# Patient Record
Sex: FEMALE | Race: WHITE | NOT HISPANIC OR LATINO | Employment: UNEMPLOYED | URBAN - NONMETROPOLITAN AREA
[De-identification: names, ages, dates, MRNs, and addresses within clinical notes are randomized per-mention and may not be internally consistent; named-entity substitution may affect disease eponyms.]

---

## 2017-03-23 ENCOUNTER — HOSPITAL ENCOUNTER (INPATIENT)
Facility: HOSPITAL | Age: 57
LOS: 1 days | Discharge: LEFT BEFORE DISCHARGE | End: 2017-03-24
Attending: EMERGENCY MEDICINE | Admitting: HOSPITALIST

## 2017-03-23 ENCOUNTER — APPOINTMENT (OUTPATIENT)
Dept: GENERAL RADIOLOGY | Facility: HOSPITAL | Age: 57
End: 2017-03-23

## 2017-03-23 DIAGNOSIS — N17.9 ACUTE RENAL FAILURE, UNSPECIFIED ACUTE RENAL FAILURE TYPE (HCC): ICD-10-CM

## 2017-03-23 DIAGNOSIS — N39.0 URINARY TRACT INFECTION, SITE UNSPECIFIED: ICD-10-CM

## 2017-03-23 DIAGNOSIS — F19.10 POLYSUBSTANCE ABUSE (HCC): ICD-10-CM

## 2017-03-23 DIAGNOSIS — E87.6 HYPOKALEMIA: ICD-10-CM

## 2017-03-23 DIAGNOSIS — I95.9 HYPOTENSION, UNSPECIFIED HYPOTENSION TYPE: Primary | ICD-10-CM

## 2017-03-23 PROBLEM — D75.839 THROMBOCYTOSIS: Status: ACTIVE | Noted: 2017-03-23

## 2017-03-23 PROBLEM — D72.829 LEUKOCYTOSIS: Status: ACTIVE | Noted: 2017-03-23

## 2017-03-23 PROBLEM — I10 ESSENTIAL HYPERTENSION: Status: ACTIVE | Noted: 2017-03-23

## 2017-03-23 PROBLEM — G40.909 SEIZURE DISORDER (HCC): Status: ACTIVE | Noted: 2017-03-23

## 2017-03-23 PROBLEM — F17.200 TOBACCO USE DISORDER: Status: ACTIVE | Noted: 2017-03-23

## 2017-03-23 PROBLEM — F32.A DEPRESSION: Status: ACTIVE | Noted: 2017-03-23

## 2017-03-23 PROBLEM — E11.9 TYPE 2 DIABETES MELLITUS (HCC): Status: ACTIVE | Noted: 2017-03-23

## 2017-03-23 PROBLEM — J44.9 COPD (CHRONIC OBSTRUCTIVE PULMONARY DISEASE) (HCC): Status: ACTIVE | Noted: 2017-03-23

## 2017-03-23 PROBLEM — E86.0 DEHYDRATION: Status: ACTIVE | Noted: 2017-03-23

## 2017-03-23 LAB
ALBUMIN SERPL-MCNC: 4.3 G/DL (ref 3.5–5)
ALBUMIN/GLOB SERPL: 1.1 G/DL (ref 1–2)
ALP SERPL-CCNC: 87 U/L (ref 38–126)
ALT SERPL W P-5'-P-CCNC: 15 U/L (ref 13–69)
AMPHET+METHAMPHET UR QL: NEGATIVE
AMPHETAMINES UR QL: NEGATIVE
ANION GAP SERPL CALCULATED.3IONS-SCNC: 17.1 MMOL/L
AST SERPL-CCNC: 14 U/L (ref 15–46)
B-HCG UR QL: NEGATIVE
BACTERIA UR QL AUTO: ABNORMAL /HPF
BARBITURATES UR QL SCN: NEGATIVE
BASOPHILS # BLD AUTO: 0.07 10*3/MM3 (ref 0–0.2)
BASOPHILS # BLD AUTO: 0.08 10*3/MM3 (ref 0–0.2)
BASOPHILS NFR BLD AUTO: 0.4 % (ref 0–2.5)
BASOPHILS NFR BLD AUTO: 0.4 % (ref 0–2.5)
BENZODIAZ UR QL SCN: POSITIVE
BILIRUB SERPL-MCNC: 0.5 MG/DL (ref 0.2–1.3)
BILIRUB UR QL STRIP: ABNORMAL
BUN BLD-MCNC: 33 MG/DL (ref 7–20)
BUN/CREAT SERPL: 12.2 (ref 7.1–23.5)
BUPRENORPHINE SERPL-MCNC: NEGATIVE NG/ML
CALCIUM SPEC-SCNC: 10.3 MG/DL (ref 8.4–10.2)
CANNABINOIDS SERPL QL: POSITIVE
CHLORIDE SERPL-SCNC: 103 MMOL/L (ref 98–107)
CLARITY UR: ABNORMAL
CO2 SERPL-SCNC: 19 MMOL/L (ref 26–30)
COCAINE UR QL: NEGATIVE
COLOR UR: ABNORMAL
CREAT BLD-MCNC: 2.7 MG/DL (ref 0.6–1.3)
D-LACTATE SERPL-SCNC: 0.7 MMOL/L (ref 0.5–2)
D-LACTATE SERPL-SCNC: 2.4 MMOL/L (ref 0.5–2)
DEPRECATED RDW RBC AUTO: 40.8 FL (ref 37–54)
DEPRECATED RDW RBC AUTO: 40.9 FL (ref 37–54)
EOSINOPHIL # BLD AUTO: 0.05 10*3/MM3 (ref 0–0.7)
EOSINOPHIL # BLD AUTO: 0.07 10*3/MM3 (ref 0–0.7)
EOSINOPHIL NFR BLD AUTO: 0.3 % (ref 0–7)
EOSINOPHIL NFR BLD AUTO: 0.4 % (ref 0–7)
ERYTHROCYTE [DISTWIDTH] IN BLOOD BY AUTOMATED COUNT: 14.3 % (ref 11.5–14.5)
ERYTHROCYTE [DISTWIDTH] IN BLOOD BY AUTOMATED COUNT: 14.3 % (ref 11.5–14.5)
ETHANOL BLD-MCNC: <10 MG/DL
ETHANOL UR QL: <0.01 %
GFR SERPL CREATININE-BSD FRML MDRD: 18 ML/MIN/1.73
GLOBULIN UR ELPH-MCNC: 3.8 GM/DL
GLUCOSE BLD-MCNC: 111 MG/DL (ref 74–98)
GLUCOSE UR STRIP-MCNC: NEGATIVE MG/DL
HCT VFR BLD AUTO: 38.3 % (ref 37–47)
HCT VFR BLD AUTO: 39 % (ref 37–47)
HGB BLD-MCNC: 13.9 G/DL (ref 12–16)
HGB BLD-MCNC: 14 G/DL (ref 12–16)
HGB UR QL STRIP.AUTO: ABNORMAL
HOLD SPECIMEN: NORMAL
HYALINE CASTS UR QL AUTO: ABNORMAL /LPF
IMM GRANULOCYTES # BLD: 0.2 10*3/MM3 (ref 0–0.06)
IMM GRANULOCYTES # BLD: 0.2 10*3/MM3 (ref 0–0.06)
IMM GRANULOCYTES NFR BLD: 1.1 % (ref 0–0.6)
IMM GRANULOCYTES NFR BLD: 1.1 % (ref 0–0.6)
KETONES UR QL STRIP: ABNORMAL
LEUKOCYTE ESTERASE UR QL STRIP.AUTO: ABNORMAL
LIPASE SERPL-CCNC: 547 U/L (ref 23–300)
LYMPHOCYTES # BLD AUTO: 3.65 10*3/MM3 (ref 0.6–3.4)
LYMPHOCYTES # BLD AUTO: 3.65 10*3/MM3 (ref 0.6–3.4)
LYMPHOCYTES NFR BLD AUTO: 19.4 % (ref 10–50)
LYMPHOCYTES NFR BLD AUTO: 19.5 % (ref 10–50)
MAGNESIUM SERPL-MCNC: 2.6 MG/DL (ref 1.6–2.3)
MCH RBC QN AUTO: 28.4 PG (ref 27–31)
MCH RBC QN AUTO: 28.7 PG (ref 27–31)
MCHC RBC AUTO-ENTMCNC: 35.9 G/DL (ref 30–37)
MCHC RBC AUTO-ENTMCNC: 36.3 G/DL (ref 30–37)
MCV RBC AUTO: 79 FL (ref 81–99)
MCV RBC AUTO: 79.1 FL (ref 81–99)
METHADONE UR QL SCN: NEGATIVE
MONOCYTES # BLD AUTO: 1.13 10*3/MM3 (ref 0–0.9)
MONOCYTES # BLD AUTO: 1.15 10*3/MM3 (ref 0–0.9)
MONOCYTES NFR BLD AUTO: 6 % (ref 0–12)
MONOCYTES NFR BLD AUTO: 6.1 % (ref 0–12)
NEUTROPHILS # BLD AUTO: 13.61 10*3/MM3 (ref 2–6.9)
NEUTROPHILS # BLD AUTO: 13.72 10*3/MM3 (ref 2–6.9)
NEUTROPHILS NFR BLD AUTO: 72.6 % (ref 37–80)
NEUTROPHILS NFR BLD AUTO: 72.7 % (ref 37–80)
NITRITE UR QL STRIP: NEGATIVE
NRBC BLD MANUAL-RTO: 0 /100 WBC (ref 0–0)
NRBC BLD MANUAL-RTO: 0 /100 WBC (ref 0–0)
OPIATES UR QL: NEGATIVE
OXYCODONE UR QL SCN: POSITIVE
PCP UR QL SCN: NEGATIVE
PH UR STRIP.AUTO: <=5 [PH] (ref 5–8)
PLATELET # BLD AUTO: 509 10*3/MM3 (ref 130–400)
PLATELET # BLD AUTO: 518 10*3/MM3 (ref 130–400)
PMV BLD AUTO: 9.1 FL (ref 6–12)
PMV BLD AUTO: 9.3 FL (ref 6–12)
POTASSIUM BLD-SCNC: 2.1 MMOL/L (ref 3.5–5.1)
PROPOXYPH UR QL: NEGATIVE
PROT SERPL-MCNC: 8.1 G/DL (ref 6.3–8.2)
PROT UR QL STRIP: ABNORMAL
RBC # BLD AUTO: 4.85 10*6/MM3 (ref 4.2–5.4)
RBC # BLD AUTO: 4.93 10*6/MM3 (ref 4.2–5.4)
RBC # UR: ABNORMAL /HPF
REF LAB TEST METHOD: ABNORMAL
SODIUM BLD-SCNC: 137 MMOL/L (ref 137–145)
SP GR UR STRIP: 1.02 (ref 1–1.03)
SQUAMOUS #/AREA URNS HPF: ABNORMAL /HPF
TRICYCLICS UR QL SCN: NEGATIVE
UROBILINOGEN UR QL STRIP: ABNORMAL
WBC CLUMPS # UR AUTO: ABNORMAL /HPF
WBC NRBC COR # BLD: 18.73 10*3/MM3 (ref 4.8–10.8)
WBC NRBC COR # BLD: 18.85 10*3/MM3 (ref 4.8–10.8)
WBC UR QL AUTO: ABNORMAL /HPF
WHOLE BLOOD HOLD SPECIMEN: NORMAL

## 2017-03-23 PROCEDURE — 25010000002 HEPARIN (PORCINE) 5000 UNIT/0.5ML SOLUTION: Performed by: HOSPITALIST

## 2017-03-23 PROCEDURE — 87086 URINE CULTURE/COLONY COUNT: CPT | Performed by: HOSPITALIST

## 2017-03-23 PROCEDURE — 87040 BLOOD CULTURE FOR BACTERIA: CPT | Performed by: HOSPITALIST

## 2017-03-23 PROCEDURE — 81025 URINE PREGNANCY TEST: CPT | Performed by: HOSPITALIST

## 2017-03-23 PROCEDURE — 83605 ASSAY OF LACTIC ACID: CPT | Performed by: EMERGENCY MEDICINE

## 2017-03-23 PROCEDURE — 81001 URINALYSIS AUTO W/SCOPE: CPT | Performed by: EMERGENCY MEDICINE

## 2017-03-23 PROCEDURE — 83735 ASSAY OF MAGNESIUM: CPT | Performed by: HOSPITALIST

## 2017-03-23 PROCEDURE — 80053 COMPREHEN METABOLIC PANEL: CPT | Performed by: EMERGENCY MEDICINE

## 2017-03-23 PROCEDURE — 80306 DRUG TEST PRSMV INSTRMNT: CPT | Performed by: EMERGENCY MEDICINE

## 2017-03-23 PROCEDURE — 71010 HC CHEST PA OR AP: CPT

## 2017-03-23 PROCEDURE — 87077 CULTURE AEROBIC IDENTIFY: CPT | Performed by: EMERGENCY MEDICINE

## 2017-03-23 PROCEDURE — 99222 1ST HOSP IP/OBS MODERATE 55: CPT | Performed by: HOSPITALIST

## 2017-03-23 PROCEDURE — 83690 ASSAY OF LIPASE: CPT | Performed by: EMERGENCY MEDICINE

## 2017-03-23 PROCEDURE — 80307 DRUG TEST PRSMV CHEM ANLYZR: CPT | Performed by: EMERGENCY MEDICINE

## 2017-03-23 PROCEDURE — 87186 SC STD MICRODIL/AGAR DIL: CPT | Performed by: EMERGENCY MEDICINE

## 2017-03-23 PROCEDURE — 87147 CULTURE TYPE IMMUNOLOGIC: CPT | Performed by: EMERGENCY MEDICINE

## 2017-03-23 PROCEDURE — 87186 SC STD MICRODIL/AGAR DIL: CPT | Performed by: HOSPITALIST

## 2017-03-23 PROCEDURE — 87086 URINE CULTURE/COLONY COUNT: CPT | Performed by: EMERGENCY MEDICINE

## 2017-03-23 PROCEDURE — 25010000003 POTASSIUM CHLORIDE 10 MEQ/100ML SOLUTION: Performed by: EMERGENCY MEDICINE

## 2017-03-23 PROCEDURE — 87040 BLOOD CULTURE FOR BACTERIA: CPT | Performed by: EMERGENCY MEDICINE

## 2017-03-23 PROCEDURE — 93005 ELECTROCARDIOGRAM TRACING: CPT | Performed by: EMERGENCY MEDICINE

## 2017-03-23 PROCEDURE — 99285 EMERGENCY DEPT VISIT HI MDM: CPT

## 2017-03-23 PROCEDURE — 87077 CULTURE AEROBIC IDENTIFY: CPT | Performed by: HOSPITALIST

## 2017-03-23 PROCEDURE — 85025 COMPLETE CBC W/AUTO DIFF WBC: CPT | Performed by: EMERGENCY MEDICINE

## 2017-03-23 PROCEDURE — 25010000002 ONDANSETRON PER 1 MG: Performed by: EMERGENCY MEDICINE

## 2017-03-23 PROCEDURE — 25010000002 CEFTRIAXONE: Performed by: EMERGENCY MEDICINE

## 2017-03-23 PROCEDURE — 87147 CULTURE TYPE IMMUNOLOGIC: CPT | Performed by: HOSPITALIST

## 2017-03-23 PROCEDURE — 87081 CULTURE SCREEN ONLY: CPT | Performed by: HOSPITALIST

## 2017-03-23 RX ORDER — HYDROXYZINE PAMOATE 25 MG/1
25 CAPSULE ORAL NIGHTLY
Status: DISCONTINUED | OUTPATIENT
Start: 2017-03-23 | End: 2017-03-24 | Stop reason: HOSPADM

## 2017-03-23 RX ORDER — OXYBUTYNIN CHLORIDE 5 MG/1
5 TABLET, EXTENDED RELEASE ORAL DAILY
Status: DISCONTINUED | OUTPATIENT
Start: 2017-03-23 | End: 2017-03-24 | Stop reason: HOSPADM

## 2017-03-23 RX ORDER — SODIUM CHLORIDE 0.9 % (FLUSH) 0.9 %
10 SYRINGE (ML) INJECTION AS NEEDED
Status: DISCONTINUED | OUTPATIENT
Start: 2017-03-23 | End: 2017-03-24 | Stop reason: HOSPADM

## 2017-03-23 RX ORDER — TIZANIDINE 4 MG/1
2 TABLET ORAL EVERY 12 HOURS SCHEDULED
Status: DISCONTINUED | OUTPATIENT
Start: 2017-03-23 | End: 2017-03-23

## 2017-03-23 RX ORDER — PANTOPRAZOLE SODIUM 40 MG/1
40 TABLET, DELAYED RELEASE ORAL
Status: DISCONTINUED | OUTPATIENT
Start: 2017-03-24 | End: 2017-03-24 | Stop reason: HOSPADM

## 2017-03-23 RX ORDER — ASPIRIN 81 MG/1
81 TABLET ORAL DAILY
Status: DISCONTINUED | OUTPATIENT
Start: 2017-03-23 | End: 2017-03-24 | Stop reason: HOSPADM

## 2017-03-23 RX ORDER — WHITE PETROLATUM 450 MG/G
1 STICK TOPICAL AS NEEDED
Status: DISCONTINUED | OUTPATIENT
Start: 2017-03-23 | End: 2017-03-24 | Stop reason: HOSPADM

## 2017-03-23 RX ORDER — SODIUM CHLORIDE 0.9 % (FLUSH) 0.9 %
1-10 SYRINGE (ML) INJECTION AS NEEDED
Status: DISCONTINUED | OUTPATIENT
Start: 2017-03-23 | End: 2017-03-24 | Stop reason: HOSPADM

## 2017-03-23 RX ORDER — POTASSIUM CHLORIDE 750 MG/1
40 CAPSULE, EXTENDED RELEASE ORAL ONCE
Status: COMPLETED | OUTPATIENT
Start: 2017-03-23 | End: 2017-03-23

## 2017-03-23 RX ORDER — LEVETIRACETAM 500 MG/1
500 TABLET ORAL 2 TIMES DAILY
COMMUNITY

## 2017-03-23 RX ORDER — LISINOPRIL 5 MG/1
5 TABLET ORAL DAILY
COMMUNITY
End: 2018-01-02

## 2017-03-23 RX ORDER — SODIUM CHLORIDE 9 MG/ML
150 INJECTION, SOLUTION INTRAVENOUS CONTINUOUS
Status: DISCONTINUED | OUTPATIENT
Start: 2017-03-23 | End: 2017-03-24 | Stop reason: HOSPADM

## 2017-03-23 RX ORDER — BUPRENORPHINE AND NALOXONE 2; .5 MG/1; MG/1
1 FILM, SOLUBLE BUCCAL; SUBLINGUAL DAILY
Status: DISCONTINUED | OUTPATIENT
Start: 2017-03-23 | End: 2017-03-23

## 2017-03-23 RX ORDER — GABAPENTIN 100 MG/1
200 CAPSULE ORAL EVERY 12 HOURS SCHEDULED
Status: DISCONTINUED | OUTPATIENT
Start: 2017-03-23 | End: 2017-03-23

## 2017-03-23 RX ORDER — OXYCODONE HYDROCHLORIDE AND ACETAMINOPHEN 5; 325 MG/1; MG/1
2 TABLET ORAL EVERY 8 HOURS PRN
Status: DISCONTINUED | OUTPATIENT
Start: 2017-03-23 | End: 2017-03-24 | Stop reason: HOSPADM

## 2017-03-23 RX ORDER — POTASSIUM CHLORIDE 7.45 MG/ML
10 INJECTION INTRAVENOUS ONCE
Status: COMPLETED | OUTPATIENT
Start: 2017-03-23 | End: 2017-03-23

## 2017-03-23 RX ORDER — ONDANSETRON 2 MG/ML
4 INJECTION INTRAMUSCULAR; INTRAVENOUS ONCE
Status: COMPLETED | OUTPATIENT
Start: 2017-03-23 | End: 2017-03-23

## 2017-03-23 RX ORDER — DEXTROSE MONOHYDRATE 25 G/50ML
25 INJECTION, SOLUTION INTRAVENOUS
Status: DISCONTINUED | OUTPATIENT
Start: 2017-03-23 | End: 2017-03-24 | Stop reason: HOSPADM

## 2017-03-23 RX ORDER — CITALOPRAM 20 MG/1
40 TABLET ORAL DAILY
Status: DISCONTINUED | OUTPATIENT
Start: 2017-03-23 | End: 2017-03-24 | Stop reason: HOSPADM

## 2017-03-23 RX ORDER — HEPARIN SODIUM 5000 [USP'U]/.5ML
5000 INJECTION, SOLUTION INTRAVENOUS; SUBCUTANEOUS EVERY 12 HOURS
Status: DISCONTINUED | OUTPATIENT
Start: 2017-03-23 | End: 2017-03-24 | Stop reason: HOSPADM

## 2017-03-23 RX ORDER — CETIRIZINE HYDROCHLORIDE 10 MG/1
5 TABLET ORAL DAILY
Status: DISCONTINUED | OUTPATIENT
Start: 2017-03-23 | End: 2017-03-24 | Stop reason: HOSPADM

## 2017-03-23 RX ORDER — FAMOTIDINE 20 MG/1
20 TABLET, FILM COATED ORAL 2 TIMES DAILY
Status: DISCONTINUED | OUTPATIENT
Start: 2017-03-23 | End: 2017-03-24 | Stop reason: HOSPADM

## 2017-03-23 RX ORDER — PREDNISONE 20 MG/1
20 TABLET ORAL DAILY
Refills: 0 | Status: ON HOLD | COMMUNITY
Start: 2017-01-02 | End: 2017-03-23

## 2017-03-23 RX ORDER — TRAZODONE HYDROCHLORIDE 50 MG/1
50 TABLET ORAL NIGHTLY
Status: DISCONTINUED | OUTPATIENT
Start: 2017-03-23 | End: 2017-03-24 | Stop reason: HOSPADM

## 2017-03-23 RX ORDER — BISACODYL 5 MG/1
5 TABLET, DELAYED RELEASE ORAL DAILY PRN
Status: DISCONTINUED | OUTPATIENT
Start: 2017-03-23 | End: 2017-03-24 | Stop reason: HOSPADM

## 2017-03-23 RX ADMIN — GABAPENTIN 200 MG: 100 CAPSULE ORAL at 13:02

## 2017-03-23 RX ADMIN — CITALOPRAM HYDROBROMIDE 40 MG: 20 TABLET, FILM COATED ORAL at 13:03

## 2017-03-23 RX ADMIN — HYDROXYZINE PAMOATE 25 MG: 25 CAPSULE ORAL at 21:28

## 2017-03-23 RX ADMIN — HEPARIN SODIUM 5000 UNITS: 5000 INJECTION, SOLUTION INTRAVENOUS; SUBCUTANEOUS at 13:01

## 2017-03-23 RX ADMIN — TIZANIDINE 2 MG: 4 TABLET ORAL at 13:02

## 2017-03-23 RX ADMIN — OXYCODONE HYDROCHLORIDE AND ACETAMINOPHEN 2 TABLET: 5; 325 TABLET ORAL at 13:02

## 2017-03-23 RX ADMIN — POTASSIUM CHLORIDE 10 MEQ: 10 INJECTION, SOLUTION INTRAVENOUS at 09:38

## 2017-03-23 RX ADMIN — SODIUM CHLORIDE 1000 ML: 9 INJECTION, SOLUTION INTRAVENOUS at 09:38

## 2017-03-23 RX ADMIN — POTASSIUM CHLORIDE 40 MEQ: 750 CAPSULE, EXTENDED RELEASE ORAL at 09:38

## 2017-03-23 RX ADMIN — CEFTRIAXONE 1 G: 1 INJECTION, POWDER, FOR SOLUTION INTRAMUSCULAR; INTRAVENOUS at 09:38

## 2017-03-23 RX ADMIN — CETIRIZINE HYDROCHLORIDE 10 MG: 10 TABLET, FILM COATED ORAL at 13:03

## 2017-03-23 RX ADMIN — FAMOTIDINE 20 MG: 20 TABLET, FILM COATED ORAL at 13:02

## 2017-03-23 RX ADMIN — OXYCODONE HYDROCHLORIDE AND ACETAMINOPHEN 2 TABLET: 5; 325 TABLET ORAL at 21:28

## 2017-03-23 RX ADMIN — FAMOTIDINE 20 MG: 20 TABLET, FILM COATED ORAL at 18:00

## 2017-03-23 RX ADMIN — SODIUM CHLORIDE 100 ML/HR: 9 INJECTION, SOLUTION INTRAVENOUS at 13:03

## 2017-03-23 RX ADMIN — ASPIRIN 81 MG: 81 TABLET, COATED ORAL at 13:03

## 2017-03-23 RX ADMIN — ONDANSETRON 4 MG: 2 INJECTION INTRAMUSCULAR; INTRAVENOUS at 08:13

## 2017-03-23 RX ADMIN — SODIUM CHLORIDE 1000 ML: 9 INJECTION, SOLUTION INTRAVENOUS at 07:30

## 2017-03-23 RX ADMIN — GABAPENTIN 200 MG: 100 CAPSULE ORAL at 21:28

## 2017-03-23 RX ADMIN — OXYBUTYNIN CHLORIDE 5 MG: 5 TABLET, EXTENDED RELEASE ORAL at 13:03

## 2017-03-24 VITALS
WEIGHT: 128.4 LBS | SYSTOLIC BLOOD PRESSURE: 98 MMHG | OXYGEN SATURATION: 100 % | TEMPERATURE: 99.1 F | DIASTOLIC BLOOD PRESSURE: 50 MMHG | HEIGHT: 60 IN | RESPIRATION RATE: 22 BRPM | BODY MASS INDEX: 25.21 KG/M2 | HEART RATE: 68 BPM

## 2017-03-24 LAB
ALBUMIN SERPL-MCNC: 3 G/DL (ref 3.5–5)
ALBUMIN/GLOB SERPL: 0.9 G/DL (ref 1–2)
ALP SERPL-CCNC: 72 U/L (ref 38–126)
ALT SERPL W P-5'-P-CCNC: 24 U/L (ref 13–69)
ANION GAP SERPL CALCULATED.3IONS-SCNC: 11.3 MMOL/L
AST SERPL-CCNC: 12 U/L (ref 15–46)
BASOPHILS # BLD AUTO: 0.08 10*3/MM3 (ref 0–0.2)
BASOPHILS NFR BLD AUTO: 0.6 % (ref 0–2.5)
BILIRUB SERPL-MCNC: 0.2 MG/DL (ref 0.2–1.3)
BUN BLD-MCNC: 20 MG/DL (ref 7–20)
BUN/CREAT SERPL: 22.2 (ref 7.1–23.5)
CALCIUM SPEC-SCNC: 9.4 MG/DL (ref 8.4–10.2)
CHLORIDE SERPL-SCNC: 113 MMOL/L (ref 98–107)
CO2 SERPL-SCNC: 19 MMOL/L (ref 26–30)
CREAT BLD-MCNC: 0.9 MG/DL (ref 0.6–1.3)
DEPRECATED RDW RBC AUTO: 43.3 FL (ref 37–54)
EOSINOPHIL # BLD AUTO: 0.21 10*3/MM3 (ref 0–0.7)
EOSINOPHIL NFR BLD AUTO: 1.5 % (ref 0–7)
ERYTHROCYTE [DISTWIDTH] IN BLOOD BY AUTOMATED COUNT: 14.6 % (ref 11.5–14.5)
GFR SERPL CREATININE-BSD FRML MDRD: 65 ML/MIN/1.73
GLOBULIN UR ELPH-MCNC: 3.3 GM/DL
GLUCOSE BLD-MCNC: 94 MG/DL (ref 74–98)
GLUCOSE BLDC GLUCOMTR-MCNC: 106 MG/DL (ref 70–130)
HBA1C MFR BLD: 5.7 % (ref 3–6)
HCT VFR BLD AUTO: 26.9 % (ref 37–47)
HGB BLD-MCNC: 9.6 G/DL (ref 12–16)
IMM GRANULOCYTES # BLD: 0.09 10*3/MM3 (ref 0–0.06)
IMM GRANULOCYTES NFR BLD: 0.7 % (ref 0–0.6)
IRON 24H UR-MRATE: 81 MCG/DL (ref 37–181)
IRON SATN MFR SERPL: 31 % (ref 11–46)
LYMPHOCYTES # BLD AUTO: 6.31 10*3/MM3 (ref 0.6–3.4)
LYMPHOCYTES NFR BLD AUTO: 46 % (ref 10–50)
MCH RBC QN AUTO: 28.8 PG (ref 27–31)
MCHC RBC AUTO-ENTMCNC: 35.7 G/DL (ref 30–37)
MCV RBC AUTO: 80.8 FL (ref 81–99)
MONOCYTES # BLD AUTO: 0.87 10*3/MM3 (ref 0–0.9)
MONOCYTES NFR BLD AUTO: 6.3 % (ref 0–12)
NEUTROPHILS # BLD AUTO: 6.15 10*3/MM3 (ref 2–6.9)
NEUTROPHILS NFR BLD AUTO: 44.9 % (ref 37–80)
NRBC BLD MANUAL-RTO: 0 /100 WBC (ref 0–0)
PLATELET # BLD AUTO: 344 10*3/MM3 (ref 130–400)
PMV BLD AUTO: 9.3 FL (ref 6–12)
POTASSIUM BLD-SCNC: 2.3 MMOL/L (ref 3.5–5.1)
PROT SERPL-MCNC: 6.3 G/DL (ref 6.3–8.2)
RBC # BLD AUTO: 3.33 10*6/MM3 (ref 4.2–5.4)
SODIUM BLD-SCNC: 141 MMOL/L (ref 137–145)
TIBC SERPL-MCNC: 260 MCG/DL (ref 261–497)
WBC NRBC COR # BLD: 13.71 10*3/MM3 (ref 4.8–10.8)

## 2017-03-24 PROCEDURE — 99221 1ST HOSP IP/OBS SF/LOW 40: CPT | Performed by: HOSPITALIST

## 2017-03-24 PROCEDURE — 83550 IRON BINDING TEST: CPT | Performed by: HOSPITALIST

## 2017-03-24 PROCEDURE — 83036 HEMOGLOBIN GLYCOSYLATED A1C: CPT | Performed by: HOSPITALIST

## 2017-03-24 PROCEDURE — 83540 ASSAY OF IRON: CPT | Performed by: HOSPITALIST

## 2017-03-24 PROCEDURE — 80053 COMPREHEN METABOLIC PANEL: CPT | Performed by: HOSPITALIST

## 2017-03-24 PROCEDURE — 25010000002 CEFTRIAXONE: Performed by: HOSPITALIST

## 2017-03-24 PROCEDURE — 82962 GLUCOSE BLOOD TEST: CPT

## 2017-03-24 PROCEDURE — 85025 COMPLETE CBC W/AUTO DIFF WBC: CPT | Performed by: HOSPITALIST

## 2017-03-24 PROCEDURE — 25010000002 HEPARIN (PORCINE) 5000 UNIT/0.5ML SOLUTION: Performed by: HOSPITALIST

## 2017-03-24 PROCEDURE — 25010000003 POTASSIUM CHLORIDE 10 MEQ/100ML SOLUTION: Performed by: INTERNAL MEDICINE

## 2017-03-24 RX ORDER — POTASSIUM CHLORIDE 750 MG/1
20 CAPSULE, EXTENDED RELEASE ORAL
Status: DISCONTINUED | OUTPATIENT
Start: 2017-03-24 | End: 2017-03-24 | Stop reason: HOSPADM

## 2017-03-24 RX ORDER — POTASSIUM CHLORIDE 7.45 MG/ML
10 INJECTION INTRAVENOUS EVERY 4 HOURS
Status: DISCONTINUED | OUTPATIENT
Start: 2017-03-24 | End: 2017-03-24

## 2017-03-24 RX ORDER — POTASSIUM CHLORIDE 7.45 MG/ML
10 INJECTION INTRAVENOUS
Status: DISCONTINUED | OUTPATIENT
Start: 2017-03-24 | End: 2017-03-24 | Stop reason: HOSPADM

## 2017-03-24 RX ADMIN — OXYBUTYNIN CHLORIDE 5 MG: 5 TABLET, EXTENDED RELEASE ORAL at 09:03

## 2017-03-24 RX ADMIN — HEPARIN SODIUM 5000 UNITS: 5000 INJECTION, SOLUTION INTRAVENOUS; SUBCUTANEOUS at 01:41

## 2017-03-24 RX ADMIN — CITALOPRAM HYDROBROMIDE 40 MG: 20 TABLET, FILM COATED ORAL at 09:03

## 2017-03-24 RX ADMIN — PANTOPRAZOLE SODIUM 40 MG: 40 TABLET, DELAYED RELEASE ORAL at 05:27

## 2017-03-24 RX ADMIN — CETIRIZINE HYDROCHLORIDE 5 MG: 10 TABLET, FILM COATED ORAL at 09:03

## 2017-03-24 RX ADMIN — FAMOTIDINE 20 MG: 20 TABLET, FILM COATED ORAL at 09:03

## 2017-03-24 RX ADMIN — Medication 1 EACH: at 09:03

## 2017-03-24 RX ADMIN — OXYCODONE HYDROCHLORIDE AND ACETAMINOPHEN 2 TABLET: 5; 325 TABLET ORAL at 05:27

## 2017-03-24 RX ADMIN — POTASSIUM CHLORIDE 10 MEQ: 10 INJECTION, SOLUTION INTRAVENOUS at 09:29

## 2017-03-24 RX ADMIN — CEFTRIAXONE 1 G: 1 INJECTION, POWDER, FOR SOLUTION INTRAMUSCULAR; INTRAVENOUS at 09:03

## 2017-03-24 RX ADMIN — ASPIRIN 81 MG: 81 TABLET, COATED ORAL at 09:03

## 2017-03-25 LAB
BACTERIA SPEC AEROBE CULT: ABNORMAL
BACTERIA SPEC AEROBE CULT: ABNORMAL

## 2017-03-26 LAB — MRSA SPEC QL CULT: NORMAL

## 2017-03-28 LAB
BACTERIA SPEC AEROBE CULT: NORMAL
BACTERIA SPEC AEROBE CULT: NORMAL

## 2017-04-25 ENCOUNTER — HOSPITAL ENCOUNTER (EMERGENCY)
Facility: HOSPITAL | Age: 57
End: 2017-04-25

## 2017-04-25 VITALS
DIASTOLIC BLOOD PRESSURE: 75 MMHG | TEMPERATURE: 98.3 F | RESPIRATION RATE: 18 BRPM | HEIGHT: 60 IN | HEART RATE: 77 BPM | WEIGHT: 125 LBS | OXYGEN SATURATION: 97 % | SYSTOLIC BLOOD PRESSURE: 130 MMHG | BODY MASS INDEX: 24.54 KG/M2

## 2017-04-28 ENCOUNTER — OFFICE VISIT (OUTPATIENT)
Dept: ORTHOPEDIC SURGERY | Facility: CLINIC | Age: 57
End: 2017-04-28

## 2017-04-28 VITALS — BODY MASS INDEX: 24.54 KG/M2 | HEIGHT: 60 IN | RESPIRATION RATE: 18 BRPM | WEIGHT: 125 LBS

## 2017-04-28 DIAGNOSIS — E11.42 TYPE 2 DIABETES MELLITUS WITH DIABETIC POLYNEUROPATHY, WITH LONG-TERM CURRENT USE OF INSULIN (HCC): ICD-10-CM

## 2017-04-28 DIAGNOSIS — Z79.4 TYPE 2 DIABETES MELLITUS WITH DIABETIC POLYNEUROPATHY, WITH LONG-TERM CURRENT USE OF INSULIN (HCC): ICD-10-CM

## 2017-04-28 DIAGNOSIS — M84.374A STRESS FRACTURE OF RIGHT FOOT, INITIAL ENCOUNTER: Primary | ICD-10-CM

## 2017-04-28 PROCEDURE — 99204 OFFICE O/P NEW MOD 45 MIN: CPT | Performed by: PODIATRIST

## 2017-04-28 RX ORDER — GABAPENTIN 600 MG/1
TABLET ORAL
Refills: 0 | COMMUNITY
Start: 2017-04-27 | End: 2017-07-17

## 2017-04-28 RX ORDER — LISINOPRIL 10 MG/1
10 TABLET ORAL DAILY
Refills: 0 | COMMUNITY
Start: 2017-04-24

## 2017-04-28 RX ORDER — MELOXICAM 7.5 MG/1
7.5 TABLET ORAL DAILY
Qty: 30 TABLET | Refills: 2 | Status: SHIPPED | OUTPATIENT
Start: 2017-04-28 | End: 2017-08-02

## 2017-04-28 NOTE — PROGRESS NOTES
Subjective   Patient ID: Charisma Cortez is a 56 y.o. female   Pain of the Right Foot  He was referred from the urgent care treatment center yesterday.  Patient states she fell down some steps back in March and the had not sought treatment until yesterday.  She says she fell down about 3 steps and was barefooted but had to keep going taking care people.  Complains of a constant pain since.  She complains of the crutches and also complains of the boot making her foot hurt worse.  She said she tried using an Ace wrap for but it made her worse as well.    History of Present Illness    Review of Systems   Constitutional: Positive for unexpected weight change. Negative for fever.   HENT: Positive for sore throat. Negative for voice change.    Eyes: Negative for visual disturbance.   Respiratory: Positive for shortness of breath.    Cardiovascular: Negative for chest pain.   Gastrointestinal: Positive for abdominal pain. Negative for abdominal distention.   Genitourinary: Negative for dysuria.   Musculoskeletal: Positive for arthralgias. Negative for gait problem and joint swelling.   Skin: Negative for rash.   Neurological: Positive for headaches. Negative for speech difficulty.   Hematological: Does not bruise/bleed easily.   Psychiatric/Behavioral: Negative for confusion.       Past Medical History:   Diagnosis Date   • Arthritis    • Cancer     skin   • Chest pain    • COPD (chronic obstructive pulmonary disease) 3/23/2017   • Depression 3/23/2017   • Diabetes mellitus    • Emphysema lung    • Epilepsy    • Headache    • Hypertension    • Liver disease    • Low back pain    • Lumbosacral disc disease    • Neurological disorder    • Osteoporosis    • Seizure disorder 3/23/2017        Past Surgical History:   Procedure Laterality Date   • APPENDECTOMY     • BREAST BIOPSY     •  SECTION  ,   • CHOLECYSTECTOMY     • COLON SURGERY     • CYST REMOVAL      brain, R front lobe - [facility]   •  HYSTERECTOMY     • RECONSTRUCTION URETHROPLASTY         Allergies   Allergen Reactions   • Sulfa Antibiotics Rash       @FH/SH reviewed@    Objective   Physical Exam   Constitutional: She is oriented to person, place, and time. She appears well-developed and well-nourished.   HENT:   Head: Normocephalic and atraumatic.   Eyes: EOM are normal. Pupils are equal, round, and reactive to light.   Neck: Normal range of motion.   Cardiovascular: Normal rate.    Pulmonary/Chest: Effort normal.   Abdominal: Soft. Bowel sounds are normal.   Musculoskeletal: Normal range of motion.   Neurological: She is alert and oriented to person, place, and time. She has normal reflexes.   Skin: Skin is warm.   Psychiatric: She has a normal mood and affect. Her behavior is normal. Judgment and thought content normal.     Ortho Exam  Ortho Exam  Right Lower extremity exam:  Vascular: Pedal pulses are palpable, no pedal hair is noted, trophic ecchymotic skin changes are noted to the foot and lower extremity.  Capillary refill time less than 5 seconds  Neuro: Sensations intact  Derm: Her skin is very thin and dry and hyperpigmented.  MSK: Planes of pain anytime I move her entire foot or ankle.  She complains of tenderness all across the top of the foot, dorsal foot, ankle.  She complains of pain everywhere and the entire right foot and ankle.  There is no way to definitively diagnose any exact pinpoint area of tenderness    Assessment/Plan right foot probable stress fractures  Independent Review of Radiographic Studies:    I reviewed x-rays taken in urgent care yesterday and do not see any definitive fracture line.  There is some sclerosis in the proximal second third and fourth metatarsal shafts in basis which could represent stress fractures.     Laboratory and Other Studies:     Medical Decision Making:        Procedures  [] No procedures were performed in office today.    Charisma was seen today for pain.    Diagnoses and all orders for  this visit:    Stress fracture of right foot, initial encounter    Type 2 diabetes mellitus with diabetic polyneuropathy, with long-term current use of insulin    Other orders  -     meloxicam (MOBIC) 7.5 MG tablet; Take 1 tablet by mouth Daily.        Recommendations/Plan:  I explained to her that I don't see any clearcut stress fracture but it sounds like she may have suffered some stress fractures and being that she's walking on for 6 weeks and not rested them there is continuing to bother her.  I recommend she wear the boot and she doesn't want to wear the boot.  She says it hurts worse.  I explained her that she doesn't need crutches but she asked about her knee scooter or walker or wheelchair.  I explained that she does not need those and she'll be fine walking in the boot.  I explained other alternative would be a cast but she does not need that.  I explained if she has a cast she would be a walk on it and she would need those offloading devices which again she does not need.  Ultimately it seems that she's probably pain seeking and requesting pain medication.  She is on Suboxone and multiple other medications.  She also has a history of a liver disease.  She takes multiple nerve altering medication secondary to neuropathy and pain as well.  I told her I would not give her narcotics and she became very upset and I plan to send meloxicam to the pharmacy but she says she's not to take it.  I recommend she wear the boot and she can follow-up in 2 or 3 weeks for follow-up x-rays if she wishes.  I instructed her on rice to help with the edema and we placed a Tubigrip on her inside her boot.    Return in about 3 weeks (around 5/19/2017).  Patient agreeable to call or return sooner for any concerns.

## 2017-07-10 DIAGNOSIS — Z09 FOLLOW-UP EXAM: Primary | ICD-10-CM

## 2017-07-14 ENCOUNTER — HOSPITAL ENCOUNTER (EMERGENCY)
Facility: HOSPITAL | Age: 57
Discharge: HOME | End: 2017-07-14
Attending: STUDENT IN AN ORGANIZED HEALTH CARE EDUCATION/TRAINING PROGRAM | Admitting: STUDENT IN AN ORGANIZED HEALTH CARE EDUCATION/TRAINING PROGRAM

## 2017-07-14 VITALS
OXYGEN SATURATION: 98 % | HEIGHT: 60 IN | TEMPERATURE: 98.9 F | WEIGHT: 130 LBS | DIASTOLIC BLOOD PRESSURE: 70 MMHG | SYSTOLIC BLOOD PRESSURE: 114 MMHG | RESPIRATION RATE: 18 BRPM | HEART RATE: 84 BPM | BODY MASS INDEX: 25.52 KG/M2

## 2017-07-14 DIAGNOSIS — T78.40XA ALLERGIC REACTION, INITIAL ENCOUNTER: Primary | ICD-10-CM

## 2017-07-14 DIAGNOSIS — L50.9 HIVES: ICD-10-CM

## 2017-07-14 PROCEDURE — 63710000001 DIPHENHYDRAMINE PER 50 MG: Performed by: PHYSICIAN ASSISTANT

## 2017-07-14 PROCEDURE — 99283 EMERGENCY DEPT VISIT LOW MDM: CPT

## 2017-07-14 PROCEDURE — 63710000001 PREDNISONE PER 5 MG: Performed by: PHYSICIAN ASSISTANT

## 2017-07-14 RX ORDER — DIPHENHYDRAMINE HCL 25 MG
25 CAPSULE ORAL ONCE
Status: COMPLETED | OUTPATIENT
Start: 2017-07-14 | End: 2017-07-14

## 2017-07-14 RX ORDER — DIPHENHYDRAMINE HCL 25 MG
25 TABLET ORAL EVERY 6 HOURS PRN
Qty: 30 TABLET | Refills: 0 | Status: SHIPPED | OUTPATIENT
Start: 2017-07-14 | End: 2017-08-02

## 2017-07-14 RX ORDER — METHYLPREDNISOLONE 4 MG/1
TABLET ORAL
Qty: 21 TABLET | Refills: 0 | Status: SHIPPED | OUTPATIENT
Start: 2017-07-14 | End: 2017-08-02

## 2017-07-14 RX ADMIN — DIPHENHYDRAMINE HYDROCHLORIDE 25 MG: 25 CAPSULE ORAL at 20:22

## 2017-07-14 RX ADMIN — PREDNISONE 60 MG: 50 TABLET ORAL at 20:22

## 2017-07-15 NOTE — ED PROVIDER NOTES
Subjective   HPI Comments: 56-year-old female presents with hives itching and a rash that started last night.  She stayed at her mother's house and states that she may be allergic to some the.  No difficulty breathing no swelling of her throat or tongue.  She is unsure what caused allergic reaction.  She states she is diabetic she is tolerating steroids in the past      History provided by:  Patient   used: No        Review of Systems   Skin:        Hives, rash   All other systems reviewed and are negative.      Past Medical History:   Diagnosis Date   • Arthritis    • Cancer     skin   • Chest pain    • COPD (chronic obstructive pulmonary disease) 3/23/2017   • Depression 3/23/2017   • Diabetes mellitus    • Emphysema lung    • Epilepsy    • Headache    • Hypertension    • Liver disease    • Low back pain    • Lumbosacral disc disease    • Neurological disorder    • Osteoporosis    • Seizure disorder 3/23/2017       Allergies   Allergen Reactions   • Keflex [Cephalexin] Hives   • Sulfa Antibiotics Rash       Past Surgical History:   Procedure Laterality Date   • APPENDECTOMY     • BRAIN SURGERY     • BREAST BIOPSY     •  SECTION     • CHOLECYSTECTOMY     • COLON SURGERY     • CYST REMOVAL      brain, R front lobe - [facility]   • DENTAL PROCEDURE     • HYSTERECTOMY     • RECONSTRUCTION URETHROPLASTY     • TUBAL ABDOMINAL LIGATION         Family History   Problem Relation Age of Onset   • Osteoarthritis Other    • Osteoporosis Other    • Heart disease Other    • Asthma Other    • COPD Other    • Ulcers Other    • Diabetes Other    • Cancer Other    • Hyperlipidemia Other    • Liver disease Other        Social History     Social History   • Marital status:      Spouse name: N/A   • Number of children: N/A   • Years of education: N/A     Social History Main Topics   • Smoking status: Current Every Day Smoker     Packs/day: 2.00     Types: Cigarettes   • Smokeless  tobacco: None   • Alcohol use No   • Drug use: No   • Sexual activity: Defer     Other Topics Concern   • None     Social History Narrative           Objective   Physical Exam   Constitutional: She is oriented to person, place, and time. She appears well-developed and well-nourished.   HENT:   Head: Normocephalic.   Eyes: EOM are normal. Pupils are equal, round, and reactive to light.   Neck: Normal range of motion. Neck supple.   Cardiovascular: Normal rate and regular rhythm.    Pulmonary/Chest: Effort normal and breath sounds normal.   Abdominal: Soft. Bowel sounds are normal.   Musculoskeletal: Normal range of motion.   Neurological: She is alert and oriented to person, place, and time. She has normal reflexes.   Skin: Skin is warm and dry.   Psychiatric: She has a normal mood and affect.   Nursing note and vitals reviewed.      Procedures         ED Course  ED Course                  MDM    Final diagnoses:   Allergic reaction, initial encounter   Hives            Loc Mason Jr., PA-C  07/14/17 2025

## 2017-07-17 ENCOUNTER — OFFICE VISIT (OUTPATIENT)
Dept: SURGERY | Facility: CLINIC | Age: 57
End: 2017-07-17

## 2017-07-17 VITALS
HEIGHT: 60 IN | TEMPERATURE: 98.2 F | BODY MASS INDEX: 24.94 KG/M2 | SYSTOLIC BLOOD PRESSURE: 120 MMHG | WEIGHT: 127 LBS | OXYGEN SATURATION: 97 % | DIASTOLIC BLOOD PRESSURE: 70 MMHG

## 2017-07-17 DIAGNOSIS — D17.1 LIPOMA OF TORSO: Primary | ICD-10-CM

## 2017-07-17 PROCEDURE — 99203 OFFICE O/P NEW LOW 30 MIN: CPT | Performed by: SURGERY

## 2017-07-17 RX ORDER — IBUPROFEN 800 MG/1
TABLET ORAL
COMMUNITY
End: 2017-08-02 | Stop reason: SDUPTHER

## 2017-07-17 RX ORDER — TIZANIDINE 4 MG/1
TABLET ORAL
COMMUNITY
End: 2017-07-17

## 2017-07-17 RX ORDER — HYDROXYZINE 50 MG/1
TABLET, FILM COATED ORAL
COMMUNITY
End: 2017-07-17

## 2017-07-17 NOTE — PROGRESS NOTES
Patient: Charisma Cortez    YOB: 1960    Date: 07/17/2017    Primary Care Provider: ANDREA Zhong    Reason for Consultation: Lesion    Chief complaint:   Chief Complaint   Patient presents with   • Cyst     cyst @ left buttock       Subjective .     History of present illness:  I saw the patient in the office  today as a consultation for evaluation and treatment cyst @ left buttock present for several years.  Patient denies redness or drainage.  She had the cyst removed in the past and has reoccurred. She complains of growth and tenderness.   Review of Systems   Constitutional: Negative for chills, fever and unexpected weight change.   HENT: Negative for hearing loss, trouble swallowing and voice change.    Eyes: Negative for visual disturbance.   Respiratory: Negative for apnea, cough, chest tightness, shortness of breath and wheezing.    Cardiovascular: Negative for chest pain, palpitations and leg swelling.   Gastrointestinal: Negative for abdominal distention, abdominal pain, anal bleeding, blood in stool, constipation, diarrhea, nausea, rectal pain and vomiting.   Endocrine: Negative for cold intolerance and heat intolerance.   Genitourinary: Negative for difficulty urinating, dysuria and flank pain.   Musculoskeletal: Negative for back pain and gait problem.   Skin: Negative for color change, rash and wound.   Neurological: Negative for dizziness, syncope, speech difficulty, weakness, light-headedness, numbness and headaches.   Hematological: Negative for adenopathy. Does not bruise/bleed easily.   Psychiatric/Behavioral: Negative for confusion. The patient is not nervous/anxious.        History:  Past Medical History:   Diagnosis Date   • Arthritis    • Cancer     skin   • Chest pain    • COPD (chronic obstructive pulmonary disease) 3/23/2017   • Depression 3/23/2017   • Diabetes mellitus    • Emphysema lung    • Epilepsy    • Headache    • Hypertension    • Liver disease    •  Low back pain    • Lumbosacral disc disease    • Neurological disorder    • Osteoporosis    • Seizure disorder 3/23/2017       Past Surgical History:   Procedure Laterality Date   • APPENDECTOMY     • BRAIN SURGERY     • BREAST BIOPSY     •  SECTION  ,   • CHOLECYSTECTOMY     • COLON SURGERY     • CYST REMOVAL      brain, R front lobe - [facility]   • DENTAL PROCEDURE     • HYSTERECTOMY     • RECONSTRUCTION URETHROPLASTY     • TUBAL ABDOMINAL LIGATION         Family History   Problem Relation Age of Onset   • Osteoarthritis Other    • Osteoporosis Other    • Heart disease Other    • Asthma Other    • COPD Other    • Ulcers Other    • Diabetes Other    • Cancer Other    • Hyperlipidemia Other    • Liver disease Other    • No Known Problems Mother    • No Known Problems Father    • No Known Problems Sister    • No Known Problems Brother        Social History   Substance Use Topics   • Smoking status: Current Every Day Smoker     Packs/day: 2.00     Types: Cigarettes   • Smokeless tobacco: None   • Alcohol use No        Allergies:  Allergies   Allergen Reactions   • Keflex [Cephalexin] Hives   • Sulfa Antibiotics Rash       Medications:    Current Outpatient Prescriptions:   •  acetaminophen-codeine (TYLENOL #3) 300-30 MG per tablet, Take 1 tablet by mouth Every 6 (Six) Hours As Needed for Moderate Pain (4-6)., Disp: 20 tablet, Rfl: 0  •  aspirin 81 MG EC tablet, TAKE 1 TABLET BY MOUTH EVERY DAY, Disp: , Rfl: 2  •  buprenorphine-naloxone (SUBOXONE) 8-2 MG per SL tablet, take 3 tablets under the tongue once daily, Disp: , Rfl: 0  •  cetirizine (ZyrTEC) 10 MG tablet, Take 10 mg by mouth daily., Disp: , Rfl:   •  citalopram (CeleXA) 40 MG tablet, TAKE 1 TABLET BY MOUTH EVERY DAY, Disp: , Rfl: 2  •  diphenhydrAMINE (BENADRYL) 25 MG tablet, Take 1 tablet by mouth Every 6 (Six) Hours As Needed for Itching., Disp: 30 tablet, Rfl: 0  •  gabapentin (NEURONTIN) 800 MG tablet, take 1 tablet by mouth four times  a day, Disp: , Rfl: 0  •  hydrochlorothiazide (HYDRODIURIL) 25 MG tablet, TAKE 1 TABLET BY MOUTH EVERY DAY, Disp: , Rfl: 0  •  hydrOXYzine (VISTARIL) 50 MG capsule, TAKE ONE CAPSULE BY MOUTH AT BEDTIME, Disp: , Rfl: 0  •  ibuprofen (ADVIL,MOTRIN) 800 MG tablet, 800 mg As Needed., Disp: , Rfl: 0  •  ibuprofen (ADVIL,MOTRIN) 800 MG tablet, Take  by mouth., Disp: , Rfl:   •  levETIRAcetam (KEPPRA) 500 MG tablet, Take 500 mg by mouth 2 (Two) Times a Day., Disp: , Rfl:   •  lisinopril (PRINIVIL,ZESTRIL) 10 MG tablet, , Disp: , Rfl: 0  •  lisinopril (PRINIVIL,ZESTRIL) 5 MG tablet, Take 5 mg by mouth Daily., Disp: , Rfl:   •  meloxicam (MOBIC) 7.5 MG tablet, Take 1 tablet by mouth Daily., Disp: 30 tablet, Rfl: 2  •  metFORMIN (GLUCOPHAGE) 500 MG tablet, TAKE 1 TABLET BY MOUTH EVERY DAY, Disp: , Rfl: 0  •  MethylPREDNISolone (MEDROL, MICHELLE,) 4 MG tablet, Take as directed on package instructions., Disp: 21 tablet, Rfl: 0  •  sertraline (ZOLOFT) 50 MG tablet, , Disp: , Rfl: 0  •  simvastatin (ZOCOR) 40 MG tablet, take 1 tablet by mouth once daily, Disp: , Rfl: 0  •  SPIRIVA HANDIHALER 18 MCG per inhalation capsule, INHALE CONTENTS OF 1 CAPSULE DAILY, Disp: , Rfl: 0  •  tiZANidine (ZANAFLEX) 4 MG tablet, TAKE 1 TABLET BY MOUTH EVERY 8 HOURS AS NEEDED, Disp: , Rfl: 0  •  traZODone (DESYREL) 100 MG tablet, TAKE 1 TABLET BY MOUTH AT BEDTIME, Disp: , Rfl: 1    Objective     Vital Signs:   Temp:  [98.2 °F (36.8 °C)] 98.2 °F (36.8 °C)  BP: (120)/(70) 120/70    Physical Exam:   General Appearance:    Alert, cooperative, in no acute distress   Head:    Normocephalic, without obvious abnormality, atraumatic   Eyes:            Lids and lashes normal, conjunctivae and sclerae normal, no   icterus, no pallor, corneas clear.   Ears:    Ears appear intact with no abnormalities noted   Throat:   No oral lesions, no thrush, oral mucosa moist   Neck:   No adenopathy, supple, trachea midline, no thyromegaly, no   carotid bruit.   Extremities:    Moves all extremities well, no edema, no cyanosis, no             Redness.    Pulses:   Pulses palpable and equal bilaterally   Skin:   No bleeding, bruising or rash. Lesion On left buttock, measures 6 cm.  Multiple and well-circumscribed    Lymph nodes:   No palpable adenopathy   Neurologic:   Cranial nerves 2 - 12 grossly intact, sensation intact.     Results Review:   I reviewed the patient's new clinical results.    Assessment/Plan     1. Lipoma of torso        I did have a detailed and extensive discussion with the patient in the hospital and they understand that they need to undergo excision of Left buttock lipoma. Full risks and benefits of operative versus nonoperative intervention were discussed with the patient and these include bleeding, infection and reccurrence. The patient understands, agrees, and wishes to proceed with the surgical treatment plan as mentioned above. The patient had no questions for me at the end of the discussion.       I discussed the patients findings and my recommendations with patient and consulting provider.    Electronically signed by Jennifer Galindo MD  07/17/17  1:04 PM

## 2017-08-02 RX ORDER — GABAPENTIN 600 MG/1
600 TABLET ORAL 3 TIMES DAILY
Status: ON HOLD | COMMUNITY
End: 2018-01-09

## 2017-08-07 ENCOUNTER — ANESTHESIA EVENT (OUTPATIENT)
Dept: PERIOP | Facility: HOSPITAL | Age: 57
End: 2017-08-07

## 2017-08-07 ENCOUNTER — HOSPITAL ENCOUNTER (OUTPATIENT)
Facility: HOSPITAL | Age: 57
Setting detail: HOSPITAL OUTPATIENT SURGERY
Discharge: HOME | End: 2017-08-07
Attending: SURGERY | Admitting: SURGERY

## 2017-08-07 ENCOUNTER — ANESTHESIA (OUTPATIENT)
Dept: PERIOP | Facility: HOSPITAL | Age: 57
End: 2017-08-07

## 2017-08-07 VITALS
DIASTOLIC BLOOD PRESSURE: 65 MMHG | SYSTOLIC BLOOD PRESSURE: 106 MMHG | RESPIRATION RATE: 18 BRPM | HEART RATE: 69 BPM | WEIGHT: 127 LBS | BODY MASS INDEX: 24.94 KG/M2 | OXYGEN SATURATION: 100 % | TEMPERATURE: 97.8 F | HEIGHT: 60 IN

## 2017-08-07 DIAGNOSIS — D17.1 LIPOMA OF TORSO: ICD-10-CM

## 2017-08-07 LAB — GLUCOSE BLDC GLUCOMTR-MCNC: 116 MG/DL (ref 70–130)

## 2017-08-07 PROCEDURE — 82962 GLUCOSE BLOOD TEST: CPT

## 2017-08-07 PROCEDURE — 27043 EXC HIP PELVIS LES SC 3 CM/>: CPT | Performed by: SURGERY

## 2017-08-07 PROCEDURE — 25010000002 PROPOFOL 10 MG/ML EMULSION: Performed by: NURSE ANESTHETIST, CERTIFIED REGISTERED

## 2017-08-07 PROCEDURE — 25010000002 FENTANYL CITRATE (PF) 100 MCG/2ML SOLUTION: Performed by: NURSE ANESTHETIST, CERTIFIED REGISTERED

## 2017-08-07 PROCEDURE — 25010000002 PROPOFOL 1000 MG/ML EMULSION: Performed by: NURSE ANESTHETIST, CERTIFIED REGISTERED

## 2017-08-07 RX ORDER — HYDROCODONE BITARTRATE AND ACETAMINOPHEN 5; 325 MG/1; MG/1
1-2 TABLET ORAL EVERY 4 HOURS PRN
Qty: 16 TABLET | Refills: 0 | Status: ON HOLD | OUTPATIENT
Start: 2017-08-07 | End: 2018-01-02

## 2017-08-07 RX ORDER — LIDOCAINE HYDROCHLORIDE 10 MG/ML
INJECTION, SOLUTION INFILTRATION; PERINEURAL
Status: DISCONTINUED
Start: 2017-08-07 | End: 2017-08-07 | Stop reason: HOSPADM

## 2017-08-07 RX ORDER — HYDROCODONE BITARTRATE AND ACETAMINOPHEN 5; 325 MG/1; MG/1
1 TABLET ORAL ONCE AS NEEDED
Status: CANCELLED | OUTPATIENT
Start: 2017-08-07 | End: 2017-08-17

## 2017-08-07 RX ORDER — BUPIVACAINE HYDROCHLORIDE 5 MG/ML
INJECTION, SOLUTION EPIDURAL; INTRACAUDAL AS NEEDED
Status: DISCONTINUED | OUTPATIENT
Start: 2017-08-07 | End: 2017-08-07 | Stop reason: HOSPADM

## 2017-08-07 RX ORDER — BUPIVACAINE HYDROCHLORIDE 5 MG/ML
INJECTION, SOLUTION EPIDURAL; INTRACAUDAL
Status: DISCONTINUED
Start: 2017-08-07 | End: 2017-08-07 | Stop reason: HOSPADM

## 2017-08-07 RX ORDER — SODIUM CHLORIDE 0.9 % (FLUSH) 0.9 %
3 SYRINGE (ML) INJECTION AS NEEDED
Status: DISCONTINUED | OUTPATIENT
Start: 2017-08-07 | End: 2017-08-07 | Stop reason: HOSPADM

## 2017-08-07 RX ORDER — HYDROCODONE BITARTRATE AND ACETAMINOPHEN 5; 325 MG/1; MG/1
TABLET ORAL
Status: COMPLETED
Start: 2017-08-07 | End: 2017-08-07

## 2017-08-07 RX ORDER — OMEPRAZOLE 20 MG/1
20 CAPSULE, DELAYED RELEASE ORAL DAILY
COMMUNITY

## 2017-08-07 RX ORDER — LIDOCAINE HYDROCHLORIDE 10 MG/ML
INJECTION, SOLUTION INFILTRATION; PERINEURAL AS NEEDED
Status: DISCONTINUED | OUTPATIENT
Start: 2017-08-07 | End: 2017-08-07 | Stop reason: HOSPADM

## 2017-08-07 RX ORDER — KETAMINE HYDROCHLORIDE 50 MG/ML
INJECTION, SOLUTION, CONCENTRATE INTRAMUSCULAR; INTRAVENOUS AS NEEDED
Status: DISCONTINUED | OUTPATIENT
Start: 2017-08-07 | End: 2017-08-07 | Stop reason: SURG

## 2017-08-07 RX ORDER — FENTANYL CITRATE 50 UG/ML
INJECTION, SOLUTION INTRAMUSCULAR; INTRAVENOUS AS NEEDED
Status: DISCONTINUED | OUTPATIENT
Start: 2017-08-07 | End: 2017-08-07 | Stop reason: SURG

## 2017-08-07 RX ORDER — SODIUM CHLORIDE, SODIUM LACTATE, POTASSIUM CHLORIDE, CALCIUM CHLORIDE 600; 310; 30; 20 MG/100ML; MG/100ML; MG/100ML; MG/100ML
1000 INJECTION, SOLUTION INTRAVENOUS CONTINUOUS PRN
Status: DISCONTINUED | OUTPATIENT
Start: 2017-08-07 | End: 2017-08-07 | Stop reason: HOSPADM

## 2017-08-07 RX ORDER — CLINDAMYCIN PHOSPHATE 900 MG/50ML
900 INJECTION, SOLUTION INTRAVENOUS ONCE
Status: COMPLETED | OUTPATIENT
Start: 2017-08-07 | End: 2017-08-07

## 2017-08-07 RX ORDER — MAGNESIUM HYDROXIDE 1200 MG/15ML
LIQUID ORAL AS NEEDED
Status: DISCONTINUED | OUTPATIENT
Start: 2017-08-07 | End: 2017-08-07 | Stop reason: HOSPADM

## 2017-08-07 RX ORDER — PROPOFOL 10 MG/ML
VIAL (ML) INTRAVENOUS AS NEEDED
Status: DISCONTINUED | OUTPATIENT
Start: 2017-08-07 | End: 2017-08-07 | Stop reason: SURG

## 2017-08-07 RX ADMIN — HYDROCODONE BITARTRATE AND ACETAMINOPHEN 1 TABLET: 5; 325 TABLET ORAL at 08:38

## 2017-08-07 RX ADMIN — SODIUM CHLORIDE, POTASSIUM CHLORIDE, SODIUM LACTATE AND CALCIUM CHLORIDE: 600; 310; 30; 20 INJECTION, SOLUTION INTRAVENOUS at 07:27

## 2017-08-07 RX ADMIN — PROPOFOL 120 MCG/KG/MIN: 10 INJECTION, EMULSION INTRAVENOUS at 07:31

## 2017-08-07 RX ADMIN — Medication 100 MCG: at 07:37

## 2017-08-07 RX ADMIN — CLINDAMYCIN PHOSPHATE 900 MG: 900 INJECTION, SOLUTION INTRAVENOUS at 07:27

## 2017-08-07 RX ADMIN — FENTANYL CITRATE 25 MCG: 50 INJECTION, SOLUTION INTRAMUSCULAR; INTRAVENOUS at 07:30

## 2017-08-07 RX ADMIN — PROPOFOL 30 MG: 10 INJECTION, EMULSION INTRAVENOUS at 07:32

## 2017-08-07 RX ADMIN — SODIUM CHLORIDE, POTASSIUM CHLORIDE, SODIUM LACTATE AND CALCIUM CHLORIDE 1000 ML: 600; 310; 30; 20 INJECTION, SOLUTION INTRAVENOUS at 06:35

## 2017-08-07 RX ADMIN — HYDROCODONE BITARTRATE AND ACETAMINOPHEN 1 TABLET: 5; 325 TABLET ORAL at 08:09

## 2017-08-07 RX ADMIN — PROPOFOL 50 MG: 10 INJECTION, EMULSION INTRAVENOUS at 07:29

## 2017-08-07 RX ADMIN — KETAMINE HYDROCHLORIDE 10 MG: 50 INJECTION, SOLUTION INTRAMUSCULAR; INTRAVENOUS at 07:31

## 2017-08-07 NOTE — NURSING NOTE
0750  Pt recvd from OR with left buttocks covaderm cdi.    0850  Pt d/c home with covaderm to buttocks cdi.

## 2017-08-07 NOTE — PLAN OF CARE
Problem: Perioperative Period (Adult)  Goal: Signs and Symptoms of Listed Potential Problems Will be Absent or Manageable (Perioperative Period)  Outcome: Ongoing (interventions implemented as appropriate)    08/07/17 0619   Perioperative Period   Problems Assessed (Perioperative Period) all   Problems Present (Perioperative Period) situational response

## 2017-08-07 NOTE — INTERVAL H&P NOTE
H&P updated. The patient was examined and the following changes are noted:  Lungs-clear heart-regular rate and rhythm without murmur

## 2017-08-07 NOTE — ANESTHESIA PREPROCEDURE EVALUATION
Anesthesia Evaluation     Patient summary reviewed and Nursing notes reviewed   NPO Solid Status: > 8 hours  NPO Liquid Status: > 8 hours     Airway   Mallampati: I  TM distance: >3 FB  Neck ROM: full  no difficulty expected  Dental    (+) edentulous    Pulmonary - normal exam    breath sounds clear to auscultation  (+) a smoker Current, COPD,   Cardiovascular - normal exam    Rhythm: regular  Rate: normal    (+) hypertension,       Neuro/Psych  (+) seizures well controlled, headaches, psychiatric history Anxiety and Depression,    GI/Hepatic/Renal/Endo    (+)  hepatitis, liver disease, diabetes mellitus type 2,     Musculoskeletal     Abdominal    Substance History      OB/GYN          Other   (+) arthritis                                     Anesthesia Plan    ASA 3     MAC     intravenous induction   Anesthetic plan and risks discussed with patient.

## 2017-08-07 NOTE — OP NOTE
PATIENT:    Charisma Cortez    DATE OF SURGERY:  8/7/2017    PHYSICIAN:    Jennifer Galindo MD    REFERRING PHYSICIAN:  Jennifer Galindo MD    YOB: 1960    PREOPERATIVE DIAGNOSIS: 8 cm left buttock mass    POSTOPERATIVE DIAGNOSIS:  Same    PROCEDURE:  Excision 8 cm left buttock intramuscular lipoma with layered closure    INDICATIONS:  The patient was sent to me as a consultation by Jennifer Galindo MD for evaluation and treatment of a history significant for left buttock mass. They are here now today for excision    ANESTHESIA:  Sedation with local Anesthesia     OPERATIVE PROCEDURE:  The patient was taken to the operating room, placed in the supine position, and given sedation with local anesthesia.  They were prepped and draped in the normal sterile fashion.  They did receive preoperative IV antibiotics.  The nursing staff did perform intraoperative timeout prior to the incision.    Lidocaine injected left buttock region.  Incision made and large 8 cm intramuscular mass was removed completely.  The wound was closed in 2 layers and dressing applied.  EBL was 5 cc.  Complications none    The patient was stable at this point in time and subsequently transferred back to the recovery room in stable condition.       Jennifer Galindo MD  8/7/2017  7:47 AM

## 2017-08-07 NOTE — ANESTHESIA POSTPROCEDURE EVALUATION
Patient: Charisma Cortez    Procedure Summary     Date Anesthesia Start Anesthesia Stop Room / Location    08/07/17 0727 0746 [facility] OR 4 /  [facility]       Procedure Diagnosis Surgeon Provider    EXCISION LEFT BUTTOCKS MASS (Left Buttocks) Lipoma of torso  (Lipoma of torso [D17.1]) MD Rodo Steiner CRNA          Anesthesia Type: MAC  Last vitals  BP        Temp        Pulse       Resp        SpO2          Post Anesthesia Care and Evaluation    Patient location during evaluation: PACU  Patient participation: complete - patient participated  Level of consciousness: awake and alert  Pain score: 0  Pain management: satisfactory to patient  Airway patency: patent  Anesthetic complications: No anesthetic complications  PONV Status: none  Cardiovascular status: acceptable and hemodynamically stable  Respiratory status: acceptable  Hydration status: acceptable

## 2017-08-08 ENCOUNTER — TELEPHONE (OUTPATIENT)
Dept: SURGERY | Facility: CLINIC | Age: 57
End: 2017-08-08

## 2017-08-08 NOTE — TELEPHONE ENCOUNTER
Patient had an excision of a left buttock cyst on Monday she was prescribed 16 [place] 5's Patient is requesting more today.  Per Dr. CASTREJON patient should not need more pain medication at this time.  She can implement Ibuprofen in bewteen doses, avoid sitting on buttock for a couple of days.  Patient denies fever, drainage or nausea.  She wants to see Dr. CASTREJON tomorrow

## 2017-08-09 ENCOUNTER — OFFICE VISIT (OUTPATIENT)
Dept: SURGERY | Facility: CLINIC | Age: 57
End: 2017-08-09

## 2017-08-09 VITALS
BODY MASS INDEX: 24.94 KG/M2 | HEIGHT: 60 IN | HEART RATE: 70 BPM | WEIGHT: 127 LBS | SYSTOLIC BLOOD PRESSURE: 110 MMHG | TEMPERATURE: 98.6 F | DIASTOLIC BLOOD PRESSURE: 68 MMHG

## 2017-08-09 DIAGNOSIS — Z48.89 POSTOPERATIVE VISIT: Primary | ICD-10-CM

## 2017-08-09 PROCEDURE — 99024 POSTOP FOLLOW-UP VISIT: CPT | Performed by: SURGERY

## 2017-08-09 RX ORDER — TROSPIUM CHLORIDE ER 60 MG/1
CAPSULE ORAL
Refills: 0 | Status: ON HOLD | COMMUNITY
Start: 2017-08-08 | End: 2018-01-02

## 2017-08-09 NOTE — PROGRESS NOTES
Patient: Charisma Cortez    YOB: 1960    Date: 08/09/2017    Primary Care Provider: ANDREA Zhong    Reason for Consultation: Follow-up lesion excision    Chief Complaint:   Chief Complaint   Patient presents with   • Post-op     Post op excision buttock mass       History of present illness:  I saw the patient in the office today as a followup from their recent lesion excision on the buttock.  They state that they have done well but is still having a lot of pain.Patient appears to be on chronic pain medications, appears to have pain seeking personality.  Patient became very hostile and I discussed that she had a very small lipoma removed, did not need further pain medication.  She takes 16 pills in less than 24 hours.  She has an appointment with pain clinic in 2 weeks.  I instructed patient that she did not require more pain medication I could not give her more, she is very upset about that and felt like she needed pain medication to function.  I encouraged her to talk to her family physician and possibly move up appointment with pain management.    Vital Signs   Temp:  [98.6 °F (37 °C)] 98.6 °F (37 °C)  Heart Rate:  [70] 70  BP: (110)/(68) 110/68    Physical Exam:   General Appearance:    Alert, cooperative, in no acute distress   Abdomen:     no masses, no organomegaly, soft non-tender, non-distended, no guarding, wounds are well healed   Chest:      Clear toausculation       Assessment / Plan:    1. Postoperative visit        I did discuss the situation with the patient today in the office and they have done well from their recent lesion excision, I don't think that the patient needs any More pain medication from my standpoint.  Patient told to take ibuprofen as needed.  Follow-up in 7 days to remove sutures.    Electronically signed by Jennifer Galindo MD  08/09/17        Scribed for Jennifer Galindo MD by Michelle Milligan. 8/9/2017  10:48 AM

## 2017-08-11 LAB
LAB AP CASE REPORT: NORMAL
Lab: NORMAL
PATH REPORT.FINAL DX SPEC: NORMAL

## 2018-01-02 ENCOUNTER — APPOINTMENT (OUTPATIENT)
Dept: CT IMAGING | Facility: HOSPITAL | Age: 58
End: 2018-01-02

## 2018-01-02 ENCOUNTER — APPOINTMENT (OUTPATIENT)
Dept: ULTRASOUND IMAGING | Facility: HOSPITAL | Age: 58
End: 2018-01-02

## 2018-01-02 ENCOUNTER — APPOINTMENT (OUTPATIENT)
Dept: GENERAL RADIOLOGY | Facility: HOSPITAL | Age: 58
End: 2018-01-02

## 2018-01-02 ENCOUNTER — HOSPITAL ENCOUNTER (INPATIENT)
Facility: HOSPITAL | Age: 58
LOS: 7 days | Discharge: HOME | End: 2018-01-09
Attending: EMERGENCY MEDICINE | Admitting: INTERNAL MEDICINE

## 2018-01-02 DIAGNOSIS — N18.9 ACUTE RENAL FAILURE SUPERIMPOSED ON CHRONIC KIDNEY DISEASE, UNSPECIFIED CKD STAGE, UNSPECIFIED ACUTE RENAL FAILURE TYPE (HCC): ICD-10-CM

## 2018-01-02 DIAGNOSIS — N17.9 ACUTE RENAL FAILURE SUPERIMPOSED ON CHRONIC KIDNEY DISEASE, UNSPECIFIED CKD STAGE, UNSPECIFIED ACUTE RENAL FAILURE TYPE (HCC): ICD-10-CM

## 2018-01-02 DIAGNOSIS — N39.0 URINARY TRACT INFECTION WITH HEMATURIA, SITE UNSPECIFIED: ICD-10-CM

## 2018-01-02 DIAGNOSIS — J18.9 COMMUNITY ACQUIRED PNEUMONIA OF LEFT LOWER LOBE OF LUNG: ICD-10-CM

## 2018-01-02 DIAGNOSIS — R31.9 URINARY TRACT INFECTION WITH HEMATURIA, SITE UNSPECIFIED: ICD-10-CM

## 2018-01-02 DIAGNOSIS — A41.9 SEPSIS, DUE TO UNSPECIFIED ORGANISM: Primary | ICD-10-CM

## 2018-01-02 DIAGNOSIS — A41.9 SEPTIC SHOCK (HCC): ICD-10-CM

## 2018-01-02 DIAGNOSIS — J18.9 COMMUNITY ACQUIRED PNEUMONIA OF RIGHT LOWER LOBE OF LUNG: ICD-10-CM

## 2018-01-02 DIAGNOSIS — R40.2413 GLASGOW COMA SCALE TOTAL SCORE 13-15, AT HOSPITAL ADMISSION: ICD-10-CM

## 2018-01-02 DIAGNOSIS — N17.9 ACUTE RENAL FAILURE, UNSPECIFIED ACUTE RENAL FAILURE TYPE (HCC): ICD-10-CM

## 2018-01-02 DIAGNOSIS — R65.21 SEPTIC SHOCK (HCC): ICD-10-CM

## 2018-01-02 DIAGNOSIS — M62.82 NON-TRAUMATIC RHABDOMYOLYSIS: ICD-10-CM

## 2018-01-02 PROBLEM — E87.20 METABOLIC ACIDOSIS: Status: ACTIVE | Noted: 2018-01-02

## 2018-01-02 PROBLEM — T50.901A DRUG OVERDOSE: Status: ACTIVE | Noted: 2018-01-02

## 2018-01-02 PROBLEM — G93.40 ENCEPHALOPATHY: Status: ACTIVE | Noted: 2018-01-02

## 2018-01-02 LAB
ALBUMIN SERPL-MCNC: 3.3 G/DL (ref 3.2–4.8)
ALBUMIN SERPL-MCNC: 3.5 G/DL (ref 3.2–4.8)
ALBUMIN SERPL-MCNC: 3.5 G/DL (ref 3.2–4.8)
ALBUMIN/GLOB SERPL: 1.2 G/DL (ref 1.5–2.5)
ALBUMIN/GLOB SERPL: 1.2 G/DL (ref 1.5–2.5)
ALP SERPL-CCNC: 120 U/L (ref 25–100)
ALP SERPL-CCNC: 120 U/L (ref 25–100)
ALT SERPL W P-5'-P-CCNC: 27 U/L (ref 7–40)
ALT SERPL W P-5'-P-CCNC: 32 U/L (ref 7–40)
AMORPH URATE CRY URNS QL MICRO: ABNORMAL /HPF
AMPHET+METHAMPHET UR QL: NEGATIVE
AMPHETAMINES UR QL: NEGATIVE
ANION GAP SERPL CALCULATED.3IONS-SCNC: 13 MMOL/L (ref 3–11)
ANION GAP SERPL CALCULATED.3IONS-SCNC: ABNORMAL MMOL/L (ref 3–11)
ANION GAP SERPL CALCULATED.3IONS-SCNC: ABNORMAL MMOL/L (ref 3–11)
APAP SERPL-MCNC: <10 MCG/ML (ref 0–30)
APTT PPP: 31 SECONDS (ref 24–31)
ARTERIAL PATENCY WRIST A: NORMAL
AST SERPL-CCNC: 53 U/L (ref 0–33)
AST SERPL-CCNC: 67 U/L (ref 0–33)
ATMOSPHERIC PRESS: 760 MMHG
ATMOSPHERIC PRESS: ABNORMAL MMHG
B-HCG UR QL: NEGATIVE
BACTERIA UR QL AUTO: ABNORMAL /HPF
BACTERIA UR QL AUTO: ABNORMAL /HPF
BARBITURATES UR QL SCN: NEGATIVE
BASE EXCESS BLDA CALC-SCNC: -19.9 MMOL/L
BASE EXCESS BLDV CALC-SCNC: -16.8 MMOL/L (ref -2–2)
BASOPHILS # BLD AUTO: 0.03 10*3/MM3 (ref 0–0.2)
BASOPHILS NFR BLD AUTO: 0.1 % (ref 0–1)
BDY SITE: ABNORMAL
BDY SITE: NORMAL
BENZODIAZ UR QL SCN: NEGATIVE
BILIRUB SERPL-MCNC: 0 MG/DL (ref 0.3–1.2)
BILIRUB SERPL-MCNC: 0 MG/DL (ref 0.3–1.2)
BILIRUB UR QL STRIP: ABNORMAL
BILIRUB UR QL STRIP: ABNORMAL
BILIRUB UR QL STRIP: NEGATIVE
BUN BLD-MCNC: 73 MG/DL (ref 9–23)
BUN BLD-MCNC: 75 MG/DL (ref 9–23)
BUN BLD-MCNC: 80 MG/DL (ref 9–23)
BUN/CREAT SERPL: 10.1 (ref 7–25)
BUN/CREAT SERPL: 8.5 (ref 7–25)
BUN/CREAT SERPL: 9.5 (ref 7–25)
BUPRENORPHINE SERPL-MCNC: NEGATIVE NG/ML
CA-I SERPL ISE-MCNC: 1.2 MMOL/L (ref 1.12–1.32)
CALCIUM SPEC-SCNC: 7.9 MG/DL (ref 8.7–10.4)
CALCIUM SPEC-SCNC: 8.2 MG/DL (ref 8.7–10.4)
CALCIUM SPEC-SCNC: 8.4 MG/DL (ref 8.7–10.4)
CANNABINOIDS SERPL QL: NEGATIVE
CHLORIDE SERPL-SCNC: 116 MMOL/L (ref 99–109)
CHLORIDE SERPL-SCNC: 117 MMOL/L (ref 99–109)
CHLORIDE SERPL-SCNC: 118 MMOL/L (ref 99–109)
CK MB SERPL-CCNC: 60.44 NG/ML (ref 0–5)
CK MB SERPL-CCNC: 86.16 NG/ML (ref 0–5)
CK MB SERPL-RTO: 2.3 % (ref 0–3)
CK MB SERPL-RTO: 2.4 % (ref 0–3)
CK SERPL-CCNC: 2498 U/L (ref 26–174)
CK SERPL-CCNC: 2593 U/L (ref 26–174)
CK SERPL-CCNC: 3581 U/L (ref 26–174)
CLARITY UR: ABNORMAL
CO2 BLDA-SCNC: 13.6 MMOL/L (ref 22–33)
CO2 SERPL-SCNC: 11 MMOL/L (ref 20–31)
CO2 SERPL-SCNC: <10 MMOL/L (ref 20–31)
CO2 SERPL-SCNC: <10 MMOL/L (ref 20–31)
COCAINE UR QL: NEGATIVE
COHGB MFR BLD: 1.3 %
COHGB MFR BLD: 1.5 % (ref 0–2)
COLOR UR: YELLOW
CREAT BLD-MCNC: 7.4 MG/DL (ref 0.6–1.3)
CREAT BLD-MCNC: 8.4 MG/DL (ref 0.6–1.3)
CREAT BLD-MCNC: 8.6 MG/DL (ref 0.6–1.3)
CREAT UR-MCNC: 75.7 MG/DL
CRP SERPL-MCNC: 19.92 MG/DL (ref 0–1)
D-LACTATE SERPL-SCNC: 0.9 MMOL/L (ref 0.5–2)
DEPRECATED RDW RBC AUTO: 53.4 FL (ref 37–54)
EOSINOPHIL # BLD AUTO: 0.04 10*3/MM3 (ref 0–0.3)
EOSINOPHIL NFR BLD AUTO: 0.2 % (ref 0–3)
ERYTHROCYTE [DISTWIDTH] IN BLOOD BY AUTOMATED COUNT: 15.7 % (ref 11.3–14.5)
ETHANOL BLD-MCNC: <10 MG/DL (ref 0–10)
FLUAV SUBTYP SPEC NAA+PROBE: NOT DETECTED
FLUBV RNA ISLT QL NAA+PROBE: NOT DETECTED
GFR SERPL CREATININE-BSD FRML MDRD: 5 ML/MIN/1.73
GFR SERPL CREATININE-BSD FRML MDRD: 5 ML/MIN/1.73
GFR SERPL CREATININE-BSD FRML MDRD: 6 ML/MIN/1.73
GLOBULIN UR ELPH-MCNC: 2.9 GM/DL
GLOBULIN UR ELPH-MCNC: 2.9 GM/DL
GLUCOSE BLD-MCNC: 102 MG/DL (ref 70–100)
GLUCOSE BLD-MCNC: 130 MG/DL (ref 70–100)
GLUCOSE BLD-MCNC: 90 MG/DL (ref 70–100)
GLUCOSE BLDC GLUCOMTR-MCNC: 121 MG/DL (ref 70–130)
GLUCOSE BLDC GLUCOMTR-MCNC: 185 MG/DL (ref 70–130)
GLUCOSE UR STRIP-MCNC: NEGATIVE MG/DL
HCO3 BLDA-SCNC: 8.9 MMOL/L
HCO3 BLDV-SCNC: 12.3 MMOL/L (ref 22–28)
HCT VFR BLD AUTO: 32 % (ref 34.5–44)
HCT VFR BLD CALC: 28.9 %
HGB BLD-MCNC: 10 G/DL (ref 11.5–15.5)
HGB BLDA-MCNC: 10.1 G/DL (ref 14–18)
HGB BLDA-MCNC: 9.4 G/DL
HGB UR QL STRIP.AUTO: ABNORMAL
HOLD SPECIMEN: NORMAL
HOLD SPECIMEN: NORMAL
HOROWITZ INDEX BLD+IHG-RTO: 37 %
HYALINE CASTS UR QL AUTO: ABNORMAL /LPF
HYALINE CASTS UR QL AUTO: ABNORMAL /LPF
IMM GRANULOCYTES # BLD: 0.11 10*3/MM3 (ref 0–0.03)
IMM GRANULOCYTES NFR BLD: 0.5 % (ref 0–0.6)
INR PPP: 1.12
KETONES UR QL STRIP: ABNORMAL
KETONES UR QL STRIP: ABNORMAL
KETONES UR QL STRIP: NEGATIVE
LDH SERPL-CCNC: 304 U/L (ref 120–246)
LEUKOCYTE ESTERASE UR QL STRIP.AUTO: ABNORMAL
LYMPHOCYTES # BLD AUTO: 2.12 10*3/MM3 (ref 0.6–4.8)
LYMPHOCYTES NFR BLD AUTO: 8.8 % (ref 24–44)
MAGNESIUM SERPL-MCNC: 1.9 MG/DL (ref 1.3–2.7)
MCH RBC QN AUTO: 29.3 PG (ref 27–31)
MCHC RBC AUTO-ENTMCNC: 31.3 G/DL (ref 32–36)
MCV RBC AUTO: 93.8 FL (ref 80–99)
METHADONE UR QL SCN: NEGATIVE
METHGB BLD QL: 1 % (ref 0–1.5)
MODALITY: ABNORMAL
MODALITY: NORMAL
MONOCYTES # BLD AUTO: 2.26 10*3/MM3 (ref 0–1)
MONOCYTES NFR BLD AUTO: 9.3 % (ref 0–12)
NEUTROPHILS # BLD AUTO: 19.64 10*3/MM3 (ref 1.5–8.3)
NEUTROPHILS NFR BLD AUTO: 81.1 % (ref 41–71)
NITRITE UR QL STRIP: NEGATIVE
OPIATES UR QL: POSITIVE
OXYCODONE UR QL SCN: NEGATIVE
OXYHGB MFR BLDV: 67.7 % (ref 94–99)
OXYHGB MFR BLDV: 94.9 % (ref 94–99)
PCO2 BLDA: 31 MM HG
PCO2 BLDV: 41.6 MM HG (ref 41–51)
PCP UR QL SCN: NEGATIVE
PH BLDA: 7.07 PH UNITS
PH BLDV: 7.08 PH UNITS (ref 7.25–7.5)
PH UR STRIP.AUTO: 5.5 [PH] (ref 5–8)
PH UR STRIP.AUTO: 5.5 [PH] (ref 5–8)
PH UR STRIP.AUTO: 6 [PH] (ref 5–8)
PHOSPHATE SERPL-MCNC: 6.9 MG/DL (ref 2.4–5.1)
PHOSPHATE SERPL-MCNC: 7.5 MG/DL (ref 2.4–5.1)
PLATELET # BLD AUTO: 387 10*3/MM3 (ref 150–450)
PMV BLD AUTO: 10.3 FL (ref 6–12)
PO2 BLDA: 88.4 MM HG
PO2 BLDV: 37.8 MM HG (ref 27–53)
POTASSIUM BLD-SCNC: 4.6 MMOL/L (ref 3.5–5.5)
POTASSIUM BLD-SCNC: 5.1 MMOL/L (ref 3.5–5.5)
POTASSIUM BLD-SCNC: 5.5 MMOL/L (ref 3.5–5.5)
PROCALCITONIN SERPL-MCNC: 3.71 NG/ML
PROPOXYPH UR QL: NEGATIVE
PROT SERPL-MCNC: 6.4 G/DL (ref 5.7–8.2)
PROT SERPL-MCNC: 6.4 G/DL (ref 5.7–8.2)
PROT UR QL STRIP: ABNORMAL
PROT UR-MCNC: 66 MG/DL (ref 1–14)
PROTHROMBIN TIME: 12.3 SECONDS (ref 9.6–11.5)
RBC # BLD AUTO: 3.41 10*6/MM3 (ref 3.89–5.14)
RBC # UR: ABNORMAL /HPF
RBC # UR: ABNORMAL /HPF
REF LAB TEST METHOD: ABNORMAL
REF LAB TEST METHOD: ABNORMAL
SALICYLATES SERPL-MCNC: <1 MG/DL (ref 0–29)
SODIUM BLD-SCNC: 139 MMOL/L (ref 132–146)
SODIUM BLD-SCNC: 139 MMOL/L (ref 132–146)
SODIUM BLD-SCNC: 140 MMOL/L (ref 132–146)
SP GR UR STRIP: 1.01 (ref 1–1.03)
SP GR UR STRIP: 1.02 (ref 1–1.03)
SP GR UR STRIP: 1.02 (ref 1–1.03)
SQUAMOUS #/AREA URNS HPF: ABNORMAL /HPF
SQUAMOUS #/AREA URNS HPF: ABNORMAL /HPF
TRICYCLICS UR QL SCN: NEGATIVE
TROPONIN I SERPL-MCNC: 0.04 NG/ML
URINE MYOGLOBIN, QUALITATIVE: POSITIVE
UROBILINOGEN UR QL STRIP: ABNORMAL
WBC NRBC COR # BLD: 24.2 10*3/MM3 (ref 3.5–10.8)
WBC UR QL AUTO: ABNORMAL /HPF
WBC UR QL AUTO: ABNORMAL /HPF
WHOLE BLOOD HOLD SPECIMEN: NORMAL
WHOLE BLOOD HOLD SPECIMEN: NORMAL

## 2018-01-02 PROCEDURE — 82805 BLOOD GASES W/O2 SATURATION: CPT | Performed by: EMERGENCY MEDICINE

## 2018-01-02 PROCEDURE — 80306 DRUG TEST PRSMV INSTRMNT: CPT | Performed by: EMERGENCY MEDICINE

## 2018-01-02 PROCEDURE — 83874 ASSAY OF MYOGLOBIN: CPT | Performed by: INTERNAL MEDICINE

## 2018-01-02 PROCEDURE — 87186 SC STD MICRODIL/AGAR DIL: CPT | Performed by: EMERGENCY MEDICINE

## 2018-01-02 PROCEDURE — 36415 COLL VENOUS BLD VENIPUNCTURE: CPT

## 2018-01-02 PROCEDURE — 94799 UNLISTED PULMONARY SVC/PX: CPT

## 2018-01-02 PROCEDURE — 80307 DRUG TEST PRSMV CHEM ANLYZR: CPT | Performed by: EMERGENCY MEDICINE

## 2018-01-02 PROCEDURE — 86160 COMPLEMENT ANTIGEN: CPT | Performed by: INTERNAL MEDICINE

## 2018-01-02 PROCEDURE — 87186 SC STD MICRODIL/AGAR DIL: CPT | Performed by: NURSE PRACTITIONER

## 2018-01-02 PROCEDURE — 82570 ASSAY OF URINE CREATININE: CPT | Performed by: INTERNAL MEDICINE

## 2018-01-02 PROCEDURE — 71250 CT THORAX DX C-: CPT

## 2018-01-02 PROCEDURE — 87077 CULTURE AEROBIC IDENTIFY: CPT | Performed by: EMERGENCY MEDICINE

## 2018-01-02 PROCEDURE — 70450 CT HEAD/BRAIN W/O DYE: CPT

## 2018-01-02 PROCEDURE — 87077 CULTURE AEROBIC IDENTIFY: CPT | Performed by: INTERNAL MEDICINE

## 2018-01-02 PROCEDURE — 82553 CREATINE MB FRACTION: CPT | Performed by: INTERNAL MEDICINE

## 2018-01-02 PROCEDURE — 81001 URINALYSIS AUTO W/SCOPE: CPT | Performed by: NURSE PRACTITIONER

## 2018-01-02 PROCEDURE — 84145 PROCALCITONIN (PCT): CPT | Performed by: INTERNAL MEDICINE

## 2018-01-02 PROCEDURE — 93005 ELECTROCARDIOGRAM TRACING: CPT | Performed by: EMERGENCY MEDICINE

## 2018-01-02 PROCEDURE — 25010000002 HEPARIN (PORCINE) PER 1000 UNITS: Performed by: INTERNAL MEDICINE

## 2018-01-02 PROCEDURE — 81025 URINE PREGNANCY TEST: CPT | Performed by: EMERGENCY MEDICINE

## 2018-01-02 PROCEDURE — 82962 GLUCOSE BLOOD TEST: CPT

## 2018-01-02 PROCEDURE — 05HM33Z INSERTION OF INFUSION DEVICE INTO RIGHT INTERNAL JUGULAR VEIN, PERCUTANEOUS APPROACH: ICD-10-PCS | Performed by: INTERNAL MEDICINE

## 2018-01-02 PROCEDURE — 83615 LACTATE (LD) (LDH) ENZYME: CPT | Performed by: INTERNAL MEDICINE

## 2018-01-02 PROCEDURE — 71045 X-RAY EXAM CHEST 1 VIEW: CPT

## 2018-01-02 PROCEDURE — 94760 N-INVAS EAR/PLS OXIMETRY 1: CPT

## 2018-01-02 PROCEDURE — 63710000001 INSULIN REGULAR HUMAN PER 5 UNITS: Performed by: INTERNAL MEDICINE

## 2018-01-02 PROCEDURE — 87040 BLOOD CULTURE FOR BACTERIA: CPT | Performed by: EMERGENCY MEDICINE

## 2018-01-02 PROCEDURE — 25010000002 LEVOFLOXACIN PER 250 MG: Performed by: EMERGENCY MEDICINE

## 2018-01-02 PROCEDURE — 94640 AIRWAY INHALATION TREATMENT: CPT

## 2018-01-02 PROCEDURE — 83874 ASSAY OF MYOGLOBIN: CPT | Performed by: NURSE PRACTITIONER

## 2018-01-02 PROCEDURE — 80053 COMPREHEN METABOLIC PANEL: CPT | Performed by: EMERGENCY MEDICINE

## 2018-01-02 PROCEDURE — 82550 ASSAY OF CK (CPK): CPT | Performed by: INTERNAL MEDICINE

## 2018-01-02 PROCEDURE — 99291 CRITICAL CARE FIRST HOUR: CPT | Performed by: INTERNAL MEDICINE

## 2018-01-02 PROCEDURE — 36556 INSERT NON-TUNNEL CV CATH: CPT | Performed by: NURSE PRACTITIONER

## 2018-01-02 PROCEDURE — 84156 ASSAY OF PROTEIN URINE: CPT | Performed by: INTERNAL MEDICINE

## 2018-01-02 PROCEDURE — 25010000002 CEFTRIAXONE PER 250 MG: Performed by: EMERGENCY MEDICINE

## 2018-01-02 PROCEDURE — 36600 WITHDRAWAL OF ARTERIAL BLOOD: CPT | Performed by: EMERGENCY MEDICINE

## 2018-01-02 PROCEDURE — 87077 CULTURE AEROBIC IDENTIFY: CPT | Performed by: NURSE PRACTITIONER

## 2018-01-02 PROCEDURE — 84100 ASSAY OF PHOSPHORUS: CPT | Performed by: EMERGENCY MEDICINE

## 2018-01-02 PROCEDURE — 36600 WITHDRAWAL OF ARTERIAL BLOOD: CPT | Performed by: INTERNAL MEDICINE

## 2018-01-02 PROCEDURE — 86140 C-REACTIVE PROTEIN: CPT | Performed by: EMERGENCY MEDICINE

## 2018-01-02 PROCEDURE — 25010000002 PIPERACILLIN SOD-TAZOBACTAM PER 1 G: Performed by: INTERNAL MEDICINE

## 2018-01-02 PROCEDURE — 82805 BLOOD GASES W/O2 SATURATION: CPT | Performed by: NURSE PRACTITIONER

## 2018-01-02 PROCEDURE — 83605 ASSAY OF LACTIC ACID: CPT | Performed by: EMERGENCY MEDICINE

## 2018-01-02 PROCEDURE — 87086 URINE CULTURE/COLONY COUNT: CPT | Performed by: EMERGENCY MEDICINE

## 2018-01-02 PROCEDURE — 81001 URINALYSIS AUTO W/SCOPE: CPT | Performed by: EMERGENCY MEDICINE

## 2018-01-02 PROCEDURE — 87502 INFLUENZA DNA AMP PROBE: CPT | Performed by: NURSE PRACTITIONER

## 2018-01-02 PROCEDURE — 87186 SC STD MICRODIL/AGAR DIL: CPT | Performed by: INTERNAL MEDICINE

## 2018-01-02 PROCEDURE — 84484 ASSAY OF TROPONIN QUANT: CPT | Performed by: INTERNAL MEDICINE

## 2018-01-02 PROCEDURE — 25010000002 VANCOMYCIN 10 G RECONSTITUTED SOLUTION: Performed by: INTERNAL MEDICINE

## 2018-01-02 PROCEDURE — 74176 CT ABD & PELVIS W/O CONTRAST: CPT

## 2018-01-02 PROCEDURE — 80053 COMPREHEN METABOLIC PANEL: CPT | Performed by: INTERNAL MEDICINE

## 2018-01-02 PROCEDURE — 99285 EMERGENCY DEPT VISIT HI MDM: CPT

## 2018-01-02 PROCEDURE — 25010000002 LEVETIRACETAM IN NACL 0.82% 500 MG/100ML SOLUTION: Performed by: INTERNAL MEDICINE

## 2018-01-02 PROCEDURE — 76775 US EXAM ABDO BACK WALL LIM: CPT

## 2018-01-02 PROCEDURE — 83735 ASSAY OF MAGNESIUM: CPT | Performed by: EMERGENCY MEDICINE

## 2018-01-02 PROCEDURE — 85730 THROMBOPLASTIN TIME PARTIAL: CPT | Performed by: EMERGENCY MEDICINE

## 2018-01-02 PROCEDURE — 80069 RENAL FUNCTION PANEL: CPT | Performed by: INTERNAL MEDICINE

## 2018-01-02 PROCEDURE — 87086 URINE CULTURE/COLONY COUNT: CPT | Performed by: INTERNAL MEDICINE

## 2018-01-02 PROCEDURE — 81003 URINALYSIS AUTO W/O SCOPE: CPT | Performed by: INTERNAL MEDICINE

## 2018-01-02 PROCEDURE — 85610 PROTHROMBIN TIME: CPT | Performed by: EMERGENCY MEDICINE

## 2018-01-02 PROCEDURE — 85025 COMPLETE CBC W/AUTO DIFF WBC: CPT | Performed by: EMERGENCY MEDICINE

## 2018-01-02 PROCEDURE — 82330 ASSAY OF CALCIUM: CPT | Performed by: EMERGENCY MEDICINE

## 2018-01-02 PROCEDURE — 82805 BLOOD GASES W/O2 SATURATION: CPT | Performed by: INTERNAL MEDICINE

## 2018-01-02 PROCEDURE — 94660 CPAP INITIATION&MGMT: CPT

## 2018-01-02 PROCEDURE — 82550 ASSAY OF CK (CPK): CPT | Performed by: EMERGENCY MEDICINE

## 2018-01-02 PROCEDURE — 87086 URINE CULTURE/COLONY COUNT: CPT | Performed by: NURSE PRACTITIONER

## 2018-01-02 RX ORDER — FAMOTIDINE 20 MG/1
20 TABLET, FILM COATED ORAL DAILY
Status: DISCONTINUED | OUTPATIENT
Start: 2018-01-02 | End: 2018-01-03

## 2018-01-02 RX ORDER — SODIUM CHLORIDE 9 MG/ML
150 INJECTION, SOLUTION INTRAVENOUS CONTINUOUS
Status: DISCONTINUED | OUTPATIENT
Start: 2018-01-02 | End: 2018-01-02

## 2018-01-02 RX ORDER — LEVOFLOXACIN 5 MG/ML
750 INJECTION, SOLUTION INTRAVENOUS ONCE
Status: COMPLETED | OUTPATIENT
Start: 2018-01-02 | End: 2018-01-02

## 2018-01-02 RX ORDER — CEFTRIAXONE SODIUM 1 G/50ML
1 INJECTION, SOLUTION INTRAVENOUS ONCE
Status: COMPLETED | OUTPATIENT
Start: 2018-01-02 | End: 2018-01-02

## 2018-01-02 RX ORDER — LEVETIRACETAM 5 MG/ML
500 INJECTION INTRAVASCULAR EVERY 12 HOURS SCHEDULED
Status: DISCONTINUED | OUTPATIENT
Start: 2018-01-02 | End: 2018-01-04

## 2018-01-02 RX ORDER — SODIUM CHLORIDE 0.9 % (FLUSH) 0.9 %
10 SYRINGE (ML) INJECTION AS NEEDED
Status: DISCONTINUED | OUTPATIENT
Start: 2018-01-02 | End: 2018-01-02

## 2018-01-02 RX ORDER — IPRATROPIUM BROMIDE AND ALBUTEROL SULFATE 2.5; .5 MG/3ML; MG/3ML
3 SOLUTION RESPIRATORY (INHALATION)
Status: DISCONTINUED | OUTPATIENT
Start: 2018-01-02 | End: 2018-01-06

## 2018-01-02 RX ORDER — ONDANSETRON 2 MG/ML
4 INJECTION INTRAMUSCULAR; INTRAVENOUS EVERY 6 HOURS PRN
Status: DISCONTINUED | OUTPATIENT
Start: 2018-01-02 | End: 2018-01-09 | Stop reason: HOSPADM

## 2018-01-02 RX ORDER — TROSPIUM CHLORIDE ER 60 MG/1
60 CAPSULE ORAL EVERY MORNING
COMMUNITY
End: 2018-01-09 | Stop reason: HOSPADM

## 2018-01-02 RX ORDER — ASPIRIN 81 MG/1
81 TABLET ORAL DAILY
COMMUNITY

## 2018-01-02 RX ORDER — LEVETIRACETAM 500 MG/1
500 TABLET ORAL EVERY 12 HOURS SCHEDULED
Status: DISCONTINUED | OUTPATIENT
Start: 2018-01-02 | End: 2018-01-02

## 2018-01-02 RX ORDER — SODIUM CHLORIDE 0.9 % (FLUSH) 0.9 %
1-10 SYRINGE (ML) INJECTION AS NEEDED
Status: DISCONTINUED | OUTPATIENT
Start: 2018-01-02 | End: 2018-01-02

## 2018-01-02 RX ORDER — SODIUM CHLORIDE, SODIUM LACTATE, POTASSIUM CHLORIDE, CALCIUM CHLORIDE 600; 310; 30; 20 MG/100ML; MG/100ML; MG/100ML; MG/100ML
150 INJECTION, SOLUTION INTRAVENOUS CONTINUOUS
Status: DISCONTINUED | OUTPATIENT
Start: 2018-01-02 | End: 2018-01-02

## 2018-01-02 RX ORDER — ACETAMINOPHEN 325 MG/1
650 TABLET ORAL EVERY 4 HOURS PRN
Status: DISCONTINUED | OUTPATIENT
Start: 2018-01-02 | End: 2018-01-09 | Stop reason: HOSPADM

## 2018-01-02 RX ORDER — HYDROCODONE BITARTRATE AND ACETAMINOPHEN 7.5; 325 MG/1; MG/1
1 TABLET ORAL 2 TIMES DAILY PRN
COMMUNITY
End: 2018-08-06

## 2018-01-02 RX ORDER — HEPARIN SODIUM 5000 [USP'U]/ML
5000 INJECTION, SOLUTION INTRAVENOUS; SUBCUTANEOUS EVERY 12 HOURS SCHEDULED
Status: DISCONTINUED | OUTPATIENT
Start: 2018-01-02 | End: 2018-01-09 | Stop reason: HOSPADM

## 2018-01-02 RX ORDER — TRAZODONE HYDROCHLORIDE 100 MG/1
100 TABLET ORAL NIGHTLY
COMMUNITY

## 2018-01-02 RX ADMIN — IPRATROPIUM BROMIDE AND ALBUTEROL SULFATE 3 ML: .5; 3 SOLUTION RESPIRATORY (INHALATION) at 20:30

## 2018-01-02 RX ADMIN — SODIUM BICARBONATE 100 ML/HR: 84 INJECTION, SOLUTION INTRAVENOUS at 17:37

## 2018-01-02 RX ADMIN — CEFTRIAXONE SODIUM 1 G: 1 INJECTION, SOLUTION INTRAVENOUS at 14:01

## 2018-01-02 RX ADMIN — TAZOBACTAM SODIUM AND PIPERACILLIN SODIUM 4.5 G: 500; 4 INJECTION, SOLUTION INTRAVENOUS at 17:32

## 2018-01-02 RX ADMIN — SODIUM CHLORIDE 1836 ML: 9 INJECTION, SOLUTION INTRAVENOUS at 11:27

## 2018-01-02 RX ADMIN — INSULIN HUMAN 2 UNITS: 100 INJECTION, SOLUTION PARENTERAL at 23:41

## 2018-01-02 RX ADMIN — NOREPINEPHRINE BITARTRATE 0.02 MCG/KG/MIN: 1 INJECTION, SOLUTION, CONCENTRATE INTRAVENOUS at 14:05

## 2018-01-02 RX ADMIN — IPRATROPIUM BROMIDE AND ALBUTEROL SULFATE 3 ML: .5; 3 SOLUTION RESPIRATORY (INHALATION) at 16:29

## 2018-01-02 RX ADMIN — SODIUM CHLORIDE 2000 ML: 9 INJECTION, SOLUTION INTRAVENOUS at 14:27

## 2018-01-02 RX ADMIN — LEVOFLOXACIN 750 MG: 5 INJECTION, SOLUTION INTRAVENOUS at 18:15

## 2018-01-02 RX ADMIN — HEPARIN SODIUM 5000 UNITS: 5000 INJECTION, SOLUTION INTRAVENOUS; SUBCUTANEOUS at 20:22

## 2018-01-02 RX ADMIN — VANCOMYCIN HYDROCHLORIDE 1250 MG: 10 INJECTION, POWDER, LYOPHILIZED, FOR SOLUTION INTRAVENOUS at 17:30

## 2018-01-02 RX ADMIN — LEVETIRACETAM 500 MG: 5 INJECTION INTRAVENOUS at 20:22

## 2018-01-02 NOTE — ED PROVIDER NOTES
Subjective   HPI Comments:   57-year-old female presents via EMS for evaluation of altered mental status.  The patient is a limited historian at this time.  It is unknown at what time the patient was last known normal.  Per EMS, the patient was found with decreased responsiveness at home by her  this morning.  EMS was called.  The patient has a history of prior narcotic overdoses.  On EMS arrival, the patient was noted be hypoxemic with pinpoint pupils and was given Narcan 2 mg IV with significant improvement in her mental status.  Her GCS increased from 10 to 13.  However, she was noted to be hypotensive and tachycardic and told EMS that she no longer wanted to live.  She was subsequently brought to the ED for evaluation.    Patient is a 57 y.o. female presenting with altered mental status.   History provided by:  EMS personnel and patient  History limited by:  Mental status change  Altered Mental Status   Presenting symptoms: confusion, disorientation and partial responsiveness    Severity:  Severe  Most recent episode:  Today  Timing:  Constant  Progression:  Unchanged  Chronicity:  New  Context: drug use    Context comment:  Injected herion  Associated symptoms: abdominal pain, agitation and suicidal behavior        Review of Systems   Unable to perform ROS: Mental status change   Gastrointestinal: Positive for abdominal pain.   Psychiatric/Behavioral: Positive for agitation, confusion, self-injury and suicidal ideas.       Past Medical History:   Diagnosis Date   • Anxiety    • Arthritis    • Cancer     skin   • Cataracts, bilateral    • Chest pain    • COPD (chronic obstructive pulmonary disease) 3/23/2017   • Depression 3/23/2017   • Diabetes mellitus    • Emphysema lung    • Epilepsy     LAST SEIZURE 2/2017   • Headache    • High cholesterol    • History of brain shunt 1983   • Hypertension    • Liver disease    • Low back pain    • Lumbosacral disc disease    • BAEZ (nonalcoholic steatohepatitis)   "  • Neurological disorder    • Osteoporosis    • Pneumonia    • Seizure disorder 3/23/2017   • Wears glasses        Allergies   Allergen Reactions   • Keflex [Cephalexin] Hives   • Sulfa Antibiotics Rash       Past Surgical History:   Procedure Laterality Date   • APPENDECTOMY     • BRAIN SURGERY     • BREAST BIOPSY Right    •  SECTION  ,   • CHOLECYSTECTOMY     • COLON SURGERY      2\" REMOVED   • COLONOSCOPY     • CYST REMOVAL      brain, R front lobe - [facility]   • DENTAL PROCEDURE     • ENDOSCOPY     • EXCISION MASS TRUNK Left 2017    Procedure: EXCISION LEFT BUTTOCKS MASS;  Surgeon: Jennifer Galindo MD;  Location: [facility] OR;  Service:    • HYSTERECTOMY     • RECONSTRUCTION URETHROPLASTY     • TUBAL ABDOMINAL LIGATION         Family History   Problem Relation Age of Onset   • Osteoarthritis Other    • Osteoporosis Other    • Heart disease Other    • Asthma Other    • COPD Other    • Ulcers Other    • Diabetes Other    • Cancer Other    • Hyperlipidemia Other    • Liver disease Other    • No Known Problems Mother    • No Known Problems Father    • No Known Problems Sister    • No Known Problems Brother        Social History     Social History   • Marital status:      Spouse name: N/A   • Number of children: N/A   • Years of education: N/A     Social History Main Topics   • Smoking status: Current Every Day Smoker     Packs/day: 1.00     Years: 45.00     Types: Cigarettes   • Smokeless tobacco: Never Used   • Alcohol use No   • Drug use: No   • Sexual activity: Defer     Other Topics Concern   • None     Social History Narrative         Objective   Physical Exam   Constitutional: She appears well-developed and well-nourished. No distress.   Nontoxic appearing female with decreased LOC   HENT:   Head: Normocephalic and atraumatic.   Mouth/Throat: Oropharynx is clear and moist.   Eyes: EOM are normal. Pupils are equal, round, and reactive to light.   Neck: Normal range of motion. Neck " supple.   Cardiovascular: Normal rate, regular rhythm and normal heart sounds.  Exam reveals no gallop and no friction rub.    No murmur heard.  Pulmonary/Chest: Effort normal and breath sounds normal. No respiratory distress. She has no wheezes. She has no rales.   Abdominal: Soft. Bowel sounds are normal. She exhibits no distension and no mass. There is tenderness. There is guarding. There is no rebound.   Diffuse tenderness to palpation with voluntary guarding noted   Musculoskeletal: Normal range of motion. She exhibits no edema.   Neurological: She is alert.   GCS of 13 (E-4, M-6, V-3); moving all fours; alert to person only   Skin: Skin is warm and dry. No rash noted. She is not diaphoretic. No erythema.   Psychiatric:   Difficult to assess secondary to altered mental status; voicing active SI   Nursing note and vitals reviewed.      Critical Care  Performed by: JENNIFER SYED  Authorized by: JENNIFER SYED     Critical care provider statement:     Critical care time (minutes):  100    Critical care time was exclusive of:  Separately billable procedures and treating other patients and teaching time    Critical care was necessary to treat or prevent imminent or life-threatening deterioration of the following conditions:  Sepsis, shock and metabolic crisis    Critical care was time spent personally by me on the following activities:  Development of treatment plan with patient or surrogate, discussions with consultants, evaluation of patient's response to treatment, examination of patient, obtaining history from patient or surrogate, ordering and performing treatments and interventions, ordering and review of laboratory studies, ordering and review of radiographic studies, pulse oximetry, re-evaluation of patient's condition and review of old charts    I assumed direction of critical care for this patient from another provider in my specialty: no                 ED Course  ED Course   Comment By Time    57-year-old female presents for evaluation of altered mental status.  It is unknown at what time the patient was last known normal.  She has a history of prior narcotic overdoses.  This morning, the patient's  found her lying in the floor with decreased LOC and EMS was called.  On their arrival, the patient was hypopneic with pinpoint pupils and GCS of 10.  She was given Narcan 2 mg IV which significantly improved her mental status.  She was also noted to be tachycardic and hypotensive and was subsequently brought to the ED after voicing SI. Trent Curiel MD 01/02 1048   On arrival to the ED, patient with GCS of 13 and blood pressure noted to be 84/50.  IV fluids administered.  Patient protecting airway.  We will obtain labs and imaging and perform serial re-evaluations of patient's mental status.  Blood and urine cultures pending at this time. Trent Curiel MD 01/02 1107   Labs remarkable for urosepsis, rhabdomyolysis, and acute renal failure.  Rocephin given.  After 30 cc/kg fluid bolus, patient's systolic blood pressure still in the 80s.  We will start Levophed for refractory hypotension.  I discussed the case with Dr. Bunch and will admit for further evaluation and treatment.  Advanced imaging pending at this time. Trent Curiel MD 01/02 1339   Levaquin added based on patient's CT read. Trent Curiel MD 01/02 1455                 Recent Results (from the past 24 hour(s))   Protime-INR    Collection Time: 01/02/18 10:56 AM   Result Value Ref Range    Protime 12.3 (H) 9.6 - 11.5 Seconds    INR 1.12    aPTT    Collection Time: 01/02/18 10:56 AM   Result Value Ref Range    PTT 31.0 24.0 - 31.0 seconds   Calcium, Ionized    Collection Time: 01/02/18 10:56 AM   Result Value Ref Range    Ionized Calcium 1.20 1.12 - 1.32 mmol/L   C-reactive Protein    Collection Time: 01/02/18 10:56 AM   Result Value Ref Range    C-Reactive Protein 19.92 (H) 0.00 - 1.00 mg/dL   Light Blue Top     Collection Time: 01/02/18 10:56 AM   Result Value Ref Range    Extra Tube hold for add-on    Lavender Top    Collection Time: 01/02/18 10:56 AM   Result Value Ref Range    Extra Tube hold for add-on    CBC Auto Differential    Collection Time: 01/02/18 10:56 AM   Result Value Ref Range    WBC 24.20 (H) 3.50 - 10.80 10*3/mm3    RBC 3.41 (L) 3.89 - 5.14 10*6/mm3    Hemoglobin 10.0 (L) 11.5 - 15.5 g/dL    Hematocrit 32.0 (L) 34.5 - 44.0 %    MCV 93.8 80.0 - 99.0 fL    MCH 29.3 27.0 - 31.0 pg    MCHC 31.3 (L) 32.0 - 36.0 g/dL    RDW 15.7 (H) 11.3 - 14.5 %    RDW-SD 53.4 37.0 - 54.0 fl    MPV 10.3 6.0 - 12.0 fL    Platelets 387 150 - 450 10*3/mm3    Neutrophil % 81.1 (H) 41.0 - 71.0 %    Lymphocyte % 8.8 (L) 24.0 - 44.0 %    Monocyte % 9.3 0.0 - 12.0 %    Eosinophil % 0.2 0.0 - 3.0 %    Basophil % 0.1 0.0 - 1.0 %    Immature Grans % 0.5 0.0 - 0.6 %    Neutrophils, Absolute 19.64 (H) 1.50 - 8.30 10*3/mm3    Lymphocytes, Absolute 2.12 0.60 - 4.80 10*3/mm3    Monocytes, Absolute 2.26 (H) 0.00 - 1.00 10*3/mm3    Eosinophils, Absolute 0.04 0.00 - 0.30 10*3/mm3    Basophils, Absolute 0.03 0.00 - 0.20 10*3/mm3    Immature Grans, Absolute 0.11 (H) 0.00 - 0.03 10*3/mm3   Urinalysis With / Culture If Indicated - Urine, Catheter    Collection Time: 01/02/18 11:04 AM   Result Value Ref Range    Color, UA Yellow Yellow, Straw    Appearance, UA Cloudy (A) Clear    pH, UA 6.0 5.0 - 8.0    Specific Gravity, UA 1.015 1.001 - 1.030    Glucose, UA Negative Negative    Ketones, UA Negative Negative    Bilirubin, UA Negative Negative    Blood, UA Small (1+) (A) Negative    Protein,  mg/dL (2+) (A) Negative    Leuk Esterase, UA Moderate (2+) (A) Negative    Nitrite, UA Negative Negative    Urobilinogen, UA 0.2 E.U./dL 0.2 - 1.0 E.U./dL   Urine Drug Screen - Urine, Clean Catch    Collection Time: 01/02/18 11:04 AM   Result Value Ref Range    THC, Screen, Urine Negative Negative    Phencyclidine (PCP), Urine Negative Negative    Cocaine  Screen, Urine Negative Negative    Methamphetamine, Urine Negative Negative    Opiate Screen Positive (A) Negative    Amphetamine Screen, Urine Negative Negative    Benzodiazepine Screen, Urine Negative Negative    Tricyclic Antidepressants Screen Negative Negative    Methadone Screen, Urine Negative Negative    Barbiturates Screen, Urine Negative Negative    Oxycodone Screen, Urine Negative Negative    Propoxyphene Screen Negative Negative    Buprenorphine, Screen, Urine Negative Negative   Pregnancy, Urine - Urine, Clean Catch    Collection Time: 01/02/18 11:04 AM   Result Value Ref Range    HCG, Urine QL Negative Negative   Urinalysis, Microscopic Only - Urine, Clean Catch    Collection Time: 01/02/18 11:04 AM   Result Value Ref Range    RBC, UA 0-2 None Seen, 0-2 /HPF    WBC, UA 31-50 (A) None Seen /HPF    Bacteria, UA Trace None Seen, Trace /HPF    Squamous Epithelial Cells, UA 0-2 None Seen, 0-2 /HPF    Hyaline Casts, UA 0-6 0 - 6 /LPF    Amorphous Crystals, UA Moderate/2+ None Seen /HPF    Methodology Manual Light Microscopy    Blood Gas, Arterial    Collection Time: 01/02/18 11:20 AM   Result Value Ref Range    Site Arterial: left brachial     Willard's Test N/A     pH, Arterial 7.068 pH units    pCO2, Arterial 31.0 mm Hg    pO2, Arterial 88.4 mm Hg    HCO3, Arterial 8.9 mmol/L    Base Excess, Arterial -19.9 mmol/L    Hemoglobin, Blood Gas 9.4 g/dL    Hematocrit, Blood Gas 28.9 %    Oxyhemoglobin 94.9 94 - 99 %    Carboxyhemoglobin 1.3 %    Barometric Pressure for Blood Gas 760 mmHg    Modality Nasal Cannula    Lactic Acid, Plasma    Collection Time: 01/02/18 11:25 AM   Result Value Ref Range    Lactate 0.9 0.5 - 2.0 mmol/L   Comprehensive Metabolic Panel    Collection Time: 01/02/18 11:32 AM   Result Value Ref Range    Glucose 90 70 - 100 mg/dL    BUN 80 (H) 9 - 23 mg/dL    Creatinine 8.40 (H) 0.60 - 1.30 mg/dL    Sodium 140 132 - 146 mmol/L    Potassium 4.6 3.5 - 5.5 mmol/L    Chloride 116 (H) 99 - 109  mmol/L    CO2 11.0 (L) 20.0 - 31.0 mmol/L    Calcium 7.9 (L) 8.7 - 10.4 mg/dL    Total Protein 6.4 5.7 - 8.2 g/dL    Albumin 3.50 3.20 - 4.80 g/dL    ALT (SGPT) 27 7 - 40 U/L    AST (SGOT) 53 (H) 0 - 33 U/L    Alkaline Phosphatase 120 (H) 25 - 100 U/L    Total Bilirubin 0.0 (L) 0.3 - 1.2 mg/dL    eGFR Non African Amer 5 (L) >60 mL/min/1.73    Globulin 2.9 gm/dL    A/G Ratio 1.2 (L) 1.5 - 2.5 g/dL    BUN/Creatinine Ratio 9.5 7.0 - 25.0    Anion Gap 13.0 (H) 3.0 - 11.0 mmol/L   Magnesium    Collection Time: 01/02/18 11:32 AM   Result Value Ref Range    Magnesium 1.9 1.3 - 2.7 mg/dL   Phosphorus    Collection Time: 01/02/18 11:32 AM   Result Value Ref Range    Phosphorus 6.9 (H) 2.4 - 5.1 mg/dL   Green Top (Gel)    Collection Time: 01/02/18 11:32 AM   Result Value Ref Range    Extra Tube Hold for add-ons.    Gold Top - SST    Collection Time: 01/02/18 11:32 AM   Result Value Ref Range    Extra Tube Hold for add-ons.    CK    Collection Time: 01/02/18 11:32 AM   Result Value Ref Range    Creatine Kinase 2593 (H) 26 - 174 U/L   Ethanol    Collection Time: 01/02/18 11:32 AM   Result Value Ref Range    Ethanol <10 0 - 10 mg/dL   Acetaminophen Level    Collection Time: 01/02/18 11:32 AM   Result Value Ref Range    Acetaminophen <10.0 0.0 - 30.0 mcg/mL   Salicylate Level    Collection Time: 01/02/18 11:32 AM   Result Value Ref Range    Salicylate <1.0 0.0 - 29.0 mg/dL   Procalcitonin    Collection Time: 01/02/18 11:32 AM   Result Value Ref Range    Procalcitonin 3.71 ng/mL   Blood Gas, Arterial    Collection Time: 01/02/18  4:31 PM   Result Value Ref Range    Site Arterial: right brachial     Willard's Test N/A     pH, Arterial 7.020 pH units    pCO2, Arterial 34.0 mm Hg    pO2, Arterial 70.0 mm Hg    HCO3, Arterial 9.0 mmol/L    Base Excess, Arterial -21.0 mmol/L    Oxyhemoglobin 89 (L) 94 - 99 %    Methemoglobin 1.10 %    Carboxyhemoglobin 1.3 %    Barometric Pressure for Blood Gas  mmHg    Modality Cannula - Nasal    POC  Glucose Once    Collection Time: 01/02/18  5:39 PM   Result Value Ref Range    Glucose 121 70 - 130 mg/dL     Note: In addition to lab results from this visit, the labs listed above may include labs taken at another facility or during a different encounter within the last 24 hours. Please correlate lab times with ED admission and discharge times for further clarification of the services performed during this visit.    CT Head Without Contrast   Final Result   There is a congenital anomaly of arachnoid cyst in the right   middle cranial fossa. There are no acute findings and there has been no   change since 12/24/2013.       D:  01/02/2018   E:  01/02/2018           This report was finalized on 1/2/2018 3:36 PM by Dr. Nahid Menjivar MD.          CT Chest Without Contrast   Final Result   Patchy bibasilar airspace disease, left greater than right,   but without consolidation or effusion.       D:  01/02/2018   E:  01/02/2018           This report was finalized on 1/2/2018 3:36 PM by Dr. Nahid Menjivar MD.          XR Chest 1 View   Final Result   No active disease.       D:  01/02/2018   E:  01/02/2018       This report was finalized on 1/2/2018 2:35 PM by Dr. Nahid Menjivar MD.          CT Abdomen Pelvis Without Contrast    (Results Pending)   US Renal Bilateral    (Results Pending)   XR Chest 1 View    (Results Pending)     Vitals:    01/02/18 1450 01/02/18 1455 01/02/18 1500 01/02/18 1629   BP: 94/54 (!) 85/50 92/44    Pulse: 96 95 96 100   Resp:    15   Temp:       TempSrc:       SpO2: 96% 95% 96% 95%   Weight:       Height:         Medications   sodium chloride 0.9 % flush 10 mL (not administered)   sodium chloride 0.9 % bolus 1,836 mL (0 mL Intravenous Stopped 1/2/18 1401)   norepinephrine (LEVOPHED) 8 mg/250 mL (32 mcg/mL) in sodium chloride 0.9% infusion (premix) (0.12 mcg/kg/min × 61.2 kg Intravenous Rate/Dose Change 1/2/18 1601)   levoFLOXacin (LEVAQUIN) 750 mg/150 mL D5W (premix) (LEVAQUIN) 750 mg (not  administered)   sodium chloride 0.9 % flush 1-10 mL (not administered)   heparin (porcine) 5000 UNIT/ML injection 5,000 Units (not administered)   acetaminophen (TYLENOL) tablet 650 mg (not administered)   ondansetron (ZOFRAN) injection 4 mg (not administered)   famotidine (PEPCID) tablet 20 mg (20 mg Oral Not Given 1/2/18 1621)   sodium bicarbonate 8.4 % 150 mEq in dextrose (D5W) 5 % 1,000 mL infusion (greater than 75 mEq) (100 mL/hr Intravenous New Bag 1/2/18 1737)   Pharmacy to dose vancomycin (not administered)   Pharmacy to dose - Zosyn (not administered)   levETIRAcetam (KEPPRA) tablet 500 mg (not administered)   ipratropium-albuterol (DUO-NEB) nebulizer solution 3 mL (3 mL Nebulization Given 1/2/18 1629)   insulin regular (humuLIN R,novoLIN R) injection 0-7 Units (0 Units Subcutaneous Not Given 1/2/18 1747)   vancomycin 1250 mg/250 mL 0.9% NS IVPB ([facility]) (1,250 mg Intravenous New Bag 1/2/18 1730)   piperacillin-tazobactam (ZOSYN) 4.5 g in iso-osmotic dextrose 100 mL IVPB (premix) (not administered)   sodium chloride 0.9 % infusion (150 mL/hr Intravenous Currently Infusing 1/2/18 1759)   cefTRIAXone (ROCEPHIN) IVPB 1 g (0 g Intravenous Stopped 1/2/18 1430)   sodium chloride 0.9 % bolus 2,000 mL (2,000 mL Intravenous New Bag 1/2/18 1427)   piperacillin-tazobactam (ZOSYN) 4.5 g in iso-osmotic dextrose 100 mL IVPB (premix) (4.5 g Intravenous New Bag 1/2/18 1732)     ECG/EMG Results (last 24 hours)     Procedure Component Value Units Date/Time    ECG 12 Lead [[id number]] Collected:  01/02/18 1017     Updated:  01/02/18 1020            MDM    Final diagnoses:   Sepsis, due to unspecified organism   Urinary tract infection with hematuria, site unspecified   Non-traumatic rhabdomyolysis   [place] coma scale total score 13-15, at hospital admission   Acute renal failure, unspecified acute renal failure type   Septic shock       Documentation assistance provided by gonzales Boyd.  Information recorded by the  scribe was done at my direction and has been verified and validated by me.     Shawn Boyd  01/02/18 1026       Shawn Boyd  01/02/18 1039       Shawn Boyd  01/02/18 1106       Shawn Boyd  01/02/18 1420       Trent Curiel MD  01/02/18 2969

## 2018-01-02 NOTE — CONSULTS
"NAL Consult Note    Charisma Cortez  1960  [id number]    Date of Admit:  2018    Date of Consult: 2018        Requesting Provider: No ref. provider found  Evaluating Physician: Nahid Flores MD        Reason for Consultation:    Oliguric raghu    History of present illness:    Patient is a 57 y.o.  Yr old female with h/o heroin abuse ( says snorts, no iv).  Pt seen by dr Barahona in the past at [facility].  Had RAGHU with cr 2.9 improved to 0.9 3/17.  No cr determinations since then.  Pt also has a h/o hypertension on RAAS inhibition and hypokalemia on k replacement.  Snorted heroin a few days ago.  Not much intake since then.   brought her in for ams.  Found to have severe acidosis, raghu, hyperkalemia and rhabdomyolysis.      Past Medical History:   Diagnosis Date   • Anxiety    • Arthritis    • Cancer     skin   • Cataracts, bilateral    • Chest pain    • COPD (chronic obstructive pulmonary disease) 3/23/2017   • Depression 3/23/2017   • Diabetes mellitus    • Emphysema lung    • Epilepsy     LAST SEIZURE 2017   • Headache    • High cholesterol    • History of brain shunt    • Hypertension    • Liver disease    • Low back pain    • Lumbosacral disc disease    • BAEZ (nonalcoholic steatohepatitis)    • Neurological disorder    • Osteoporosis    • Pneumonia    • Seizure disorder 3/23/2017   • Wears glasses        Past Surgical History:   Procedure Laterality Date   • APPENDECTOMY     • BRAIN SURGERY     • BREAST BIOPSY Right    •  SECTION  ,   • CHOLECYSTECTOMY     • COLON SURGERY      2\" REMOVED   • COLONOSCOPY     • CYST REMOVAL      brain, R front lobe - [facility]   • DENTAL PROCEDURE     • ENDOSCOPY     • EXCISION MASS TRUNK Left 2017    Procedure: EXCISION LEFT BUTTOCKS MASS;  Surgeon: Jennifer Galindo MD;  Location: [facility];  Service:    • HYSTERECTOMY     • RECONSTRUCTION URETHROPLASTY     • TUBAL ABDOMINAL LIGATION   "       Social history is pos for smoking and drug abuse neg etoh    family history includes Asthma in her other; COPD in her other; Cancer in her other; Diabetes in her other; Heart disease in her other; Hyperlipidemia in her other; Liver disease in her other; No Known Problems in her brother, father, mother, and sister; Osteoarthritis in her other; Osteoporosis in her other; Ulcers in her other.    Allergies   Allergen Reactions   • Keflex [Cephalexin] Hives   • Sulfa Antibiotics Rash       Medication:    Current Facility-Administered Medications:   •  acetaminophen (TYLENOL) tablet 650 mg, 650 mg, Oral, Q4H PRN, Nandini V. Case, DO  •  famotidine (PEPCID) tablet 20 mg, 20 mg, Oral, Daily, Nandini V. Case, DO  •  heparin (porcine) 5000 UNIT/ML injection 5,000 Units, 5,000 Units, Subcutaneous, Q12H, Nandini V. Case, DO  •  insulin regular (humuLIN R,novoLIN R) injection 0-7 Units, 0-7 Units, Subcutaneous, Q6H, Nandini V. Case, DO  •  ipratropium-albuterol (DUO-NEB) nebulizer solution 3 mL, 3 mL, Nebulization, 4x Daily - RT, Nandini V. Case, DO, 3 mL at 01/02/18 1629  •  levETIRAcetam (KEPPRA) tablet 500 mg, 500 mg, Oral, Q12H, Nandini V. Case, DO  •  norepinephrine (LEVOPHED) 8 mg/250 mL (32 mcg/mL) in sodium chloride 0.9% infusion (premix), 0.02-0.3 mcg/kg/min, Intravenous, Titrated, Trent Curiel MD, Last Rate: 18.36 mL/hr at 01/02/18 1815, 0.16 mcg/kg/min at 01/02/18 1815  •  ondansetron (ZOFRAN) injection 4 mg, 4 mg, Intravenous, Q6H PRN, Nandini V. Case, DO  •  Pharmacy to dose - Zosyn, , Does not apply, Continuous PRN, Nandini V. Case, DO  •  Pharmacy to dose vancomycin, , Does not apply, Continuous PRN, Nandini V. Case, DO  •  [START ON 1/3/2018] piperacillin-tazobactam (ZOSYN) 4.5 g in iso-osmotic dextrose 100 mL IVPB (premix), 4.5 g, Intravenous, Q12H, Kedar Sharma MD  •  sodium bicarbonate 8.4 % 150 mEq in dextrose (D5W) 5 % 1,000 mL infusion (greater than 75 mEq), 100 mL/hr, Intravenous,  Continuous, Nandini V. Case, DO, Last Rate: 100 mL/hr at 01/02/18 1737, 100 mL/hr at 01/02/18 1737  •  sodium chloride 0.9 % flush 1-10 mL, 1-10 mL, Intravenous, PRN, Nandini V. Case, DO  •  sodium chloride 0.9 % flush 10 mL, 10 mL, Intravenous, PRN, Trent Curiel MD  •  sodium chloride 0.9 % infusion, 150 mL/hr, Intravenous, Continuous, Kedar Sharma MD, Last Rate: 150 mL/hr at 01/02/18 1759, 150 mL/hr at 01/02/18 1759  •  vancomycin 1250 mg/250 mL 0.9% NS IVPB ([facility]), 20 mg/kg, Intravenous, Once, Kedar Sharma MD, 1,250 mg at 01/02/18 1730    Prescriptions Prior to Admission   Medication Sig Dispense Refill Last Dose   • aspirin 81 MG EC tablet Take 81 mg by mouth Daily.      • HYDROcodone-acetaminophen (NORCO) 7.5-325 MG per tablet Take 1 tablet by mouth 2 (Two) Times a Day As Needed for Moderate Pain .      • metFORMIN (GLUCOPHAGE) 500 MG tablet Take 500 mg by mouth Daily With Breakfast.      • tiotropium (SPIRIVA) 18 MCG per inhalation capsule Place 1 capsule into inhaler and inhale Daily.      • traZODone (DESYREL) 100 MG tablet Take 100 mg by mouth Every Night.      • trospium 60 MG capsule sustained-release 24 hr capsule Take 60 mg by mouth Every Morning.      • gabapentin (NEURONTIN) 600 MG tablet Take 600 mg by mouth 3 (Three) Times a Day.   8/4/2017   • levETIRAcetam (KEPPRA) 500 MG tablet Take 500 mg by mouth 2 (Two) Times a Day.   8/7/2017 at 0430   • lisinopril (PRINIVIL,ZESTRIL) 10 MG tablet Take 10 mg by mouth Daily.  0 Taking   • omeprazole (priLOSEC) 20 MG capsule Take 20 mg by mouth Daily.   8/7/2017 at 0430       Review of Systems:  Obtained from the , pt cannot contribute    Constitutional-- pos wgt loss  Eye-- no diplopia, no conjunctivitis  ENT-- No new hearing or throat complaints.  No epistaxis or oral sores. No odynophagia or dysphagia. No headache, photophobia or neck stiffness.  CV-- pos chest pain, palpitations,  Pos soa, or edema  Resp-- pos soa and cough/neg  "Hemoptysis  GI- pos nausea, vomiting, no diarrhea.  No hematochezia, melena, or hematemesis.  -- No dysuria, hematuria, or flank pain. No lower tract obstructive symptoms, uop low  Skin-- No rash, warm and dry  Lymph- no painful or swollen lymph nodes in neck/axilla or groin.   Heme- No active bruising or bleeding; no Hx of DVT or PE.  MS-- no swelling or pain in the joints  Neuro-- altered mental status for 3 days, pos seizures.  Psych--pos anxiety and depression  Endo--pos cold or heat intolerance.  No polyuria, polydipsia, or polyphagia    Full review of systems reviewed and negative otherwise for acute complaints    Physical Exam:   Vital Signs   Blood pressure (!) 90/39, pulse 97, temperature 98 °F (36.7 °C), temperature source Axillary, resp. rate 20, height 152.4 cm (60\"), weight 61.2 kg (135 lb), SpO2 94 %.     GENERAL: obtunded, not responsive  HEENT: Normocephalic, atraumatic.  PER.  No conjunctivitis. No icterus. Oropharynx clear without evidence of thrush or exudate. No evidence of peridontal disease.    NECK: Supple without nuchal rigidity. No mass.  LYMPH: No painful cervical, axillary or inguinal lymphadenopathy.  HEART: RRR; No murmur, rubs, gallops. No bruits in neck, abdomen, or groins, no edema  LUNGS: Normal respiratory effort. Nonlabored. No dullness.  No crepitus.  Not Clear to auscultation bilateral wheezing, rales, and rhonchi.  ABDOMEN: Soft, nontender, nondistended. Positive bowel sounds. No rebound or guarding. NO mass or HSM.  JOINTS:  Full range of motion, no redness or tenderness.  EXT:  No cyanosis, clubbing or edema.  :  External genitalia without swelling or lesion  SKIN: Warm and dry without rash  NEURO: Oriented to PPT. No focal neurological deficits. Strength equal bilateral  PSYCHIATRIC: Normal insight and judgement. Cooperative with PE    Laboratory Data    Results from last 7 days  Lab Units 01/02/18  1056   HEMOGLOBIN g/dL 10.0*   HEMATOCRIT % 32.0*       Results from last " 7 days  Lab Units 01/02/18  1614 01/02/18  1132   SODIUM mmol/L 139 140   POTASSIUM mmol/L 5.5 4.6   CHLORIDE mmol/L 118* 116*   CO2 mmol/L <10.0* 11.0*   BUN mg/dL 73* 80*   CREATININE mg/dL 8.60* 8.40*   CALCIUM mg/dL 8.4* 7.9*   PHOSPHORUS mg/dL  --  6.9*   MAGNESIUM mg/dL  --  1.9   ALBUMIN g/dL 3.50 3.50   Results for LEXI PERSON (MRN [id number]) as of 1/2/2018 18:40   Ref. Range 1/2/2018 11:32   Creatine Kinase Latest Ref Range: 26 - 174 U/L 2593 (H)   Results for LEXI PERSON (MRN [id number]) as of 1/2/2018 18:40   Ref. Range 1/2/2018 11:25   Lactate, Venous Latest Ref Range: 0.5 - 2.0 mmol/L 0.9       Results from last 7 days  Lab Units 01/02/18  1614   GLUCOSE mg/dL 102*       Results from last 7 days  Lab Units 01/02/18  1104   COLOR UA  Yellow   CLARITY UA  Cloudy*   PH, URINE  6.0   SPECIFIC GRAVITY, URINE  1.015   GLUCOSE UA  Negative   KETONES UA  Negative   BILIRUBIN UA  Negative   PROTEIN UA  100 mg/dL (2+)*   BLOOD UA  Small (1+)*   LEUKOCYTES UA  Moderate (2+)*   NITRITE UA  Negative       Results from last 7 days  Lab Units 01/02/18  1614   ALK PHOS U/L 120*   BILIRUBIN mg/dL 0.0*   ALT (SGPT) U/L 32   AST (SGOT) U/L 67*     Estimated Creatinine Clearance: 6 mL/min (by C-G formula based on Cr of 8.6).    Radiology:  FINDINGS: The cardiac silhouette is normal. There is no pulmonary  inflammatory process. There is no mass or effusion.   I viewed the cxr         IMPRESSION:  No active disease.  Ct abd and chest pending  Renal Imaging:        Impression:   Oliguric xena, likely 2/2 vol depletion, acei, rhabdo, may require dialysis if she does not respond to ivf and hco3.I guess she could have glomerulonephritis from shunt infection but I doubt it.will check c3 and c4.  non anion gap acidosis, i'm surprised anion gap and lactate normal, she must not have been taking her metformin which causes a lactic acidosis, continue hco3  Hyperkalemia 2/2 #1 and 2 and meds, should reverse with  hco3  Hypotensive possibly septic she has a h/o PV shunt could be infected would prefer to avoid vancomycin for now, consider zyvox or cubicin      PLAN: Thank you for asking us to see Charisma Cortez, I recommend the following:   nahco3, vol replacement, broad atbs avoid vancomycin and aminoglycosides for now, eval in am after volume resuscitation and hco3 for recovery of renal function she may need dialysis    Nahid Flores MD  1/2/2018  6:54 PM

## 2018-01-02 NOTE — PROCEDURES
"Insert Central Line At Bedside  Date/Time: 1/2/2018 5:18 PM  Performed by: CAMERON TOMAS  Authorized by: CAMERON TOMAS   Consent: Verbal consent not obtained. Written consent obtained.  Risks and benefits: risks, benefits and alternatives were discussed  Consent given by: spouse  Procedure consent: procedure consent matches procedure scheduled  Relevant documents: relevant documents present and verified  Test results: test results available and properly labeled  Site marked: the operative site was marked  Imaging studies: imaging studies available  Required items: required blood products, implants, devices, and special equipment available  Patient identity confirmed: arm band and hospital-assigned identification number  Time out: Immediately prior to procedure a \"time out\" was called to verify the correct patient, procedure, equipment, support staff and site/side marked as required.  Indications: vascular access and central pressure monitoring  Anesthesia: local infiltration    Anesthesia:  Local Anesthetic: lidocaine 1% without epinephrine    Sedation:  Patient sedated: no  Preparation: skin prepped with 2% chlorhexidine  Skin prep agent dried: skin prep agent completely dried prior to procedure  Sterile barriers: all five maximum sterile barriers used - cap, mask, sterile gown, sterile gloves, and large sterile sheet  Hand hygiene: hand hygiene performed prior to central venous catheter insertion  Location details: right internal jugular  Patient position: flat  Catheter type: triple lumen  Catheter size: 7 Fr  Pre-procedure: landmarks identified  Ultrasound guidance: yes  Sterile ultrasound techniques: sterile gel and sterile probe covers were used  Number of attempts: 1  Successful placement: yes  Post-procedure: line sutured and dressing applied  Assessment: blood return through all ports,  free fluid flow,  placement verified by x-ray and no pneumothorax on x-ray  Patient tolerance: Patient " tolerated the procedure well with no immediate complications      Procedure note/ultrasound interpretation:  Using ultrasound guidance the potential vascular site for insertion of the catheter was visualized to determine the patency of the vessel.  After selecting the appropriate site for insertion, the needle was visualized under ultrasound being inserted into the internal jugular vein, followed by ultrasound confirmation of wire and catheter placement.  Subsequently, a chest x-ray was ordered for further evaluation of line placement.    Chest x-ray interpretation:  A chest x-ray was ordered and independently read by myself to determine placement of central venous catheter.  The central venous catheter tip is well positioned and appears to be in the Superior Vena cava just above the right atrium.    ANDREA Lewis, [facility]wood [facility] CenterP-BC  Pulmonary and Critical Care Service  1/2/2018 5:20 PM

## 2018-01-02 NOTE — H&P
"INTENSIVIST   INITIAL HOSPITAL VISIT NOTE     Hospital:  LOS: 0 days     Ms. Charisma Cortez, 57 y.o. female is seen for a Chief Complaint of:  Chief Complaint   Patient presents with   • Altered Mental Status     Active Problems:    Hypotension    Tobacco use disorder    Type 2 diabetes mellitus    Seizure disorder    UTI (urinary tract infection)    Dehydration    COPD (chronic obstructive pulmonary disease)    Drug overdose    Acute renal failure superimposed on chronic kidney disease    Sepsis    Metabolic acidosis    Encephalopathy      Subjective   S     Ms. Cortez is a 58yo f with a history of polysubstance abuse who presents to the ED after being found altered at home by her . The patient is unable to provide any real history and the history is obtained from her  who is at bedside.     Per report, she last used Heroin on 12/29/17. For the past couple of days, she has been laying on the couch and has not been eating or drinking much. Her  was able to get her to eat some cornflakes earlier today. He also notes that she sometimes uses drugs without his knowledge. After breakfast, she began to have what he calls a \"gran mal\" seizure. However, he notes that she was able to talk during this whole event. She was then poorly responsive and he called EMS. Upon EMS arrival, she was noted to be significantly hypotensive and tachycardic and she told them that she \"wanted to die.\" Her GCS initially per EMS report was 10 but improved to 13 after 2mg IV Narcan.     In the ED, she was found to have an initial blood pressure of 84/50. Per report, this did not improve after 3L of IVF and she was started on levophed. She was given Rocephin and Levaquin.     She does have a history of seizures and was admitted to both  and [facility] with seizures in the past requiring mechanical ventilation per her .        PMH: She  has a past medical history of Anxiety; Arthritis; Cancer; Cataracts, " bilateral; Chest pain; COPD (chronic obstructive pulmonary disease) (3/23/2017); Depression (3/23/2017); Diabetes mellitus; Emphysema lung; Epilepsy; Headache; High cholesterol; History of brain shunt (); Hypertension; Liver disease; Low back pain; Lumbosacral disc disease; BAEZ (nonalcoholic steatohepatitis); Neurological disorder; Osteoporosis; Pneumonia; Seizure disorder (3/23/2017); and Wears glasses.   PSxH: She  has a past surgical history that includes Appendectomy (); Reconstruction urethroplasty;  section (,); Hysterectomy; Cyst Removal (); Cholecystectomy; Brain surgery; Tubal ligation; Dental surgery; Breast biopsy (Right); Colon surgery (); Colonoscopy; Esophagogastroduodenoscopy; and Excision Mass Trunk (Left, 2017).      Medications:  No current facility-administered medications on file prior to encounter.      Current Outpatient Prescriptions on File Prior to Encounter   Medication Sig   • aspirin 81 MG EC tablet TAKE 1 TABLET BY MOUTH EVERY DAY   • gabapentin (NEURONTIN) 600 MG tablet Take 600 mg by mouth 3 (Three) Times a Day.   • HYDROcodone-acetaminophen (NORCO) 5-325 MG per tablet Take 1-2 tablets by mouth Every 4 (Four) Hours As Needed (Pain).   • levETIRAcetam (KEPPRA) 500 MG tablet Take 500 mg by mouth 2 (Two) Times a Day.   • lisinopril (PRINIVIL,ZESTRIL) 10 MG tablet Take 10 mg by mouth Daily.   • metFORMIN (GLUCOPHAGE) 500 MG tablet TAKE 1 TABLET BY MOUTH EVERY DAY   • omeprazole (priLOSEC) 20 MG capsule Take 20 mg by mouth Daily.   • SPIRIVA HANDIHALER 18 MCG per inhalation capsule INHALE CONTENTS OF 1 CAPSULE DAILY   • traZODone (DESYREL) 100 MG tablet TAKE 1 TABLET BY MOUTH AT BEDTIME   • trospium 60 MG capsule sustained-release 24 hr capsule take 1 capsule by mouth once daily   • [DISCONTINUED] cetirizine (ZyrTEC) 10 MG tablet Take 10 mg by mouth daily.   • [DISCONTINUED] citalopram (CeleXA) 40 MG tablet TAKE 1 TABLET BY MOUTH EVERY DAY   •  [DISCONTINUED] hydrochlorothiazide (HYDRODIURIL) 25 MG tablet TAKE 1 TABLET BY MOUTH EVERY DAY   • [DISCONTINUED] hydrOXYzine (VISTARIL) 50 MG capsule TAKE ONE CAPSULE BY MOUTH AT BEDTIME   • [DISCONTINUED] ibuprofen (ADVIL,MOTRIN) 800 MG tablet 800 mg Every 6 (Six) Hours As Needed.   • [DISCONTINUED] lisinopril (PRINIVIL,ZESTRIL) 5 MG tablet Take 5 mg by mouth Daily.   • [DISCONTINUED] sertraline (ZOLOFT) 50 MG tablet Take 100 mg by mouth Daily.   • [DISCONTINUED] simvastatin (ZOCOR) 40 MG tablet take 1 tablet by mouth once daily   • [DISCONTINUED] tiZANidine (ZANAFLEX) 4 MG tablet TAKE 1 TABLET BY MOUTH EVERY 8 HOURS AS NEEDED       Allergies: She is allergic to keflex [cephalexin] and sulfa antibiotics.   FH: Her family history includes Asthma in her other; COPD in her other; Cancer in her other; Diabetes in her other; Heart disease in her other; Hyperlipidemia in her other; Liver disease in her other; No Known Problems in her brother, father, mother, and sister; Osteoarthritis in her other; Osteoporosis in her other; Ulcers in her other.   SH: She  reports that she has been smoking Cigarettes.  She has a 45.00 pack-year smoking history. She has never used smokeless tobacco. She reports that she does not drink alcohol or use illicit drugs.     The patient's relevant past medical, surgical and social history were reviewed and updated in Epic as appropriate.     ROS (14):   Review of Systems   Unable to perform ROS: Mental status change       Objective   O     Vitals    Temp  Min: 97.7 °F (36.5 °C)  Max: 97.7 °F (36.5 °C)  BP  Min: 51/42  Max: 94/54  Pulse  Min: 95  Max: 100  Resp  Min: 16  Max: 18  SpO2  Min: 74 %  Max: 98 % Flow (L/min)  Min: 4  Max: 4     I/O 24 hrs (7:00AM - 6:59 AM)  Intake/Output       01/02/18 0700 - 01/03/18 0659    Intake (ml) 2000    Output (ml) --    Net (ml) 2000    Last Weight 61.2 kg (135 lb)          Medications (drips):    lactated ringers    norepinephrine Last Rate: 0.06  mcg/kg/min (01/02/18 1433)   Pharmacy Consult    Pharmacy to dose vancomycin    sodium bicarbonate drip (greater than 75 mEq/bag)        Physical Examination    Telemetry: Sinus Rhythm: normal sinus rhythm      Constitutional:  No acute distress.  Altered.    HEENT: Normocephalic and atraumatic.   Neck:  Normal range of motion. Neck supple.   No JVD present.   No thyromegaly.    Cardiovascular: Regular rate and rhythm.  No murmurs, rub or gallop.  No peripheral edema.   Respiratory: Normal respiratory effort.  Clear to ascultation.  Clear to percussion.    Abdominal:  Soft. No masses.   Non-tender. No distension.   No hepatosplenomegaly.   MSK: Normal muscle strength and tone.   Extremities: No digital cyanosis or clubbing.   Skin: Skin is warm and dry.    Neurological:   Awakens to stimulation.   Disoriented to place and time.   Twitching in the extremities.    Moves all extremities.   Psychiatric:  Unable to assess.            Results from last 7 days  Lab Units 01/02/18  1056   WBC 10*3/mm3 24.20*   HEMOGLOBIN g/dL 10.0*   MCV fL 93.8   PLATELETS 10*3/mm3 387       Results from last 7 days  Lab Units 01/02/18  1132   SODIUM mmol/L 140   POTASSIUM mmol/L 4.6   CO2 mmol/L 11.0*   CREATININE mg/dL 8.40*   MAGNESIUM mg/dL 1.9   PHOSPHORUS mg/dL 6.9*     Estimated Creatinine Clearance: 6.2 mL/min (by C-G formula based on Cr of 8.4).    Results from last 7 days  Lab Units 01/02/18  1132   ALK PHOS U/L 120*   BILIRUBIN mg/dL 0.0*   ALT (SGPT) U/L 27   AST (SGOT) U/L 53*         Results from last 7 days  Lab Units 01/02/18  1120   PH, ARTERIAL pH units 7.068   PCO2, ARTERIAL mm Hg 31.0   PO2 ART mm Hg 88.4       Images:    Personally reviewed.      Imaging Results (last 24 hours)     Procedure Component Value Units Date/Time    CT Abdomen Pelvis Without Contrast [[id number]] Updated:  01/02/18 1351    XR Chest 1 View [[id number]] Collected:  01/02/18 1347     Updated:  01/02/18 1437    Narrative:       EXAMINATION: XR  CHEST 1 VW-01/02/2018:      INDICATION: Severe sepsis, triage protocol.      COMPARISON: NONE.     FINDINGS: The cardiac silhouette is normal. There is no pulmonary  inflammatory process. There is no mass or effusion.           Impression:       No active disease.     D:  01/02/2018  E:  01/02/2018     This report was finalized on 1/2/2018 2:35 PM by Dr. Nahid Menjivar MD.       CT Head Without Contrast [[id number]] Collected:  01/02/18 1531     Updated:  01/02/18 1538    Narrative:       EXAMINATION: CT HEAD WO CONTRAST- 01/02/2018     INDICATION: AMS      TECHNIQUE: CT scan of the head was performed at 5 mm intervals. No  intravenous contrast was utilized.     The radiation dose reduction device was turned on for each scan per the  ALARA (As Low as Reasonably Achievable) protocol.     COMPARISON: 12/24/2013     FINDINGS: There is no intracranial mass. There is no hemorrhage. There  is no midline shift. There is an arachnoid cyst once again noted at the  anterior portion of the right middle cranial fossa. There is no  extra-axial fluid collection.       Impression:       There is a congenital anomaly of arachnoid cyst in the right  middle cranial fossa. There are no acute findings and there has been no  change since 12/24/2013.     D:  01/02/2018  E:  01/02/2018        This report was finalized on 1/2/2018 3:36 PM by Dr. Nahid Menjivar MD.       CT Chest Without Contrast [[id number]] Collected:  01/02/18 1532     Updated:  01/02/18 1538    Narrative:       EXAMINATION: CT CHEST WO CONTRAST- 01/02/2018     INDICATION: SOB      TECHNIQUE: CT scan of the chest was performed without contrast.     The radiation dose reduction device was turned on for each scan per the  ALARA (As Low as Reasonably Achievable) protocol.     COMPARISON: NONE     FINDINGS: There is no axillary lymphadenopathy. There are small  mediastinal and hilar nodes which are not abnormal based on size  criteria. There is no pericardial or pleural effusion.  Images displayed  at lung window settings reveal patchy bibasilar airspace disease without  consolidation. There are areas of mild basilar pleural thickening.       Impression:       Patchy bibasilar airspace disease, left greater than right,  but without consolidation or effusion.     D:  01/02/2018  E:  01/02/2018        This report was finalized on 1/2/2018 3:36 PM by Dr. Nahid Menjivar MD.           ECG/EMG Results (last 24 hours)     Procedure Component Value Units Date/Time    ECG 12 Lead [[id number]] Collected:  01/02/18 1017     Updated:  01/02/18 1020          I reviewed the patient's new laboratory and imaging results.  I independently reviewed the patient's new images.  Assessment/Plan   A / P     Ms. Cortez is a 56yo F with a history of polysubstance abuse who presented with altered mental status and renal failure after being found down at home by her . She is reported to have last used heroin on 12/29, but her  is unsure if she used drugs without him after that. She is now in renal failure with what appears to be rhadomyolysis. She has a profound metabolic acidosis and is currently hypotensive requiring Levophed.     Nutrition:   NPO Diet  Advance Directives: Full Code    Hospital Problem List     Hypotension    Tobacco use disorder    Type 2 diabetes mellitus    Seizure disorder    UTI (urinary tract infection)    Dehydration    COPD (chronic obstructive pulmonary disease)    Drug overdose    Acute renal failure superimposed on chronic kidney disease    Sepsis    Metabolic acidosis    Encephalopathy          Assessment / Plan:    1. Admit to ICU  2. Place cvc for pressors and fluid administration.   3. IVF at 150/hr  4. Place matute  5. Start bicarb drip  6. Renal consult  7. Renal ultrasound  8. Restart home keppra  9. Vancomycin and Zosyn while cultures pending  10. q6h glucose checks with SSI  11. Follow up repeat labs    I discussed the patient's findings and my recommendations with  family    Time:   Critical Care Time: was greater than 40 minutes.(excluding time spent on other separately billable procedures or treating other patients).   Patient was at high risk for life-threatening deterioration due to multi-system organ failure.      Nandini V Case, DO  Pulmonary and Critical Care Medicine

## 2018-01-03 ENCOUNTER — APPOINTMENT (OUTPATIENT)
Dept: CARDIOLOGY | Facility: HOSPITAL | Age: 58
End: 2018-01-03
Attending: INTERNAL MEDICINE

## 2018-01-03 ENCOUNTER — APPOINTMENT (OUTPATIENT)
Dept: NEUROLOGY | Facility: HOSPITAL | Age: 58
End: 2018-01-03
Attending: INTERNAL MEDICINE

## 2018-01-03 LAB
ALBUMIN SERPL-MCNC: 3 G/DL (ref 3.2–4.8)
ALBUMIN/GLOB SERPL: 1.4 G/DL (ref 1.5–2.5)
ALP SERPL-CCNC: 114 U/L (ref 25–100)
ALT SERPL W P-5'-P-CCNC: 28 U/L (ref 7–40)
ANION GAP SERPL CALCULATED.3IONS-SCNC: 13 MMOL/L (ref 3–11)
APTT PPP: 34.2 SECONDS (ref 24–31)
ARTERIAL PATENCY WRIST A: ABNORMAL
ARTERIAL PATENCY WRIST A: ABNORMAL
AST SERPL-CCNC: 54 U/L (ref 0–33)
ATMOSPHERIC PRESS: ABNORMAL MMHG
ATMOSPHERIC PRESS: ABNORMAL MMHG
BASE EXCESS BLDA CALC-SCNC: -21 MMOL/L
BASE EXCESS BLDA CALC-SCNC: -7.5 MMOL/L (ref 0–2)
BASOPHILS # BLD AUTO: 0.02 10*3/MM3 (ref 0–0.2)
BASOPHILS NFR BLD AUTO: 0.1 % (ref 0–1)
BDY SITE: ABNORMAL
BDY SITE: ABNORMAL
BH CV ECHO MEAS - AO ROOT AREA (BSA CORRECTED): 1.6
BH CV ECHO MEAS - AO ROOT AREA: 5.1 CM^2
BH CV ECHO MEAS - AO ROOT DIAM: 2.5 CM
BH CV ECHO MEAS - ASC AORTA: 2.5 CM
BH CV ECHO MEAS - BSA(HAYCOCK): 1.6 M^2
BH CV ECHO MEAS - BSA: 1.6 M^2
BH CV ECHO MEAS - BZI_BMI: 26.4 KILOGRAMS/M^2
BH CV ECHO MEAS - BZI_METRIC_HEIGHT: 152.4 CM
BH CV ECHO MEAS - BZI_METRIC_WEIGHT: 61.2 KG
BH CV ECHO MEAS - CONTRAST EF (2CH): 83.3 ML/M^2
BH CV ECHO MEAS - CONTRAST EF 4CH: 79.1 ML/M^2
BH CV ECHO MEAS - EDV(CUBED): 80.9 ML
BH CV ECHO MEAS - EDV(MOD-SP2): 42 ML
BH CV ECHO MEAS - EDV(MOD-SP4): 43 ML
BH CV ECHO MEAS - EDV(TEICH): 84.2 ML
BH CV ECHO MEAS - EF(CUBED): 85.2 %
BH CV ECHO MEAS - EF(MOD-SP2): 83.3 %
BH CV ECHO MEAS - EF(MOD-SP4): 79.1 %
BH CV ECHO MEAS - EF(TEICH): 78.8 %
BH CV ECHO MEAS - ESV(CUBED): 12 ML
BH CV ECHO MEAS - ESV(MOD-SP2): 7 ML
BH CV ECHO MEAS - ESV(MOD-SP4): 9 ML
BH CV ECHO MEAS - ESV(TEICH): 17.9 ML
BH CV ECHO MEAS - FS: 47.1 %
BH CV ECHO MEAS - IVS/LVPW: 0.92
BH CV ECHO MEAS - IVSD: 0.81 CM
BH CV ECHO MEAS - LAT PEAK E' VEL: 9.6 CM/SEC
BH CV ECHO MEAS - LV DIASTOLIC VOL/BSA (35-75): 27.2 ML/M^2
BH CV ECHO MEAS - LV MASS(C)D: 114.4 GRAMS
BH CV ECHO MEAS - LV MASS(C)DI: 72.4 GRAMS/M^2
BH CV ECHO MEAS - LV SYSTOLIC VOL/BSA (12-30): 5.7 ML/M^2
BH CV ECHO MEAS - LVIDD: 4.3 CM
BH CV ECHO MEAS - LVIDS: 2.3 CM
BH CV ECHO MEAS - LVLD AP2: 6.2 CM
BH CV ECHO MEAS - LVLD AP4: 5.9 CM
BH CV ECHO MEAS - LVLS AP2: 4.1 CM
BH CV ECHO MEAS - LVLS AP4: 4.6 CM
BH CV ECHO MEAS - LVOT AREA (M): 2.8 CM^2
BH CV ECHO MEAS - LVOT AREA: 2.8 CM^2
BH CV ECHO MEAS - LVOT DIAM: 1.9 CM
BH CV ECHO MEAS - LVPWD: 0.88 CM
BH CV ECHO MEAS - MED PEAK E' VEL: 9.21 CM/SEC
BH CV ECHO MEAS - MV A MAX VEL: 117.9 CM/SEC
BH CV ECHO MEAS - MV DEC TIME: 0.15 SEC
BH CV ECHO MEAS - MV E MAX VEL: 94.3 CM/SEC
BH CV ECHO MEAS - MV E/A: 0.8
BH CV ECHO MEAS - PA ACC SLOPE: 1288 CM/SEC^2
BH CV ECHO MEAS - PA ACC TIME: 0.12 SEC
BH CV ECHO MEAS - PA MAX PG: 8.1 MMHG
BH CV ECHO MEAS - PA PR(ACCEL): 25.1 MMHG
BH CV ECHO MEAS - PA V2 MAX: 142.3 CM/SEC
BH CV ECHO MEAS - SI(CUBED): 43.6 ML/M^2
BH CV ECHO MEAS - SI(MOD-SP2): 22.2 ML/M^2
BH CV ECHO MEAS - SI(MOD-SP4): 21.5 ML/M^2
BH CV ECHO MEAS - SI(TEICH): 42 ML/M^2
BH CV ECHO MEAS - SV(CUBED): 68.9 ML
BH CV ECHO MEAS - SV(MOD-SP2): 35 ML
BH CV ECHO MEAS - SV(MOD-SP4): 34 ML
BH CV ECHO MEAS - SV(TEICH): 66.3 ML
BH CV ECHO MEAS - TAPSE (>1.6): 2.9 CM2
BH CV XLRA - RV BASE: 2.9 CM
BH CV XLRA - RV LENGTH: 5.6 CM
BH CV XLRA - RV MID: 2.1 CM
BH CV XLRA - TDI S': 18.1 CM/SEC
BILIRUB SERPL-MCNC: 0.2 MG/DL (ref 0.3–1.2)
BUN BLD-MCNC: 76 MG/DL (ref 9–23)
BUN/CREAT SERPL: 15.5 (ref 7–25)
CA-I SERPL ISE-MCNC: 1.22 MMOL/L (ref 1.12–1.32)
CALCIUM SPEC-SCNC: 7.8 MG/DL (ref 8.7–10.4)
CHLORIDE SERPL-SCNC: 112 MMOL/L (ref 99–109)
CK SERPL-CCNC: 2255 U/L (ref 26–174)
CO2 BLDA-SCNC: 19.8 MMOL/L (ref 22–33)
CO2 BLDA-SCNC: 9.9 MMOL/L (ref 23–27)
CO2 SERPL-SCNC: 17 MMOL/L (ref 20–31)
COHGB MFR BLD: 0.6 % (ref 0–2)
COHGB MFR BLD: 1.3 %
CREAT BLD-MCNC: 4.9 MG/DL (ref 0.6–1.3)
D-LACTATE SERPL-SCNC: 1.1 MMOL/L (ref 0.5–2)
DEPRECATED RDW RBC AUTO: 50.6 FL (ref 37–54)
E/E' RATIO: 10.2
EOSINOPHIL # BLD AUTO: 0.05 10*3/MM3 (ref 0–0.3)
EOSINOPHIL NFR BLD AUTO: 0.2 % (ref 0–3)
ERYTHROCYTE [DISTWIDTH] IN BLOOD BY AUTOMATED COUNT: 15.3 % (ref 11.3–14.5)
GFR SERPL CREATININE-BSD FRML MDRD: 9 ML/MIN/1.73
GLOBULIN UR ELPH-MCNC: 2.2 GM/DL
GLUCOSE BLD-MCNC: 161 MG/DL (ref 70–100)
GLUCOSE BLDC GLUCOMTR-MCNC: 172 MG/DL (ref 70–130)
GLUCOSE BLDC GLUCOMTR-MCNC: 179 MG/DL (ref 70–130)
GLUCOSE BLDC GLUCOMTR-MCNC: 186 MG/DL (ref 70–130)
GLUCOSE BLDC GLUCOMTR-MCNC: 196 MG/DL (ref 70–130)
HBA1C MFR BLD: 5.6 % (ref 4.8–5.6)
HCO3 BLDA-SCNC: 18.6 MMOL/L (ref 20–26)
HCO3 BLDA-SCNC: 9 MMOL/L
HCT VFR BLD AUTO: 28.1 % (ref 34.5–44)
HCT VFR BLD CALC: 26.7 %
HCT VFR BLD CALC: 28.7 %
HGB BLD-MCNC: 8.9 G/DL (ref 11.5–15.5)
HGB BLDA-MCNC: 8.7 G/DL (ref 14–18)
HGB BLDA-MCNC: 9.4 G/DL
HOROWITZ INDEX BLD+IHG-RTO: 28 %
HOROWITZ INDEX BLD+IHG-RTO: 31 %
IMM GRANULOCYTES # BLD: 0.1 10*3/MM3 (ref 0–0.03)
IMM GRANULOCYTES NFR BLD: 0.5 % (ref 0–0.6)
INR PPP: 1.21
LEFT ATRIUM VOLUME INDEX: 17.7 ML/M2
LV EF 2D ECHO EST: 75 %
LYMPHOCYTES # BLD AUTO: 1.87 10*3/MM3 (ref 0.6–4.8)
LYMPHOCYTES NFR BLD AUTO: 9 % (ref 24–44)
MAGNESIUM SERPL-MCNC: 1.3 MG/DL (ref 1.3–2.7)
MAXIMAL PREDICTED HEART RATE: 165 BPM
MCH RBC QN AUTO: 28.9 PG (ref 27–31)
MCHC RBC AUTO-ENTMCNC: 31.7 G/DL (ref 32–36)
MCV RBC AUTO: 91.2 FL (ref 80–99)
METHGB BLD QL: 0.9 % (ref 0–1.5)
METHGB BLD QL: 1.1 %
MODALITY: ABNORMAL
MODALITY: ABNORMAL
MONOCYTES # BLD AUTO: 1.54 10*3/MM3 (ref 0–1)
MONOCYTES NFR BLD AUTO: 7.4 % (ref 0–12)
NEUTROPHILS # BLD AUTO: 17.17 10*3/MM3 (ref 1.5–8.3)
NEUTROPHILS NFR BLD AUTO: 82.8 % (ref 41–71)
OXYHGB MFR BLDV: 89 % (ref 94–99)
OXYHGB MFR BLDV: 94.3 % (ref 94–99)
PCO2 BLDA: 34 MM HG
PCO2 BLDA: 39.1 MM HG (ref 35–45)
PH BLDA: 7.02 PH UNITS
PH BLDA: 7.29 PH UNITS (ref 7.35–7.45)
PHOSPHATE SERPL-MCNC: 4.6 MG/DL (ref 2.4–5.1)
PLATELET # BLD AUTO: 351 10*3/MM3 (ref 150–450)
PMV BLD AUTO: 9.3 FL (ref 6–12)
PO2 BLDA: 70 MM HG
PO2 BLDA: 87.9 MM HG (ref 83–108)
POTASSIUM BLD-SCNC: 2.6 MMOL/L (ref 3.5–5.5)
POTASSIUM BLD-SCNC: 3.4 MMOL/L (ref 3.5–5.5)
PROT SERPL-MCNC: 5.2 G/DL (ref 5.7–8.2)
PROTHROMBIN TIME: 13.3 SECONDS (ref 9.6–11.5)
RBC # BLD AUTO: 3.08 10*6/MM3 (ref 3.89–5.14)
SODIUM BLD-SCNC: 142 MMOL/L (ref 132–146)
STRESS TARGET HR: 140 BPM
WBC NRBC COR # BLD: 20.75 10*3/MM3 (ref 3.5–10.8)

## 2018-01-03 PROCEDURE — 94799 UNLISTED PULMONARY SVC/PX: CPT

## 2018-01-03 PROCEDURE — 82330 ASSAY OF CALCIUM: CPT | Performed by: INTERNAL MEDICINE

## 2018-01-03 PROCEDURE — 83605 ASSAY OF LACTIC ACID: CPT | Performed by: INTERNAL MEDICINE

## 2018-01-03 PROCEDURE — 25010000002 DAPTOMYCIN PER 1 MG: Performed by: INTERNAL MEDICINE

## 2018-01-03 PROCEDURE — 84100 ASSAY OF PHOSPHORUS: CPT | Performed by: INTERNAL MEDICINE

## 2018-01-03 PROCEDURE — 83735 ASSAY OF MAGNESIUM: CPT | Performed by: INTERNAL MEDICINE

## 2018-01-03 PROCEDURE — 94640 AIRWAY INHALATION TREATMENT: CPT

## 2018-01-03 PROCEDURE — 25010000002 MAGNESIUM SULFATE 2 GM/50ML SOLUTION: Performed by: NURSE PRACTITIONER

## 2018-01-03 PROCEDURE — 25010000002 PIPERACILLIN-TAZOBACTAM: Performed by: INTERNAL MEDICINE

## 2018-01-03 PROCEDURE — 93306 TTE W/DOPPLER COMPLETE: CPT

## 2018-01-03 PROCEDURE — 25010000003 POTASSIUM CHLORIDE PER 2 MEQ: Performed by: INTERNAL MEDICINE

## 2018-01-03 PROCEDURE — 80053 COMPREHEN METABOLIC PANEL: CPT | Performed by: INTERNAL MEDICINE

## 2018-01-03 PROCEDURE — 36600 WITHDRAWAL OF ARTERIAL BLOOD: CPT | Performed by: INTERNAL MEDICINE

## 2018-01-03 PROCEDURE — 85025 COMPLETE CBC W/AUTO DIFF WBC: CPT | Performed by: INTERNAL MEDICINE

## 2018-01-03 PROCEDURE — 84132 ASSAY OF SERUM POTASSIUM: CPT | Performed by: INTERNAL MEDICINE

## 2018-01-03 PROCEDURE — 82805 BLOOD GASES W/O2 SATURATION: CPT | Performed by: INTERNAL MEDICINE

## 2018-01-03 PROCEDURE — 95816 EEG AWAKE AND DROWSY: CPT

## 2018-01-03 PROCEDURE — 82962 GLUCOSE BLOOD TEST: CPT

## 2018-01-03 PROCEDURE — 82550 ASSAY OF CK (CPK): CPT | Performed by: INTERNAL MEDICINE

## 2018-01-03 PROCEDURE — 25010000002 HEPARIN (PORCINE) PER 1000 UNITS: Performed by: INTERNAL MEDICINE

## 2018-01-03 PROCEDURE — 93306 TTE W/DOPPLER COMPLETE: CPT | Performed by: INTERNAL MEDICINE

## 2018-01-03 PROCEDURE — 94760 N-INVAS EAR/PLS OXIMETRY 1: CPT

## 2018-01-03 PROCEDURE — 99233 SBSQ HOSP IP/OBS HIGH 50: CPT | Performed by: INTERNAL MEDICINE

## 2018-01-03 PROCEDURE — 25010000002 LEVETIRACETAM IN NACL 0.82% 500 MG/100ML SOLUTION: Performed by: INTERNAL MEDICINE

## 2018-01-03 PROCEDURE — 85610 PROTHROMBIN TIME: CPT | Performed by: INTERNAL MEDICINE

## 2018-01-03 PROCEDURE — 83036 HEMOGLOBIN GLYCOSYLATED A1C: CPT | Performed by: INTERNAL MEDICINE

## 2018-01-03 PROCEDURE — 85730 THROMBOPLASTIN TIME PARTIAL: CPT | Performed by: INTERNAL MEDICINE

## 2018-01-03 RX ORDER — MAGNESIUM SULFATE HEPTAHYDRATE 40 MG/ML
2 INJECTION, SOLUTION INTRAVENOUS ONCE
Status: COMPLETED | OUTPATIENT
Start: 2018-01-03 | End: 2018-01-03

## 2018-01-03 RX ORDER — POTASSIUM CHLORIDE 29.8 MG/ML
20 INJECTION INTRAVENOUS
Status: DISCONTINUED | OUTPATIENT
Start: 2018-01-03 | End: 2018-01-04

## 2018-01-03 RX ORDER — FAMOTIDINE 10 MG/ML
20 INJECTION, SOLUTION INTRAVENOUS DAILY
Status: DISCONTINUED | OUTPATIENT
Start: 2018-01-03 | End: 2018-01-03

## 2018-01-03 RX ORDER — POTASSIUM CHLORIDE 7.45 MG/ML
10 INJECTION INTRAVENOUS
Status: DISCONTINUED | OUTPATIENT
Start: 2018-01-03 | End: 2018-01-03

## 2018-01-03 RX ORDER — PANTOPRAZOLE SODIUM 40 MG/1
40 TABLET, DELAYED RELEASE ORAL
Status: DISCONTINUED | OUTPATIENT
Start: 2018-01-04 | End: 2018-01-09 | Stop reason: HOSPADM

## 2018-01-03 RX ADMIN — IPRATROPIUM BROMIDE AND ALBUTEROL SULFATE 3 ML: .5; 3 SOLUTION RESPIRATORY (INHALATION) at 19:30

## 2018-01-03 RX ADMIN — SODIUM BICARBONATE 150 ML/HR: 84 INJECTION, SOLUTION INTRAVENOUS at 19:36

## 2018-01-03 RX ADMIN — FAMOTIDINE 20 MG: 10 INJECTION INTRAVENOUS at 09:14

## 2018-01-03 RX ADMIN — POTASSIUM CHLORIDE 20 MEQ: 400 INJECTION, SOLUTION INTRAVENOUS at 23:04

## 2018-01-03 RX ADMIN — HEPARIN SODIUM 5000 UNITS: 5000 INJECTION, SOLUTION INTRAVENOUS; SUBCUTANEOUS at 09:14

## 2018-01-03 RX ADMIN — INSULIN HUMAN 2 UNITS: 100 INJECTION, SOLUTION PARENTERAL at 12:09

## 2018-01-03 RX ADMIN — SODIUM BICARBONATE 200 ML/HR: 84 INJECTION, SOLUTION INTRAVENOUS at 01:15

## 2018-01-03 RX ADMIN — IPRATROPIUM BROMIDE AND ALBUTEROL SULFATE 3 ML: .5; 3 SOLUTION RESPIRATORY (INHALATION) at 16:14

## 2018-01-03 RX ADMIN — TAZOBACTAM SODIUM AND PIPERACILLIN SODIUM 4.5 G: .5; 4 INJECTION, POWDER, LYOPHILIZED, FOR SOLUTION INTRAVENOUS at 17:42

## 2018-01-03 RX ADMIN — POTASSIUM CHLORIDE 20 MEQ: 400 INJECTION, SOLUTION INTRAVENOUS at 19:58

## 2018-01-03 RX ADMIN — IPRATROPIUM BROMIDE AND ALBUTEROL SULFATE 3 ML: .5; 3 SOLUTION RESPIRATORY (INHALATION) at 07:38

## 2018-01-03 RX ADMIN — POTASSIUM CHLORIDE 20 MEQ: 400 INJECTION, SOLUTION INTRAVENOUS at 21:33

## 2018-01-03 RX ADMIN — HEPARIN SODIUM 5000 UNITS: 5000 INJECTION, SOLUTION INTRAVENOUS; SUBCUTANEOUS at 20:08

## 2018-01-03 RX ADMIN — SODIUM BICARBONATE 150 ML/HR: 84 INJECTION, SOLUTION INTRAVENOUS at 12:55

## 2018-01-03 RX ADMIN — INSULIN HUMAN 2 UNITS: 100 INJECTION, SOLUTION PARENTERAL at 05:51

## 2018-01-03 RX ADMIN — INSULIN HUMAN 2 UNITS: 100 INJECTION, SOLUTION PARENTERAL at 17:43

## 2018-01-03 RX ADMIN — MAGNESIUM SULFATE HEPTAHYDRATE 2 G: 40 INJECTION, SOLUTION INTRAVENOUS at 07:33

## 2018-01-03 RX ADMIN — LEVETIRACETAM 500 MG: 5 INJECTION INTRAVENOUS at 20:08

## 2018-01-03 RX ADMIN — LEVETIRACETAM 500 MG: 5 INJECTION INTRAVENOUS at 09:14

## 2018-01-03 RX ADMIN — NOREPINEPHRINE BITARTRATE 0.22 MCG/KG/MIN: 1 INJECTION, SOLUTION, CONCENTRATE INTRAVENOUS at 04:22

## 2018-01-03 RX ADMIN — ACETAMINOPHEN 650 MG: 325 TABLET, FILM COATED ORAL at 20:08

## 2018-01-03 RX ADMIN — IPRATROPIUM BROMIDE AND ALBUTEROL SULFATE 3 ML: .5; 3 SOLUTION RESPIRATORY (INHALATION) at 12:24

## 2018-01-03 RX ADMIN — TAZOBACTAM SODIUM AND PIPERACILLIN SODIUM 4.5 G: .5; 4 INJECTION, POWDER, LYOPHILIZED, FOR SOLUTION INTRAVENOUS at 05:51

## 2018-01-03 RX ADMIN — DAPTOMYCIN 250 MG: 500 INJECTION, POWDER, LYOPHILIZED, FOR SOLUTION INTRAVENOUS at 17:43

## 2018-01-03 RX ADMIN — SODIUM BICARBONATE 200 ML/HR: 84 INJECTION, SOLUTION INTRAVENOUS at 06:35

## 2018-01-03 NOTE — PLAN OF CARE
Problem: Overdose, Ingestion/Inhalants (Adult)  Goal: Signs and Symptoms of Listed Potential Problems Will be Absent or Manageable (Overdose, Ingestion/Inhalants)  Outcome: Ongoing (interventions implemented as appropriate)      Problem: Sepsis (Adult)  Goal: Signs and Symptoms of Listed Potential Problems Will be Absent or Manageable (Sepsis)  Outcome: Ongoing (interventions implemented as appropriate)      Problem: Pneumonia (Adult)  Goal: Signs and Symptoms of Listed Potential Problems Will be Absent or Manageable (Pneumonia)  Outcome: Ongoing (interventions implemented as appropriate)      Problem: SAFETY - NON-VIOLENT RESTRAINT  Goal: Remains free of injury from restraints (Non-Violent Restraint)  Outcome: Ongoing (interventions implemented as appropriate)    Goal: Free from restraint(s) (Non-Violent Restraint)  Outcome: Ongoing (interventions implemented as appropriate)      Problem: Patient Care Overview (Adult)  Goal: Plan of Care Review  Outcome: Ongoing (interventions implemented as appropriate)    Goal: Adult Individualization and Mutuality  Outcome: Ongoing (interventions implemented as appropriate)    Goal: Discharge Needs Assessment  Outcome: Ongoing (interventions implemented as appropriate)

## 2018-01-03 NOTE — PROGRESS NOTES
"   LOS: 1 day    Patient Care Team:  ANDREA Quinones as PCP - General  Jennifer Galindo MD as Consulting Physician (General Surgery)    Chief Complaint:  Heroin overdose     Subjective     Interval History:   No acute issues overnight. No new complaints.     Review of Systems:   Unable to perform secondary to patient factor.     Objective     Vital Sign Min/Max for last 24 hours  Temp  Min: 97.7 °F (36.5 °C)  Max: 99.9 °F (37.7 °C)   BP  Min: 51/42  Max: 130/93   Pulse  Min: 91  Max: 110   Resp  Min: 15  Max: 24   SpO2  Min: 74 %  Max: 98 %   Flow (L/min)  Min: 4  Max: 6   Weight  Min: 61.2 kg (135 lb)  Max: 61.2 kg (135 lb)     Flowsheet Rows         First Filed Value    Admission Height  152.4 cm (60\") Documented at 01/02/2018 1014    Admission Weight  61.2 kg (135 lb) Documented at 01/02/2018 1014             I/O last 3 completed shifts:  In: 4586.2 [I.V.:2486.2; IV Piggyback:2100]  Out: 2240 [Urine:2240]    Physical Exam:  GENERAL: obtunded, not responsive  HEENT: Normocephalic, atraumatic.  PER.  No conjunctivitis. No icterus. Oropharynx clear without evidence of thrush or exudate. No evidence of peridontal disease.    NECK: Supple without nuchal rigidity. No mass.  LYMPH: No painful cervical, axillary or inguinal lymphadenopathy.  HEART: RRR; No murmur, rubs, gallops. No bruits in neck, abdomen, or groins, no edema  LUNGS: Normal respiratory effort. Nonlabored. No dullness.  No crepitus.  Not Clear to auscultation bilateral wheezing, rales, and rhonchi.  ABDOMEN: Soft, nontender, nondistended. Positive bowel sounds. No rebound or guarding. NO mass or HSM.  JOINTS:  Full range of motion, no redness or tenderness.  EXT:  No cyanosis, clubbing or edema.  :  External genitalia without swelling or lesion  SKIN: Warm and dry without rash  NEURO: Oriented to PPT. No focal neurological deficits. Strength equal bilateral  PSYCHIATRIC: Normal insight and judgement. Cooperative with PE    WBC WBC   Date Value Ref " Range Status   01/03/2018 20.75 (H) 3.50 - 10.80 10*3/mm3 Final   01/02/2018 24.20 (H) 3.50 - 10.80 10*3/mm3 Final      HGB Hemoglobin   Date Value Ref Range Status   01/03/2018 8.9 (L) 11.5 - 15.5 g/dL Final   01/02/2018 10.0 (L) 11.5 - 15.5 g/dL Final      HCT Hematocrit   Date Value Ref Range Status   01/03/2018 28.1 (L) 34.5 - 44.0 % Final   01/02/2018 32.0 (L) 34.5 - 44.0 % Final      Platlets No results found for: LABPLAT   MCV MCV   Date Value Ref Range Status   01/03/2018 91.2 80.0 - 99.0 fL Final   01/02/2018 93.8 80.0 - 99.0 fL Final          Sodium Sodium   Date Value Ref Range Status   01/03/2018 142 132 - 146 mmol/L Final   01/02/2018 139 132 - 146 mmol/L Final   01/02/2018 139 132 - 146 mmol/L Final   01/02/2018 140 132 - 146 mmol/L Final      Potassium Potassium   Date Value Ref Range Status   01/03/2018 3.4 (L) 3.5 - 5.5 mmol/L Final   01/02/2018 5.1 3.5 - 5.5 mmol/L Final   01/02/2018 5.5 3.5 - 5.5 mmol/L Final   01/02/2018 4.6 3.5 - 5.5 mmol/L Final      Chloride Chloride   Date Value Ref Range Status   01/03/2018 112 (H) 99 - 109 mmol/L Final   01/02/2018 117 (H) 99 - 109 mmol/L Final   01/02/2018 118 (H) 99 - 109 mmol/L Final   01/02/2018 116 (H) 99 - 109 mmol/L Final      CO2 CO2   Date Value Ref Range Status   01/03/2018 17.0 (L) 20.0 - 31.0 mmol/L Final   01/02/2018 <10.0 (C) 20.0 - 31.0 mmol/L Final   01/02/2018 <10.0 (C) 20.0 - 31.0 mmol/L Final     Comment:     Verified by repeat analysis.    01/02/2018 11.0 (L) 20.0 - 31.0 mmol/L Final      BUN BUN   Date Value Ref Range Status   01/03/2018 76 (H) 9 - 23 mg/dL Final   01/02/2018 75 (H) 9 - 23 mg/dL Final   01/02/2018 73 (H) 9 - 23 mg/dL Final   01/02/2018 80 (H) 9 - 23 mg/dL Final      Creatinine Creatinine   Date Value Ref Range Status   01/03/2018 4.90 (H) 0.60 - 1.30 mg/dL Final   01/02/2018 7.40 (H) 0.60 - 1.30 mg/dL Final   01/02/2018 8.60 (H) 0.60 - 1.30 mg/dL Final   01/02/2018 8.40 (H) 0.60 - 1.30 mg/dL Final      Calcium Calcium    Date Value Ref Range Status   01/03/2018 7.8 (L) 8.7 - 10.4 mg/dL Final   01/02/2018 8.2 (L) 8.7 - 10.4 mg/dL Final   01/02/2018 8.4 (L) 8.7 - 10.4 mg/dL Final   01/02/2018 7.9 (L) 8.7 - 10.4 mg/dL Final      PO4 No results found for: CAPO4   Albumin Albumin   Date Value Ref Range Status   01/03/2018 3.00 (L) 3.20 - 4.80 g/dL Final   01/02/2018 3.30 3.20 - 4.80 g/dL Final   01/02/2018 3.50 3.20 - 4.80 g/dL Final   01/02/2018 3.50 3.20 - 4.80 g/dL Final      Magnesium Magnesium   Date Value Ref Range Status   01/03/2018 1.3 1.3 - 2.7 mg/dL Final   01/02/2018 1.9 1.3 - 2.7 mg/dL Final      Uric Acid No results found for: URICACID        Results Review:     I reviewed the patient's new clinical results.      DAPTOmycin 4 mg/kg Intravenous Q48H   famotidine 20 mg Oral Daily   heparin (porcine) 5,000 Units Subcutaneous Q12H   insulin regular 0-7 Units Subcutaneous Q6H   ipratropium-albuterol 3 mL Nebulization 4x Daily - RT   levETIRAcetam 500 mg Intravenous Q12H   piperacillin-tazobactam 4.5 g Intravenous Q12H       norepinephrine 0.02-0.3 mcg/kg/min Last Rate: 0.15 mcg/kg/min (01/03/18 1173)   Pharmacy Consult     sodium bicarbonate drip (greater than 75 mEq/bag) 200 mL/hr Last Rate: 200 mL/hr (01/03/18 6656)       FINDINGS:   Right kidney measures 11.3 centers in length without evidence of  hydronephrosis, contour deforming mass or obvious calculi. Appropriate  flow on Doppler interrogation.  Left kidney measures 10.6 cm in length without evidence of  hydronephrosis, contour deforming mass or obvious calculi. Appropriate  flow on Doppler interrogation. Trace nonspecific fluid seen in  perinephric location anterior to the left kidney.      Urinary bladder is decompressed and unremarkable with Joyner catheter in  situ.          IMPRESSION:  1. No hydronephrosis.  2. Nonspecific trace free fluid anteriorly adjacent to left kidney.    Medication Review:     Assessment/Plan     Active Problems:    Hypotension    Tobacco  use disorder    Type 2 diabetes mellitus    Seizure disorder    UTI (urinary tract infection)    Dehydration    COPD (chronic obstructive pulmonary disease)    Drug overdose    Acute renal failure superimposed on chronic kidney disease    Sepsis    Metabolic acidosis    Encephalopathy      1- heroin overdose   2- RAGHU - Non oliguric - improving with IV fluids.   3- Metabolic acidosis - improving.   4- Hyperkalemia - resolved.   5- Proteinuria - UPCR -0.8  6-  Hypotension - improving with IV fluids.     Plan:  Continue with IV hydration. Will decrease bicarb drip rate to 150 cc/hr.   Monitor I/O   Monitor H/H   Avoid nephrotoxic agents.   Replete electrolytes per protocol.     Stacy Reddy MD  01/03/18  8:57 AM

## 2018-01-03 NOTE — PLAN OF CARE
Problem: Patient Care Overview (Adult)  Goal: Plan of Care Review  Outcome: Ongoing (interventions implemented as appropriate)   01/03/18 0637   Coping/Psychosocial Response Interventions   Plan Of Care Reviewed With patient   Patient Care Overview   Progress improving   Outcome Evaluation   Outcome Summary/Follow up Plan BICARB increased to 200 ml/hr. Renal Ultrasound performed. BIPAP initiated to blow off CO2. Per omari may not have to Dialyse or CRRT due to increase in UOP. Waiting on AM labs. Levo titrated for BP. Currently at .18. At one point Levo uo to .22. ABG this AM shows improvement with acidosis. EEG performed in AM. Echo needed today.

## 2018-01-03 NOTE — NURSING NOTE
"RN entered room and found white, oblong pill with \"[id number]\" imprinted on it (looked up and matches picture of vicodin) resting on patient's chest, underneath pt's chin.   was \"sleeping\" on couch.  Charge nurse Lexy HOPPER RN alerted, who in turn notified security.  Video from in-room camera confirms  placing what appears to be white pill into patient's mouth.   left before security was able to confirm action via video.  alerted to inform charge RN when pt's  returns to see patient.      Later in day, spoke with son \"Parviz Cortez\" on phone, who says that they ( patient) have been admitted simultaneously multiple times for drug OD.    "

## 2018-01-03 NOTE — PROGRESS NOTES
Multidisciplinary Rounds    Time: 20min  Patient Name: Charisma Cortez  Date of Encounter: 01/03/18 10:58 AM  MRN: [id number]  Admission date: 1/2/2018      Reason for visit: MDR. RD to continue to follow per protocol.     Additional information obtained during MDR: Pt drowsy this AM, weaning levophed, Mg++ replaced.     Current diet: NPO Diet      Intervention:  Follow treatment plan  Care plan reviewed    Follow up:   Per protocol      Sandi West MS RD/LD CNSC  10:58 AM

## 2018-01-03 NOTE — PROGRESS NOTES
Discharge Planning Assessment  [facility]     Patient Name: Charisma Cortez  MRN: [id number]  Today's Date: 1/3/2018    Admit Date: 1/2/2018          Discharge Needs Assessment       01/03/18 1136    Living Environment    Lives With spouse    Living Arrangements apartment    Home Accessibility stairs (1 railing present)    Stair Railings at Home outside, present on left side    Type of Financial/Environmental Concern none    Transportation Available family or friend will provide    Living Environment    Provides Primary Care For no one    Quality Of Family Relationships supportive    Discharge Needs Assessment    Concerns To Be Addressed adjustment to diagnosis/illness concerns;substance/tobacco abuse/use concerns    Readmission Within The Last 30 Days no previous admission in last 30 days    Equipment Currently Used at Home walker, standard    Equipment Needed After Discharge none            Discharge Plan       01/03/18 1137    Case Management/Social Work Plan    Plan Home    Patient/Family In Agreement With Plan yes    Additional Comments Spoke with patient at bedside.  Patient sleepy and difficult to understand.  Patient states she resides in [facility] and lives in an apartment with her .  Prior to admission patient  states she is independent with ADL's and mobility but also states she has a walker she uses sometimes.  Patient denies needs at this time and states she plans to go home upon discharge. CM will continue to follow.        Discharge Placement     No information found        Expected Discharge Date and Time     Expected Discharge Date Expected Discharge Time    Jan 6, 2018               Demographic Summary       01/03/18 1135    Referral Information    Admission Type inpatient    Arrived From home or self-care    Referral Source admission list    Reason For Consult discharge planning    Record Reviewed clinical discipline documentation;history and physical;medical record;patient  profile    Contact Information    Permission Granted to Share Information With ;family/designee    Primary Care Physician Information    Name Kailee Beebe            Functional Status     None            Psychosocial     None            Abuse/Neglect     None            Legal     None            Substance Abuse     None            Patient Forms     None          Eulalia Yates RN

## 2018-01-03 NOTE — PAYOR COMM NOTE
"Jaqui Marrero RN Utilization Review [phone]  Fax # [phone]    Notification of admission and initial clinicals.  Status is that of inpt as of 01/02/2018 @1430  Lexi Person (57 y.o. Female)     Date of Birth Social Security Number Address Home Phone MRN    1960  [address] [place], [place] [id number] [phone] [id number]    Yazidi Marital Status          None        Admission Date Admission Type Admitting Provider Attending Provider Department, Room/Bed    1/2/18 Emergency Case, DO Ab Thompson Yuri, MD [facility] 2A ICU, N201/1    Discharge Date Discharge Disposition Discharge Destination                      Attending Provider: Warner Berger MD     Allergies:  Keflex [Cephalexin], Sulfa Antibiotics    Isolation:  None   Infection:  None   Code Status:  FULL    Ht:  152.4 cm (60\")   Wt:  61.2 kg (135 lb)    Admission Cmt:  None   Principal Problem:  None                Active Insurance as of 1/2/2018     Primary Coverage     Payor Plan Insurance Group Employer/Plan Group    [employer] [place] [employer] [place]      Payor Plan Address Payor Plan Phone Number Effective From Effective To    [address] [address]  1/1/2014     [place] [address]       Subscriber Name Subscriber Birth Date Member ID       LEXI PERSON 1960 [id number]                 Emergency Contacts      (Rel.) Home Phone Work Phone Mobile Phone    Neelima Martínez (Mother) [phone] -- --               History & Physical      Nandini Bunch DO at 1/2/2018  3:52 PM          INTENSIVIST   INITIAL HOSPITAL VISIT NOTE     Hospital:  LOS: 0 days     Ms. Lexi Person, 57 y.o. female is seen for a Chief Complaint of:  Chief Complaint   Patient presents with   • Altered Mental Status     Active Problems:    Hypotension    Tobacco use disorder    Type 2 diabetes mellitus    Seizure disorder    UTI (urinary tract infection)    Dehydration    " "COPD (chronic obstructive pulmonary disease)    Drug overdose    Acute renal failure superimposed on chronic kidney disease    Sepsis    Metabolic acidosis    Encephalopathy      Subjective   S     Ms. Cortez is a 56yo f with a history of polysubstance abuse who presents to the ED after being found altered at home by her . The patient is unable to provide any real history and the history is obtained from her  who is at bedside.     Per report, she last used Heroin on 12/29/17. For the past couple of days, she has been laying on the couch and has not been eating or drinking much. Her  was able to get her to eat some cornflakes earlier today. He also notes that she sometimes uses drugs without his knowledge. After breakfast, she began to have what he calls a \"gran mal\" seizure. However, he notes that she was able to talk during this whole event. She was then poorly responsive and he called EMS. Upon EMS arrival, she was noted to be significantly hypotensive and tachycardic and she told them that she \"wanted to die.\" Her GCS initially per EMS report was 10 but improved to 13 after 2mg IV Narcan.     In the ED, she was found to have an initial blood pressure of 84/50. Per report, this did not improve after 3L of IVF and she was started on levophed. She was given Rocephin and Levaquin.     She does have a history of seizures and was admitted to both  and [facility] with seizures in the past requiring mechanical ventilation per her .        PMH: She  has a past medical history of Anxiety; Arthritis; Cancer; Cataracts, bilateral; Chest pain; COPD (chronic obstructive pulmonary disease) (3/23/2017); Depression (3/23/2017); Diabetes mellitus; Emphysema lung; Epilepsy; Headache; High cholesterol; History of brain shunt (1983); Hypertension; Liver disease; Low back pain; Lumbosacral disc disease; BAEZ (nonalcoholic steatohepatitis); Neurological disorder; Osteoporosis; Pneumonia; Seizure disorder " (3/23/2017); and Wears glasses.   PSxH: She  has a past surgical history that includes Appendectomy (); Reconstruction urethroplasty;  section (,); Hysterectomy; Cyst Removal (); Cholecystectomy; Brain surgery; Tubal ligation; Dental surgery; Breast biopsy (Right); Colon surgery (); Colonoscopy; Esophagogastroduodenoscopy; and Excision Mass Trunk (Left, 2017).      Medications:  No current facility-administered medications on file prior to encounter.      Current Outpatient Prescriptions on File Prior to Encounter   Medication Sig   • aspirin 81 MG EC tablet TAKE 1 TABLET BY MOUTH EVERY DAY   • gabapentin (NEURONTIN) 600 MG tablet Take 600 mg by mouth 3 (Three) Times a Day.   • HYDROcodone-acetaminophen (NORCO) 5-325 MG per tablet Take 1-2 tablets by mouth Every 4 (Four) Hours As Needed (Pain).   • levETIRAcetam (KEPPRA) 500 MG tablet Take 500 mg by mouth 2 (Two) Times a Day.   • lisinopril (PRINIVIL,ZESTRIL) 10 MG tablet Take 10 mg by mouth Daily.   • metFORMIN (GLUCOPHAGE) 500 MG tablet TAKE 1 TABLET BY MOUTH EVERY DAY   • omeprazole (priLOSEC) 20 MG capsule Take 20 mg by mouth Daily.   • SPIRIVA HANDIHALER 18 MCG per inhalation capsule INHALE CONTENTS OF 1 CAPSULE DAILY   • traZODone (DESYREL) 100 MG tablet TAKE 1 TABLET BY MOUTH AT BEDTIME   • trospium 60 MG capsule sustained-release 24 hr capsule take 1 capsule by mouth once daily   • [DISCONTINUED] cetirizine (ZyrTEC) 10 MG tablet Take 10 mg by mouth daily.   • [DISCONTINUED] citalopram (CeleXA) 40 MG tablet TAKE 1 TABLET BY MOUTH EVERY DAY   • [DISCONTINUED] hydrochlorothiazide (HYDRODIURIL) 25 MG tablet TAKE 1 TABLET BY MOUTH EVERY DAY   • [DISCONTINUED] hydrOXYzine (VISTARIL) 50 MG capsule TAKE ONE CAPSULE BY MOUTH AT BEDTIME   • [DISCONTINUED] ibuprofen (ADVIL,MOTRIN) 800 MG tablet 800 mg Every 6 (Six) Hours As Needed.   • [DISCONTINUED] lisinopril (PRINIVIL,ZESTRIL) 5 MG tablet Take 5 mg by mouth Daily.   • [DISCONTINUED]  sertraline (ZOLOFT) 50 MG tablet Take 100 mg by mouth Daily.   • [DISCONTINUED] simvastatin (ZOCOR) 40 MG tablet take 1 tablet by mouth once daily   • [DISCONTINUED] tiZANidine (ZANAFLEX) 4 MG tablet TAKE 1 TABLET BY MOUTH EVERY 8 HOURS AS NEEDED       Allergies: She is allergic to keflex [cephalexin] and sulfa antibiotics.   FH: Her family history includes Asthma in her other; COPD in her other; Cancer in her other; Diabetes in her other; Heart disease in her other; Hyperlipidemia in her other; Liver disease in her other; No Known Problems in her brother, father, mother, and sister; Osteoarthritis in her other; Osteoporosis in her other; Ulcers in her other.   SH: She  reports that she has been smoking Cigarettes.  She has a 45.00 pack-year smoking history. She has never used smokeless tobacco. She reports that she does not drink alcohol or use illicit drugs.     The patient's relevant past medical, surgical and social history were reviewed and updated in Epic as appropriate.     ROS (14):   Review of Systems   Unable to perform ROS: Mental status change       Objective   O     Vitals    Temp  Min: 97.7 °F (36.5 °C)  Max: 97.7 °F (36.5 °C)  BP  Min: 51/42  Max: 94/54  Pulse  Min: 95  Max: 100  Resp  Min: 16  Max: 18  SpO2  Min: 74 %  Max: 98 % Flow (L/min)  Min: 4  Max: 4     I/O 24 hrs (7:00AM - 6:59 AM)  Intake/Output       01/02/18 0700 - 01/03/18 0659    Intake (ml) 2000    Output (ml) --    Net (ml) 2000    Last Weight 61.2 kg (135 lb)          Medications (drips):    lactated ringers    norepinephrine Last Rate: 0.06 mcg/kg/min (01/02/18 1433)   Pharmacy Consult    Pharmacy to dose vancomycin    sodium bicarbonate drip (greater than 75 mEq/bag)        Physical Examination    Telemetry: Sinus Rhythm: normal sinus rhythm      Constitutional:  No acute distress.  Altered.    HEENT: Normocephalic and atraumatic.   Neck:  Normal range of motion. Neck supple.   No JVD present.   No thyromegaly.    Cardiovascular:  Regular rate and rhythm.  No murmurs, rub or gallop.  No peripheral edema.   Respiratory: Normal respiratory effort.  Clear to ascultation.  Clear to percussion.    Abdominal:  Soft. No masses.   Non-tender. No distension.   No hepatosplenomegaly.   MSK: Normal muscle strength and tone.   Extremities: No digital cyanosis or clubbing.   Skin: Skin is warm and dry.    Neurological:   Awakens to stimulation.   Disoriented to place and time.   Twitching in the extremities.    Moves all extremities.   Psychiatric:  Unable to assess.            Results from last 7 days  Lab Units 01/02/18  1056   WBC 10*3/mm3 24.20*   HEMOGLOBIN g/dL 10.0*   MCV fL 93.8   PLATELETS 10*3/mm3 387       Results from last 7 days  Lab Units 01/02/18  1132   SODIUM mmol/L 140   POTASSIUM mmol/L 4.6   CO2 mmol/L 11.0*   CREATININE mg/dL 8.40*   MAGNESIUM mg/dL 1.9   PHOSPHORUS mg/dL 6.9*     Estimated Creatinine Clearance: 6.2 mL/min (by C-G formula based on Cr of 8.4).    Results from last 7 days  Lab Units 01/02/18  1132   ALK PHOS U/L 120*   BILIRUBIN mg/dL 0.0*   ALT (SGPT) U/L 27   AST (SGOT) U/L 53*         Results from last 7 days  Lab Units 01/02/18  1120   PH, ARTERIAL pH units 7.068   PCO2, ARTERIAL mm Hg 31.0   PO2 ART mm Hg 88.4       Images:    Personally reviewed.      Imaging Results (last 24 hours)     Procedure Component Value Units Date/Time    CT Abdomen Pelvis Without Contrast [[id number]] Updated:  01/02/18 1351    XR Chest 1 View [[id number]] Collected:  01/02/18 1347     Updated:  01/02/18 1437    Narrative:       EXAMINATION: XR CHEST 1 VW-01/02/2018:      INDICATION: Severe sepsis, triage protocol.      COMPARISON: NONE.     FINDINGS: The cardiac silhouette is normal. There is no pulmonary  inflammatory process. There is no mass or effusion.           Impression:       No active disease.     D:  01/02/2018  E:  01/02/2018     This report was finalized on 1/2/2018 2:35 PM by Dr. Nahid Menjivar MD.       CT Head Without  Contrast [[id number]] Collected:  01/02/18 1531     Updated:  01/02/18 1538    Narrative:       EXAMINATION: CT HEAD WO CONTRAST- 01/02/2018     INDICATION: AMS      TECHNIQUE: CT scan of the head was performed at 5 mm intervals. No  intravenous contrast was utilized.     The radiation dose reduction device was turned on for each scan per the  ALARA (As Low as Reasonably Achievable) protocol.     COMPARISON: 12/24/2013     FINDINGS: There is no intracranial mass. There is no hemorrhage. There  is no midline shift. There is an arachnoid cyst once again noted at the  anterior portion of the right middle cranial fossa. There is no  extra-axial fluid collection.       Impression:       There is a congenital anomaly of arachnoid cyst in the right  middle cranial fossa. There are no acute findings and there has been no  change since 12/24/2013.     D:  01/02/2018  E:  01/02/2018        This report was finalized on 1/2/2018 3:36 PM by Dr. Nahid Menjivar MD.       CT Chest Without Contrast [[id number]] Collected:  01/02/18 1532     Updated:  01/02/18 1538    Narrative:       EXAMINATION: CT CHEST WO CONTRAST- 01/02/2018     INDICATION: SOB      TECHNIQUE: CT scan of the chest was performed without contrast.     The radiation dose reduction device was turned on for each scan per the  ALARA (As Low as Reasonably Achievable) protocol.     COMPARISON: NONE     FINDINGS: There is no axillary lymphadenopathy. There are small  mediastinal and hilar nodes which are not abnormal based on size  criteria. There is no pericardial or pleural effusion. Images displayed  at lung window settings reveal patchy bibasilar airspace disease without  consolidation. There are areas of mild basilar pleural thickening.       Impression:       Patchy bibasilar airspace disease, left greater than right,  but without consolidation or effusion.     D:  01/02/2018  E:  01/02/2018        This report was finalized on 1/2/2018 3:36 PM by Dr. Nahid Menjivar MD.            ECG/EMG Results (last 24 hours)     Procedure Component Value Units Date/Time    ECG 12 Lead [[id number]] Collected:  01/02/18 1017     Updated:  01/02/18 1020          I reviewed the patient's new laboratory and imaging results.  I independently reviewed the patient's new images.  Assessment/Plan   A / P     Ms. Cortez is a 58yo F with a history of polysubstance abuse who presented with altered mental status and renal failure after being found down at home by her . She is reported to have last used heroin on 12/29, but her  is unsure if she used drugs without him after that. She is now in renal failure with what appears to be rhadomyolysis. She has a profound metabolic acidosis and is currently hypotensive requiring Levophed.     Nutrition:   NPO Diet  Advance Directives: Full Code    Hospital Problem List     Hypotension    Tobacco use disorder    Type 2 diabetes mellitus    Seizure disorder    UTI (urinary tract infection)    Dehydration    COPD (chronic obstructive pulmonary disease)    Drug overdose    Acute renal failure superimposed on chronic kidney disease    Sepsis    Metabolic acidosis    Encephalopathy          Assessment / Plan:    1. Admit to ICU  2. Place cvc for pressors and fluid administration.   3. IVF at 150/hr  4. Place matute  5. Start bicarb drip  6. Renal consult  7. Renal ultrasound  8. Restart home keppra  9. Vancomycin and Zosyn while cultures pending  10. q6h glucose checks with SSI  11. Follow up repeat labs    I discussed the patient's findings and my recommendations with family    Time:   Critical Care Time: was greater than 40 minutes.(excluding time spent on other separately billable procedures or treating other patients).   Patient was at high risk for life-threatening deterioration due to multi-system organ failure.      Nandini Bunch DO  Pulmonary and Critical Care Medicine     Electronically signed by Nandini Bunch DO at 1/2/2018  4:17 PM            Emergency Department Notes      Trent Curiel MD at 1/2/2018 10:23 AM      Procedure Orders:    1. Critical Care [[id number]] ordered by Trent Curiel MD at 01/02/18 1419                Subjective   HPI Comments:   57-year-old female presents via EMS for evaluation of altered mental status.  The patient is a limited historian at this time.  It is unknown at what time the patient was last known normal.  Per EMS, the patient was found with decreased responsiveness at home by her  this morning.  EMS was called.  The patient has a history of prior narcotic overdoses.  On EMS arrival, the patient was noted be hypoxemic with pinpoint pupils and was given Narcan 2 mg IV with significant improvement in her mental status.  Her GCS increased from 10 to 13.  However, she was noted to be hypotensive and tachycardic and told EMS that she no longer wanted to live.  She was subsequently brought to the ED for evaluation.    Patient is a 57 y.o. female presenting with altered mental status.   History provided by:  EMS personnel and patient  History limited by:  Mental status change  Altered Mental Status   Presenting symptoms: confusion, disorientation and partial responsiveness    Severity:  Severe  Most recent episode:  Today  Timing:  Constant  Progression:  Unchanged  Chronicity:  New  Context: drug use    Context comment:  Injected herion  Associated symptoms: abdominal pain, agitation and suicidal behavior        Review of Systems   Unable to perform ROS: Mental status change   Gastrointestinal: Positive for abdominal pain.   Psychiatric/Behavioral: Positive for agitation, confusion, self-injury and suicidal ideas.       Past Medical History:   Diagnosis Date   • Anxiety    • Arthritis    • Cancer     skin   • Cataracts, bilateral    • Chest pain    • COPD (chronic obstructive pulmonary disease) 3/23/2017   • Depression 3/23/2017   • Diabetes mellitus    • Emphysema lung    • Epilepsy     LAST SEIZURE  "2017   • Headache    • High cholesterol    • History of brain shunt    • Hypertension    • Liver disease    • Low back pain    • Lumbosacral disc disease    • BAEZ (nonalcoholic steatohepatitis)    • Neurological disorder    • Osteoporosis    • Pneumonia    • Seizure disorder 3/23/2017   • Wears glasses        Allergies   Allergen Reactions   • Keflex [Cephalexin] Hives   • Sulfa Antibiotics Rash       Past Surgical History:   Procedure Laterality Date   • APPENDECTOMY  1970   • BRAIN SURGERY     • BREAST BIOPSY Right    •  SECTION  ,   • CHOLECYSTECTOMY     • COLON SURGERY  1970\" REMOVED   • COLONOSCOPY     • CYST REMOVAL      brain, R front lobe - [facility]   • DENTAL PROCEDURE     • ENDOSCOPY     • EXCISION MASS TRUNK Left 2017    Procedure: EXCISION LEFT BUTTOCKS MASS;  Surgeon: Jennifer Galindo MD;  Location: [facility] OR;  Service:    • HYSTERECTOMY     • RECONSTRUCTION URETHROPLASTY     • TUBAL ABDOMINAL LIGATION         Family History   Problem Relation Age of Onset   • Osteoarthritis Other    • Osteoporosis Other    • Heart disease Other    • Asthma Other    • COPD Other    • Ulcers Other    • Diabetes Other    • Cancer Other    • Hyperlipidemia Other    • Liver disease Other    • No Known Problems Mother    • No Known Problems Father    • No Known Problems Sister    • No Known Problems Brother        Social History     Social History   • Marital status:      Spouse name: N/A   • Number of children: N/A   • Years of education: N/A     Social History Main Topics   • Smoking status: Current Every Day Smoker     Packs/day: 1.00     Years: 45.00     Types: Cigarettes   • Smokeless tobacco: Never Used   • Alcohol use No   • Drug use: No   • Sexual activity: Defer     Other Topics Concern   • None     Social History Narrative         Objective   Physical Exam   Constitutional: She appears well-developed and well-nourished. No distress.   Nontoxic appearing female with decreased LOC "   HENT:   Head: Normocephalic and atraumatic.   Mouth/Throat: Oropharynx is clear and moist.   Eyes: EOM are normal. Pupils are equal, round, and reactive to light.   Neck: Normal range of motion. Neck supple.   Cardiovascular: Normal rate, regular rhythm and normal heart sounds.  Exam reveals no gallop and no friction rub.    No murmur heard.  Pulmonary/Chest: Effort normal and breath sounds normal. No respiratory distress. She has no wheezes. She has no rales.   Abdominal: Soft. Bowel sounds are normal. She exhibits no distension and no mass. There is tenderness. There is guarding. There is no rebound.   Diffuse tenderness to palpation with voluntary guarding noted   Musculoskeletal: Normal range of motion. She exhibits no edema.   Neurological: She is alert.   GCS of 13 (E-4, M-6, V-3); moving all fours; alert to person only   Skin: Skin is warm and dry. No rash noted. She is not diaphoretic. No erythema.   Psychiatric:   Difficult to assess secondary to altered mental status; voicing active SI   Nursing note and vitals reviewed.      Critical Care  Performed by: JENNIFER SYED  Authorized by: JENNIFER SYED     Critical care provider statement:     Critical care time (minutes):  100    Critical care time was exclusive of:  Separately billable procedures and treating other patients and teaching time    Critical care was necessary to treat or prevent imminent or life-threatening deterioration of the following conditions:  Sepsis, shock and metabolic crisis    Critical care was time spent personally by me on the following activities:  Development of treatment plan with patient or surrogate, discussions with consultants, evaluation of patient's response to treatment, examination of patient, obtaining history from patient or surrogate, ordering and performing treatments and interventions, ordering and review of laboratory studies, ordering and review of radiographic studies, pulse oximetry, re-evaluation of  patient's condition and review of old charts    I assumed direction of critical care for this patient from another provider in my specialty: no                ED Course  ED Course   Comment By Time   57-year-old female presents for evaluation of altered mental status.  It is unknown at what time the patient was last known normal.  She has a history of prior narcotic overdoses.  This morning, the patient's  found her lying in the floor with decreased LOC and EMS was called.  On their arrival, the patient was hypopneic with pinpoint pupils and GCS of 10.  She was given Narcan 2 mg IV which significantly improved her mental status.  She was also noted to be tachycardic and hypotensive and was subsequently brought to the ED after voicing SI. Trent Curiel MD 01/02 1048   On arrival to the ED, patient with GCS of 13 and blood pressure noted to be 84/50.  IV fluids administered.  Patient protecting airway.  We will obtain labs and imaging and perform serial re-evaluations of patient's mental status.  Blood and urine cultures pending at this time. Trent Curiel MD 01/02 1107   Labs remarkable for urosepsis, rhabdomyolysis, and acute renal failure.  Rocephin given.  After 30 cc/kg fluid bolus, patient's systolic blood pressure still in the 80s.  We will start Levophed for refractory hypotension.  I discussed the case with Dr. Bunch and will admit for further evaluation and treatment.  Advanced imaging pending at this time. Trent Curiel MD 01/02 1339   Levaquin added based on patient's CT read. Trent Curiel MD 01/02 1455                 Recent Results (from the past 24 hour(s))   Protime-INR    Collection Time: 01/02/18 10:56 AM   Result Value Ref Range    Protime 12.3 (H) 9.6 - 11.5 Seconds    INR 1.12    aPTT    Collection Time: 01/02/18 10:56 AM   Result Value Ref Range    PTT 31.0 24.0 - 31.0 seconds   Calcium, Ionized    Collection Time: 01/02/18 10:56 AM   Result Value Ref Range     Ionized Calcium 1.20 1.12 - 1.32 mmol/L   C-reactive Protein    Collection Time: 01/02/18 10:56 AM   Result Value Ref Range    C-Reactive Protein 19.92 (H) 0.00 - 1.00 mg/dL   Light Blue Top    Collection Time: 01/02/18 10:56 AM   Result Value Ref Range    Extra Tube hold for add-on    Lavender Top    Collection Time: 01/02/18 10:56 AM   Result Value Ref Range    Extra Tube hold for add-on    CBC Auto Differential    Collection Time: 01/02/18 10:56 AM   Result Value Ref Range    WBC 24.20 (H) 3.50 - 10.80 10*3/mm3    RBC 3.41 (L) 3.89 - 5.14 10*6/mm3    Hemoglobin 10.0 (L) 11.5 - 15.5 g/dL    Hematocrit 32.0 (L) 34.5 - 44.0 %    MCV 93.8 80.0 - 99.0 fL    MCH 29.3 27.0 - 31.0 pg    MCHC 31.3 (L) 32.0 - 36.0 g/dL    RDW 15.7 (H) 11.3 - 14.5 %    RDW-SD 53.4 37.0 - 54.0 fl    MPV 10.3 6.0 - 12.0 fL    Platelets 387 150 - 450 10*3/mm3    Neutrophil % 81.1 (H) 41.0 - 71.0 %    Lymphocyte % 8.8 (L) 24.0 - 44.0 %    Monocyte % 9.3 0.0 - 12.0 %    Eosinophil % 0.2 0.0 - 3.0 %    Basophil % 0.1 0.0 - 1.0 %    Immature Grans % 0.5 0.0 - 0.6 %    Neutrophils, Absolute 19.64 (H) 1.50 - 8.30 10*3/mm3    Lymphocytes, Absolute 2.12 0.60 - 4.80 10*3/mm3    Monocytes, Absolute 2.26 (H) 0.00 - 1.00 10*3/mm3    Eosinophils, Absolute 0.04 0.00 - 0.30 10*3/mm3    Basophils, Absolute 0.03 0.00 - 0.20 10*3/mm3    Immature Grans, Absolute 0.11 (H) 0.00 - 0.03 10*3/mm3   Urinalysis With / Culture If Indicated - Urine, Catheter    Collection Time: 01/02/18 11:04 AM   Result Value Ref Range    Color, UA Yellow Yellow, Straw    Appearance, UA Cloudy (A) Clear    pH, UA 6.0 5.0 - 8.0    Specific Gravity, UA 1.015 1.001 - 1.030    Glucose, UA Negative Negative    Ketones, UA Negative Negative    Bilirubin, UA Negative Negative    Blood, UA Small (1+) (A) Negative    Protein,  mg/dL (2+) (A) Negative    Leuk Esterase, UA Moderate (2+) (A) Negative    Nitrite, UA Negative Negative    Urobilinogen, UA 0.2 E.U./dL 0.2 - 1.0 E.U./dL   Urine  Drug Screen - Urine, Clean Catch    Collection Time: 01/02/18 11:04 AM   Result Value Ref Range    THC, Screen, Urine Negative Negative    Phencyclidine (PCP), Urine Negative Negative    Cocaine Screen, Urine Negative Negative    Methamphetamine, Urine Negative Negative    Opiate Screen Positive (A) Negative    Amphetamine Screen, Urine Negative Negative    Benzodiazepine Screen, Urine Negative Negative    Tricyclic Antidepressants Screen Negative Negative    Methadone Screen, Urine Negative Negative    Barbiturates Screen, Urine Negative Negative    Oxycodone Screen, Urine Negative Negative    Propoxyphene Screen Negative Negative    Buprenorphine, Screen, Urine Negative Negative   Pregnancy, Urine - Urine, Clean Catch    Collection Time: 01/02/18 11:04 AM   Result Value Ref Range    HCG, Urine QL Negative Negative   Urinalysis, Microscopic Only - Urine, Clean Catch    Collection Time: 01/02/18 11:04 AM   Result Value Ref Range    RBC, UA 0-2 None Seen, 0-2 /HPF    WBC, UA 31-50 (A) None Seen /HPF    Bacteria, UA Trace None Seen, Trace /HPF    Squamous Epithelial Cells, UA 0-2 None Seen, 0-2 /HPF    Hyaline Casts, UA 0-6 0 - 6 /LPF    Amorphous Crystals, UA Moderate/2+ None Seen /HPF    Methodology Manual Light Microscopy    Blood Gas, Arterial    Collection Time: 01/02/18 11:20 AM   Result Value Ref Range    Site Arterial: left brachial     Willard's Test N/A     pH, Arterial 7.068 pH units    pCO2, Arterial 31.0 mm Hg    pO2, Arterial 88.4 mm Hg    HCO3, Arterial 8.9 mmol/L    Base Excess, Arterial -19.9 mmol/L    Hemoglobin, Blood Gas 9.4 g/dL    Hematocrit, Blood Gas 28.9 %    Oxyhemoglobin 94.9 94 - 99 %    Carboxyhemoglobin 1.3 %    Barometric Pressure for Blood Gas 760 mmHg    Modality Nasal Cannula    Lactic Acid, Plasma    Collection Time: 01/02/18 11:25 AM   Result Value Ref Range    Lactate 0.9 0.5 - 2.0 mmol/L   Comprehensive Metabolic Panel    Collection Time: 01/02/18 11:32 AM   Result Value Ref Range     Glucose 90 70 - 100 mg/dL    BUN 80 (H) 9 - 23 mg/dL    Creatinine 8.40 (H) 0.60 - 1.30 mg/dL    Sodium 140 132 - 146 mmol/L    Potassium 4.6 3.5 - 5.5 mmol/L    Chloride 116 (H) 99 - 109 mmol/L    CO2 11.0 (L) 20.0 - 31.0 mmol/L    Calcium 7.9 (L) 8.7 - 10.4 mg/dL    Total Protein 6.4 5.7 - 8.2 g/dL    Albumin 3.50 3.20 - 4.80 g/dL    ALT (SGPT) 27 7 - 40 U/L    AST (SGOT) 53 (H) 0 - 33 U/L    Alkaline Phosphatase 120 (H) 25 - 100 U/L    Total Bilirubin 0.0 (L) 0.3 - 1.2 mg/dL    eGFR Non African Amer 5 (L) >60 mL/min/1.73    Globulin 2.9 gm/dL    A/G Ratio 1.2 (L) 1.5 - 2.5 g/dL    BUN/Creatinine Ratio 9.5 7.0 - 25.0    Anion Gap 13.0 (H) 3.0 - 11.0 mmol/L   Magnesium    Collection Time: 01/02/18 11:32 AM   Result Value Ref Range    Magnesium 1.9 1.3 - 2.7 mg/dL   Phosphorus    Collection Time: 01/02/18 11:32 AM   Result Value Ref Range    Phosphorus 6.9 (H) 2.4 - 5.1 mg/dL   Green Top (Gel)    Collection Time: 01/02/18 11:32 AM   Result Value Ref Range    Extra Tube Hold for add-ons.    Gold Top - SST    Collection Time: 01/02/18 11:32 AM   Result Value Ref Range    Extra Tube Hold for add-ons.    CK    Collection Time: 01/02/18 11:32 AM   Result Value Ref Range    Creatine Kinase 2593 (H) 26 - 174 U/L   Ethanol    Collection Time: 01/02/18 11:32 AM   Result Value Ref Range    Ethanol <10 0 - 10 mg/dL   Acetaminophen Level    Collection Time: 01/02/18 11:32 AM   Result Value Ref Range    Acetaminophen <10.0 0.0 - 30.0 mcg/mL   Salicylate Level    Collection Time: 01/02/18 11:32 AM   Result Value Ref Range    Salicylate <1.0 0.0 - 29.0 mg/dL   Procalcitonin    Collection Time: 01/02/18 11:32 AM   Result Value Ref Range    Procalcitonin 3.71 ng/mL   Blood Gas, Arterial    Collection Time: 01/02/18  4:31 PM   Result Value Ref Range    Site Arterial: right brachial     Willard's Test N/A     pH, Arterial 7.020 pH units    pCO2, Arterial 34.0 mm Hg    pO2, Arterial 70.0 mm Hg    HCO3, Arterial 9.0 mmol/L    Base  Excess, Arterial -21.0 mmol/L    Oxyhemoglobin 89 (L) 94 - 99 %    Methemoglobin 1.10 %    Carboxyhemoglobin 1.3 %    Barometric Pressure for Blood Gas  mmHg    Modality Cannula - Nasal    POC Glucose Once    Collection Time: 01/02/18  5:39 PM   Result Value Ref Range    Glucose 121 70 - 130 mg/dL     Note: In addition to lab results from this visit, the labs listed above may include labs taken at another facility or during a different encounter within the last 24 hours. Please correlate lab times with ED admission and discharge times for further clarification of the services performed during this visit.    CT Head Without Contrast   Final Result   There is a congenital anomaly of arachnoid cyst in the right   middle cranial fossa. There are no acute findings and there has been no   change since 12/24/2013.       D:  01/02/2018   E:  01/02/2018           This report was finalized on 1/2/2018 3:36 PM by Dr. Nahid Menjivar MD.          CT Chest Without Contrast   Final Result   Patchy bibasilar airspace disease, left greater than right,   but without consolidation or effusion.       D:  01/02/2018   E:  01/02/2018           This report was finalized on 1/2/2018 3:36 PM by Dr. Nahid Menjivar MD.          XR Chest 1 View   Final Result   No active disease.       D:  01/02/2018   E:  01/02/2018       This report was finalized on 1/2/2018 2:35 PM by Dr. Nahid Menjivar MD.          CT Abdomen Pelvis Without Contrast    (Results Pending)   US Renal Bilateral    (Results Pending)   XR Chest 1 View    (Results Pending)     Vitals:    01/02/18 1450 01/02/18 1455 01/02/18 1500 01/02/18 1629   BP: 94/54 (!) 85/50 92/44    Pulse: 96 95 96 100   Resp:    15   Temp:       TempSrc:       SpO2: 96% 95% 96% 95%   Weight:       Height:         Medications   sodium chloride 0.9 % flush 10 mL (not administered)   sodium chloride 0.9 % bolus 1,836 mL (0 mL Intravenous Stopped 1/2/18 1401)   norepinephrine (LEVOPHED) 8 mg/250 mL (32 mcg/mL) in  sodium chloride 0.9% infusion (premix) (0.12 mcg/kg/min × 61.2 kg Intravenous Rate/Dose Change 1/2/18 1747)   levoFLOXacin (LEVAQUIN) 750 mg/150 mL D5W (premix) (LEVAQUIN) 750 mg (not administered)   sodium chloride 0.9 % flush 1-10 mL (not administered)   heparin (porcine) 5000 UNIT/ML injection 5,000 Units (not administered)   acetaminophen (TYLENOL) tablet 650 mg (not administered)   ondansetron (ZOFRAN) injection 4 mg (not administered)   famotidine (PEPCID) tablet 20 mg (20 mg Oral Not Given 1/2/18 1621)   sodium bicarbonate 8.4 % 150 mEq in dextrose (D5W) 5 % 1,000 mL infusion (greater than 75 mEq) (100 mL/hr Intravenous New Bag 1/2/18 1737)   Pharmacy to dose vancomycin (not administered)   Pharmacy to dose - Zosyn (not administered)   levETIRAcetam (KEPPRA) tablet 500 mg (not administered)   ipratropium-albuterol (DUO-NEB) nebulizer solution 3 mL (3 mL Nebulization Given 1/2/18 1629)   insulin regular (humuLIN R,novoLIN R) injection 0-7 Units (0 Units Subcutaneous Not Given 1/2/18 1747)   vancomycin 1250 mg/250 mL 0.9% NS IVPB ([facility]) (1,250 mg Intravenous New Bag 1/2/18 1730)   piperacillin-tazobactam (ZOSYN) 4.5 g in iso-osmotic dextrose 100 mL IVPB (premix) (not administered)   sodium chloride 0.9 % infusion (150 mL/hr Intravenous Currently Infusing 1/2/18 1759)   cefTRIAXone (ROCEPHIN) IVPB 1 g (0 g Intravenous Stopped 1/2/18 1430)   sodium chloride 0.9 % bolus 2,000 mL (2,000 mL Intravenous New Bag 1/2/18 1427)   piperacillin-tazobactam (ZOSYN) 4.5 g in iso-osmotic dextrose 100 mL IVPB (premix) (4.5 g Intravenous New Bag 1/2/18 1732)     ECG/EMG Results (last 24 hours)     Procedure Component Value Units Date/Time    ECG 12 Lead [[id number]] Collected:  01/02/18 1017     Updated:  01/02/18 1020            MDM    Final diagnoses:   Sepsis, due to unspecified organism   Urinary tract infection with hematuria, site unspecified   Non-traumatic rhabdomyolysis   Jose coma scale total score 13-15, at hospital  admission   Acute renal failure, unspecified acute renal failure type   Septic shock       Documentation assistance provided by gonzales Boyd.  Information recorded by the scribe was done at my direction and has been verified and validated by me.     Shawn R Oliver  01/02/18 1026       Shawn Boyd  01/02/18 1039       Shawn THOMAS Oliver  01/02/18 1106       Shawn Boyd  01/02/18 1420       Trent Curiel MD  01/02/18 1824       Electronically signed by Trent Curiel MD at 1/2/2018  6:24 PM        Vital Signs (last 24 hours)       01/02 0700  -  01/03 0659 01/03 0700  -  01/03 0850   Most Recent    Temp (°F) 97.7 -  99.9       98.4 (36.9)    Heart Rate 91 -  110      102     102    Resp 15 -  24      18     18    BP (!)51/42 -  130/93       120/64    SpO2 (%) (!)74 -  98       94          Hospital Medications (active)       Dose Frequency Start End    acetaminophen (TYLENOL) tablet 650 mg 650 mg Every 4 Hours PRN 1/2/2018     Sig - Route: Take 2 tablets by mouth Every 4 (Four) Hours As Needed for Headache or Fever (temperature greater than 101.5 F). - Oral    cefTRIAXone (ROCEPHIN) IVPB 1 g 1 g Once 1/2/2018 1/2/2018    Sig - Route: Infuse 50 mL into a venous catheter 1 (One) Time. - Intravenous    DAPTOmycin (CUBICIN) 250 mg in sterile water (preservative free) 5 mL IV syringe 4 mg/kg × 61.2 kg Every 48 Hours 1/3/2018 1/5/2018    Sig - Route: Infuse 5 mL into a venous catheter Every Other Day. - Intravenous    Notes to Pharmacy: xena possible pv shunt infection avoid vancomycin    famotidine (PEPCID) tablet 20 mg 20 mg Daily 1/2/2018     Sig - Route: Take 1 tablet by mouth Daily. - Oral    heparin (porcine) 5000 UNIT/ML injection 5,000 Units 5,000 Units Every 12 Hours Scheduled 1/2/2018     Sig - Route: Inject 1 mL under the skin Every 12 (Twelve) Hours. - Subcutaneous    insulin regular (humuLIN R,novoLIN R) injection 0-7 Units 0-7 Units Every 6 Hours Scheduled 1/2/2018     Sig - Route:  Inject 0-7 Units under the skin Every 6 (Six) Hours. - Subcutaneous    ipratropium-albuterol (DUO-NEB) nebulizer solution 3 mL 3 mL 4 Times Daily - RT 1/2/2018     Sig - Route: Take 3 mL by nebulization 4 (Four) Times a Day. - Nebulization    levETIRAcetam in NaCl 0.82% (KEPPRA) IVPB 500 mg 500 mg Every 12 Hours Scheduled 1/2/2018     Sig - Route: Infuse 100 mL into a venous catheter Every 12 (Twelve) Hours. - Intravenous    levoFLOXacin (LEVAQUIN) 750 mg/150 mL D5W (premix) (LEVAQUIN) 750 mg 750 mg Once 1/2/2018 1/2/2018    Sig - Route: Infuse 150 mL into a venous catheter 1 (One) Time. - Intravenous    magnesium sulfate 2g/50 mL (PREMIX) infusion 2 g Once 1/3/2018 1/3/2018    Sig - Route: Infuse 50 mL into a venous catheter 1 (One) Time. - Intravenous    norepinephrine (LEVOPHED) 8 mg/250 mL (32 mcg/mL) in sodium chloride 0.9% infusion (premix) 0.02-0.3 mcg/kg/min × 61.2 kg Titrated 1/2/2018     Sig - Route: Infuse 1.224-18.36 mcg/min into a venous catheter Dose Adjusted By Provider As Needed. - Intravenous    ondansetron (ZOFRAN) injection 4 mg 4 mg Every 6 Hours PRN 1/2/2018     Sig - Route: Infuse 2 mL into a venous catheter Every 6 (Six) Hours As Needed for Nausea or Vomiting. - Intravenous    Pharmacy to dose - Zosyn  Continuous PRN 1/2/2018 1/9/2018    Sig - Route: Continuous As Needed for Consult. - Does not apply    piperacillin-tazobactam (ZOSYN) 4.5 g in iso-osmotic dextrose 100 mL IVPB (premix) 4.5 g Once 1/2/2018 1/2/2018    Sig - Route: Infuse 100 mL into a venous catheter 1 (One) Time. - Intravenous    piperacillin-tazobactam (ZOSYN) 4.5 g/100 mL 0.9% NS IVPB ([facility]) 4.5 g Every 12 Hours 1/3/2018 1/10/2018    Sig - Route: Infuse 100 mL into a venous catheter Every 12 (Twelve) Hours. - Intravenous    sodium bicarbonate 8.4 % 150 mEq in dextrose (D5W) 5 % 1,000 mL infusion (greater than 75 mEq) 200 mL/hr Continuous 1/2/2018     Sig - Route: Infuse 200 mL/hr into a venous catheter Continuous. -  Intravenous    sodium chloride 0.9 % bolus 2,000 mL 2,000 mL Once 1/2/2018 1/2/2018    Sig - Route: Infuse 2,000 mL into a venous catheter 1 (One) Time. - Intravenous    vancomycin 1250 mg/250 mL 0.9% NS IVPB ([facility]) 20 mg/kg × 61.2 kg Once 1/2/2018 1/2/2018    Sig - Route: Infuse 250 mL into a venous catheter 1 (One) Time. - Intravenous    lactated ringers infusion (Discontinued) 150 mL/hr Continuous 1/2/2018 1/2/2018    Sig - Route: Infuse 150 mL/hr into a venous catheter Continuous. - Intravenous    levETIRAcetam (KEPPRA) tablet 500 mg (Discontinued) 500 mg Every 12 Hours Scheduled 1/2/2018 1/2/2018    Sig - Route: Take 1 tablet by mouth Every 12 (Twelve) Hours. - Oral    Pharmacy to dose vancomycin (Discontinued)  Continuous PRN 1/2/2018 1/2/2018    Sig - Route: Continuous As Needed for Consult. - Does not apply    piperacillin-tazobactam (ZOSYN) 4.5 g in iso-osmotic dextrose 100 mL IVPB (premix) (Discontinued) 4.5 g Every 12 Hours 1/3/2018 1/2/2018    Sig - Route: Infuse 100 mL into a venous catheter Every 12 (Twelve) Hours. - Intravenous    sodium chloride 0.9 % bolus 1,836 mL (Discontinued) 30 mL/kg × 61.2 kg Continuous 1/2/2018 1/2/2018    Sig - Route: Infuse 1,836 mL into a venous catheter Continuous. - Intravenous    sodium chloride 0.9 % flush 1-10 mL (Discontinued) 1-10 mL As Needed 1/2/2018 1/2/2018    Sig - Route: Infuse 1-10 mL into a venous catheter As Needed for Line Care. - Intravenous    sodium chloride 0.9 % flush 10 mL (Discontinued) 10 mL As Needed 1/2/2018 1/2/2018    Sig - Route: Infuse 10 mL into a venous catheter As Needed for Line Care. - Intravenous    Cosign for Ordering: Accepted by Trent Curiel MD on 1/2/2018  8:43 PM    sodium chloride 0.9 % infusion (Discontinued) 150 mL/hr Continuous 1/2/2018 1/2/2018    Sig - Route: Infuse 150 mL/hr into a venous catheter Continuous. - Intravenous             Operative/Procedure Notes (all)      ANDREA Domínguez at 1/2/2018  5:17 PM   "Version 1 of 1     Procedure Orders:    1. Insert Central Line At Bedside [[id number]] ordered by ANDREA Domínguez at 01/02/18 2568            Post-procedure Diagnoses:    1. Sepsis, due to unspecified organism [A41.9]    2. Acute renal failure superimposed on chronic kidney disease, unspecified CKD stage, unspecified acute renal failure type [N17.9, N18.9]               Insert Central Line At Bedside  Date/Time: 1/2/2018 5:18 PM  Performed by: CAMERON TOMAS  Authorized by: CAMERON TOMAS   Consent: Verbal consent not obtained. Written consent obtained.  Risks and benefits: risks, benefits and alternatives were discussed  Consent given by: spouse  Procedure consent: procedure consent matches procedure scheduled  Relevant documents: relevant documents present and verified  Test results: test results available and properly labeled  Site marked: the operative site was marked  Imaging studies: imaging studies available  Required items: required blood products, implants, devices, and special equipment available  Patient identity confirmed: arm band and hospital-assigned identification number  Time out: Immediately prior to procedure a \"time out\" was called to verify the correct patient, procedure, equipment, support staff and site/side marked as required.  Indications: vascular access and central pressure monitoring  Anesthesia: local infiltration    Anesthesia:  Local Anesthetic: lidocaine 1% without epinephrine    Sedation:  Patient sedated: no  Preparation: skin prepped with 2% chlorhexidine  Skin prep agent dried: skin prep agent completely dried prior to procedure  Sterile barriers: all five maximum sterile barriers used - cap, mask, sterile gown, sterile gloves, and large sterile sheet  Hand hygiene: hand hygiene performed prior to central venous catheter insertion  Location details: right internal jugular  Patient position: flat  Catheter type: triple lumen  Catheter size: 7 Fr  Pre-procedure: " landmarks identified  Ultrasound guidance: yes  Sterile ultrasound techniques: sterile gel and sterile probe covers were used  Number of attempts: 1  Successful placement: yes  Post-procedure: line sutured and dressing applied  Assessment: blood return through all ports,  free fluid flow,  placement verified by x-ray and no pneumothorax on x-ray  Patient tolerance: Patient tolerated the procedure well with no immediate complications      Procedure note/ultrasound interpretation:  Using ultrasound guidance the potential vascular site for insertion of the catheter was visualized to determine the patency of the vessel.  After selecting the appropriate site for insertion, the needle was visualized under ultrasound being inserted into the internal jugular vein, followed by ultrasound confirmation of wire and catheter placement.  Subsequently, a chest x-ray was ordered for further evaluation of line placement.    Chest x-ray interpretation:  A chest x-ray was ordered and independently read by myself to determine placement of central venous catheter.  The central venous catheter tip is well positioned and appears to be in the Superior Vena cava just above the right atrium.    ANDREA Lewis, [facility]  Pulmonary and Critical Care Service  1/2/2018 5:20 PM         Electronically signed by ANDREA Domínguez at 1/2/2018  5:52 PM        Physician Progress Notes (all)     No notes of this type exist for this encounter.           Consult Notes (all)      Nahid Flores MD at 1/2/2018  6:54 PM  Version 1 of 1         Formerly [facility] Consult Note    Charisma Cortez  1960  [id number]    Date of Admit:  1/2/2018    Date of Consult: 1/2/2018        Requesting Provider: No ref. provider found  Evaluating Physician: Nahid Flores MD        Reason for Consultation:    Oliguric xena    History of present illness:    Patient is a 57 y.o.  Yr old female with h/o heroin abuse ( says snorts, no iv).  Pt  "seen by dr Barahona in the past at [facility].  Had RAGHU with cr 2.9 improved to 0.9 3/17.  No cr determinations since then.  Pt also has a h/o hypertension on RAAS inhibition and hypokalemia on k replacement.  Snorted heroin a few days ago.  Not much intake since then.   brought her in for ams.  Found to have severe acidosis, raghu, hyperkalemia and rhabdomyolysis.      Past Medical History:   Diagnosis Date   • Anxiety    • Arthritis    • Cancer     skin   • Cataracts, bilateral    • Chest pain    • COPD (chronic obstructive pulmonary disease) 3/23/2017   • Depression 3/23/2017   • Diabetes mellitus    • Emphysema lung    • Epilepsy     LAST SEIZURE 2017   • Headache    • High cholesterol    • History of brain shunt    • Hypertension    • Liver disease    • Low back pain    • Lumbosacral disc disease    • BAEZ (nonalcoholic steatohepatitis)    • Neurological disorder    • Osteoporosis    • Pneumonia    • Seizure disorder 3/23/2017   • Wears glasses        Past Surgical History:   Procedure Laterality Date   • APPENDECTOMY     • BRAIN SURGERY     • BREAST BIOPSY Right    •  SECTION  ,   • CHOLECYSTECTOMY     • COLON SURGERY  1970\" REMOVED   • COLONOSCOPY     • CYST REMOVAL      brain, R front lobe - [facility]   • DENTAL PROCEDURE     • ENDOSCOPY     • EXCISION MASS TRUNK Left 2017    Procedure: EXCISION LEFT BUTTOCKS MASS;  Surgeon: Jennifer Galindo MD;  Location: [facility];  Service:    • HYSTERECTOMY     • RECONSTRUCTION URETHROPLASTY     • TUBAL ABDOMINAL LIGATION         Social history is pos for smoking and drug abuse neg etoh    family history includes Asthma in her other; COPD in her other; Cancer in her other; Diabetes in her other; Heart disease in her other; Hyperlipidemia in her other; Liver disease in her other; No Known Problems in her brother, father, mother, and sister; Osteoarthritis in her other; Osteoporosis in her other; Ulcers in her " other.    Allergies   Allergen Reactions   • Keflex [Cephalexin] Hives   • Sulfa Antibiotics Rash       Medication:    Current Facility-Administered Medications:   •  acetaminophen (TYLENOL) tablet 650 mg, 650 mg, Oral, Q4H PRN, Nandini V. Case, DO  •  famotidine (PEPCID) tablet 20 mg, 20 mg, Oral, Daily, Nandini V. Case, DO  •  heparin (porcine) 5000 UNIT/ML injection 5,000 Units, 5,000 Units, Subcutaneous, Q12H, Nandini V. Case, DO  •  insulin regular (humuLIN R,novoLIN R) injection 0-7 Units, 0-7 Units, Subcutaneous, Q6H, Nandini V. Case, DO  •  ipratropium-albuterol (DUO-NEB) nebulizer solution 3 mL, 3 mL, Nebulization, 4x Daily - RT, Nandini V. Case, DO, 3 mL at 01/02/18 1629  •  levETIRAcetam (KEPPRA) tablet 500 mg, 500 mg, Oral, Q12H, Nandini V. Case, DO  •  norepinephrine (LEVOPHED) 8 mg/250 mL (32 mcg/mL) in sodium chloride 0.9% infusion (premix), 0.02-0.3 mcg/kg/min, Intravenous, Titrated, Trent Curiel MD, Last Rate: 18.36 mL/hr at 01/02/18 1815, 0.16 mcg/kg/min at 01/02/18 1815  •  ondansetron (ZOFRAN) injection 4 mg, 4 mg, Intravenous, Q6H PRN, Nandini V. Case, DO  •  Pharmacy to dose - Zosyn, , Does not apply, Continuous PRN, Nandini V. Case, DO  •  Pharmacy to dose vancomycin, , Does not apply, Continuous PRN, Nandini V. Case, DO  •  [START ON 1/3/2018] piperacillin-tazobactam (ZOSYN) 4.5 g in iso-osmotic dextrose 100 mL IVPB (premix), 4.5 g, Intravenous, Q12H, Kedar Sharma MD  •  sodium bicarbonate 8.4 % 150 mEq in dextrose (D5W) 5 % 1,000 mL infusion (greater than 75 mEq), 100 mL/hr, Intravenous, Continuous, Nandini V. Case, DO, Last Rate: 100 mL/hr at 01/02/18 1737, 100 mL/hr at 01/02/18 1737  •  sodium chloride 0.9 % flush 1-10 mL, 1-10 mL, Intravenous, PRN, Nandini V. Case, DO  •  sodium chloride 0.9 % flush 10 mL, 10 mL, Intravenous, PRN, Trent Curiel MD  •  sodium chloride 0.9 % infusion, 150 mL/hr, Intravenous, Continuous, Kedar Sharma MD, Last Rate: 150 mL/hr at 01/02/18  1759, 150 mL/hr at 01/02/18 1759  •  vancomycin 1250 mg/250 mL 0.9% NS IVPB ([facility]), 20 mg/kg, Intravenous, Once, Kedar Sharma MD, 1,250 mg at 01/02/18 1730    Prescriptions Prior to Admission   Medication Sig Dispense Refill Last Dose   • aspirin 81 MG EC tablet Take 81 mg by mouth Daily.      • HYDROcodone-acetaminophen (NORCO) 7.5-325 MG per tablet Take 1 tablet by mouth 2 (Two) Times a Day As Needed for Moderate Pain .      • metFORMIN (GLUCOPHAGE) 500 MG tablet Take 500 mg by mouth Daily With Breakfast.      • tiotropium (SPIRIVA) 18 MCG per inhalation capsule Place 1 capsule into inhaler and inhale Daily.      • traZODone (DESYREL) 100 MG tablet Take 100 mg by mouth Every Night.      • trospium 60 MG capsule sustained-release 24 hr capsule Take 60 mg by mouth Every Morning.      • gabapentin (NEURONTIN) 600 MG tablet Take 600 mg by mouth 3 (Three) Times a Day.   8/4/2017   • levETIRAcetam (KEPPRA) 500 MG tablet Take 500 mg by mouth 2 (Two) Times a Day.   8/7/2017 at 0430   • lisinopril (PRINIVIL,ZESTRIL) 10 MG tablet Take 10 mg by mouth Daily.  0 Taking   • omeprazole (priLOSEC) 20 MG capsule Take 20 mg by mouth Daily.   8/7/2017 at 0430       Review of Systems:  Obtained from the , pt cannot contribute    Constitutional-- pos wgt loss  Eye-- no diplopia, no conjunctivitis  ENT-- No new hearing or throat complaints.  No epistaxis or oral sores. No odynophagia or dysphagia. No headache, photophobia or neck stiffness.  CV-- pos chest pain, palpitations,  Pos soa, or edema  Resp-- pos soa and cough/neg Hemoptysis  GI- pos nausea, vomiting, no diarrhea.  No hematochezia, melena, or hematemesis.  -- No dysuria, hematuria, or flank pain. No lower tract obstructive symptoms, uop low  Skin-- No rash, warm and dry  Lymph- no painful or swollen lymph nodes in neck/axilla or groin.   Heme- No active bruising or bleeding; no Hx of DVT or PE.  MS-- no swelling or pain in the joints  Neuro-- altered mental status  "for 3 days, pos seizures.  Psych--pos anxiety and depression  Endo--pos cold or heat intolerance.  No polyuria, polydipsia, or polyphagia    Full review of systems reviewed and negative otherwise for acute complaints    Physical Exam:   Vital Signs   Blood pressure (!) 90/39, pulse 97, temperature 98 °F (36.7 °C), temperature source Axillary, resp. rate 20, height 152.4 cm (60\"), weight 61.2 kg (135 lb), SpO2 94 %.     GENERAL: obtunded, not responsive  HEENT: Normocephalic, atraumatic.  PER.  No conjunctivitis. No icterus. Oropharynx clear without evidence of thrush or exudate. No evidence of peridontal disease.    NECK: Supple without nuchal rigidity. No mass.  LYMPH: No painful cervical, axillary or inguinal lymphadenopathy.  HEART: RRR; No murmur, rubs, gallops. No bruits in neck, abdomen, or groins, no edema  LUNGS: Normal respiratory effort. Nonlabored. No dullness.  No crepitus.  Not Clear to auscultation bilateral wheezing, rales, and rhonchi.  ABDOMEN: Soft, nontender, nondistended. Positive bowel sounds. No rebound or guarding. NO mass or HSM.  JOINTS:  Full range of motion, no redness or tenderness.  EXT:  No cyanosis, clubbing or edema.  :  External genitalia without swelling or lesion  SKIN: Warm and dry without rash  NEURO: Oriented to PPT. No focal neurological deficits. Strength equal bilateral  PSYCHIATRIC: Normal insight and judgement. Cooperative with PE    Laboratory Data    Results from last 7 days  Lab Units 01/02/18  1056   HEMOGLOBIN g/dL 10.0*   HEMATOCRIT % 32.0*       Results from last 7 days  Lab Units 01/02/18  1614 01/02/18  1132   SODIUM mmol/L 139 140   POTASSIUM mmol/L 5.5 4.6   CHLORIDE mmol/L 118* 116*   CO2 mmol/L <10.0* 11.0*   BUN mg/dL 73* 80*   CREATININE mg/dL 8.60* 8.40*   CALCIUM mg/dL 8.4* 7.9*   PHOSPHORUS mg/dL  --  6.9*   MAGNESIUM mg/dL  --  1.9   ALBUMIN g/dL 3.50 3.50   Results for LEXI PERSON (MRN [id number]) as of 1/2/2018 18:40   Ref. Range 1/2/2018 " 11:32   Creatine Kinase Latest Ref Range: 26 - 174 U/L 2593 (H)   Results for LEXI PERSON (MRN [id number]) as of 1/2/2018 18:40   Ref. Range 1/2/2018 11:25   Lactate, Venous Latest Ref Range: 0.5 - 2.0 mmol/L 0.9       Results from last 7 days  Lab Units 01/02/18  1614   GLUCOSE mg/dL 102*       Results from last 7 days  Lab Units 01/02/18  1104   COLOR UA  Yellow   CLARITY UA  Cloudy*   PH, URINE  6.0   SPECIFIC GRAVITY, URINE  1.015   GLUCOSE UA  Negative   KETONES UA  Negative   BILIRUBIN UA  Negative   PROTEIN UA  100 mg/dL (2+)*   BLOOD UA  Small (1+)*   LEUKOCYTES UA  Moderate (2+)*   NITRITE UA  Negative       Results from last 7 days  Lab Units 01/02/18  1614   ALK PHOS U/L 120*   BILIRUBIN mg/dL 0.0*   ALT (SGPT) U/L 32   AST (SGOT) U/L 67*     Estimated Creatinine Clearance: 6 mL/min (by C-G formula based on Cr of 8.6).    Radiology:  FINDINGS: The cardiac silhouette is normal. There is no pulmonary  inflammatory process. There is no mass or effusion.   I viewed the cxr         IMPRESSION:  No active disease.  Ct abd and chest pending  Renal Imaging:        Impression:   Oliguric xena, likely 2/2 vol depletion, acei, rhabdo, may require dialysis if she does not respond to ivf and hco3.I guess she could have glomerulonephritis from shunt infection but I doubt it.will check c3 and c4.  non anion gap acidosis, i'm surprised anion gap and lactate normal, she must not have been taking her metformin which causes a lactic acidosis, continue hco3  Hyperkalemia 2/2 #1 and 2 and meds, should reverse with hco3  Hypotensive possibly septic she has a h/o PV shunt could be infected would prefer to avoid vancomycin for now, consider zyvox or cubicin      PLAN: Thank you for asking us to see Lexi Person, I recommend the following:   nahco3, vol replacement, broad atbs avoid vancomycin and aminoglycosides for now, eval in am after volume resuscitation and hco3 for recovery of renal function she may need  dialysis    Nahid Flores MD  1/2/2018  6:54 PM                     Electronically signed by Nahid Flores MD at 1/2/2018  7:12 PM

## 2018-01-03 NOTE — PLAN OF CARE
Problem: Overdose, Ingestion/Inhalants (Adult)  Goal: Signs and Symptoms of Listed Potential Problems Will be Absent or Manageable (Overdose, Ingestion/Inhalants)  Outcome: Ongoing (interventions implemented as appropriate)      Problem: Sepsis (Adult)  Goal: Signs and Symptoms of Listed Potential Problems Will be Absent or Manageable (Sepsis)  Outcome: Ongoing (interventions implemented as appropriate)      Problem: Patient Care Overview (Adult)  Goal: Plan of Care Review  Outcome: Ongoing (interventions implemented as appropriate)   01/03/18 1840   Coping/Psychosocial Response Interventions   Plan Of Care Reviewed With patient;spouse;mother   Patient Care Overview   Progress improving   Outcome Evaluation   Outcome Summary/Follow up Plan Pt steadily improved over shift, levo weaned off, LOC improved. Was angry/aggitated/determined she was going home at 1400 when she found out her  was not allowed to come back to see her, but was calmed and returned to cooperative and pleasant afterwards. Bicarb gtt continues at 150ml/hr, UOP 2835 for shift.        Problem: Pressure Ulcer Risk (Vinicius Scale) (Adult,Obstetrics,Pediatric)  Goal: Identify Related Risk Factors and Signs and Symptoms  Outcome: Outcome(s) achieved Date Met: 01/03/18    Goal: Skin Integrity  Outcome: Ongoing (interventions implemented as appropriate)      Problem: Fall Risk (Adult)  Goal: Identify Related Risk Factors and Signs and Symptoms  Outcome: Outcome(s) achieved Date Met: 01/03/18    Goal: Absence of Falls  Outcome: Ongoing (interventions implemented as appropriate)

## 2018-01-04 ENCOUNTER — APPOINTMENT (OUTPATIENT)
Dept: GENERAL RADIOLOGY | Facility: HOSPITAL | Age: 58
End: 2018-01-04

## 2018-01-04 LAB
ALBUMIN SERPL-MCNC: 2.6 G/DL (ref 3.2–4.8)
ANION GAP SERPL CALCULATED.3IONS-SCNC: 6 MMOL/L (ref 3–11)
BACTERIA SPEC AEROBE CULT: ABNORMAL
BACTERIA SPEC AEROBE CULT: ABNORMAL
BASOPHILS # BLD AUTO: 0.03 10*3/MM3 (ref 0–0.2)
BASOPHILS NFR BLD AUTO: 0.2 % (ref 0–1)
BUN BLD-MCNC: 35 MG/DL (ref 9–23)
BUN/CREAT SERPL: 31.8 (ref 7–25)
C DIFF TOX GENS STL QL NAA+PROBE: DETECTED
C3 SERPL-MCNC: 125 MG/DL (ref 82–167)
C4 SERPL-MCNC: 37 MG/DL (ref 14–44)
CALCIUM SPEC-SCNC: 7.2 MG/DL (ref 8.7–10.4)
CHLORIDE SERPL-SCNC: 103 MMOL/L (ref 99–109)
CK SERPL-CCNC: 1016 U/L (ref 26–174)
CO2 SERPL-SCNC: 39 MMOL/L (ref 20–31)
CREAT BLD-MCNC: 1.1 MG/DL (ref 0.6–1.3)
DEPRECATED RDW RBC AUTO: 48.4 FL (ref 37–54)
EOSINOPHIL # BLD AUTO: 0.11 10*3/MM3 (ref 0–0.3)
EOSINOPHIL NFR BLD AUTO: 0.6 % (ref 0–3)
ERYTHROCYTE [DISTWIDTH] IN BLOOD BY AUTOMATED COUNT: 14.9 % (ref 11.3–14.5)
GFR SERPL CREATININE-BSD FRML MDRD: 52 ML/MIN/1.73
GLUCOSE BLD-MCNC: 128 MG/DL (ref 70–100)
GLUCOSE BLDC GLUCOMTR-MCNC: 125 MG/DL (ref 70–130)
GLUCOSE BLDC GLUCOMTR-MCNC: 132 MG/DL (ref 70–130)
GLUCOSE BLDC GLUCOMTR-MCNC: 160 MG/DL (ref 70–130)
GLUCOSE BLDC GLUCOMTR-MCNC: 187 MG/DL (ref 70–130)
HCT VFR BLD AUTO: 23.3 % (ref 34.5–44)
HGB BLD-MCNC: 7.7 G/DL (ref 11.5–15.5)
IMM GRANULOCYTES # BLD: 0.07 10*3/MM3 (ref 0–0.03)
IMM GRANULOCYTES NFR BLD: 0.4 % (ref 0–0.6)
LYMPHOCYTES # BLD AUTO: 3 10*3/MM3 (ref 0.6–4.8)
LYMPHOCYTES NFR BLD AUTO: 16.6 % (ref 24–44)
MAGNESIUM SERPL-MCNC: 1.1 MG/DL (ref 1.3–2.7)
MCH RBC QN AUTO: 29.3 PG (ref 27–31)
MCHC RBC AUTO-ENTMCNC: 33 G/DL (ref 32–36)
MCV RBC AUTO: 88.6 FL (ref 80–99)
MONOCYTES # BLD AUTO: 1.47 10*3/MM3 (ref 0–1)
MONOCYTES NFR BLD AUTO: 8.1 % (ref 0–12)
NEUTROPHILS # BLD AUTO: 13.43 10*3/MM3 (ref 1.5–8.3)
NEUTROPHILS NFR BLD AUTO: 74.1 % (ref 41–71)
PHOSPHATE SERPL-MCNC: 2.1 MG/DL (ref 2.4–5.1)
PHOSPHATE SERPL-MCNC: 2.1 MG/DL (ref 2.4–5.1)
PLATELET # BLD AUTO: 291 10*3/MM3 (ref 150–450)
PMV BLD AUTO: 9.4 FL (ref 6–12)
POTASSIUM BLD-SCNC: 2.7 MMOL/L (ref 3.5–5.5)
POTASSIUM BLD-SCNC: 2.9 MMOL/L (ref 3.5–5.5)
PROCALCITONIN SERPL-MCNC: 0.48 NG/ML
RBC # BLD AUTO: 2.63 10*6/MM3 (ref 3.89–5.14)
SODIUM BLD-SCNC: 148 MMOL/L (ref 132–146)
WBC NRBC COR # BLD: 18.11 10*3/MM3 (ref 3.5–10.8)

## 2018-01-04 PROCEDURE — 25010000002 MAGNESIUM SULFATE 2 GM/50ML SOLUTION: Performed by: INTERNAL MEDICINE

## 2018-01-04 PROCEDURE — 83735 ASSAY OF MAGNESIUM: CPT | Performed by: INTERNAL MEDICINE

## 2018-01-04 PROCEDURE — 25010000003 POTASSIUM CHLORIDE PER 2 MEQ: Performed by: INTERNAL MEDICINE

## 2018-01-04 PROCEDURE — 94799 UNLISTED PULMONARY SVC/PX: CPT

## 2018-01-04 PROCEDURE — 94760 N-INVAS EAR/PLS OXIMETRY 1: CPT

## 2018-01-04 PROCEDURE — 84132 ASSAY OF SERUM POTASSIUM: CPT | Performed by: NURSE PRACTITIONER

## 2018-01-04 PROCEDURE — 25010000002 HEPARIN (PORCINE) PER 1000 UNITS: Performed by: INTERNAL MEDICINE

## 2018-01-04 PROCEDURE — 84145 PROCALCITONIN (PCT): CPT | Performed by: INTERNAL MEDICINE

## 2018-01-04 PROCEDURE — 25010000002 ONDANSETRON PER 1 MG: Performed by: INTERNAL MEDICINE

## 2018-01-04 PROCEDURE — 84100 ASSAY OF PHOSPHORUS: CPT | Performed by: INTERNAL MEDICINE

## 2018-01-04 PROCEDURE — 87493 C DIFF AMPLIFIED PROBE: CPT | Performed by: NURSE PRACTITIONER

## 2018-01-04 PROCEDURE — 83874 ASSAY OF MYOGLOBIN: CPT | Performed by: INTERNAL MEDICINE

## 2018-01-04 PROCEDURE — 71045 X-RAY EXAM CHEST 1 VIEW: CPT

## 2018-01-04 PROCEDURE — 99233 SBSQ HOSP IP/OBS HIGH 50: CPT | Performed by: INTERNAL MEDICINE

## 2018-01-04 PROCEDURE — 82550 ASSAY OF CK (CPK): CPT | Performed by: INTERNAL MEDICINE

## 2018-01-04 PROCEDURE — 25010000002 MORPHINE PER 10 MG: Performed by: INTERNAL MEDICINE

## 2018-01-04 PROCEDURE — 82962 GLUCOSE BLOOD TEST: CPT

## 2018-01-04 PROCEDURE — 80069 RENAL FUNCTION PANEL: CPT | Performed by: INTERNAL MEDICINE

## 2018-01-04 PROCEDURE — 25010000002 PIPERACILLIN-TAZOBACTAM: Performed by: INTERNAL MEDICINE

## 2018-01-04 PROCEDURE — 85025 COMPLETE CBC W/AUTO DIFF WBC: CPT | Performed by: INTERNAL MEDICINE

## 2018-01-04 PROCEDURE — 94640 AIRWAY INHALATION TREATMENT: CPT

## 2018-01-04 RX ORDER — POTASSIUM CHLORIDE 750 MG/1
40 CAPSULE, EXTENDED RELEASE ORAL AS NEEDED
Status: DISCONTINUED | OUTPATIENT
Start: 2018-01-04 | End: 2018-01-05 | Stop reason: SDUPTHER

## 2018-01-04 RX ORDER — NICOTINE 21 MG/24HR
1 PATCH, TRANSDERMAL 24 HOURS TRANSDERMAL EVERY 24 HOURS
Status: DISCONTINUED | OUTPATIENT
Start: 2018-01-04 | End: 2018-01-09 | Stop reason: HOSPADM

## 2018-01-04 RX ORDER — GABAPENTIN 300 MG/1
300 CAPSULE ORAL 3 TIMES DAILY
Status: DISCONTINUED | OUTPATIENT
Start: 2018-01-04 | End: 2018-01-09 | Stop reason: HOSPADM

## 2018-01-04 RX ORDER — MAGNESIUM SULFATE HEPTAHYDRATE 40 MG/ML
4 INJECTION, SOLUTION INTRAVENOUS ONCE
Status: COMPLETED | OUTPATIENT
Start: 2018-01-04 | End: 2018-01-04

## 2018-01-04 RX ORDER — MAGNESIUM SULFATE HEPTAHYDRATE 40 MG/ML
4 INJECTION, SOLUTION INTRAVENOUS AS NEEDED
Status: DISCONTINUED | OUTPATIENT
Start: 2018-01-04 | End: 2018-01-07 | Stop reason: SDUPTHER

## 2018-01-04 RX ORDER — MAGNESIUM SULFATE HEPTAHYDRATE 40 MG/ML
2 INJECTION, SOLUTION INTRAVENOUS AS NEEDED
Status: DISCONTINUED | OUTPATIENT
Start: 2018-01-04 | End: 2018-01-04

## 2018-01-04 RX ORDER — MAGNESIUM SULFATE HEPTAHYDRATE 40 MG/ML
4 INJECTION, SOLUTION INTRAVENOUS AS NEEDED
Status: DISCONTINUED | OUTPATIENT
Start: 2018-01-04 | End: 2018-01-04

## 2018-01-04 RX ORDER — MORPHINE SULFATE 4 MG/ML
4 INJECTION, SOLUTION INTRAMUSCULAR; INTRAVENOUS EVERY 4 HOURS PRN
Status: DISCONTINUED | OUTPATIENT
Start: 2018-01-04 | End: 2018-01-07

## 2018-01-04 RX ORDER — LEVETIRACETAM 500 MG/1
500 TABLET ORAL 2 TIMES DAILY
Status: DISCONTINUED | OUTPATIENT
Start: 2018-01-04 | End: 2018-01-09 | Stop reason: HOSPADM

## 2018-01-04 RX ORDER — AMOXICILLIN AND CLAVULANATE POTASSIUM 875; 125 MG/1; MG/1
1 TABLET, FILM COATED ORAL EVERY 12 HOURS SCHEDULED
Status: DISCONTINUED | OUTPATIENT
Start: 2018-01-04 | End: 2018-01-08

## 2018-01-04 RX ADMIN — IPRATROPIUM BROMIDE AND ALBUTEROL SULFATE 3 ML: .5; 3 SOLUTION RESPIRATORY (INHALATION) at 12:52

## 2018-01-04 RX ADMIN — GABAPENTIN 300 MG: 300 CAPSULE ORAL at 20:08

## 2018-01-04 RX ADMIN — IPRATROPIUM BROMIDE AND ALBUTEROL SULFATE 3 ML: .5; 3 SOLUTION RESPIRATORY (INHALATION) at 16:05

## 2018-01-04 RX ADMIN — ACETAMINOPHEN 650 MG: 325 TABLET, FILM COATED ORAL at 07:21

## 2018-01-04 RX ADMIN — ACETAMINOPHEN 650 MG: 325 TABLET, FILM COATED ORAL at 13:18

## 2018-01-04 RX ADMIN — POTASSIUM CHLORIDE 20 MEQ: 400 INJECTION, SOLUTION INTRAVENOUS at 11:03

## 2018-01-04 RX ADMIN — SODIUM BICARBONATE 150 ML/HR: 84 INJECTION, SOLUTION INTRAVENOUS at 03:28

## 2018-01-04 RX ADMIN — GABAPENTIN 300 MG: 300 CAPSULE ORAL at 08:55

## 2018-01-04 RX ADMIN — POTASSIUM CHLORIDE 40 MEQ: 750 CAPSULE, EXTENDED RELEASE ORAL at 18:41

## 2018-01-04 RX ADMIN — HEPARIN SODIUM 5000 UNITS: 5000 INJECTION, SOLUTION INTRAVENOUS; SUBCUTANEOUS at 08:56

## 2018-01-04 RX ADMIN — LEVETIRACETAM 500 MG: 500 TABLET, FILM COATED ORAL at 11:03

## 2018-01-04 RX ADMIN — GABAPENTIN 300 MG: 300 CAPSULE ORAL at 16:38

## 2018-01-04 RX ADMIN — AMOXICILLIN AND CLAVULANATE POTASSIUM 1 TABLET: 875; 125 TABLET, FILM COATED ORAL at 20:08

## 2018-01-04 RX ADMIN — POTASSIUM CHLORIDE 20 MEQ: 400 INJECTION, SOLUTION INTRAVENOUS at 10:00

## 2018-01-04 RX ADMIN — ACETAMINOPHEN 650 MG: 325 TABLET, FILM COATED ORAL at 17:26

## 2018-01-04 RX ADMIN — TAZOBACTAM SODIUM AND PIPERACILLIN SODIUM 4.5 G: .5; 4 INJECTION, POWDER, LYOPHILIZED, FOR SOLUTION INTRAVENOUS at 05:09

## 2018-01-04 RX ADMIN — ONDANSETRON 4 MG: 2 INJECTION INTRAMUSCULAR; INTRAVENOUS at 02:02

## 2018-01-04 RX ADMIN — LEVETIRACETAM 500 MG: 500 TABLET, FILM COATED ORAL at 20:08

## 2018-01-04 RX ADMIN — IPRATROPIUM BROMIDE AND ALBUTEROL SULFATE 3 ML: .5; 3 SOLUTION RESPIRATORY (INHALATION) at 07:10

## 2018-01-04 RX ADMIN — IPRATROPIUM BROMIDE AND ALBUTEROL SULFATE 3 ML: .5; 3 SOLUTION RESPIRATORY (INHALATION) at 19:44

## 2018-01-04 RX ADMIN — VANCOMYCIN 125 MG: KIT at 23:13

## 2018-01-04 RX ADMIN — MAGNESIUM SULFATE HEPTAHYDRATE 2 G: 40 INJECTION, SOLUTION INTRAVENOUS at 11:19

## 2018-01-04 RX ADMIN — AMOXICILLIN AND CLAVULANATE POTASSIUM 1 TABLET: 875; 125 TABLET, FILM COATED ORAL at 11:03

## 2018-01-04 RX ADMIN — NICOTINE 1 PATCH: 21 PATCH, EXTENDED RELEASE TRANSDERMAL at 02:54

## 2018-01-04 RX ADMIN — INSULIN HUMAN 2 UNITS: 100 INJECTION, SOLUTION PARENTERAL at 05:08

## 2018-01-04 RX ADMIN — PANTOPRAZOLE SODIUM 40 MG: 40 TABLET, DELAYED RELEASE ORAL at 05:09

## 2018-01-04 RX ADMIN — ACETAMINOPHEN 650 MG: 325 TABLET, FILM COATED ORAL at 00:07

## 2018-01-04 RX ADMIN — HEPARIN SODIUM 5000 UNITS: 5000 INJECTION, SOLUTION INTRAVENOUS; SUBCUTANEOUS at 20:09

## 2018-01-04 RX ADMIN — MAGNESIUM SULFATE IN WATER 4 G: 40 INJECTION, SOLUTION INTRAVENOUS at 16:41

## 2018-01-04 RX ADMIN — MORPHINE SULFATE 4 MG: 4 INJECTION, SOLUTION INTRAMUSCULAR; INTRAVENOUS at 23:13

## 2018-01-04 RX ADMIN — INSULIN HUMAN 2 UNITS: 100 INJECTION, SOLUTION PARENTERAL at 00:07

## 2018-01-04 RX ADMIN — GABAPENTIN 300 MG: 300 CAPSULE ORAL at 02:55

## 2018-01-04 RX ADMIN — POTASSIUM CHLORIDE 40 MEQ: 750 CAPSULE, EXTENDED RELEASE ORAL at 12:47

## 2018-01-04 RX ADMIN — MORPHINE SULFATE 4 MG: 4 INJECTION, SOLUTION INTRAMUSCULAR; INTRAVENOUS at 18:58

## 2018-01-04 RX ADMIN — ONDANSETRON 4 MG: 2 INJECTION INTRAMUSCULAR; INTRAVENOUS at 11:09

## 2018-01-04 RX ADMIN — INSULIN HUMAN 2 UNITS: 100 INJECTION, SOLUTION PARENTERAL at 20:14

## 2018-01-04 RX ADMIN — ONDANSETRON 4 MG: 2 INJECTION INTRAMUSCULAR; INTRAVENOUS at 18:44

## 2018-01-04 NOTE — PROGRESS NOTES
Multidisciplinary Rounds    Time: 20min  Patient Name: Charisma Cortez  Date of Encounter: 01/04/18 10:32 AM  MRN: [id number]  Admission date: 1/2/2018      Reason for visit: MDR. RD to continue to follow per protocol.     Pt alert, appears oriented,  ok to initiate diet per MD    Current diet: Diet Soft Texture; Ground; Cardiac, Consistent Carbohydrate      Intervention:  Follow treatment plan  Care plan reviewed    Follow up:   Per protocol      Mallory Sanchez RD  10:32 AM

## 2018-01-04 NOTE — PROGRESS NOTES
INTENSIVIST   PROGRESS NOTE     Hospital:  LOS: 1 day   Subjective   Ms. Charisma Cortez, 57 y.o. female is followed for:      Encephalopathy    Hypotension    Type 2 diabetes mellitus    Drug overdose    Acute renal failure superimposed on chronic kidney disease    As an Intensivist, we provide an integrated approach to the ICU patient and family, medical management of comorbid conditions, lead interdisciplinary rounds and coordinate the care with all other services, including those from other specialists.     S     Interval History:  Not feeling well.  Oriented but talking to herself.  Mumble few words     The patient's relevant past medical, surgical and social history were reviewed and updated in Epic as appropriate.      ROS:   Constitutional: Positive for fever.   Respiratory: Negative for dyspnea.   Cardiovascular: Negative for chest pain.   Gastrointestinal: Negative for  nausea, vomiting and diarrhea.     Objective   O     Vitals:  Temp: 99.3 °F (37.4 °C) Temp  Min: 98.4 °F (36.9 °C)  Max: 101 °F (38.3 °C)   BP: 114/73 BP  Min: 71/52  Max: 130/93   Pulse: 104 Pulse  Min: 91  Max: 110   Resp: 20 Resp  Min: 16  Max: 22   SpO2: (!) 84 % SpO2  Min: 84 %  Max: 96 %   Device: room air    Flow Rate: 4 Flow (L/min)  Min: 4  Max: 4     Intake/Ouptut 24 hrs (7:00AM - 6:59 AM)  Intake & Output (last 3 days)       01/01 0701 - 01/02 0700 01/02 0701 - 01/03 0700 01/03 0701 - 01/04 0700    I.V. (mL/kg)  2486.2 (40.6) 2139.3 (34.9)    IV Piggyback  2100 199    Total Intake(mL/kg)  4586.2 (74.9) 2338.3 (38.2)    Urine (mL/kg/hr)  2240 2835 (3.8)    Total Output   2240 2835    Net   +2346.2 -496.7                 Medications (drips):    norepinephrine Last Rate: Stopped (01/03/18 1435)   sodium bicarbonate drip (greater than 75 mEq/bag) Last Rate: 150 mL/hr (01/03/18 1255)     Physical Examination  Telemetry:  Sinus Rhythm: normal sinus rhythm, sinus tachycardia   Constitutional:  No acute distress.   Cardiovascular:  Normal rate, regular and rhythm. Normal heart sounds.  No murmurs, gallop or rub.   Respiratory: No respiratory distress. Normal respiratory effort.  Normal breath sounds  Clear to auscultation and percussion.    Abdominal:  Soft. No masses. Non-tender. No distension. No HSM.   Extremities: No digital cyanosis. No clubbing.  No peripheral edema.   Neurological:   Alert and Oriented to person, place, and time.   Lines/Drains/Airways: [facility] CVC   [facility]ner       Hematology:    Results from last 7 days  Lab Units 01/03/18 0326 01/02/18  1056   WBC 10*3/mm3 20.75* 24.20*   HEMOGLOBIN g/dL 8.9* 10.0*   MCV fL 91.2 93.8   PLATELETS 10*3/mm3 351 387     Electrolytes, Magnesium and Phosphorus:    Results from last 7 days  Lab Units 01/03/18 0326 01/02/18 1928 01/02/18 1614 01/02/18  1132   SODIUM mmol/L 142 139 139 140   POTASSIUM mmol/L 3.4* 5.1 5.5 4.6   CO2 mmol/L 17.0* <10.0* <10.0* 11.0*   MAGNESIUM mg/dL 1.3  --   --  1.9   PHOSPHORUS mg/dL 4.6 7.5*  --  6.9*     Renal:    Results from last 7 days  Lab Units 01/03/18 0326 01/02/18 1928 01/02/18 1614 01/02/18  1132   CREATININE mg/dL 4.90* 7.40* 8.60* 8.40*   BUN mg/dL 76* 75* 73* 80*     Estimated Creatinine Clearance: 10.6 mL/min (by C-G formula based on Cr of 4.9).    Hepatic:    Results from last 7 days  Lab Units 01/03/18 0326 01/02/18 1614 01/02/18  1132   ALK PHOS U/L 114* 120* 120*   BILIRUBIN mg/dL 0.2* 0.0* 0.0*   ALT (SGPT) U/L 28 32 27   AST (SGOT) U/L 54* 67* 53*       Results from last 7 days  Lab Units 01/03/18 0326 01/02/18 1614 01/02/18  1132   CK TOTAL U/L 2255* 3581* 2498*  2593*     Arterial Blood Gases:    Results from last 7 days  Lab Units 01/03/18  0400 01/02/18  1631 01/02/18  1120 01/02/18  0925   PH, ARTERIAL pH units 7.285* 7.020 7.068  --    PCO2, ARTERIAL mm Hg 39.1 34.0 31.0  --    PO2 ART mm Hg 87.9 70.0 88.4  --    FIO2 % 28 31  --  37     Lab Results   Component Value Date    LACTATE 1.1 01/03/2018    LACTATE 0.9 01/02/2018      Lab Results   Component Value Date    PROCALCITO 3.71 01/02/2018    CRP 19.92 (H) 01/02/2018    CRP 3.0 (H) 11/04/2016       Lab Results   Component Value Date    BLOODCX No growth at 24 hours 01/02/2018    BLOODCX No growth at 24 hours 01/02/2018    BLOODCX No growth at 5 days 03/23/2017    BLOODCX No growth at 5 days 03/23/2017       Lab Results   Component Value Date    URINECX Culture in progress 01/02/2018    URINECX Culture in progress 01/02/2018    URINECX 10,000-20,000 CFU/mL Enterococcus species (A) 01/02/2018    URINECX >100,000 CFU/mL Enterococcus faecalis (A) 03/23/2017       Images:  CXR 1/3/2018 Mild diffuse interstitial abnormality in the mid and lower lung zones.     Echo:  Results for orders placed during the hospital encounter of 01/02/18   Adult Transthoracic Echo Complete W/ Cont if Necessary Per Protocol    Narrative · Left ventricular systolic function is hyperdynamic (EF > 70).  · Mild mitral valve regurgitation is present  · Mild tricuspid valve regurgitation is present.  · abnormal LVOT contour without significant obstruction          Results: Reviewed.  I reviewed the patient's new laboratory and imaging results.  I independently reviewed the patient's new images.    Medications: Reviewed.    Assessment/Plan   A / P     57 y.o.female, admitted on 1/2/2018 with Drug overdose [T50.901A]:     1. Hypotension  1. R/O Sepsis as PCT was elevated on 1/2/2017  2. NOT UTI as CFU <= 20,000  3. Leukocytosis  4. Abs: Pip-Tazo  2. RAGHU  1. Rhabo  2. ABG: Metabolic acidosis  3. Cr down to 4.9 from, 8.6 on admission  3. Substance abuse: Heroin  4. COPD  1. Ongoing tobacco use  5. Seizures - on  Levetiracetam  6. GERD on PPI at home.  7. Overweight. Body mass index is 26.37 kg/(m^2).   8. T2DM on Metformin at home.    Results from last 7 days  Lab Units 01/03/18  1732 01/03/18  1135 01/03/18  0548 01/02/18  2339 01/02/18  1739   GLUCOSE mg/dL 172* 186* 179* 185* 121       Lab Results  Lab Value Date/Time    HGBA1C 5.60 01/03/2018 0326   HGBA1C 5.7 03/24/2017 0435   HGBA1C 5.6 11/05/2016 0543        Nutrition:  NPO Diet   Advance Directives: Full Code       Assessment / Plan:    1. Monitor renal function  2. PCT in AM  3. Labs and cxr in AM.  4. Keep in ICU  5. Weaning pressors  6. Avoid self medication or medications per her family.    Plan of care and goals reviewed during interdisciplinary rounds.  I discussed the patient's findings and my recommendations with patient and nursing staff    Warner Berger MD, FACP, FCCP, [facility]  Intensive Care Medicine, Nutrition Support and Pulmonary Medicine

## 2018-01-04 NOTE — SIGNIFICANT NOTE
At approximately 6am, patient was found to have pulled off deep line dressing, she stated to another staff nurse that she wanted to go home and was very agitated, central line dressing changed and patient was fully oriented and agreeable to staying in hospital and not pulling at lines and dressings.

## 2018-01-04 NOTE — PROGRESS NOTES
"   LOS: 2 days    Patient Care Team:  ANDREA Quinones as PCP - General  Jennifer Galindo MD as Consulting Physician (General Surgery)    Chief Complaint:  Heroin overdose     Subjective     Interval History:   No acute issues overnight. No new complaints.     Review of Systems:   NO CP or SOA.     Objective     Vital Sign Min/Max for last 24 hours  Temp  Min: 98.3 °F (36.8 °C)  Max: 101 °F (38.3 °C)   BP  Min: 95/49  Max: 128/35   Pulse  Min: 88  Max: 106   Resp  Min: 16  Max: 22   SpO2  Min: 84 %  Max: 98 %   Flow (L/min)  Min: 2  Max: 4   Weight  Min: 61.2 kg (135 lb)  Max: 61.2 kg (135 lb)     Flowsheet Rows         First Filed Value    Admission Height  152.4 cm (60\") Documented at 01/02/2018 1014    Admission Weight  61.2 kg (135 lb) Documented at 01/02/2018 1014             I/O last 3 completed shifts:  In: 6552.5 [I.V.:5955.5; IV Piggyback:597]  Out: 6310 [Urine:6310]    Physical Exam:  GENERAL: Alert and cooperative.   HEENT: Normocephalic, atraumatic.  PER.  No conjunctivitis. No icterus. Oropharynx clear without evidence of thrush or exudate. No evidence of peridontal disease.    NECK: Supple without nuchal rigidity. No mass.  LYMPH: No painful cervical, axillary or inguinal lymphadenopathy.  HEART: RRR; No murmur, rubs, gallops. No bruits in neck, abdomen, or groins, no edema  LUNGS: Normal respiratory effort. Nonlabored. No dullness.  No crepitus.  Not Clear to auscultation bilateral wheezing, rales, and rhonchi.  ABDOMEN: Soft, nontender, nondistended. Positive bowel sounds. No rebound or guarding. NO mass or HSM.  JOINTS:  Full range of motion, no redness or tenderness.  EXT:  No cyanosis, clubbing or edema.  :  External genitalia without swelling or lesion  SKIN: Warm and dry without rash  NEURO: Oriented to PPT. No focal neurological deficits. Strength equal bilateral  PSYCHIATRIC: Normal insight and judgement. Cooperative with PE    WBC WBC   Date Value Ref Range Status   01/04/2018 18.11 (H) " 3.50 - 10.80 10*3/mm3 Final   01/03/2018 20.75 (H) 3.50 - 10.80 10*3/mm3 Final   01/02/2018 24.20 (H) 3.50 - 10.80 10*3/mm3 Final      HGB Hemoglobin   Date Value Ref Range Status   01/04/2018 7.7 (L) 11.5 - 15.5 g/dL Final   01/03/2018 8.9 (L) 11.5 - 15.5 g/dL Final   01/02/2018 10.0 (L) 11.5 - 15.5 g/dL Final      HCT Hematocrit   Date Value Ref Range Status   01/04/2018 23.3 (L) 34.5 - 44.0 % Final   01/03/2018 28.1 (L) 34.5 - 44.0 % Final   01/02/2018 32.0 (L) 34.5 - 44.0 % Final      Platlets No results found for: LABPLAT   MCV MCV   Date Value Ref Range Status   01/04/2018 88.6 80.0 - 99.0 fL Final   01/03/2018 91.2 80.0 - 99.0 fL Final   01/02/2018 93.8 80.0 - 99.0 fL Final          Sodium Sodium   Date Value Ref Range Status   01/03/2018 142 132 - 146 mmol/L Final   01/02/2018 139 132 - 146 mmol/L Final   01/02/2018 139 132 - 146 mmol/L Final   01/02/2018 140 132 - 146 mmol/L Final      Potassium Potassium   Date Value Ref Range Status   01/03/2018 2.6 (C) 3.5 - 5.5 mmol/L Final   01/03/2018 3.4 (L) 3.5 - 5.5 mmol/L Final   01/02/2018 5.1 3.5 - 5.5 mmol/L Final   01/02/2018 5.5 3.5 - 5.5 mmol/L Final   01/02/2018 4.6 3.5 - 5.5 mmol/L Final      Chloride Chloride   Date Value Ref Range Status   01/03/2018 112 (H) 99 - 109 mmol/L Final   01/02/2018 117 (H) 99 - 109 mmol/L Final   01/02/2018 118 (H) 99 - 109 mmol/L Final   01/02/2018 116 (H) 99 - 109 mmol/L Final      CO2 CO2   Date Value Ref Range Status   01/03/2018 17.0 (L) 20.0 - 31.0 mmol/L Final   01/02/2018 <10.0 (C) 20.0 - 31.0 mmol/L Final   01/02/2018 <10.0 (C) 20.0 - 31.0 mmol/L Final     Comment:     Verified by repeat analysis.    01/02/2018 11.0 (L) 20.0 - 31.0 mmol/L Final      BUN BUN   Date Value Ref Range Status   01/03/2018 76 (H) 9 - 23 mg/dL Final   01/02/2018 75 (H) 9 - 23 mg/dL Final   01/02/2018 73 (H) 9 - 23 mg/dL Final   01/02/2018 80 (H) 9 - 23 mg/dL Final      Creatinine Creatinine   Date Value Ref Range Status   01/03/2018 4.90 (H)  0.60 - 1.30 mg/dL Final   01/02/2018 7.40 (H) 0.60 - 1.30 mg/dL Final   01/02/2018 8.60 (H) 0.60 - 1.30 mg/dL Final   01/02/2018 8.40 (H) 0.60 - 1.30 mg/dL Final      Calcium Calcium   Date Value Ref Range Status   01/03/2018 7.8 (L) 8.7 - 10.4 mg/dL Final   01/02/2018 8.2 (L) 8.7 - 10.4 mg/dL Final   01/02/2018 8.4 (L) 8.7 - 10.4 mg/dL Final   01/02/2018 7.9 (L) 8.7 - 10.4 mg/dL Final      PO4 No results found for: CAPO4   Albumin Albumin   Date Value Ref Range Status   01/03/2018 3.00 (L) 3.20 - 4.80 g/dL Final   01/02/2018 3.30 3.20 - 4.80 g/dL Final   01/02/2018 3.50 3.20 - 4.80 g/dL Final   01/02/2018 3.50 3.20 - 4.80 g/dL Final      Magnesium Magnesium   Date Value Ref Range Status   01/03/2018 1.3 1.3 - 2.7 mg/dL Final   01/02/2018 1.9 1.3 - 2.7 mg/dL Final      Uric Acid No results found for: URICACID        Results Review:     I reviewed the patient's new clinical results.      gabapentin 300 mg Oral TID   heparin (porcine) 5,000 Units Subcutaneous Q12H   insulin regular 0-7 Units Subcutaneous Q6H   ipratropium-albuterol 3 mL Nebulization 4x Daily - RT   levETIRAcetam 500 mg Intravenous Q12H   nicotine 1 patch Transdermal Q24H   pantoprazole 40 mg Oral Q AM   piperacillin-tazobactam 4.5 g Intravenous Q12H       norepinephrine 0.02-0.3 mcg/kg/min Last Rate: Stopped (01/03/18 2143)   sodium bicarbonate drip (greater than 75 mEq/bag) 75 mL/hr Last Rate: 150 mL/hr (01/04/18 6632)       FINDINGS:   Right kidney measures 11.3 centers in length without evidence of  hydronephrosis, contour deforming mass or obvious calculi. Appropriate  flow on Doppler interrogation.  Left kidney measures 10.6 cm in length without evidence of  hydronephrosis, contour deforming mass or obvious calculi. Appropriate  flow on Doppler interrogation. Trace nonspecific fluid seen in  perinephric location anterior to the left kidney.      Urinary bladder is decompressed and unremarkable with Joyner catheter  in  situ.          IMPRESSION:  1. No hydronephrosis.  2. Nonspecific trace free fluid anteriorly adjacent to left kidney.    Medication Review:     Assessment/Plan     Active Problems:    Hypotension    Tobacco use disorder    Type 2 diabetes mellitus    Seizure disorder    UTI (urinary tract infection)    Dehydration    COPD (chronic obstructive pulmonary disease)    Drug overdose    Acute renal failure superimposed on chronic kidney disease    Sepsis    Metabolic acidosis    Encephalopathy      1- heroin overdose   2- RAGHU - Non oliguric - improving with IV fluids.   3- Metabolic acidosis - improving.   4- Hyperkalemia - resolved.   5- Proteinuria - UPCR -0.8  6-  Hypotension - improving with IV fluids.     Plan:  Decrease bicarb drip rate to 75 cc/hr. Renal function panel today. If Bicarb greater than 20 will stop bicarb drip.   Monitor I/O   Monitor H/H   Avoid nephrotoxic agents.   Replete electrolytes per protocol.     Stacy Reddy MD  01/04/18  8:50 AM

## 2018-01-04 NOTE — PROGRESS NOTES
INTENSIVIST   PROGRESS NOTE     Hospital:  LOS: 2 days   Subjective   Ms. Charisma Cortez, 57 y.o. female is followed for:      Encephalopathy    Hypotension    Type 2 diabetes mellitus    Drug overdose    Acute renal failure superimposed on chronic kidney disease    As an Intensivist, we provide an integrated approach to the ICU patient and family, medical management of comorbid conditions, lead interdisciplinary rounds and coordinate the care with all other services, including those from other specialists.     S     Interval History:  Doing much better.  Feeling well.  She had an episode of agitation last night and wanted to leave AMA. Now she is cooperative.  Last fever. Yesterday 1400     The patient's relevant past medical, surgical and social history were reviewed and updated in Epic as appropriate.      ROS:   Constitutional: Positive for fever.   Respiratory: Negative for dyspnea.   Cardiovascular: Negative for chest pain.   Gastrointestinal: Negative for  nausea, vomiting and diarrhea.     Objective   O     Vitals:  Temp: 98.5 °F (36.9 °C) Temp  Min: 98.3 °F (36.8 °C)  Max: 101 °F (38.3 °C)   BP: 107/73 BP  Min: 95/49  Max: 128/35   Pulse: 86 Pulse  Min: 86  Max: 106   Resp: 22 Resp  Min: 16  Max: 24   SpO2: 94 % SpO2  Min: 84 %  Max: 98 %   Device: nasal cannula with humidification    Flow Rate: 2 Flow (L/min)  Min: 2  Max: 4     Intake/Ouptut 24 hrs (7:00AM - 6:59 AM)  Intake & Output (last 3 days)       01/01 0701 - 01/02 0700 01/02 0701 - 01/03 0700 01/03 0701 - 01/04 0700 01/04 0701 - 01/05 0700    I.V. (mL/kg)  2486.2 (40.6) 3469.3 (56.7) 661 (10.8)    IV Piggyback  2100 497 100    Total Intake(mL/kg)  4586.2 (74.9) 3966.3 (64.8) 761 (12.4)    Urine (mL/kg/hr)  2240 4260 (2.9) 335 (1.3)    Stool   0 (0)     Total Output   2240 4260 335    Net   +2346.2 -293.7 +426            Unmeasured Stool Occurrence   5 x           Medications (drips):    sodium bicarbonate drip (greater than 75 mEq/bag) Last  Rate: 75 mL/hr (01/04/18 0855)     Physical Examination  Telemetry:  Sinus Rhythm: normal sinus rhythm   Constitutional:  No acute distress.   Cardiovascular: Normal rate, regular and rhythm. Normal heart sounds.  No murmurs, gallop or rub.   Respiratory: No respiratory distress. Normal respiratory effort.  Normal breath sounds  Clear to auscultation and percussion.    Abdominal:  Soft. No masses. Non-tender. No distension. No HSM.   Extremities: No digital cyanosis. No clubbing.  No peripheral edema.   Neurological:   Alert and Oriented to person, place, and time.   Lines/Drains/Airways: [facility] CVC   Ar       Hematology:    Results from last 7 days  Lab Units 01/04/18  0424 01/03/18  0326 01/02/18  1056   WBC 10*3/mm3 18.11* 20.75* 24.20*   HEMOGLOBIN g/dL 7.7* 8.9* 10.0*   MCV fL 88.6 91.2 93.8   PLATELETS 10*3/mm3 291 351 387     Electrolytes, Magnesium and Phosphorus:    Results from last 7 days  Lab Units 01/04/18  0424 01/03/18  1852 01/03/18  0326 01/02/18  1928 01/02/18  1132   SODIUM mmol/L 148*  --  142 139  < > 140   POTASSIUM mmol/L 2.7* 2.6* 3.4* 5.1  < > 4.6   CO2 mmol/L 39.0*  --  17.0* <10.0*  < > 11.0*   MAGNESIUM mg/dL 1.1*  --  1.3  --   --  1.9   PHOSPHORUS mg/dL 2.1*  2.1*  --  4.6 7.5*  --  6.9*   < > = values in this interval not displayed.  Renal:    Results from last 7 days  Lab Units 01/04/18  0424 01/03/18  0326 01/02/18  1928 01/02/18  1614 01/02/18  1132   CREATININE mg/dL 1.10 4.90* 7.40* 8.60* 8.40*   BUN mg/dL 35* 76* 75* 73* 80*     Estimated Creatinine Clearance: 47.3 mL/min (by C-G formula based on Cr of 1.1).    Hepatic:    Results from last 7 days  Lab Units 01/03/18  0326 01/02/18  1614 01/02/18  1132   ALK PHOS U/L 114* 120* 120*   BILIRUBIN mg/dL 0.2* 0.0* 0.0*   ALT (SGPT) U/L 28 32 27   AST (SGOT) U/L 54* 67* 53*       Results from last 7 days  Lab Units 01/04/18  0424 01/03/18  0326 01/02/18  1614 01/02/18  1132   CK TOTAL U/L 1016* 2255* 3581* 2498*  2593*       Lab  Results   Component Value Date    PROCALCITO 0.48 01/04/2018    PROCALCITO 3.71 01/02/2018    CRP 19.92 (H) 01/02/2018    CRP 3.0 (H) 11/04/2016       Lab Results   Component Value Date    BLOODCX No growth at 24 hours 01/02/2018    BLOODCX No growth at 24 hours 01/02/2018    BLOODCX No growth at 5 days 03/23/2017    BLOODCX No growth at 5 days 03/23/2017       Lab Results   Component Value Date    URINECX >100,000 CFU/mL Enterococcus species (A) 01/02/2018    URINECX >100,000 CFU/mL Enterococcus species (A) 01/02/2018    URINECX 10,000-20,000 CFU/mL Enterococcus species (A) 01/02/2018    URINECX >100,000 CFU/mL Enterococcus faecalis (A) 03/23/2017       Images:  CXR 1/3/2018 Mild diffuse interstitial abnormality in the mid and lower lung zones.     Echo:  Results for orders placed during the hospital encounter of 01/02/18   Adult Transthoracic Echo Complete W/ Cont if Necessary Per Protocol    Narrative · Left ventricular systolic function is hyperdynamic (EF > 70).  · Mild mitral valve regurgitation is present  · Mild tricuspid valve regurgitation is present.  · abnormal LVOT contour without significant obstruction          Results: Reviewed.  I reviewed the patient's new laboratory and imaging results.  I independently reviewed the patient's new images.    Medications: Reviewed.    Assessment/Plan   A / P     57 y.o.female, admitted on 1/2/2018 with Drug overdose [T50.901A]:     1. Hypotension  1. R/O Sepsis as PCT was elevated on 1/2/2017. PCT down to 0.48 on 1/4/18. BC neg so far.  2. NOT UTI as CFU <= 20,000. However 2 other cultures done hours later, on the same day, CFU > 10^5. Asymptomatic. Doubt UTI.  3. Leukocytosis  4. Abs: Pip-Tazo -> Augmentin  2. RAGHU  1. Rhabo: CK decreasing  2. Metabolic acidosis Resolved.  3. Cr down to 1.4 (1/4/18)  from, 8.6 on admission  3. Substance abuse: Heroin  4. Hypokalemia  5. Hypomagnesemia  6. COPD  1. Ongoing tobacco use  7. Seizures - on  Levetiracetam  8. GERD on PPI  at home.  9. Overweight. Body mass index is 26.37 kg/(m^2).   10. T2DM on Metformin at home.    Results from last 7 days  Lab Units 01/04/18  0508 01/03/18  2324 01/03/18  1732 01/03/18  1135 01/03/18  0548 01/02/18  2339   GLUCOSE mg/dL 160* 196* 172* 186* 179* 185*       Lab Results  Lab Value Date/Time   HGBA1C 5.60 01/03/2018 0326   HGBA1C 5.7 03/24/2017 0435   HGBA1C 5.6 11/05/2016 0543        Nutrition:  Diet Soft Texture; Ground; Cardiac, Consistent Carbohydrate   Advance Directives: Full Code       Assessment / Plan:    1. D/C Bic Na  2. F/U WBC, and chemistry in AM.  3. Correct hypokalemia, it will also improve once Bic gtt is off.  4. Remove Joyner  5. [facility] evaluation    Plan of care and goals reviewed during interdisciplinary rounds.  I discussed the patient's findings and my recommendations with patient and nursing staff    Warner Berger MD, FACP, FCCP, [facility]  Intensive Care Medicine, Nutrition Support and Pulmonary Medicine

## 2018-01-04 NOTE — PLAN OF CARE
Problem: Overdose, Ingestion/Inhalants (Adult)  Goal: Signs and Symptoms of Listed Potential Problems Will be Absent or Manageable (Overdose, Ingestion/Inhalants)  Outcome: Ongoing (interventions implemented as appropriate)      Problem: Sepsis (Adult)  Goal: Signs and Symptoms of Listed Potential Problems Will be Absent or Manageable (Sepsis)  Outcome: Ongoing (interventions implemented as appropriate)      Problem: Patient Care Overview (Adult)  Goal: Plan of Care Review  Outcome: Ongoing (interventions implemented as appropriate)   01/04/18 0742   Coping/Psychosocial Response Interventions   Plan Of Care Reviewed With patient   Patient Care Overview   Progress no change   Outcome Evaluation   Outcome Summary/Follow up Plan calm and cooperative all night, in and out of disorientation to place and situation, only agitated at one point, see previous note, uop adequate, k critical at 2.6, repeated requests for pain medication and drinks of water despite emesis x1, 5 loose bowel movements     Goal: Adult Individualization and Mutuality  Outcome: Ongoing (interventions implemented as appropriate)    Goal: Discharge Needs Assessment  Outcome: Ongoing (interventions implemented as appropriate)      Problem: Pressure Ulcer Risk (Vinicius Scale) (Adult,Obstetrics,Pediatric)  Goal: Skin Integrity  Outcome: Ongoing (interventions implemented as appropriate)      Problem: Fall Risk (Adult)  Goal: Absence of Falls  Outcome: Ongoing (interventions implemented as appropriate)

## 2018-01-04 NOTE — CONSULTS
Continued Stay Note   Emily     Patient Name: Charisma Cortez  MRN: [id number]  Today's Date: 1/4/2018    Admit Date: 1/2/2018          Discharge Plan       01/04/18 1507    Case Management/Social Work Plan    Plan MSW available-     Additional Comments Spoke with pt. She states this was accidental overdose. States she is ready to d/c home. She denies SI/ HI. Discussed rehabs- pt states she is not ready to go to rehab again. States she is aware of the available rehab facilities and declined listing from MSW.  Due to pt not being suicidal [facility] mobile assessment does NOT need to be involved.               Discharge Codes     None        Expected Discharge Date and Time     Expected Discharge Date Expected Discharge Time    Jan 6, 2018             TOBIN Dillon

## 2018-01-05 ENCOUNTER — APPOINTMENT (OUTPATIENT)
Dept: GENERAL RADIOLOGY | Facility: HOSPITAL | Age: 58
End: 2018-01-05

## 2018-01-05 LAB
ANION GAP SERPL CALCULATED.3IONS-SCNC: 8 MMOL/L (ref 3–11)
BACTERIA SPEC AEROBE CULT: ABNORMAL
BASOPHILS # BLD AUTO: 0.03 10*3/MM3 (ref 0–0.2)
BASOPHILS NFR BLD AUTO: 0.1 % (ref 0–1)
BUN BLD-MCNC: 15 MG/DL (ref 9–23)
BUN/CREAT SERPL: 25 (ref 7–25)
CALCIUM SPEC-SCNC: 7.5 MG/DL (ref 8.7–10.4)
CHLORIDE SERPL-SCNC: 104 MMOL/L (ref 99–109)
CK SERPL-CCNC: 546 U/L (ref 26–174)
CO2 SERPL-SCNC: 35 MMOL/L (ref 20–31)
CREAT BLD-MCNC: 0.6 MG/DL (ref 0.6–1.3)
DEPRECATED RDW RBC AUTO: 50 FL (ref 37–54)
EOSINOPHIL # BLD AUTO: 0.2 10*3/MM3 (ref 0–0.3)
EOSINOPHIL NFR BLD AUTO: 0.9 % (ref 0–3)
ERYTHROCYTE [DISTWIDTH] IN BLOOD BY AUTOMATED COUNT: 14.9 % (ref 11.3–14.5)
GFR SERPL CREATININE-BSD FRML MDRD: 104 ML/MIN/1.73
GLUCOSE BLD-MCNC: 100 MG/DL (ref 70–100)
GLUCOSE BLDC GLUCOMTR-MCNC: 106 MG/DL (ref 70–130)
GLUCOSE BLDC GLUCOMTR-MCNC: 120 MG/DL (ref 70–130)
GLUCOSE BLDC GLUCOMTR-MCNC: 144 MG/DL (ref 70–130)
GLUCOSE BLDC GLUCOMTR-MCNC: 158 MG/DL (ref 70–130)
HCT VFR BLD AUTO: 25.9 % (ref 34.5–44)
HGB BLD-MCNC: 8.2 G/DL (ref 11.5–15.5)
IMM GRANULOCYTES # BLD: 0.08 10*3/MM3 (ref 0–0.03)
IMM GRANULOCYTES NFR BLD: 0.4 % (ref 0–0.6)
LYMPHOCYTES # BLD AUTO: 3.64 10*3/MM3 (ref 0.6–4.8)
LYMPHOCYTES NFR BLD AUTO: 16.9 % (ref 24–44)
MAGNESIUM SERPL-MCNC: 1.9 MG/DL (ref 1.3–2.7)
MCH RBC QN AUTO: 29 PG (ref 27–31)
MCHC RBC AUTO-ENTMCNC: 31.7 G/DL (ref 32–36)
MCV RBC AUTO: 91.5 FL (ref 80–99)
MONOCYTES # BLD AUTO: 1.21 10*3/MM3 (ref 0–1)
MONOCYTES NFR BLD AUTO: 5.6 % (ref 0–12)
MYOGLOBIN UR-MCNC: 9 NG/ML (ref 0–13)
NEUTROPHILS # BLD AUTO: 16.39 10*3/MM3 (ref 1.5–8.3)
NEUTROPHILS NFR BLD AUTO: 76.1 % (ref 41–71)
PHOSPHATE SERPL-MCNC: 1.4 MG/DL (ref 2.4–5.1)
PLATELET # BLD AUTO: 288 10*3/MM3 (ref 150–450)
PMV BLD AUTO: 9.6 FL (ref 6–12)
POTASSIUM BLD-SCNC: 3.1 MMOL/L (ref 3.5–5.5)
POTASSIUM BLD-SCNC: 4 MMOL/L (ref 3.5–5.5)
RBC # BLD AUTO: 2.83 10*6/MM3 (ref 3.89–5.14)
SODIUM BLD-SCNC: 147 MMOL/L (ref 132–146)
WBC NRBC COR # BLD: 21.55 10*3/MM3 (ref 3.5–10.8)

## 2018-01-05 PROCEDURE — 84132 ASSAY OF SERUM POTASSIUM: CPT | Performed by: INTERNAL MEDICINE

## 2018-01-05 PROCEDURE — 82962 GLUCOSE BLOOD TEST: CPT

## 2018-01-05 PROCEDURE — 25010000002 MORPHINE PER 10 MG: Performed by: INTERNAL MEDICINE

## 2018-01-05 PROCEDURE — 94799 UNLISTED PULMONARY SVC/PX: CPT

## 2018-01-05 PROCEDURE — 84100 ASSAY OF PHOSPHORUS: CPT | Performed by: INTERNAL MEDICINE

## 2018-01-05 PROCEDURE — 94760 N-INVAS EAR/PLS OXIMETRY 1: CPT

## 2018-01-05 PROCEDURE — 74018 RADEX ABDOMEN 1 VIEW: CPT

## 2018-01-05 PROCEDURE — 80048 BASIC METABOLIC PNL TOTAL CA: CPT | Performed by: INTERNAL MEDICINE

## 2018-01-05 PROCEDURE — 99233 SBSQ HOSP IP/OBS HIGH 50: CPT | Performed by: INTERNAL MEDICINE

## 2018-01-05 PROCEDURE — 85025 COMPLETE CBC W/AUTO DIFF WBC: CPT | Performed by: INTERNAL MEDICINE

## 2018-01-05 PROCEDURE — 25010000002 HEPARIN (PORCINE) PER 1000 UNITS: Performed by: INTERNAL MEDICINE

## 2018-01-05 PROCEDURE — 83735 ASSAY OF MAGNESIUM: CPT | Performed by: INTERNAL MEDICINE

## 2018-01-05 PROCEDURE — 82550 ASSAY OF CK (CPK): CPT | Performed by: INTERNAL MEDICINE

## 2018-01-05 PROCEDURE — 25010000002 ONDANSETRON PER 1 MG: Performed by: INTERNAL MEDICINE

## 2018-01-05 RX ORDER — POTASSIUM CHLORIDE 1.5 G/1.77G
40 POWDER, FOR SOLUTION ORAL AS NEEDED
Status: DISCONTINUED | OUTPATIENT
Start: 2018-01-05 | End: 2018-01-05 | Stop reason: SDUPTHER

## 2018-01-05 RX ORDER — POTASSIUM CHLORIDE 750 MG/1
40 CAPSULE, EXTENDED RELEASE ORAL AS NEEDED
Status: DISCONTINUED | OUTPATIENT
Start: 2018-01-05 | End: 2018-01-09 | Stop reason: HOSPADM

## 2018-01-05 RX ORDER — POTASSIUM CHLORIDE 29.8 MG/ML
20 INJECTION INTRAVENOUS
Status: DISCONTINUED | OUTPATIENT
Start: 2018-01-05 | End: 2018-01-05

## 2018-01-05 RX ADMIN — IPRATROPIUM BROMIDE AND ALBUTEROL SULFATE 3 ML: .5; 3 SOLUTION RESPIRATORY (INHALATION) at 15:59

## 2018-01-05 RX ADMIN — POTASSIUM CHLORIDE 40 MEQ: 750 CAPSULE, EXTENDED RELEASE ORAL at 15:57

## 2018-01-05 RX ADMIN — GABAPENTIN 300 MG: 300 CAPSULE ORAL at 20:00

## 2018-01-05 RX ADMIN — GABAPENTIN 300 MG: 300 CAPSULE ORAL at 08:58

## 2018-01-05 RX ADMIN — MORPHINE SULFATE 4 MG: 4 INJECTION, SOLUTION INTRAMUSCULAR; INTRAVENOUS at 03:09

## 2018-01-05 RX ADMIN — MORPHINE SULFATE 4 MG: 4 INJECTION, SOLUTION INTRAMUSCULAR; INTRAVENOUS at 08:58

## 2018-01-05 RX ADMIN — LEVETIRACETAM 500 MG: 500 TABLET, FILM COATED ORAL at 20:00

## 2018-01-05 RX ADMIN — POTASSIUM CHLORIDE 40 MEQ: 750 CAPSULE, EXTENDED RELEASE ORAL at 10:27

## 2018-01-05 RX ADMIN — NICOTINE 1 PATCH: 21 PATCH, EXTENDED RELEASE TRANSDERMAL at 09:00

## 2018-01-05 RX ADMIN — VANCOMYCIN 125 MG: KIT at 12:10

## 2018-01-05 RX ADMIN — VANCOMYCIN 125 MG: KIT at 17:36

## 2018-01-05 RX ADMIN — AMOXICILLIN AND CLAVULANATE POTASSIUM 1 TABLET: 875; 125 TABLET, FILM COATED ORAL at 08:58

## 2018-01-05 RX ADMIN — ONDANSETRON 4 MG: 2 INJECTION INTRAMUSCULAR; INTRAVENOUS at 14:28

## 2018-01-05 RX ADMIN — POTASSIUM CHLORIDE 40 MEQ: 750 CAPSULE, EXTENDED RELEASE ORAL at 12:10

## 2018-01-05 RX ADMIN — MAGNESIUM SULFATE IN WATER 4 G: 40 INJECTION, SOLUTION INTRAVENOUS at 10:30

## 2018-01-05 RX ADMIN — HEPARIN SODIUM 5000 UNITS: 5000 INJECTION, SOLUTION INTRAVENOUS; SUBCUTANEOUS at 08:58

## 2018-01-05 RX ADMIN — MORPHINE SULFATE 4 MG: 4 INJECTION, SOLUTION INTRAMUSCULAR; INTRAVENOUS at 14:47

## 2018-01-05 RX ADMIN — PANTOPRAZOLE SODIUM 40 MG: 40 TABLET, DELAYED RELEASE ORAL at 06:03

## 2018-01-05 RX ADMIN — MORPHINE SULFATE 4 MG: 4 INJECTION, SOLUTION INTRAMUSCULAR; INTRAVENOUS at 20:00

## 2018-01-05 RX ADMIN — ACETAMINOPHEN 650 MG: 325 TABLET, FILM COATED ORAL at 03:47

## 2018-01-05 RX ADMIN — IPRATROPIUM BROMIDE AND ALBUTEROL SULFATE 3 ML: .5; 3 SOLUTION RESPIRATORY (INHALATION) at 20:16

## 2018-01-05 RX ADMIN — VANCOMYCIN 125 MG: KIT at 06:04

## 2018-01-05 RX ADMIN — HEPARIN SODIUM 5000 UNITS: 5000 INJECTION, SOLUTION INTRAVENOUS; SUBCUTANEOUS at 20:00

## 2018-01-05 RX ADMIN — ACETAMINOPHEN 650 MG: 325 TABLET, FILM COATED ORAL at 22:01

## 2018-01-05 RX ADMIN — GABAPENTIN 300 MG: 300 CAPSULE ORAL at 15:58

## 2018-01-05 RX ADMIN — POTASSIUM PHOSPHATE, MONOBASIC AND POTASSIUM PHOSPHATE, DIBASIC 30 MMOL: 224; 236 INJECTION, SOLUTION INTRAVENOUS at 09:08

## 2018-01-05 RX ADMIN — AMOXICILLIN AND CLAVULANATE POTASSIUM 1 TABLET: 875; 125 TABLET, FILM COATED ORAL at 20:00

## 2018-01-05 RX ADMIN — LEVETIRACETAM 500 MG: 500 TABLET, FILM COATED ORAL at 08:58

## 2018-01-05 NOTE — PLAN OF CARE
Problem: Overdose, Ingestion/Inhalants (Adult)  Goal: Signs and Symptoms of Listed Potential Problems Will be Absent or Manageable (Overdose, Ingestion/Inhalants)  Outcome: Ongoing (interventions implemented as appropriate)      Problem: Sepsis (Adult)  Goal: Signs and Symptoms of Listed Potential Problems Will be Absent or Manageable (Sepsis)  Outcome: Ongoing (interventions implemented as appropriate)      Problem: Patient Care Overview (Adult)  Goal: Plan of Care Review  Outcome: Ongoing (interventions implemented as appropriate)   01/05/18 2042   Coping/Psychosocial Response Interventions   Plan Of Care Reviewed With patient   Patient Care Overview   Progress improving   Outcome Evaluation   Outcome Summary/Follow up Plan Pt has had less BMs today, and is urinating on her own. Restarted pts diet, not eating much. Had one episode of emesis. Pain controlled with morphine. Pt has orders to be moved to the floor.      Goal: Adult Individualization and Mutuality  Outcome: Ongoing (interventions implemented as appropriate)    Goal: Discharge Needs Assessment  Outcome: Ongoing (interventions implemented as appropriate)      Problem: Pressure Ulcer Risk (Vinicius Scale) (Adult,Obstetrics,Pediatric)  Goal: Skin Integrity  Outcome: Ongoing (interventions implemented as appropriate)      Problem: Fall Risk (Adult)  Goal: Absence of Falls  Outcome: Ongoing (interventions implemented as appropriate)

## 2018-01-05 NOTE — NURSING NOTE
The KUB said mild ileus so patient will be made NPO per Meagan MENDEZ until they discuss what to do. Will continue to monitor.

## 2018-01-05 NOTE — PLAN OF CARE
Problem: Sepsis (Adult)  Goal: Signs and Symptoms of Listed Potential Problems Will be Absent or Manageable (Sepsis)  Outcome: Ongoing (interventions implemented as appropriate)      Problem: Patient Care Overview (Adult)  Goal: Plan of Care Review  Outcome: Ongoing (interventions implemented as appropriate)   01/05/18 0442   Coping/Psychosocial Response Interventions   Plan Of Care Reviewed With patient   Patient Care Overview   Progress no change   Outcome Evaluation   Outcome Summary/Follow up Plan Patient has had a rough night. She was diagnosised with C-diff, Her electrolytes had to be replaced and a redraw sent. Patient c/o severe left lower quad abd pain so a KUB was taken and the rectal tube was removed until results were found. The patient will be NPO until further evaluation. Will contin   01/05/18 0442   Coping/Psychosocial Response Interventions   Plan Of Care Reviewed With patient   Patient Care Overview   Progress no change   Outcome Evaluation   Outcome Summary/Follow up Plan Patient has had a rough night. She was diagnosised with C-diff, Her electrolytes had to be replaced and a redraw sent. Patient c/o severe left lower quad abd pain so a KUB was taken and the rectal tube was removed until results were found. The patient will be NPO until further evaluation. Will continue to monitor.   ue to monitor.       Problem: Pressure Ulcer Risk (Vinicius Scale) (Adult,Obstetrics,Pediatric)  Goal: Skin Integrity  Outcome: Ongoing (interventions implemented as appropriate)      Problem: Fall Risk (Adult)  Goal: Absence of Falls  Outcome: Ongoing (interventions implemented as appropriate)

## 2018-01-05 NOTE — PROGRESS NOTES
Continued Stay Note  [facility]     Patient Name: Charisma Cortez  MRN: [id number]  Today's Date: 1/5/2018    Admit Date: 1/2/2018          Discharge Plan       01/05/18 1144    Case Management/Social Work Plan    Plan Home    Additional Comments Patient still plans to go home upon discharge.  CM will continue to follow.              Discharge Codes     None        Expected Discharge Date and Time     Expected Discharge Date Expected Discharge Time    Jan 6, 2018             Eulalia Yates RN

## 2018-01-05 NOTE — PLAN OF CARE
Problem: Overdose, Ingestion/Inhalants (Adult)  Goal: Signs and Symptoms of Listed Potential Problems Will be Absent or Manageable (Overdose, Ingestion/Inhalants)  Outcome: Ongoing (interventions implemented as appropriate)      Problem: Sepsis (Adult)  Goal: Signs and Symptoms of Listed Potential Problems Will be Absent or Manageable (Sepsis)  Outcome: Ongoing (interventions implemented as appropriate)      Problem: Patient Care Overview (Adult)  Goal: Plan of Care Review  Outcome: Ongoing (interventions implemented as appropriate)   01/04/18 1850   Coping/Psychosocial Response Interventions   Plan Of Care Reviewed With patient   Patient Care Overview   Progress no change   Outcome Evaluation   Outcome Summary/Follow up Plan Pt was calm and cooperative for majority of the day. Diet advanced to mech soft; not much of an appetitie, emesis x2 (zofran given x2). Pt has had many liquid BMs today, history of CDIFF, culture was sent. K+ remains critical @ 2.9, continuing to replace and mag replaced today. Joyner d/c'd; due to void; bladder scanned 364cc; refused I/o cath. Pts ex- and mother came to visit today; security called due to the incident that happened yesterday,  is banned from [facility] and will be arrested if returns. Pain uncontrolled with Tylenol q4. Pt became very agitated this evening and demanded more pain meds; wanted to sign out AMA. After pt spoke with family on the phone, she apologized and agreed to stay here. FMS placed due to multiped stools.      Goal: Adult Individualization and Mutuality  Outcome: Ongoing (interventions implemented as appropriate)    Goal: Discharge Needs Assessment  Outcome: Ongoing (interventions implemented as appropriate)      Problem: Pressure Ulcer Risk (Vinicius Scale) (Adult,Obstetrics,Pediatric)  Goal: Skin Integrity  Outcome: Ongoing (interventions implemented as appropriate)      Problem: Fall Risk (Adult)  Goal: Absence of Falls  Outcome:  Ongoing (interventions implemented as appropriate)

## 2018-01-05 NOTE — PROGRESS NOTES
Adult Nutrition  Assessment/PES    Patient Name:  Charisma Cortez  YOB: 1960  MRN: [id number]  Admit Date:  2018    Assessment Date:  2018            Reason for Assessment       18 1107    Reason for Assessment    Reason For Assessment/Visit multidisciplinary rounds;length of stay    Time Spent (min) 30    Diagnosis Diagnosis    Endocrine DM Type 2    Gastrointestinal Ileus   mild ileus per KUB    Infectious Disease C. diff    Metabolic/ICU Hyperkalemia;Metabolic acidosis   hyperkalemia- resolved; met acidosis- improving    Ortho Rhabdomyolysis    Renal RAGHU   resolved    Substance Use Drug/illicit/recreational   heroin overdose   PMH: She  has a past medical history of Anxiety; Arthritis; Cancer; Cataracts, bilateral; Chest pain; COPD (chronic obstructive pulmonary disease) (3/23/2017); Depression (3/23/2017); Diabetes mellitus; Emphysema lung; Epilepsy; Headache; High cholesterol; History of brain shunt (); Hypertension; Liver disease; Low back pain; Lumbosacral disc disease; BAEZ (nonalcoholic steatohepatitis); Neurological disorder; Osteoporosis; Pneumonia; Seizure disorder (3/23/2017); and Wears glasses.   PSxH: She  has a past surgical history that includes Appendectomy (); Reconstruction urethroplasty;  section (,); Hysterectomy; Cyst Removal (); Cholecystectomy; Brain surgery; Tubal ligation; Dental surgery; Breast biopsy (Right); Colon surgery (); Colonoscopy; Esophagogastroduodenoscopy; and Excision Mass Trunk (Left, 2017).              Nutrition/Diet History       18 1109    Nutrition/Diet History    Reported/Observed By Patient;MD    Appetite Improved   pt reports poor appetite for the past several months, states that appetite has been improving    Food Habit/Preferences Dislike Supplement   pt reports that she has tried Boost/Ensure supplements in the past and does not like them    Oral Without Dentures;Chewing Difficulty   Pt  states difficulty chewing secondary to being edentulous    Other Pt made npo overnight secondary to mild ileus. Ok per intensivist to restart PO diet.             Anthropometrics       01/05/18 1110    Anthropometrics (Special Considerations)    Height Used for Calculations 1.524 m (5')    Weight Used for Calculations 61.2 kg (135 lb)   per estimated wt on (1/2)    RD Calculated BMI (kg/m2) 26.4    Usual Body Weight (UBW)    Weight Loss --   Pt reports that she estimates ~30# unintentional wt loss over the past 4 months secondary to a decrease PO intake due to recurrent illnesses. Noted per EHR that pt had a standing scale wt o 130# on (6/27/17). ? accuracy of wt loss.             Labs/Tests/Procedures/Meds       01/05/18 1112    Labs/Tests/Procedures/Meds    Labs/Tests Review Reviewed;C-Diff;K+;Phos;Mg++   electrolytes being replaced    Medication Review Reviewed, pertinent     Results from last 7 days  Lab Units 01/05/18  0331  01/03/18  0326   SODIUM mmol/L 147*  < > 142   POTASSIUM mmol/L 3.1*  < > 3.4*   CHLORIDE mmol/L 104  < > 112*   CO2 mmol/L 35.0*  < > 17.0*   BUN mg/dL 15  < > 76*   CREATININE mg/dL 0.60  < > 4.90*   CALCIUM mg/dL 7.5*  < > 7.8*   BILIRUBIN mg/dL  --   --  0.2*   ALK PHOS U/L  --   --  114*   ALT (SGPT) U/L  --   --  28   AST (SGOT) U/L  --   --  54*   GLUCOSE mg/dL 100  < > 161*   < > = values in this interval not displayed.             Nutrition Prescription Ordered       01/05/18 1113    Nutrition Prescription PO    Current PO Diet Soft Texture    Texture Ground    Common Modifiers Cardiac;Consistent Carbohydrate            Evaluation of Received Nutrient/Fluid Intake       01/05/18 1113    PO Evaluation    Number of Meals 2    % PO Intake 0            Problem/Interventions:        Problem 1       01/05/18 1114    Nutrition Diagnoses Problem 1    Problem 1 Inadequate Nutrient Intake    Etiology (related to) --   recent poor appetite, PO diet started yesterday (1/4)    Signs/Symptoms  (evidenced by) PO Intake;Report of Mnimal PO Intake    Percent (%) intake recorded 0 %    Over number of meals 2                    Intervention Goal       01/05/18 1115    Intervention Goal    General Nutrition support treatment    PO Increase intake            Nutrition Intervention       01/05/18 1115    Nutrition Intervention    RD/Tech Action Care plan reviewd;Follow Tx progress;Supplement offered/refused;Menu provided;Interview for preference;Encourage intake              Education/Evaluation       01/05/18 1115    Monitor/Evaluation    Monitor Per protocol;PO intake;Symptoms;Weight;Pertinent labs        Electronically signed by:  Sandi West MS RD/[facility]  01/05/18 11:16 AM

## 2018-01-05 NOTE — PROGRESS NOTES
"   LOS: 3 days    Patient Care Team:  ANDREA Quinones as PCP - General  Jennifer Galindo MD as Consulting Physician (General Surgery)    Chief Complaint:  Heroin overdose     Subjective     Interval History:   No acute issues overnight. No new complaints.     Review of Systems:   NO CP or SOA.     Objective     Vital Sign Min/Max for last 24 hours  Temp  Min: 98.2 °F (36.8 °C)  Max: 98.7 °F (37.1 °C)   BP  Min: 94/72  Max: 141/93   Pulse  Min: 86  Max: 100   Resp  Min: 16  Max: 26   SpO2  Min: 89 %  Max: 98 %   Flow (L/min)  Min: 2  Max: 3   No Data Recorded     Flowsheet Rows         First Filed Value    Admission Height  152.4 cm (60\") Documented at 01/02/2018 1014    Admission Weight  61.2 kg (135 lb) Documented at 01/02/2018 1014             I/O last 3 completed shifts:  In: 3515 [P.O.:250; I.V.:2567; Other:300; IV Piggyback:398]  Out: 2940 [Urine:2730; Stool:210]    Physical Exam:  GENERAL: Alert and cooperative.   HEENT: Normocephalic, atraumatic.  PER.  No conjunctivitis. No icterus. Oropharynx clear without evidence of thrush or exudate. No evidence of peridontal disease.    NECK: Supple without nuchal rigidity. No mass.  LYMPH: No painful cervical, axillary or inguinal lymphadenopathy.  HEART: RRR; No murmur, rubs, gallops. No bruits in neck, abdomen, or groins, no edema  LUNGS: Normal respiratory effort. Nonlabored. No dullness.  No crepitus.  Not Clear to auscultation bilateral wheezing, rales, and rhonchi.  ABDOMEN: Soft, nontender, nondistended. Positive bowel sounds. No rebound or guarding. NO mass or HSM.  JOINTS:  Full range of motion, no redness or tenderness.  EXT:  No cyanosis, clubbing or edema.  :  External genitalia without swelling or lesion  SKIN: Warm and dry without rash  NEURO: Oriented to PPT. No focal neurological deficits. Strength equal bilateral  PSYCHIATRIC: Normal insight and judgement. Cooperative with PE    WBC WBC   Date Value Ref Range Status   01/05/2018 21.55 (H) 3.50 - " 10.80 10*3/mm3 Final   01/04/2018 18.11 (H) 3.50 - 10.80 10*3/mm3 Final   01/03/2018 20.75 (H) 3.50 - 10.80 10*3/mm3 Final   01/02/2018 24.20 (H) 3.50 - 10.80 10*3/mm3 Final      HGB Hemoglobin   Date Value Ref Range Status   01/05/2018 8.2 (L) 11.5 - 15.5 g/dL Final   01/04/2018 7.7 (L) 11.5 - 15.5 g/dL Final   01/03/2018 8.9 (L) 11.5 - 15.5 g/dL Final   01/02/2018 10.0 (L) 11.5 - 15.5 g/dL Final      HCT Hematocrit   Date Value Ref Range Status   01/05/2018 25.9 (L) 34.5 - 44.0 % Final   01/04/2018 23.3 (L) 34.5 - 44.0 % Final   01/03/2018 28.1 (L) 34.5 - 44.0 % Final   01/02/2018 32.0 (L) 34.5 - 44.0 % Final      Platlets No results found for: LABPLAT   MCV MCV   Date Value Ref Range Status   01/05/2018 91.5 80.0 - 99.0 fL Final   01/04/2018 88.6 80.0 - 99.0 fL Final   01/03/2018 91.2 80.0 - 99.0 fL Final   01/02/2018 93.8 80.0 - 99.0 fL Final          Sodium Sodium   Date Value Ref Range Status   01/05/2018 147 (H) 132 - 146 mmol/L Final   01/04/2018 148 (H) 132 - 146 mmol/L Final   01/03/2018 142 132 - 146 mmol/L Final   01/02/2018 139 132 - 146 mmol/L Final   01/02/2018 139 132 - 146 mmol/L Final   01/02/2018 140 132 - 146 mmol/L Final      Potassium Potassium   Date Value Ref Range Status   01/05/2018 3.1 (L) 3.5 - 5.5 mmol/L Final   01/04/2018 2.9 (L) 3.5 - 5.5 mmol/L Final   01/04/2018 2.7 (C) 3.5 - 5.5 mmol/L Final   01/03/2018 2.6 (C) 3.5 - 5.5 mmol/L Final   01/03/2018 3.4 (L) 3.5 - 5.5 mmol/L Final   01/02/2018 5.1 3.5 - 5.5 mmol/L Final   01/02/2018 5.5 3.5 - 5.5 mmol/L Final   01/02/2018 4.6 3.5 - 5.5 mmol/L Final      Chloride Chloride   Date Value Ref Range Status   01/05/2018 104 99 - 109 mmol/L Final   01/04/2018 103 99 - 109 mmol/L Final   01/03/2018 112 (H) 99 - 109 mmol/L Final   01/02/2018 117 (H) 99 - 109 mmol/L Final   01/02/2018 118 (H) 99 - 109 mmol/L Final   01/02/2018 116 (H) 99 - 109 mmol/L Final      CO2 CO2   Date Value Ref Range Status   01/05/2018 35.0 (H) 20.0 - 31.0 mmol/L Final    01/04/2018 39.0 (H) 20.0 - 31.0 mmol/L Final   01/03/2018 17.0 (L) 20.0 - 31.0 mmol/L Final   01/02/2018 <10.0 (C) 20.0 - 31.0 mmol/L Final   01/02/2018 <10.0 (C) 20.0 - 31.0 mmol/L Final     Comment:     Verified by repeat analysis.    01/02/2018 11.0 (L) 20.0 - 31.0 mmol/L Final      BUN BUN   Date Value Ref Range Status   01/05/2018 15 9 - 23 mg/dL Final   01/04/2018 35 (H) 9 - 23 mg/dL Final   01/03/2018 76 (H) 9 - 23 mg/dL Final   01/02/2018 75 (H) 9 - 23 mg/dL Final   01/02/2018 73 (H) 9 - 23 mg/dL Final   01/02/2018 80 (H) 9 - 23 mg/dL Final      Creatinine Creatinine   Date Value Ref Range Status   01/05/2018 0.60 0.60 - 1.30 mg/dL Final   01/04/2018 1.10 0.60 - 1.30 mg/dL Final   01/03/2018 4.90 (H) 0.60 - 1.30 mg/dL Final   01/02/2018 7.40 (H) 0.60 - 1.30 mg/dL Final   01/02/2018 8.60 (H) 0.60 - 1.30 mg/dL Final   01/02/2018 8.40 (H) 0.60 - 1.30 mg/dL Final      Calcium Calcium   Date Value Ref Range Status   01/05/2018 7.5 (L) 8.7 - 10.4 mg/dL Final   01/04/2018 7.2 (L) 8.7 - 10.4 mg/dL Final   01/03/2018 7.8 (L) 8.7 - 10.4 mg/dL Final   01/02/2018 8.2 (L) 8.7 - 10.4 mg/dL Final   01/02/2018 8.4 (L) 8.7 - 10.4 mg/dL Final   01/02/2018 7.9 (L) 8.7 - 10.4 mg/dL Final      PO4 No results found for: CAPO4   Albumin Albumin   Date Value Ref Range Status   01/04/2018 2.60 (L) 3.20 - 4.80 g/dL Final   01/03/2018 3.00 (L) 3.20 - 4.80 g/dL Final   01/02/2018 3.30 3.20 - 4.80 g/dL Final   01/02/2018 3.50 3.20 - 4.80 g/dL Final   01/02/2018 3.50 3.20 - 4.80 g/dL Final      Magnesium Magnesium   Date Value Ref Range Status   01/05/2018 1.9 1.3 - 2.7 mg/dL Final   01/04/2018 1.1 (L) 1.3 - 2.7 mg/dL Final   01/03/2018 1.3 1.3 - 2.7 mg/dL Final   01/02/2018 1.9 1.3 - 2.7 mg/dL Final      Uric Acid No results found for: URICACID        Results Review:     I reviewed the patient's new clinical results.      amoxicillin-clavulanate 1 tablet Oral Q12H   gabapentin 300 mg Oral TID   heparin (porcine) 5,000 Units  Subcutaneous Q12H   insulin regular 0-7 Units Subcutaneous 4x Daily With Meals & Nightly   ipratropium-albuterol 3 mL Nebulization 4x Daily - RT   levETIRAcetam 500 mg Oral BID   nicotine 1 patch Transdermal Q24H   pantoprazole 40 mg Oral Q AM   vancomycin 125 mg Oral Q6H          FINDINGS:   Right kidney measures 11.3 centers in length without evidence of  hydronephrosis, contour deforming mass or obvious calculi. Appropriate  flow on Doppler interrogation.  Left kidney measures 10.6 cm in length without evidence of  hydronephrosis, contour deforming mass or obvious calculi. Appropriate  flow on Doppler interrogation. Trace nonspecific fluid seen in  perinephric location anterior to the left kidney.      Urinary bladder is decompressed and unremarkable with Joyner catheter in  situ.          IMPRESSION:  1. No hydronephrosis.  2. Nonspecific trace free fluid anteriorly adjacent to left kidney.    Medication Review:     Assessment/Plan     Active Problems:    Hypotension    Tobacco use disorder    Type 2 diabetes mellitus    Seizure disorder    UTI (urinary tract infection)    Dehydration    COPD (chronic obstructive pulmonary disease)    Drug overdose    Acute renal failure superimposed on chronic kidney disease    Sepsis    Metabolic acidosis    Encephalopathy      1- heroin overdose   2- RAGHU - Non oliguric - Resolved.   3- Metabolic acidosis - improving.   4- Hyperkalemia - resolved.   5- Proteinuria - UPCR -0.8  6-  Hypotension - Resolved.     Plan:  Monitor I/O   Monitor H/H   Avoid nephrotoxic agents.   Replete electrolytes per protocol.     Stacy Reddy MD  01/05/18  9:32 AM

## 2018-01-06 LAB
ANION GAP SERPL CALCULATED.3IONS-SCNC: 7 MMOL/L (ref 3–11)
BASOPHILS # BLD AUTO: 0.03 10*3/MM3 (ref 0–0.2)
BASOPHILS NFR BLD AUTO: 0.2 % (ref 0–1)
BUN BLD-MCNC: 10 MG/DL (ref 9–23)
BUN/CREAT SERPL: 14.3 (ref 7–25)
CALCIUM SPEC-SCNC: 8.3 MG/DL (ref 8.7–10.4)
CHLORIDE SERPL-SCNC: 107 MMOL/L (ref 99–109)
CK SERPL-CCNC: 199 U/L (ref 26–174)
CO2 SERPL-SCNC: 26 MMOL/L (ref 20–31)
CREAT BLD-MCNC: 0.7 MG/DL (ref 0.6–1.3)
DEPRECATED RDW RBC AUTO: 51.6 FL (ref 37–54)
EOSINOPHIL # BLD AUTO: 0.36 10*3/MM3 (ref 0–0.3)
EOSINOPHIL NFR BLD AUTO: 2 % (ref 0–3)
ERYTHROCYTE [DISTWIDTH] IN BLOOD BY AUTOMATED COUNT: 14.9 % (ref 11.3–14.5)
GFR SERPL CREATININE-BSD FRML MDRD: 87 ML/MIN/1.73
GLUCOSE BLD-MCNC: 111 MG/DL (ref 70–100)
GLUCOSE BLDC GLUCOMTR-MCNC: 120 MG/DL (ref 70–130)
GLUCOSE BLDC GLUCOMTR-MCNC: 121 MG/DL (ref 70–130)
GLUCOSE BLDC GLUCOMTR-MCNC: 147 MG/DL (ref 70–130)
GLUCOSE BLDC GLUCOMTR-MCNC: 152 MG/DL (ref 70–130)
HCT VFR BLD AUTO: 26.6 % (ref 34.5–44)
HGB BLD-MCNC: 8.3 G/DL (ref 11.5–15.5)
IMM GRANULOCYTES # BLD: 0.12 10*3/MM3 (ref 0–0.03)
IMM GRANULOCYTES NFR BLD: 0.7 % (ref 0–0.6)
LYMPHOCYTES # BLD AUTO: 3.9 10*3/MM3 (ref 0.6–4.8)
LYMPHOCYTES NFR BLD AUTO: 21.1 % (ref 24–44)
MAGNESIUM SERPL-MCNC: 1.6 MG/DL (ref 1.3–2.7)
MCH RBC QN AUTO: 29.1 PG (ref 27–31)
MCHC RBC AUTO-ENTMCNC: 31.2 G/DL (ref 32–36)
MCV RBC AUTO: 93.3 FL (ref 80–99)
MONOCYTES # BLD AUTO: 1.46 10*3/MM3 (ref 0–1)
MONOCYTES NFR BLD AUTO: 7.9 % (ref 0–12)
NEUTROPHILS # BLD AUTO: 12.58 10*3/MM3 (ref 1.5–8.3)
NEUTROPHILS NFR BLD AUTO: 68.1 % (ref 41–71)
NRBC BLD MANUAL-RTO: 0 /100 WBC (ref 0–0)
PHOSPHATE SERPL-MCNC: 2.5 MG/DL (ref 2.4–5.1)
PLATELET # BLD AUTO: 292 10*3/MM3 (ref 150–450)
PMV BLD AUTO: 9.3 FL (ref 6–12)
POTASSIUM BLD-SCNC: 4.1 MMOL/L (ref 3.5–5.5)
RBC # BLD AUTO: 2.85 10*6/MM3 (ref 3.89–5.14)
SODIUM BLD-SCNC: 140 MMOL/L (ref 132–146)
WBC NRBC COR # BLD: 18.45 10*3/MM3 (ref 3.5–10.8)

## 2018-01-06 PROCEDURE — 85025 COMPLETE CBC W/AUTO DIFF WBC: CPT | Performed by: INTERNAL MEDICINE

## 2018-01-06 PROCEDURE — 82962 GLUCOSE BLOOD TEST: CPT

## 2018-01-06 PROCEDURE — 25010000002 HEPARIN (PORCINE) PER 1000 UNITS: Performed by: INTERNAL MEDICINE

## 2018-01-06 PROCEDURE — 94640 AIRWAY INHALATION TREATMENT: CPT

## 2018-01-06 PROCEDURE — 99233 SBSQ HOSP IP/OBS HIGH 50: CPT | Performed by: HOSPITALIST

## 2018-01-06 PROCEDURE — 94799 UNLISTED PULMONARY SVC/PX: CPT

## 2018-01-06 PROCEDURE — 82550 ASSAY OF CK (CPK): CPT | Performed by: INTERNAL MEDICINE

## 2018-01-06 PROCEDURE — 80048 BASIC METABOLIC PNL TOTAL CA: CPT | Performed by: INTERNAL MEDICINE

## 2018-01-06 PROCEDURE — 25010000002 MORPHINE PER 10 MG: Performed by: INTERNAL MEDICINE

## 2018-01-06 PROCEDURE — 83735 ASSAY OF MAGNESIUM: CPT | Performed by: INTERNAL MEDICINE

## 2018-01-06 PROCEDURE — 63710000001 INSULIN REGULAR HUMAN PER 5 UNITS

## 2018-01-06 PROCEDURE — 84100 ASSAY OF PHOSPHORUS: CPT | Performed by: INTERNAL MEDICINE

## 2018-01-06 PROCEDURE — 25010000002 ONDANSETRON PER 1 MG: Performed by: INTERNAL MEDICINE

## 2018-01-06 RX ORDER — HYDROCODONE BITARTRATE AND ACETAMINOPHEN 7.5; 325 MG/1; MG/1
1 TABLET ORAL EVERY 6 HOURS PRN
Status: DISCONTINUED | OUTPATIENT
Start: 2018-01-06 | End: 2018-01-09 | Stop reason: HOSPADM

## 2018-01-06 RX ORDER — CLONAZEPAM 0.5 MG/1
0.5 TABLET ORAL EVERY 12 HOURS SCHEDULED
Status: DISCONTINUED | OUTPATIENT
Start: 2018-01-06 | End: 2018-01-09 | Stop reason: HOSPADM

## 2018-01-06 RX ORDER — IPRATROPIUM BROMIDE AND ALBUTEROL SULFATE 2.5; .5 MG/3ML; MG/3ML
3 SOLUTION RESPIRATORY (INHALATION) EVERY 6 HOURS PRN
Status: DISCONTINUED | OUTPATIENT
Start: 2018-01-06 | End: 2018-01-09 | Stop reason: HOSPADM

## 2018-01-06 RX ORDER — SODIUM CHLORIDE 450 MG/100ML
50 INJECTION, SOLUTION INTRAVENOUS CONTINUOUS
Status: DISCONTINUED | OUTPATIENT
Start: 2018-01-06 | End: 2018-01-07

## 2018-01-06 RX ORDER — SERTRALINE HYDROCHLORIDE 100 MG/1
100 TABLET, FILM COATED ORAL DAILY
Status: DISCONTINUED | OUTPATIENT
Start: 2018-01-06 | End: 2018-01-09 | Stop reason: HOSPADM

## 2018-01-06 RX ORDER — ASPIRIN 81 MG/1
81 TABLET ORAL DAILY
Status: DISCONTINUED | OUTPATIENT
Start: 2018-01-06 | End: 2018-01-09 | Stop reason: HOSPADM

## 2018-01-06 RX ORDER — TRAZODONE HYDROCHLORIDE 100 MG/1
100 TABLET ORAL NIGHTLY
Status: DISCONTINUED | OUTPATIENT
Start: 2018-01-06 | End: 2018-01-09 | Stop reason: HOSPADM

## 2018-01-06 RX ORDER — POLYETHYLENE GLYCOL 3350 17 G
4 POWDER IN PACKET (EA) ORAL
Status: DISCONTINUED | OUTPATIENT
Start: 2018-01-06 | End: 2018-01-09 | Stop reason: HOSPADM

## 2018-01-06 RX ADMIN — CLONAZEPAM 0.5 MG: 0.5 TABLET ORAL at 20:33

## 2018-01-06 RX ADMIN — AMOXICILLIN AND CLAVULANATE POTASSIUM 1 TABLET: 875; 125 TABLET, FILM COATED ORAL at 20:38

## 2018-01-06 RX ADMIN — NICOTINE 1 PATCH: 21 PATCH, EXTENDED RELEASE TRANSDERMAL at 08:44

## 2018-01-06 RX ADMIN — HEPARIN SODIUM 5000 UNITS: 5000 INJECTION, SOLUTION INTRAVENOUS; SUBCUTANEOUS at 08:33

## 2018-01-06 RX ADMIN — ONDANSETRON 4 MG: 2 INJECTION INTRAMUSCULAR; INTRAVENOUS at 04:07

## 2018-01-06 RX ADMIN — IPRATROPIUM BROMIDE AND ALBUTEROL SULFATE 3 ML: .5; 3 SOLUTION RESPIRATORY (INHALATION) at 16:06

## 2018-01-06 RX ADMIN — MORPHINE SULFATE 4 MG: 4 INJECTION, SOLUTION INTRAMUSCULAR; INTRAVENOUS at 15:56

## 2018-01-06 RX ADMIN — SERTRALINE HYDROCHLORIDE 100 MG: 100 TABLET ORAL at 20:33

## 2018-01-06 RX ADMIN — MORPHINE SULFATE 4 MG: 4 INJECTION, SOLUTION INTRAMUSCULAR; INTRAVENOUS at 12:15

## 2018-01-06 RX ADMIN — GABAPENTIN 300 MG: 300 CAPSULE ORAL at 08:34

## 2018-01-06 RX ADMIN — VANCOMYCIN 250 MG: KIT at 23:01

## 2018-01-06 RX ADMIN — VANCOMYCIN 125 MG: KIT at 12:15

## 2018-01-06 RX ADMIN — MORPHINE SULFATE 4 MG: 4 INJECTION, SOLUTION INTRAMUSCULAR; INTRAVENOUS at 08:33

## 2018-01-06 RX ADMIN — IPRATROPIUM BROMIDE AND ALBUTEROL SULFATE 3 ML: .5; 3 SOLUTION RESPIRATORY (INHALATION) at 08:14

## 2018-01-06 RX ADMIN — INSULIN HUMAN 2 UNITS: 100 INJECTION, SOLUTION PARENTERAL at 20:33

## 2018-01-06 RX ADMIN — LEVETIRACETAM 500 MG: 500 TABLET, FILM COATED ORAL at 20:33

## 2018-01-06 RX ADMIN — MORPHINE SULFATE 4 MG: 4 INJECTION, SOLUTION INTRAMUSCULAR; INTRAVENOUS at 00:04

## 2018-01-06 RX ADMIN — VANCOMYCIN 125 MG: KIT at 17:58

## 2018-01-06 RX ADMIN — GABAPENTIN 300 MG: 300 CAPSULE ORAL at 20:33

## 2018-01-06 RX ADMIN — TRAZODONE HYDROCHLORIDE 100 MG: 100 TABLET ORAL at 20:33

## 2018-01-06 RX ADMIN — VANCOMYCIN 125 MG: KIT at 05:32

## 2018-01-06 RX ADMIN — LEVETIRACETAM 500 MG: 500 TABLET, FILM COATED ORAL at 08:34

## 2018-01-06 RX ADMIN — HEPARIN SODIUM 5000 UNITS: 5000 INJECTION, SOLUTION INTRAVENOUS; SUBCUTANEOUS at 20:33

## 2018-01-06 RX ADMIN — AMOXICILLIN AND CLAVULANATE POTASSIUM 1 TABLET: 875; 125 TABLET, FILM COATED ORAL at 08:34

## 2018-01-06 RX ADMIN — HYDROCODONE BITARTRATE AND ACETAMINOPHEN 1 TABLET: 7.5; 325 TABLET ORAL at 23:01

## 2018-01-06 RX ADMIN — IPRATROPIUM BROMIDE AND ALBUTEROL SULFATE 3 ML: .5; 3 SOLUTION RESPIRATORY (INHALATION) at 12:39

## 2018-01-06 RX ADMIN — ASPIRIN 81 MG: 81 TABLET, COATED ORAL at 20:33

## 2018-01-06 RX ADMIN — METRONIDAZOLE 500 MG: 500 INJECTION, SOLUTION INTRAVENOUS at 22:58

## 2018-01-06 RX ADMIN — SODIUM CHLORIDE 50 ML/HR: 4.5 INJECTION, SOLUTION INTRAVENOUS at 20:28

## 2018-01-06 RX ADMIN — PANTOPRAZOLE SODIUM 40 MG: 40 TABLET, DELAYED RELEASE ORAL at 05:32

## 2018-01-06 RX ADMIN — MORPHINE SULFATE 4 MG: 4 INJECTION, SOLUTION INTRAMUSCULAR; INTRAVENOUS at 20:34

## 2018-01-06 RX ADMIN — GABAPENTIN 300 MG: 300 CAPSULE ORAL at 15:58

## 2018-01-06 RX ADMIN — VANCOMYCIN 125 MG: KIT at 00:28

## 2018-01-06 NOTE — PLAN OF CARE
Problem: Overdose, Ingestion/Inhalants (Adult)  Goal: Signs and Symptoms of Listed Potential Problems Will be Absent or Manageable (Overdose, Ingestion/Inhalants)  Outcome: Ongoing (interventions implemented as appropriate)      Problem: Sepsis (Adult)  Goal: Signs and Symptoms of Listed Potential Problems Will be Absent or Manageable (Sepsis)  Outcome: Ongoing (interventions implemented as appropriate)      Problem: Patient Care Overview (Adult)  Goal: Plan of Care Review  Outcome: Ongoing (interventions implemented as appropriate)   01/06/18 0317   Coping/Psychosocial Response Interventions   Plan Of Care Reviewed With patient   Patient Care Overview   Progress no change   Outcome Evaluation   Outcome Summary/Follow up Plan pt continues to have frequent BMs. VSS. will continue to monitor.        Problem: Pressure Ulcer Risk (Vinicius Scale) (Adult,Obstetrics,Pediatric)  Goal: Skin Integrity  Outcome: Ongoing (interventions implemented as appropriate)      Problem: Fall Risk (Adult)  Goal: Absence of Falls  Outcome: Ongoing (interventions implemented as appropriate)

## 2018-01-06 NOTE — PLAN OF CARE
Problem: Overdose, Ingestion/Inhalants (Adult)  Goal: Signs and Symptoms of Listed Potential Problems Will be Absent or Manageable (Overdose, Ingestion/Inhalants)  Outcome: Ongoing (interventions implemented as appropriate)      Problem: Patient Care Overview (Adult)  Goal: Plan of Care Review  Outcome: Ongoing (interventions implemented as appropriate)      Problem: Fall Risk (Adult)  Goal: Absence of Falls  Outcome: Ongoing (interventions implemented as appropriate)

## 2018-01-06 NOTE — PROGRESS NOTES
"   LOS: 4 days    Patient Care Team:  ANDREA Quinones as PCP - General  Jennifer Galindo MD as Consulting Physician (General Surgery)    Chief Complaint:  Heroin overdose     Subjective     Interval History:   No acute issues overnight. No new complaints.     Review of Systems:   NO CP or SOA.     Objective     Vital Sign Min/Max for last 24 hours  Temp  Min: 98.1 °F (36.7 °C)  Max: 100.2 °F (37.9 °C)   BP  Min: 121/84  Max: 156/87   Pulse  Min: 90  Max: 109   Resp  Min: 16  Max: 24   SpO2  Min: 85 %  Max: 97 %   Flow (L/min)  Min: 2  Max: 2   Weight  Min: 60 kg (132 lb 3.2 oz)  Max: 60 kg (132 lb 3.2 oz)     Flowsheet Rows         First Filed Value    Admission Height  152.4 cm (60\") Documented at 01/02/2018 1014    Admission Weight  61.2 kg (135 lb) Documented at 01/02/2018 1014             I/O last 3 completed shifts:  In: 1703 [P.O.:370; I.V.:623; Other:210; IV Piggyback:500]  Out: 1310 [Urine:1100; Stool:210]    Physical Exam:  GENERAL: Alert and cooperative.   HEENT: Normocephalic, atraumatic.  PER.  No conjunctivitis. No icterus. Oropharynx clear without evidence of thrush or exudate. No evidence of peridontal disease.    NECK: Supple without nuchal rigidity. No mass.  LYMPH: No painful cervical, axillary or inguinal lymphadenopathy.  HEART: RRR; No murmur, rubs, gallops. No bruits in neck, abdomen, or groins, no edema  LUNGS: Normal respiratory effort. Nonlabored. No dullness.  No crepitus.  Not Clear to auscultation bilateral wheezing, rales, and rhonchi.  ABDOMEN: Soft, nontender, nondistended. Positive bowel sounds. No rebound or guarding. NO mass or HSM.  JOINTS:  Full range of motion, no redness or tenderness.  EXT:  No cyanosis, clubbing or edema.  :  External genitalia without swelling or lesion  SKIN: Warm and dry without rash  NEURO: Oriented to PPT. No focal neurological deficits. Strength equal bilateral  PSYCHIATRIC: Normal insight and judgement. Cooperative with PE    WBC WBC   Date Value " Ref Range Status   01/06/2018 18.45 (H) 3.50 - 10.80 10*3/mm3 Final   01/05/2018 21.55 (H) 3.50 - 10.80 10*3/mm3 Final   01/04/2018 18.11 (H) 3.50 - 10.80 10*3/mm3 Final      HGB Hemoglobin   Date Value Ref Range Status   01/06/2018 8.3 (L) 11.5 - 15.5 g/dL Final   01/05/2018 8.2 (L) 11.5 - 15.5 g/dL Final   01/04/2018 7.7 (L) 11.5 - 15.5 g/dL Final      HCT Hematocrit   Date Value Ref Range Status   01/06/2018 26.6 (L) 34.5 - 44.0 % Final   01/05/2018 25.9 (L) 34.5 - 44.0 % Final   01/04/2018 23.3 (L) 34.5 - 44.0 % Final      Platlets No results found for: LABPLAT   MCV MCV   Date Value Ref Range Status   01/06/2018 93.3 80.0 - 99.0 fL Final   01/05/2018 91.5 80.0 - 99.0 fL Final   01/04/2018 88.6 80.0 - 99.0 fL Final          Sodium Sodium   Date Value Ref Range Status   01/06/2018 140 132 - 146 mmol/L Final   01/05/2018 147 (H) 132 - 146 mmol/L Final   01/04/2018 148 (H) 132 - 146 mmol/L Final      Potassium Potassium   Date Value Ref Range Status   01/06/2018 4.1 3.5 - 5.5 mmol/L Final   01/05/2018 4.0 3.5 - 5.5 mmol/L Final   01/05/2018 3.1 (L) 3.5 - 5.5 mmol/L Final   01/04/2018 2.9 (L) 3.5 - 5.5 mmol/L Final   01/04/2018 2.7 (C) 3.5 - 5.5 mmol/L Final   01/03/2018 2.6 (C) 3.5 - 5.5 mmol/L Final      Chloride Chloride   Date Value Ref Range Status   01/06/2018 107 99 - 109 mmol/L Final   01/05/2018 104 99 - 109 mmol/L Final   01/04/2018 103 99 - 109 mmol/L Final      CO2 CO2   Date Value Ref Range Status   01/06/2018 26.0 20.0 - 31.0 mmol/L Final   01/05/2018 35.0 (H) 20.0 - 31.0 mmol/L Final   01/04/2018 39.0 (H) 20.0 - 31.0 mmol/L Final      BUN BUN   Date Value Ref Range Status   01/06/2018 10 9 - 23 mg/dL Final   01/05/2018 15 9 - 23 mg/dL Final   01/04/2018 35 (H) 9 - 23 mg/dL Final      Creatinine Creatinine   Date Value Ref Range Status   01/06/2018 0.70 0.60 - 1.30 mg/dL Final   01/05/2018 0.60 0.60 - 1.30 mg/dL Final   01/04/2018 1.10 0.60 - 1.30 mg/dL Final      Calcium Calcium   Date Value Ref Range  Status   01/06/2018 8.3 (L) 8.7 - 10.4 mg/dL Final   01/05/2018 7.5 (L) 8.7 - 10.4 mg/dL Final   01/04/2018 7.2 (L) 8.7 - 10.4 mg/dL Final      PO4 No results found for: CAPO4   Albumin Albumin   Date Value Ref Range Status   01/04/2018 2.60 (L) 3.20 - 4.80 g/dL Final      Magnesium Magnesium   Date Value Ref Range Status   01/06/2018 1.6 1.3 - 2.7 mg/dL Final   01/05/2018 1.9 1.3 - 2.7 mg/dL Final   01/04/2018 1.1 (L) 1.3 - 2.7 mg/dL Final      Uric Acid No results found for: URICACID        Results Review:     I reviewed the patient's new clinical results.      amoxicillin-clavulanate 1 tablet Oral Q12H   gabapentin 300 mg Oral TID   heparin (porcine) 5,000 Units Subcutaneous Q12H   insulin regular 0-7 Units Subcutaneous 4x Daily With Meals & Nightly   ipratropium-albuterol 3 mL Nebulization 4x Daily - RT   levETIRAcetam 500 mg Oral BID   nicotine 1 patch Transdermal Q24H   pantoprazole 40 mg Oral Q AM   vancomycin 125 mg Oral Q6H          FINDINGS:   Right kidney measures 11.3 centers in length without evidence of  hydronephrosis, contour deforming mass or obvious calculi. Appropriate  flow on Doppler interrogation.  Left kidney measures 10.6 cm in length without evidence of  hydronephrosis, contour deforming mass or obvious calculi. Appropriate  flow on Doppler interrogation. Trace nonspecific fluid seen in  perinephric location anterior to the left kidney.      Urinary bladder is decompressed and unremarkable with Joyner catheter in  situ.          IMPRESSION:  1. No hydronephrosis.  2. Nonspecific trace free fluid anteriorly adjacent to left kidney.    Medication Review:     Assessment/Plan     Active Problems:    Hypotension    Tobacco use disorder    Type 2 diabetes mellitus    Seizure disorder    UTI (urinary tract infection)    Dehydration    COPD (chronic obstructive pulmonary disease)    Drug overdose    Acute renal failure superimposed on chronic kidney disease    Sepsis    Metabolic acidosis     Encephalopathy      1- heroin overdose   2- RAGHU - Non oliguric - Resolved.   3- Metabolic acidosis - improving.   4- Hyperkalemia - resolved.   5- Proteinuria - UPCR -0.8  6-  Hypotension - Resolved.     Plan:  Monitor I/O   Monitor H/H   Avoid nephrotoxic agents.   Replete electrolytes per protocol.     Will sign off the service.    Stacy Reddy MD  01/06/18  9:17 AM

## 2018-01-06 NOTE — NURSING NOTE
Patient brought to [facility] with VSS and on 2 LNC. She stated her head hurt and she needed to go to the restroom. I proceeded to get her some Tylenol after I took her to the restroom. Eve from [facility] took over care from there.

## 2018-01-07 LAB
ALBUMIN SERPL-MCNC: 3.1 G/DL (ref 3.2–4.8)
ALBUMIN/GLOB SERPL: 1.1 G/DL (ref 1.5–2.5)
ALP SERPL-CCNC: 82 U/L (ref 25–100)
ALT SERPL W P-5'-P-CCNC: 17 U/L (ref 7–40)
ANION GAP SERPL CALCULATED.3IONS-SCNC: 3 MMOL/L (ref 3–11)
AST SERPL-CCNC: 13 U/L (ref 0–33)
BACTERIA SPEC AEROBE CULT: NORMAL
BACTERIA SPEC AEROBE CULT: NORMAL
BASOPHILS # BLD AUTO: 0.02 10*3/MM3 (ref 0–0.2)
BASOPHILS NFR BLD AUTO: 0.1 % (ref 0–1)
BILIRUB SERPL-MCNC: 0.2 MG/DL (ref 0.3–1.2)
BUN BLD-MCNC: 11 MG/DL (ref 9–23)
BUN/CREAT SERPL: 13.8 (ref 7–25)
CALCIUM SPEC-SCNC: 8 MG/DL (ref 8.7–10.4)
CHLORIDE SERPL-SCNC: 112 MMOL/L (ref 99–109)
CO2 SERPL-SCNC: 25 MMOL/L (ref 20–31)
CREAT BLD-MCNC: 0.8 MG/DL (ref 0.6–1.3)
D-LACTATE SERPL-SCNC: 0.8 MMOL/L (ref 0.5–2)
DEPRECATED RDW RBC AUTO: 50.7 FL (ref 37–54)
EOSINOPHIL # BLD AUTO: 0.5 10*3/MM3 (ref 0–0.3)
EOSINOPHIL NFR BLD AUTO: 3.3 % (ref 0–3)
ERYTHROCYTE [DISTWIDTH] IN BLOOD BY AUTOMATED COUNT: 14.8 % (ref 11.3–14.5)
GFR SERPL CREATININE-BSD FRML MDRD: 74 ML/MIN/1.73
GLOBULIN UR ELPH-MCNC: 2.7 GM/DL
GLUCOSE BLD-MCNC: 82 MG/DL (ref 70–100)
GLUCOSE BLDC GLUCOMTR-MCNC: 103 MG/DL (ref 70–130)
GLUCOSE BLDC GLUCOMTR-MCNC: 105 MG/DL (ref 70–130)
GLUCOSE BLDC GLUCOMTR-MCNC: 106 MG/DL (ref 70–130)
GLUCOSE BLDC GLUCOMTR-MCNC: 110 MG/DL (ref 70–130)
HCT VFR BLD AUTO: 27.6 % (ref 34.5–44)
HGB BLD-MCNC: 8.4 G/DL (ref 11.5–15.5)
IMM GRANULOCYTES # BLD: 0.15 10*3/MM3 (ref 0–0.03)
IMM GRANULOCYTES NFR BLD: 1 % (ref 0–0.6)
INR PPP: 1.1
LYMPHOCYTES # BLD AUTO: 5.54 10*3/MM3 (ref 0.6–4.8)
LYMPHOCYTES NFR BLD AUTO: 37.1 % (ref 24–44)
MAGNESIUM SERPL-MCNC: 1.2 MG/DL (ref 1.3–2.7)
MCH RBC QN AUTO: 28.7 PG (ref 27–31)
MCHC RBC AUTO-ENTMCNC: 30.4 G/DL (ref 32–36)
MCV RBC AUTO: 94.2 FL (ref 80–99)
MONOCYTES # BLD AUTO: 1.4 10*3/MM3 (ref 0–1)
MONOCYTES NFR BLD AUTO: 9.4 % (ref 0–12)
NEUTROPHILS # BLD AUTO: 7.32 10*3/MM3 (ref 1.5–8.3)
NEUTROPHILS NFR BLD AUTO: 49.1 % (ref 41–71)
PHOSPHATE SERPL-MCNC: 3.4 MG/DL (ref 2.4–5.1)
PLATELET # BLD AUTO: 289 10*3/MM3 (ref 150–450)
PMV BLD AUTO: 9.6 FL (ref 6–12)
POTASSIUM BLD-SCNC: 3.6 MMOL/L (ref 3.5–5.5)
PROT SERPL-MCNC: 5.8 G/DL (ref 5.7–8.2)
PROTHROMBIN TIME: 12 SECONDS (ref 9.6–11.5)
RBC # BLD AUTO: 2.93 10*6/MM3 (ref 3.89–5.14)
SODIUM BLD-SCNC: 140 MMOL/L (ref 132–146)
WBC NRBC COR # BLD: 14.93 10*3/MM3 (ref 3.5–10.8)

## 2018-01-07 PROCEDURE — 85610 PROTHROMBIN TIME: CPT | Performed by: HOSPITALIST

## 2018-01-07 PROCEDURE — 99233 SBSQ HOSP IP/OBS HIGH 50: CPT | Performed by: INTERNAL MEDICINE

## 2018-01-07 PROCEDURE — 83605 ASSAY OF LACTIC ACID: CPT | Performed by: HOSPITALIST

## 2018-01-07 PROCEDURE — 84100 ASSAY OF PHOSPHORUS: CPT | Performed by: HOSPITALIST

## 2018-01-07 PROCEDURE — 83735 ASSAY OF MAGNESIUM: CPT | Performed by: HOSPITALIST

## 2018-01-07 PROCEDURE — 25010000002 MORPHINE PER 10 MG: Performed by: INTERNAL MEDICINE

## 2018-01-07 PROCEDURE — 82962 GLUCOSE BLOOD TEST: CPT

## 2018-01-07 PROCEDURE — 25010000002 HEPARIN (PORCINE) PER 1000 UNITS: Performed by: INTERNAL MEDICINE

## 2018-01-07 PROCEDURE — 80053 COMPREHEN METABOLIC PANEL: CPT | Performed by: HOSPITALIST

## 2018-01-07 PROCEDURE — 85025 COMPLETE CBC W/AUTO DIFF WBC: CPT | Performed by: HOSPITALIST

## 2018-01-07 PROCEDURE — 25010000002 MAGNESIUM SULFATE 2 GM/50ML SOLUTION: Performed by: INTERNAL MEDICINE

## 2018-01-07 RX ORDER — MAGNESIUM SULFATE HEPTAHYDRATE 40 MG/ML
2 INJECTION, SOLUTION INTRAVENOUS AS NEEDED
Status: DISCONTINUED | OUTPATIENT
Start: 2018-01-07 | End: 2018-01-09 | Stop reason: HOSPADM

## 2018-01-07 RX ORDER — MORPHINE SULFATE 2 MG/ML
2 INJECTION, SOLUTION INTRAMUSCULAR; INTRAVENOUS EVERY 6 HOURS PRN
Status: DISCONTINUED | OUTPATIENT
Start: 2018-01-07 | End: 2018-01-08

## 2018-01-07 RX ORDER — MAGNESIUM SULFATE HEPTAHYDRATE 40 MG/ML
4 INJECTION, SOLUTION INTRAVENOUS AS NEEDED
Status: DISCONTINUED | OUTPATIENT
Start: 2018-01-07 | End: 2018-01-09 | Stop reason: HOSPADM

## 2018-01-07 RX ADMIN — MORPHINE SULFATE 4 MG: 4 INJECTION, SOLUTION INTRAMUSCULAR; INTRAVENOUS at 03:43

## 2018-01-07 RX ADMIN — METRONIDAZOLE 500 MG: 500 INJECTION, SOLUTION INTRAVENOUS at 21:41

## 2018-01-07 RX ADMIN — SERTRALINE HYDROCHLORIDE 100 MG: 100 TABLET ORAL at 08:21

## 2018-01-07 RX ADMIN — HYDROCODONE BITARTRATE AND ACETAMINOPHEN 1 TABLET: 7.5; 325 TABLET ORAL at 18:02

## 2018-01-07 RX ADMIN — NICOTINE POLACRILEX 4 MG: 2 GUM, CHEWING ORAL at 05:42

## 2018-01-07 RX ADMIN — POTASSIUM CHLORIDE 40 MEQ: 750 CAPSULE, EXTENDED RELEASE ORAL at 12:19

## 2018-01-07 RX ADMIN — HEPARIN SODIUM 5000 UNITS: 5000 INJECTION, SOLUTION INTRAVENOUS; SUBCUTANEOUS at 08:21

## 2018-01-07 RX ADMIN — HYDROCODONE BITARTRATE AND ACETAMINOPHEN 1 TABLET: 7.5; 325 TABLET ORAL at 05:41

## 2018-01-07 RX ADMIN — CLONAZEPAM 0.5 MG: 0.5 TABLET ORAL at 08:21

## 2018-01-07 RX ADMIN — PANTOPRAZOLE SODIUM 40 MG: 40 TABLET, DELAYED RELEASE ORAL at 05:41

## 2018-01-07 RX ADMIN — AMOXICILLIN AND CLAVULANATE POTASSIUM 1 TABLET: 875; 125 TABLET, FILM COATED ORAL at 20:18

## 2018-01-07 RX ADMIN — METRONIDAZOLE 500 MG: 500 INJECTION, SOLUTION INTRAVENOUS at 07:25

## 2018-01-07 RX ADMIN — MORPHINE SULFATE 4 MG: 4 INJECTION, SOLUTION INTRAMUSCULAR; INTRAVENOUS at 08:22

## 2018-01-07 RX ADMIN — VANCOMYCIN 250 MG: KIT at 18:02

## 2018-01-07 RX ADMIN — MAGNESIUM SULFATE HEPTAHYDRATE 2 G: 40 INJECTION, SOLUTION INTRAVENOUS at 09:55

## 2018-01-07 RX ADMIN — TRAZODONE HYDROCHLORIDE 100 MG: 100 TABLET ORAL at 20:18

## 2018-01-07 RX ADMIN — ASPIRIN 81 MG: 81 TABLET, COATED ORAL at 08:21

## 2018-01-07 RX ADMIN — HYDROCODONE BITARTRATE AND ACETAMINOPHEN 1 TABLET: 7.5; 325 TABLET ORAL at 11:31

## 2018-01-07 RX ADMIN — MORPHINE SULFATE 4 MG: 4 INJECTION, SOLUTION INTRAMUSCULAR; INTRAVENOUS at 12:19

## 2018-01-07 RX ADMIN — MORPHINE SULFATE 4 MG: 4 INJECTION, SOLUTION INTRAMUSCULAR; INTRAVENOUS at 16:09

## 2018-01-07 RX ADMIN — MORPHINE SULFATE 4 MG: 4 INJECTION, SOLUTION INTRAMUSCULAR; INTRAVENOUS at 20:19

## 2018-01-07 RX ADMIN — MAGNESIUM SULFATE HEPTAHYDRATE 2 G: 40 INJECTION, SOLUTION INTRAVENOUS at 14:08

## 2018-01-07 RX ADMIN — NICOTINE 1 PATCH: 21 PATCH, EXTENDED RELEASE TRANSDERMAL at 08:22

## 2018-01-07 RX ADMIN — VANCOMYCIN 250 MG: KIT at 11:32

## 2018-01-07 RX ADMIN — VANCOMYCIN 250 MG: KIT at 05:41

## 2018-01-07 RX ADMIN — LEVETIRACETAM 500 MG: 500 TABLET, FILM COATED ORAL at 20:19

## 2018-01-07 RX ADMIN — MAGNESIUM SULFATE HEPTAHYDRATE 2 G: 40 INJECTION, SOLUTION INTRAVENOUS at 12:20

## 2018-01-07 RX ADMIN — NICOTINE POLACRILEX 4 MG: 2 GUM, CHEWING ORAL at 09:18

## 2018-01-07 RX ADMIN — METRONIDAZOLE 500 MG: 500 INJECTION, SOLUTION INTRAVENOUS at 14:03

## 2018-01-07 RX ADMIN — HEPARIN SODIUM 5000 UNITS: 5000 INJECTION, SOLUTION INTRAVENOUS; SUBCUTANEOUS at 20:19

## 2018-01-07 RX ADMIN — POTASSIUM CHLORIDE 40 MEQ: 750 CAPSULE, EXTENDED RELEASE ORAL at 08:22

## 2018-01-07 RX ADMIN — ACETAMINOPHEN 650 MG: 325 TABLET, FILM COATED ORAL at 16:08

## 2018-01-07 RX ADMIN — GABAPENTIN 300 MG: 300 CAPSULE ORAL at 16:08

## 2018-01-07 RX ADMIN — AMOXICILLIN AND CLAVULANATE POTASSIUM 1 TABLET: 875; 125 TABLET, FILM COATED ORAL at 08:21

## 2018-01-07 RX ADMIN — NICOTINE POLACRILEX 4 MG: 2 GUM, CHEWING ORAL at 14:04

## 2018-01-07 RX ADMIN — LEVETIRACETAM 500 MG: 500 TABLET, FILM COATED ORAL at 08:21

## 2018-01-07 RX ADMIN — CLONAZEPAM 0.5 MG: 0.5 TABLET ORAL at 20:18

## 2018-01-07 RX ADMIN — GABAPENTIN 300 MG: 300 CAPSULE ORAL at 08:21

## 2018-01-07 RX ADMIN — GABAPENTIN 300 MG: 300 CAPSULE ORAL at 20:18

## 2018-01-07 NOTE — PLAN OF CARE
Problem: Overdose, Ingestion/Inhalants (Adult)  Goal: Signs and Symptoms of Listed Potential Problems Will be Absent or Manageable (Overdose, Ingestion/Inhalants)  Outcome: Ongoing (interventions implemented as appropriate)      Problem: Sepsis (Adult)  Goal: Signs and Symptoms of Listed Potential Problems Will be Absent or Manageable (Sepsis)  Outcome: Ongoing (interventions implemented as appropriate)      Problem: Patient Care Overview (Adult)  Goal: Plan of Care Review  Outcome: Ongoing (interventions implemented as appropriate)   01/06/18 1458 01/06/18 2015 01/07/18 0258   Coping/Psychosocial Response Interventions   Plan Of Care Reviewed With --  patient --    Patient Care Overview   Progress progress toward functional goals as expected --  --    Outcome Evaluation   Outcome Summary/Follow up Plan --  --  Pt has rested well tonight. Requested pain meds at beginning of shift but has rested well since. Refuses rehab. VSS. Iv and PO abx. Will continue to monitor.     Goal: Adult Individualization and Mutuality  Outcome: Ongoing (interventions implemented as appropriate)    Goal: Discharge Needs Assessment  Outcome: Ongoing (interventions implemented as appropriate)      Problem: Pressure Ulcer Risk (Vinicius Scale) (Adult,Obstetrics,Pediatric)  Goal: Skin Integrity  Outcome: Ongoing (interventions implemented as appropriate)      Problem: Fall Risk (Adult)  Goal: Absence of Falls  Outcome: Ongoing (interventions implemented as appropriate)

## 2018-01-07 NOTE — PROGRESS NOTES
[facility] Medicine Services  PROGRESS NOTE    Patient Name: Charisma Cortez  : 1960  MRN: [id number]    Date of Admission: 2018  Length of Stay: 4  Primary Care Physician: ANDREA Zhong    Subjective   Subjective     CC:  ams / decreased responsiveness    HPI:  ICU Downgrade  Smoking a lot more than a nicotine patch may supplement.  She reports a history of PTSD  / depression.  EMS report on admission suggesting she did not want to live.  Asking for antidepressant and chronic benzo    Review of Systems    Gen- No fevers, chills  CV- No chest pain, palpitations  Resp- No cough, dyspnea  GI- No N/V/D, abd pain      Otherwise ROS is negative except as mentioned in the HPI.    Objective   Objective     Vital Signs:   Temp:  [98.9 °F (37.2 °C)-100.2 °F (37.9 °C)] 98.9 °F (37.2 °C)  Heart Rate:  [] 96  Resp:  [16-20] 16  BP: (126-156)/(87-96) 151/96        Physical Exam:  Constitutional: No acute distress, awake, + IJ Right Triple Lumen Catheter  HENT: NCAT, no JVD  Respiratory: Clear to auscultation bilaterally, poor inspiratory effort  Cardiovascular: RRR, s1 and s2  Gastrointestinal: Positive bowel sounds, soft, obese, no guarding  Musculoskeletal: No bilateral ankle edema  Psychiatric: Anxious  Neurologic: Oriented x 3, strength symmetric in all extremities, Cranial Nerves grossly intact to confrontation, speech clear  Skin: dry, +skin tenting      Results Reviewed:  I have personally reviewed current lab, radiology, and data and agree.      Results from last 7 days  Lab Units 18  0816 18  0331 18  0424 18  0326 18  1056   WBC 10*3/mm3 18.45* 21.55* 18.11* 20.75* 24.20*   HEMOGLOBIN g/dL 8.3* 8.2* 7.7* 8.9* 10.0*   HEMATOCRIT % 26.6* 25.9* 23.3* 28.1* 32.0*   PLATELETS 10*3/mm3 292 288 291 351 387   INR   --   --   --  1.21 1.12       Results from last 7 days  Lab Units 18  0816 18  1946 18  0331   01/04/18  0424  01/03/18  0326  01/02/18  1614 01/02/18  1132   SODIUM mmol/L 140  --  147*  --  148*  --  142  < > 139 140   POTASSIUM mmol/L 4.1 4.0 3.1*  < > 2.7*  < > 3.4*  < > 5.5 4.6   CHLORIDE mmol/L 107  --  104  --  103  --  112*  < > 118* 116*   CO2 mmol/L 26.0  --  35.0*  --  39.0*  --  17.0*  < > <10.0* 11.0*   BUN mg/dL 10  --  15  --  35*  --  76*  < > 73* 80*   CREATININE mg/dL 0.70  --  0.60  --  1.10  --  4.90*  < > 8.60* 8.40*   GLUCOSE mg/dL 111*  --  100  --  128*  --  161*  < > 102* 90   CALCIUM mg/dL 8.3*  --  7.5*  --  7.2*  --  7.8*  < > 8.4* 7.9*   ALT (SGPT) U/L  --   --   --   --   --   --  28  --  32 27   AST (SGOT) U/L  --   --   --   --   --   --  54*  --  67* 53*   TROPONIN I ng/mL  --   --   --   --   --   --   --   --   --  0.039   < > = values in this interval not displayed.  No results found for: BNP  No results found for: PHART    Microbiology Results Abnormal     Procedure Component Value - Date/Time    Blood Culture - Blood, [[id number]]  (Normal) Collected:  01/02/18 1245    Lab Status:  Preliminary result Specimen:  Blood from Arm, Left Updated:  01/06/18 1401     Blood Culture No growth at 4 days    Narrative:       Aerobic bottle only    Blood Culture - Blood, [[id number]]  (Normal) Collected:  01/02/18 1320    Lab Status:  Preliminary result Specimen:  Blood from Arm, Left Updated:  01/06/18 1401     Blood Culture No growth at 4 days    Urine Culture - Urine, Urine, Clean Catch [[id number]]  (Abnormal)  (Susceptibility) Collected:  01/02/18 1621    Lab Status:  Final result Specimen:  Urine from Urine, Catheter Updated:  01/05/18 1426     Urine Culture --      >100,000 CFU/mL Enterococcus faecalis (A)    Susceptibility      Enterococcus faecalis     KARI     Ampicillin <=2 ug/ml Susceptible     Gentamicin High Level Synergy <=500 ug/ml Susceptible     Levofloxacin 2 ug/ml Susceptible     Linezolid 2 ug/ml Susceptible     Nitrofurantoin <=32 ug/ml Susceptible     Penicillin G 2  ug/ml Susceptible     Streptomycin High Level Synergy >1000 ug/ml Resistant     Tetracycline >8 ug/ml Resistant     Vancomycin 2 ug/ml Susceptible                    Urine Culture - Urine, Urine, Clean Catch [[id number]]  (Abnormal)  (Susceptibility) Collected:  01/02/18 2022    Lab Status:  Final result Specimen:  Urine from Urine, Clean Catch Updated:  01/05/18 1425     Urine Culture --      >100,000 CFU/mL Enterococcus faecalis (A)    Susceptibility      Enterococcus faecalis     KARI     Ampicillin <=2 ug/ml Susceptible     Gentamicin High Level Synergy <=500 ug/ml Susceptible     Levofloxacin 2 ug/ml Susceptible     Linezolid 2 ug/ml Susceptible     Nitrofurantoin <=32 ug/ml Susceptible     Penicillin G 2 ug/ml Susceptible     Streptomycin High Level Synergy >1000 ug/ml Resistant     Tetracycline >8 ug/ml Resistant     Vancomycin 2 ug/ml Susceptible                    Clostridium Difficile Toxin - Stool, Per Rectum [[id number]] Collected:  01/04/18 1721    Lab Status:  Final result Specimen:  Stool from Per Rectum Updated:  01/04/18 2035    Narrative:       The following orders were created for panel order Clostridium Difficile Toxin - Stool, Per Rectum.  Procedure                               Abnormality         Status                     ---------                               -----------         ------                     Clostridium Difficile To...[[id number]]  Abnormal            Final result                 Please view results for these tests on the individual orders.    Clostridium Difficile Toxin, PCR - Stool, Per Rectum [[id number]]  (Abnormal) Collected:  01/04/18 1721    Lab Status:  Final result Specimen:  Stool from Per Rectum Updated:  01/04/18 2035     C. Difficile Toxins by PCR Detected (A)    Narrative:         Performance characteristics of test not established for patients <2 years of age.    Urine Culture - Urine, Urine, Clean Catch [[id number]]  (Abnormal)  (Susceptibility) Collected:  01/02/18  1104    Lab Status:  Final result Specimen:  Urine from Urine, Catheter Updated:  01/04/18 1422     Urine Culture --      10,000-20,000 CFU/mL Enterococcus faecalis (A)    Susceptibility      Enterococcus faecalis     KARI     Ampicillin <=2 ug/ml Susceptible     Gentamicin High Level Synergy <=500 ug/ml Susceptible     Levofloxacin <=1 ug/ml Susceptible     Linezolid 2 ug/ml Susceptible     Nitrofurantoin <=32 ug/ml Susceptible     Penicillin G 2 ug/ml Susceptible     Streptomycin High Level Synergy >1000 ug/ml Resistant     Tetracycline >8 ug/ml Resistant     Vancomycin 2 ug/ml Susceptible                    Influenza A & B, RT PCR - Swab, Nasopharynx [[id number]]  (Normal) Collected:  01/02/18 1737    Lab Status:  Final result Specimen:  Swab from Nasopharynx Updated:  01/02/18 1841     Influenza A PCR Not Detected     Influenza B PCR Not Detected          Imaging Results (last 24 hours)     ** No results found for the last 24 hours. **        Results for orders placed during the hospital encounter of 01/02/18   Adult Transthoracic Echo Complete W/ Cont if Necessary Per Protocol    Narrative · Left ventricular systolic function is hyperdynamic (EF > 70).  · Mild mitral valve regurgitation is present  · Mild tricuspid valve regurgitation is present.  · abnormal LVOT contour without significant obstruction        I have reviewed the medications.    Assessment/Plan   Assessment / Plan     Hospital Problem List     Hypotension    Tobacco use disorder    Type 2 diabetes mellitus    Seizure disorder    UTI (urinary tract infection)    Dehydration    COPD (chronic obstructive pulmonary disease)    Drug overdose    Acute renal failure superimposed on chronic kidney disease    Sepsis    Metabolic acidosis    Encephalopathy           Brief Hospital Course to date:  Charisma Cortez is a 57 y.o. female who presented by EMS with decreased responsiveness and septic shock.  Admitted to ICU.    Assessment & Plan:    Severe  Sepsis POA  - was on pressors and bicarb in ICU  - central line placed in Right IJ on admission  - ? Recurrent C Diff  - escalate abx today - increase oral vanc  - add IV flagyl  - if no improvement consider ID consultation  Recurrent C Diff  - started on oral vanc in unit at 125 -->increased to 250 mg oral today  - if no improvement consider ID consultation  Heroin Abuse  - patient told EMS that she did not want to live  Tobacco Use Disorder  - has been smoking since age 13  - [place] was up to 4 ppd for many years  - increase supplemental nicotine today  Dehydration  - IV fluids  PTSD / Depression  - restart Zoloft 100 mg  - restart Klonopin   Hypophosphatemia  - improved    Unclear about intentions to harm herself based on documentation  Increase oral vanc / add IV flagyl for sepsis / c diff diagnosed in ICU  If leukocytosis does not improve / symptoms do not improve may consider ID consultation    DVT Prophylaxis:  Heparin 5,000 Units SC every 12 hours    CODE STATUS: Full Code    Disposition: I expect the patient to be discharged to be determined    Steve Mckeon MD  01/06/18  9:06 PM

## 2018-01-07 NOTE — PROGRESS NOTES
Continued Stay Note  [facility]     Patient Name: Charisma Cortez  MRN: [id number]  Today's Date: 1/7/2018    Admit Date: 1/2/2018          Discharge Plan       01/07/18 1206    Case Management/Social Work Plan    Additional Comments Spoke with patient's nurse, Bharathi and confirmed that it would be okay for  to see patient on Monday morning.                  Discharge Codes     None        Expected Discharge Date and Time     Expected Discharge Date Expected Discharge Time    Jan 6, 2018             Natalie Reece

## 2018-01-08 LAB
ANION GAP SERPL CALCULATED.3IONS-SCNC: 6 MMOL/L (ref 3–11)
BUN BLD-MCNC: 12 MG/DL (ref 9–23)
BUN/CREAT SERPL: 17.1 (ref 7–25)
CALCIUM SPEC-SCNC: 8.2 MG/DL (ref 8.7–10.4)
CHLORIDE SERPL-SCNC: 111 MMOL/L (ref 99–109)
CO2 SERPL-SCNC: 22 MMOL/L (ref 20–31)
CREAT BLD-MCNC: 0.7 MG/DL (ref 0.6–1.3)
DEPRECATED RDW RBC AUTO: 49 FL (ref 37–54)
ERYTHROCYTE [DISTWIDTH] IN BLOOD BY AUTOMATED COUNT: 14.2 % (ref 11.3–14.5)
GFR SERPL CREATININE-BSD FRML MDRD: 87 ML/MIN/1.73
GLUCOSE BLD-MCNC: 86 MG/DL (ref 70–100)
GLUCOSE BLDC GLUCOMTR-MCNC: 100 MG/DL (ref 70–130)
GLUCOSE BLDC GLUCOMTR-MCNC: 115 MG/DL (ref 70–130)
GLUCOSE BLDC GLUCOMTR-MCNC: 87 MG/DL (ref 70–130)
GLUCOSE BLDC GLUCOMTR-MCNC: 95 MG/DL (ref 70–130)
HCT VFR BLD AUTO: 26.5 % (ref 34.5–44)
HGB BLD-MCNC: 8.1 G/DL (ref 11.5–15.5)
MAGNESIUM SERPL-MCNC: 1.6 MG/DL (ref 1.3–2.7)
MCH RBC QN AUTO: 28.7 PG (ref 27–31)
MCHC RBC AUTO-ENTMCNC: 30.6 G/DL (ref 32–36)
MCV RBC AUTO: 94 FL (ref 80–99)
MYOGLOBIN UR-MCNC: 1157 NG/ML (ref 0–13)
PLATELET # BLD AUTO: 268 10*3/MM3 (ref 150–450)
PMV BLD AUTO: 8.7 FL (ref 6–12)
POTASSIUM BLD-SCNC: 4 MMOL/L (ref 3.5–5.5)
RBC # BLD AUTO: 2.82 10*6/MM3 (ref 3.89–5.14)
SODIUM BLD-SCNC: 139 MMOL/L (ref 132–146)
WBC NRBC COR # BLD: 12.82 10*3/MM3 (ref 3.5–10.8)

## 2018-01-08 PROCEDURE — 83735 ASSAY OF MAGNESIUM: CPT | Performed by: INTERNAL MEDICINE

## 2018-01-08 PROCEDURE — 25010000002 HEPARIN (PORCINE) PER 1000 UNITS: Performed by: INTERNAL MEDICINE

## 2018-01-08 PROCEDURE — 25010000002 MORPHINE SULFATE (PF) 2 MG/ML SOLUTION: Performed by: INTERNAL MEDICINE

## 2018-01-08 PROCEDURE — 82962 GLUCOSE BLOOD TEST: CPT

## 2018-01-08 PROCEDURE — 80048 BASIC METABOLIC PNL TOTAL CA: CPT | Performed by: INTERNAL MEDICINE

## 2018-01-08 PROCEDURE — 85027 COMPLETE CBC AUTOMATED: CPT | Performed by: INTERNAL MEDICINE

## 2018-01-08 PROCEDURE — 99232 SBSQ HOSP IP/OBS MODERATE 35: CPT | Performed by: INTERNAL MEDICINE

## 2018-01-08 RX ORDER — AMOXICILLIN 250 MG/1
500 CAPSULE ORAL EVERY 8 HOURS SCHEDULED
Status: DISCONTINUED | OUTPATIENT
Start: 2018-01-08 | End: 2018-01-09 | Stop reason: HOSPADM

## 2018-01-08 RX ORDER — SACCHAROMYCES BOULARDII 250 MG
250 CAPSULE ORAL 2 TIMES DAILY
Status: DISCONTINUED | OUTPATIENT
Start: 2018-01-08 | End: 2018-01-09 | Stop reason: HOSPADM

## 2018-01-08 RX ADMIN — PANTOPRAZOLE SODIUM 40 MG: 40 TABLET, DELAYED RELEASE ORAL at 06:01

## 2018-01-08 RX ADMIN — TRAZODONE HYDROCHLORIDE 100 MG: 100 TABLET ORAL at 21:14

## 2018-01-08 RX ADMIN — HYDROCODONE BITARTRATE AND ACETAMINOPHEN 1 TABLET: 7.5; 325 TABLET ORAL at 00:36

## 2018-01-08 RX ADMIN — LEVETIRACETAM 500 MG: 500 TABLET, FILM COATED ORAL at 08:56

## 2018-01-08 RX ADMIN — MAGNESIUM SULFATE IN WATER 4 G: 40 INJECTION, SOLUTION INTRAVENOUS at 08:57

## 2018-01-08 RX ADMIN — GABAPENTIN 300 MG: 300 CAPSULE ORAL at 15:17

## 2018-01-08 RX ADMIN — CLONAZEPAM 0.5 MG: 0.5 TABLET ORAL at 21:14

## 2018-01-08 RX ADMIN — Medication 250 MG: at 21:14

## 2018-01-08 RX ADMIN — CLONAZEPAM 0.5 MG: 0.5 TABLET ORAL at 08:55

## 2018-01-08 RX ADMIN — HYDROCODONE BITARTRATE AND ACETAMINOPHEN 1 TABLET: 7.5; 325 TABLET ORAL at 06:28

## 2018-01-08 RX ADMIN — GABAPENTIN 300 MG: 300 CAPSULE ORAL at 21:14

## 2018-01-08 RX ADMIN — VANCOMYCIN 250 MG: KIT at 18:05

## 2018-01-08 RX ADMIN — MORPHINE SULFATE 2 MG: 25 INJECTION, SOLUTION, CONCENTRATE INTRAVENOUS at 02:12

## 2018-01-08 RX ADMIN — MORPHINE SULFATE 2 MG: 25 INJECTION, SOLUTION, CONCENTRATE INTRAVENOUS at 08:56

## 2018-01-08 RX ADMIN — HEPARIN SODIUM 5000 UNITS: 5000 INJECTION, SOLUTION INTRAVENOUS; SUBCUTANEOUS at 21:15

## 2018-01-08 RX ADMIN — LEVETIRACETAM 500 MG: 500 TABLET, FILM COATED ORAL at 21:14

## 2018-01-08 RX ADMIN — NICOTINE 1 PATCH: 21 PATCH, EXTENDED RELEASE TRANSDERMAL at 08:57

## 2018-01-08 RX ADMIN — AMOXICILLIN 500 MG: 250 CAPSULE ORAL at 23:07

## 2018-01-08 RX ADMIN — SERTRALINE HYDROCHLORIDE 100 MG: 100 TABLET ORAL at 08:55

## 2018-01-08 RX ADMIN — VANCOMYCIN 250 MG: KIT at 12:55

## 2018-01-08 RX ADMIN — MORPHINE SULFATE 2 MG: 25 INJECTION, SOLUTION, CONCENTRATE INTRAVENOUS at 15:16

## 2018-01-08 RX ADMIN — METRONIDAZOLE 500 MG: 500 INJECTION, SOLUTION INTRAVENOUS at 15:15

## 2018-01-08 RX ADMIN — ASPIRIN 81 MG: 81 TABLET, COATED ORAL at 08:55

## 2018-01-08 RX ADMIN — HYDROCODONE BITARTRATE AND ACETAMINOPHEN 1 TABLET: 7.5; 325 TABLET ORAL at 12:55

## 2018-01-08 RX ADMIN — METRONIDAZOLE 500 MG: 500 INJECTION, SOLUTION INTRAVENOUS at 06:01

## 2018-01-08 RX ADMIN — VANCOMYCIN 250 MG: KIT at 23:07

## 2018-01-08 RX ADMIN — HYDROCODONE BITARTRATE AND ACETAMINOPHEN 1 TABLET: 7.5; 325 TABLET ORAL at 19:00

## 2018-01-08 RX ADMIN — HEPARIN SODIUM 5000 UNITS: 5000 INJECTION, SOLUTION INTRAVENOUS; SUBCUTANEOUS at 08:55

## 2018-01-08 RX ADMIN — GABAPENTIN 300 MG: 300 CAPSULE ORAL at 08:56

## 2018-01-08 RX ADMIN — AMOXICILLIN AND CLAVULANATE POTASSIUM 1 TABLET: 875; 125 TABLET, FILM COATED ORAL at 09:02

## 2018-01-08 RX ADMIN — VANCOMYCIN 250 MG: KIT at 06:01

## 2018-01-08 RX ADMIN — AMOXICILLIN 500 MG: 250 CAPSULE ORAL at 15:15

## 2018-01-08 RX ADMIN — VANCOMYCIN 250 MG: KIT at 00:35

## 2018-01-08 NOTE — PAYOR COMM NOTE
"Updated clinicals for continued hospitalization  auth #[id number]    Thank You,  Roxanna Crooks RN  [phone]  Fax [phone]    Lexi Person (57 y.o. Female)     Date of Birth Social Security Number Address Home Phone MRN    1960  [address] [place], [place] [id number] [phone] [id number]    [facility] Status          None        Admission Date Admission Type Admitting Provider Attending Provider Department, Room/Bed    1/2/18 Emergency Lacey Candelario MD Brown, Hannah, MD [address], S[address]/1    Discharge Date Discharge Disposition Discharge Destination                      Attending Provider: Lacey Candelario MD     Allergies:  Keflex [Cephalexin], Sulfa Antibiotics    Isolation:  Spore   Infection:  C.difficile (01/04/18)   Code Status:  FULL    Ht:  152.4 cm (60\")   Wt:  59.9 kg (132 lb)    Admission Cmt:  None   Principal Problem:  None                Active Insurance as of 1/2/2018     Primary Coverage     Payor Plan Insurance Group Employer/Plan Group    [employer] [place] [employer] [place]      Payor Plan Address Payor Plan Phone Number Effective From Effective To    [address] [address]  1/1/2014     [facility] [place] [address]       Subscriber Name Subscriber Birth Date Member ID       LEXI PERSON 1960 [id number]                 Emergency Contacts      (Rel.) Home Phone Work Phone Mobile Phone    Neelima Martínez (Mother) [phone] -- --            Vital Signs (last 72 hrs)       01/05 0700  -  01/06 0659 01/06 0700  -  01/07 0659 01/07 0700  -  01/08 0659 01/08 0700  -  01/08 1416   Most Recent    Temp (°F) 98.1 -  100.2    98.9 -  99.1    98 -  98.9      98.1     98.1 (36.7)    Heart Rate 90 -  109    90 -  102    87 -  115      82     82    Resp 20 -  24    16 -  19    16 -  20       20    /72 -  156/87    151/96 -  155/92    (!) 149/101 -  165/92      120/76     120/76    SpO2 (%) (!)85 -  98    " 91 -  96    94 -  95       95          Intake & Output (last 3 days)       01/05 0701 - 01/06 0700 01/06 0701 - 01/07 0700 01/07 0701 - 01/08 0700 01/08 0701 - 01/09 0700    P.O. 120 1520 1950 480    I.V. (mL/kg) 287 (4.8)  1261.7 (21.1)     Other        IV Piggyback 500       Total Intake(mL/kg) 907 (15.1) 1520 (25.3) 3211.7 (53.6) 480 (8)    Urine (mL/kg/hr) 250 (0.2)  1600 (1.1)     Emesis/NG output        Stool 0 (0)       Total Output 250   1600      Net +657 +1520 +1611.7 +480            Unmeasured Urine Occurrence 4 x 7 x 9 x 2 x    Unmeasured Stool Occurrence 13 x 6 x 5 x 1 x        Lines, Drains & Airways    Active LDAs     Name:   Placement date:   Placement time:   Site:   Days:    Percutaneous Central Line - Triple Lumen 01/02/18 1630 internal jugular vein, right  01/02/18    1630      5                Hospital Medications (active)       Dose Frequency Start End    acetaminophen (TYLENOL) tablet 650 mg 650 mg Every 4 Hours PRN 1/2/2018     Sig - Route: Take 2 tablets by mouth Every 4 (Four) Hours As Needed for Headache or Fever (temperature greater than 101.5 F). - Oral    amoxicillin (AMOXIL) capsule 500 mg 500 mg Every 8 Hours Scheduled 1/8/2018 1/10/2018    Sig - Route: Take 2 capsules by mouth Every 8 (Eight) Hours. - Oral    aspirin EC tablet 81 mg 81 mg Daily 1/6/2018     Sig - Route: Take 1 tablet by mouth Daily. - Oral    clonazePAM (KlonoPIN) tablet 0.5 mg 0.5 mg Every 12 Hours Scheduled 1/6/2018 1/16/2018    Sig - Route: Take 1 tablet by mouth Every 12 (Twelve) Hours. - Oral    gabapentin (NEURONTIN) capsule 300 mg 300 mg 3 Times Daily 1/4/2018     Sig - Route: Take 1 capsule by mouth 3 (Three) Times a Day. - Oral    heparin (porcine) 5000 UNIT/ML injection 5,000 Units 5,000 Units Every 12 Hours Scheduled 1/2/2018     Sig - Route: Inject 1 mL under the skin Every 12 (Twelve) Hours. - Subcutaneous    HYDROcodone-acetaminophen (NORCO) 7.5-325 MG per tablet 1 tablet 1 tablet Every 6 Hours PRN  "1/6/2018 1/16/2018    Sig - Route: Take 1 tablet by mouth Every 6 (Six) Hours As Needed for Severe Pain . - Oral    insulin regular (humuLIN R,novoLIN R) injection 0-7 Units 0-7 Units 4 Times Daily With Meals & Nightly 1/4/2018     Sig - Route: Inject 0-7 Units under the skin 4 (Four) Times a Day With Meals & at Bedtime. - Subcutaneous    ipratropium-albuterol (DUO-NEB) nebulizer solution 3 mL 3 mL Every 6 Hours PRN 1/6/2018     Sig - Route: Take 3 mL by nebulization Every 6 (Six) Hours As Needed for Wheezing or Shortness of Air. - Nebulization    levETIRAcetam (KEPPRA) tablet 500 mg 500 mg 2 Times Daily (BID) 1/4/2018     Sig - Route: Take 1 tablet by mouth 2 (Two) Times a Day. - Oral    Magnesium Sulfate 2 gram Bolus, followed by 8 gram infusion (total Mg dose 10 grams)- Mg less than or equal to 1mg/dL 2 g As Needed 1/7/2018     Sig - Route: Infuse 50 mL into a venous catheter As Needed (Mg less than or equal to 1mg/dL). - Intravenous    Linked Group 1:  \"Or\" Linked Group Details        magnesium sulfate 4 gram infusion- Mg 1.6-1.9 mg/dL 4 g As Needed 1/7/2018     Sig - Route: Infuse 100 mL into a venous catheter As Needed (Mg 1.6-1.9 mg/dL). - Intravenous    Linked Group 1:  \"Or\" Linked Group Details        Magnesium Sulfate 6 gram Infusion (2 gm x 3) -Mg 1.1 -1.5 mg/dL 2 g As Needed 1/7/2018     Sig - Route: Infuse 50 mL into a venous catheter As Needed (Mg 1.1 -1.5 mg/dL). - Intravenous    Linked Group 1:  \"Or\" Linked Group Details        metroNIDAZOLE (FLAGYL) IVPB 500 mg 500 mg Every 8 Hours 1/6/2018 1/13/2018    Sig - Route: Infuse 100 mL into a venous catheter Every 8 (Eight) Hours. - Intravenous    Morphine sulfate (PF) injection 2 mg 2 mg Every 6 Hours PRN 1/7/2018     Sig - Route: Infuse 1 mL into a venous catheter Every 6 (Six) Hours As Needed for Moderate Pain  or Severe Pain . - Intravenous    nicotine (NICODERM CQ) 21 MG/24HR patch 1 patch 1 patch Every 24 Hours 1/4/2018     Sig - Route: Place 1 " "patch on the skin Daily. - Transdermal    nicotine polacrilex (COMMIT) lozenge 4 mg 4 mg Every 1 Hour PRN 1/6/2018     Sig - Route: Dissolve 2 lozenges in the mouth Every 1 (One) Hour As Needed for Smoking Cessation. - Mouth/Throat    nicotine polacrilex (NICORETTE) gum 4 mg 4 mg Every 1 Hour PRN 1/6/2018     Sig - Route: Chew 2 each Every 1 (One) Hour As Needed for Smoking Cessation. - Mouth/Throat    ondansetron (ZOFRAN) injection 4 mg 4 mg Every 6 Hours PRN 1/2/2018     Sig - Route: Infuse 2 mL into a venous catheter Every 6 (Six) Hours As Needed for Nausea or Vomiting. - Intravenous    pantoprazole (PROTONIX) EC tablet 40 mg 40 mg Every Early Morning 1/4/2018     Sig - Route: Take 1 tablet by mouth Every Morning. - Oral    potassium chloride (MICRO-K) CR capsule 40 mEq 40 mEq As Needed 1/5/2018     Sig - Route: Take 4 capsules by mouth As Needed (potassium replacement.  see admin instructions). - Oral    Linked Group 2:  \"Or\" Linked Group Details        potassium phosphate 15 mmol in sodium chloride 0.9 % 250 mL infusion 15 mmol As Needed 1/4/2018 1/18/2018    Sig - Route: Infuse 15 mmol into a venous catheter As Needed (Phosphorus 1.8-2.5 mg/dL). - Intravenous    potassium phosphate 30 mmol in sodium chloride 0.9 % 500 mL infusion 30 mmol As Needed 1/4/2018 1/18/2018    Sig - Route: Infuse 30 mmol into a venous catheter As Needed (Phosphorus 1.3-1.7 mg/dL). - Intravenous    potassium phosphate 45 mmol in sodium chloride 0.9 % 500 mL infusion 45 mmol As Needed 1/4/2018 1/18/2018    Sig - Route: Infuse 45 mmol into a venous catheter As Needed (Phosphorus 1.2 mg/dL or less). - Intravenous    sertraline (ZOLOFT) tablet 100 mg 100 mg Daily 1/6/2018     Sig - Route: Take 1 tablet by mouth Daily. - Oral    traZODone (DESYREL) tablet 100 mg 100 mg Nightly 1/6/2018     Sig - Route: Take 1 tablet by mouth Every Night. - Oral    vancomycin oral solution 250 mg 250 mg Every 6 Hours Scheduled 1/7/2018 1/18/2018    Sig - " Route: Take 5 mL by mouth Every 6 (Six) Hours. - Oral    amoxicillin-clavulanate (AUGMENTIN) 875-125 MG per tablet 1 tablet (Discontinued) 1 tablet Every 12 Hours Scheduled 1/4/2018 1/8/2018    Sig - Route: Take 1 tablet by mouth Every 12 (Twelve) Hours. - Oral    Morphine sulfate (PF) injection 4 mg (Discontinued) 4 mg Every 4 Hours PRN 1/4/2018 1/7/2018    Sig - Route: Infuse 1 mL into a venous catheter Every 4 (Four) Hours As Needed for Moderate Pain  or Severe Pain . - Intravenous    sodium chloride 0.45 % infusion (Discontinued) 50 mL/hr Continuous 1/6/2018 1/7/2018    Sig - Route: Infuse 50 mL/hr into a venous catheter Continuous. - Intravenous          Lab Results (last 72 hours)     Procedure Component Value Units Date/Time    Urine Culture - Urine, Urine, Clean Catch [[id number]]  (Abnormal)  (Susceptibility) Collected:  01/02/18 2022    Specimen:  Urine from Urine, Clean Catch Updated:  01/05/18 1425     Urine Culture --      >100,000 CFU/mL Enterococcus faecalis (A)    Susceptibility      Enterococcus faecalis     KARI     Ampicillin <=2 ug/ml Susceptible     Gentamicin High Level Synergy <=500 ug/ml Susceptible     Levofloxacin 2 ug/ml Susceptible     Linezolid 2 ug/ml Susceptible     Nitrofurantoin <=32 ug/ml Susceptible     Penicillin G 2 ug/ml Susceptible     Streptomycin High Level Synergy >1000 ug/ml Resistant     Tetracycline >8 ug/ml Resistant     Vancomycin 2 ug/ml Susceptible                    Urine Culture - Urine, Urine, Clean Catch [[id number]]  (Abnormal)  (Susceptibility) Collected:  01/02/18 1621    Specimen:  Urine from Urine, Catheter Updated:  01/05/18 1426     Urine Culture --      >100,000 CFU/mL Enterococcus faecalis (A)    Susceptibility      Enterococcus faecalis     KARI     Ampicillin <=2 ug/ml Susceptible     Gentamicin High Level Synergy <=500 ug/ml Susceptible     Levofloxacin 2 ug/ml Susceptible     Linezolid 2 ug/ml Susceptible     Nitrofurantoin <=32 ug/ml Susceptible      Penicillin G 2 ug/ml Susceptible     Streptomycin High Level Synergy >1000 ug/ml Resistant     Tetracycline >8 ug/ml Resistant     Vancomycin 2 ug/ml Susceptible                    Myoglobin, Urine Qnt - Urine, Catheter [[id number]] Collected:  01/02/18 1744    Specimen:  Urine from Urine, Catheter Updated:  01/05/18 1514     Myoglobin, URINE 9 ng/mL     Narrative:       Performed at:  [address] [place] [address]  [id number]  : Servando Aburto MD, Phone:  [phone]    POC Glucose Once [[id number]]  (Abnormal) Collected:  01/05/18 1642    Specimen:  Blood Updated:  01/05/18 1643     Glucose 144 (H) mg/dL     Narrative:       Meter: [id number] : [id number] Wayne Yancey    POC Glucose Once [[id number]]  (Normal) Collected:  01/05/18 2013    Specimen:  Blood Updated:  01/05/18 2017     Glucose 106 mg/dL     Narrative:       Meter: [id number] : [id number] Oksana Mcgee    Potassium [[id number]]  (Normal) Collected:  01/05/18 1946    Specimen:  Blood Updated:  01/05/18 2018     Potassium 4.0 mmol/L     CBC & Differential [[id number]] Collected:  01/06/18 0816    Specimen:  Blood Updated:  01/06/18 0836    Narrative:       The following orders were created for panel order CBC & Differential.  Procedure                               Abnormality         Status                     ---------                               -----------         ------                     CBC Auto Differential[[id number]]        Abnormal            Final result                 Please view results for these tests on the individual orders.    CBC Auto Differential [[id number]]  (Abnormal) Collected:  01/06/18 0816    Specimen:  Blood Updated:  01/06/18 0836     WBC 18.45 (H) 10*3/mm3      RBC 2.85 (L) 10*6/mm3      Hemoglobin 8.3 (L) g/dL      Hematocrit 26.6 (L) %      MCV 93.3 fL      MCH 29.1 pg      MCHC 31.2 (L) g/dL      RDW 14.9 (H) %      RDW-SD 51.6 fl      MPV 9.3 fL      Platelets 292 10*3/mm3       Neutrophil % 68.1 %      Lymphocyte % 21.1 (L) %      Monocyte % 7.9 %      Eosinophil % 2.0 %      Basophil % 0.2 %      Immature Grans % 0.7 (H) %      Neutrophils, Absolute 12.58 (H) 10*3/mm3      Lymphocytes, Absolute 3.90 10*3/mm3      Monocytes, Absolute 1.46 (H) 10*3/mm3      Eosinophils, Absolute 0.36 (H) 10*3/mm3      Basophils, Absolute 0.03 10*3/mm3      Immature Grans, Absolute 0.12 (H) 10*3/mm3      nRBC 0.0 /100 WBC     POC Glucose Once [[id number]]  (Normal) Collected:  01/06/18 0848    Specimen:  Blood Updated:  01/06/18 0850     Glucose 121 mg/dL     Narrative:       Meter: [id number] : [id number] Tanesha Shen    Magnesium [[id number]]  (Normal) Collected:  01/06/18 0816    Specimen:  Blood Updated:  01/06/18 0906     Magnesium 1.6 mg/dL     Phosphorus [[id number]]  (Normal) Collected:  01/06/18 0816    Specimen:  Blood Updated:  01/06/18 0906     Phosphorus 2.5 mg/dL     CK [[id number]]  (Abnormal) Collected:  01/06/18 0816    Specimen:  Blood Updated:  01/06/18 0906     Creatine Kinase 199 (H) U/L     Basic Metabolic Panel [[id number]]  (Abnormal) Collected:  01/06/18 0816    Specimen:  Blood Updated:  01/06/18 0912     Glucose 111 (H) mg/dL      BUN 10 mg/dL      Creatinine 0.70 mg/dL      Sodium 140 mmol/L      Potassium 4.1 mmol/L      Chloride 107 mmol/L      CO2 26.0 mmol/L      Calcium 8.3 (L) mg/dL      eGFR Non African Amer 87 mL/min/1.73      BUN/Creatinine Ratio 14.3     Anion Gap 7.0 mmol/L     Narrative:       National Kidney Foundation Guidelines    Stage     Description        GFR  1         Normal or High     90+  2         Mild decrease      60-89  3         Moderate decrease  30-59  4         Severe decrease    15-29  5         Kidney failure     <15    POC Glucose Once [[id number]]  (Normal) Collected:  01/06/18 1133    Specimen:  Blood Updated:  01/06/18 1153     Glucose 120 mg/dL     Narrative:       Meter: [id number] : [id number] Baldo Yao    POC Glucose Once  [[id number]]  (Abnormal) Collected:  01/06/18 1647    Specimen:  Blood Updated:  01/06/18 1658     Glucose 147 (H) mg/dL     Narrative:       Meter: [id number] : [id number] Baldo Yao    POC Glucose Once [[id number]]  (Abnormal) Collected:  01/06/18 2018    Specimen:  Blood Updated:  01/06/18 2020     Glucose 152 (H) mg/dL     Narrative:       Treated Patient Meter: [id number] : [id number] Nathan Tang    CBC & Differential [[id number]] Collected:  01/07/18 0338    Specimen:  Blood Updated:  01/07/18 0459    Narrative:       The following orders were created for panel order CBC & Differential.  Procedure                               Abnormality         Status                     ---------                               -----------         ------                     CBC Auto Differential[[id number]]        Abnormal            Final result                 Please view results for these tests on the individual orders.    CBC Auto Differential [[id number]]  (Abnormal) Collected:  01/07/18 0338    Specimen:  Blood Updated:  01/07/18 0459     WBC 14.93 (H) 10*3/mm3      RBC 2.93 (L) 10*6/mm3      Hemoglobin 8.4 (L) g/dL      Hematocrit 27.6 (L) %      MCV 94.2 fL      MCH 28.7 pg      MCHC 30.4 (L) g/dL      RDW 14.8 (H) %      RDW-SD 50.7 fl      MPV 9.6 fL      Platelets 289 10*3/mm3      Neutrophil % 49.1 %      Lymphocyte % 37.1 %      Monocyte % 9.4 %      Eosinophil % 3.3 (H) %      Basophil % 0.1 %      Immature Grans % 1.0 (H) %      Neutrophils, Absolute 7.32 10*3/mm3      Lymphocytes, Absolute 5.54 (H) 10*3/mm3      Monocytes, Absolute 1.40 (H) 10*3/mm3      Eosinophils, Absolute 0.50 (H) 10*3/mm3      Basophils, Absolute 0.02 10*3/mm3      Immature Grans, Absolute 0.15 (H) 10*3/mm3     Lactic Acid, Plasma [[id number]]  (Normal) Collected:  01/07/18 0338    Specimen:  Blood Updated:  01/07/18 0501     Lactate 0.8 mmol/L       Falsely depressed results may occur on samples drawn from patients receiving  N-Acetylcysteine (NAC) or Metamizole.       Comprehensive Metabolic Panel [[id number]]  (Abnormal) Collected:  01/07/18 0338    Specimen:  Blood Updated:  01/07/18 0509     Glucose 82 mg/dL      BUN 11 mg/dL      Creatinine 0.80 mg/dL      Sodium 140 mmol/L      Potassium 3.6 mmol/L      Chloride 112 (H) mmol/L      CO2 25.0 mmol/L      Calcium 8.0 (L) mg/dL      Total Protein 5.8 g/dL      Albumin 3.10 (L) g/dL      ALT (SGPT) 17 U/L      AST (SGOT) 13 U/L      Alkaline Phosphatase 82 U/L      Total Bilirubin 0.2 (L) mg/dL      eGFR Non African Amer 74 mL/min/1.73      Globulin 2.7 gm/dL      A/G Ratio 1.1 (L) g/dL      BUN/Creatinine Ratio 13.8     Anion Gap 3.0 mmol/L     Narrative:       National Kidney Foundation Guidelines    Stage     Description        GFR  1         Normal or High     90+  2         Mild decrease      60-89  3         Moderate decrease  30-59  4         Severe decrease    15-29  5         Kidney failure     <15    Magnesium [[id number]]  (Abnormal) Collected:  01/07/18 0338    Specimen:  Blood Updated:  01/07/18 0509     Magnesium 1.2 (L) mg/dL     Phosphorus [[id number]]  (Normal) Collected:  01/07/18 0338    Specimen:  Blood Updated:  01/07/18 0509     Phosphorus 3.4 mg/dL     Protime-INR [[id number]]  (Abnormal) Collected:  01/07/18 0338    Specimen:  Blood Updated:  01/07/18 0519     Protime 12.0 (H) Seconds      INR 1.10    Narrative:       Therapeutic Ranges for INR: 2.0-3.0 (PT 20-30)                              2.5-3.5 (PT 25-34)    POC Glucose Once [[id number]]  (Normal) Collected:  01/07/18 0756    Specimen:  Blood Updated:  01/07/18 0806     Glucose 110 mg/dL     Narrative:       Meter: [id number] : [id number] Saenz Trint    POC Glucose Once [[id number]]  (Normal) Collected:  01/07/18 1154    Specimen:  Blood Updated:  01/07/18 1211     Glucose 103 mg/dL     Narrative:       Meter: [id number] : [id number] [facility]    Blood Culture - Blood, [[id number]]  (Normal) Collected:   01/02/18 1320    Specimen:  Blood from Arm, Left Updated:  01/07/18 1401     Blood Culture No growth at 5 days    Blood Culture - Blood, [[id number]]  (Normal) Collected:  01/02/18 1245    Specimen:  Blood from Arm, Left Updated:  01/07/18 1401     Blood Culture No growth at 5 days    Narrative:       Aerobic bottle only    POC Glucose Once [[id number]]  (Normal) Collected:  01/07/18 1644    Specimen:  Blood Updated:  01/07/18 1705     Glucose 106 mg/dL     Narrative:       Meter: [id number] : [id number] Saenz Trint    POC Glucose Once [[id number]]  (Normal) Collected:  01/07/18 2134    Specimen:  Blood Updated:  01/07/18 2139     Glucose 105 mg/dL     Narrative:       Meter: [id number] : [id number] Esteban Jay    CBC (No Diff) [[id number]]  (Abnormal) Collected:  01/08/18 0547    Specimen:  Blood Updated:  01/08/18 0626     WBC 12.82 (H) 10*3/mm3      RBC 2.82 (L) 10*6/mm3      Hemoglobin 8.1 (L) g/dL      Hematocrit 26.5 (L) %      MCV 94.0 fL      MCH 28.7 pg      MCHC 30.6 (L) g/dL      RDW 14.2 %      RDW-SD 49.0 fl      MPV 8.7 fL      Platelets 268 10*3/mm3     Magnesium [[id number]]  (Normal) Collected:  01/08/18 0547    Specimen:  Blood Updated:  01/08/18 0655     Magnesium 1.6 mg/dL     Basic Metabolic Panel [[id number]]  (Abnormal) Collected:  01/08/18 0547    Specimen:  Blood Updated:  01/08/18 0655     Glucose 86 mg/dL      BUN 12 mg/dL      Creatinine 0.70 mg/dL      Sodium 139 mmol/L      Potassium 4.0 mmol/L      Chloride 111 (H) mmol/L      CO2 22.0 mmol/L      Calcium 8.2 (L) mg/dL      eGFR Non African Amer 87 mL/min/1.73      BUN/Creatinine Ratio 17.1     Anion Gap 6.0 mmol/L     Narrative:       National Kidney Foundation Guidelines    Stage     Description        GFR  1         Normal or High     90+  2         Mild decrease      60-89  3         Moderate decrease  30-59  4         Severe decrease    15-29  5         Kidney failure     <15    POC Glucose Once [[id number]]  (Normal) Collected:  " 01/08/18 0802    Specimen:  Blood Updated:  01/08/18 0813     Glucose 87 mg/dL     Narrative:       Meter: [id number] : [id number] Saenz Trint    POC Glucose Once [[id number]]  (Normal) Collected:  01/08/18 1221    Specimen:  Blood Updated:  01/08/18 1246     Glucose 100 mg/dL     Narrative:       Meter: [id number] : [id number] Saenz Trint          Imaging Results (last 72 hours)     ** No results found for the last 72 hours. **        ECG/EMG Results (last 72 hours)     ** No results found for the last 72 hours. **        Operative/Procedure Notes (last 72 hours) (Notes from 1/5/2018  2:17 PM through 1/8/2018  2:17 PM)     No notes of this type exist for this encounter.           Physician Progress Notes (last 72 hours) (Notes from 1/5/2018  2:17 PM through 1/8/2018  2:17 PM)      Stacy Reddy MD at 1/6/2018  9:17 AM  Version 1 of 1            LOS: 4 days    Patient Care Team:  ANDREA Quinones as PCP - General  Jennifer Galindo MD as Consulting Physician (General Surgery)    Chief Complaint:  Heroin overdose     Subjective     Interval History:   No acute issues overnight. No new complaints.     Review of Systems:   NO CP or SOA.     Objective     Vital Sign Min/Max for last 24 hours  Temp  Min: 98.1 °F (36.7 °C)  Max: 100.2 °F (37.9 °C)   BP  Min: 121/84  Max: 156/87   Pulse  Min: 90  Max: 109   Resp  Min: 16  Max: 24   SpO2  Min: 85 %  Max: 97 %   Flow (L/min)  Min: 2  Max: 2   Weight  Min: 60 kg (132 lb 3.2 oz)  Max: 60 kg (132 lb 3.2 oz)     Flowsheet Rows         First Filed Value    Admission Height  152.4 cm (60\") Documented at 01/02/2018 1014    Admission Weight  61.2 kg (135 lb) Documented at 01/02/2018 1014             I/O last 3 completed shifts:  In: 1703 [P.O.:370; I.V.:623; Other:210; IV Piggyback:500]  Out: 1310 [Urine:1100; Stool:210]    Physical Exam:  GENERAL: Alert and cooperative.   HEENT: Normocephalic, atraumatic.  PER.  No conjunctivitis. No icterus. Oropharynx clear " without evidence of thrush or exudate. No evidence of peridontal disease.    NECK: Supple without nuchal rigidity. No mass.  LYMPH: No painful cervical, axillary or inguinal lymphadenopathy.  HEART: RRR; No murmur, rubs, gallops. No bruits in neck, abdomen, or groins, no edema  LUNGS: Normal respiratory effort. Nonlabored. No dullness.  No crepitus.  Not Clear to auscultation bilateral wheezing, rales, and rhonchi.  ABDOMEN: Soft, nontender, nondistended. Positive bowel sounds. No rebound or guarding. NO mass or HSM.  JOINTS:  Full range of motion, no redness or tenderness.  EXT:  No cyanosis, clubbing or edema.  :  External genitalia without swelling or lesion  SKIN: Warm and dry without rash  NEURO: Oriented to PPT. No focal neurological deficits. Strength equal bilateral  PSYCHIATRIC: Normal insight and judgement. Cooperative with PE    WBC WBC   Date Value Ref Range Status   01/06/2018 18.45 (H) 3.50 - 10.80 10*3/mm3 Final   01/05/2018 21.55 (H) 3.50 - 10.80 10*3/mm3 Final   01/04/2018 18.11 (H) 3.50 - 10.80 10*3/mm3 Final      HGB Hemoglobin   Date Value Ref Range Status   01/06/2018 8.3 (L) 11.5 - 15.5 g/dL Final   01/05/2018 8.2 (L) 11.5 - 15.5 g/dL Final   01/04/2018 7.7 (L) 11.5 - 15.5 g/dL Final      HCT Hematocrit   Date Value Ref Range Status   01/06/2018 26.6 (L) 34.5 - 44.0 % Final   01/05/2018 25.9 (L) 34.5 - 44.0 % Final   01/04/2018 23.3 (L) 34.5 - 44.0 % Final      Platlets No results found for: LABPLAT   MCV MCV   Date Value Ref Range Status   01/06/2018 93.3 80.0 - 99.0 fL Final   01/05/2018 91.5 80.0 - 99.0 fL Final   01/04/2018 88.6 80.0 - 99.0 fL Final          Sodium Sodium   Date Value Ref Range Status   01/06/2018 140 132 - 146 mmol/L Final   01/05/2018 147 (H) 132 - 146 mmol/L Final   01/04/2018 148 (H) 132 - 146 mmol/L Final      Potassium Potassium   Date Value Ref Range Status   01/06/2018 4.1 3.5 - 5.5 mmol/L Final   01/05/2018 4.0 3.5 - 5.5 mmol/L Final   01/05/2018 3.1 (L) 3.5 -  5.5 mmol/L Final   01/04/2018 2.9 (L) 3.5 - 5.5 mmol/L Final   01/04/2018 2.7 (C) 3.5 - 5.5 mmol/L Final   01/03/2018 2.6 (C) 3.5 - 5.5 mmol/L Final      Chloride Chloride   Date Value Ref Range Status   01/06/2018 107 99 - 109 mmol/L Final   01/05/2018 104 99 - 109 mmol/L Final   01/04/2018 103 99 - 109 mmol/L Final      CO2 CO2   Date Value Ref Range Status   01/06/2018 26.0 20.0 - 31.0 mmol/L Final   01/05/2018 35.0 (H) 20.0 - 31.0 mmol/L Final   01/04/2018 39.0 (H) 20.0 - 31.0 mmol/L Final      BUN BUN   Date Value Ref Range Status   01/06/2018 10 9 - 23 mg/dL Final   01/05/2018 15 9 - 23 mg/dL Final   01/04/2018 35 (H) 9 - 23 mg/dL Final      Creatinine Creatinine   Date Value Ref Range Status   01/06/2018 0.70 0.60 - 1.30 mg/dL Final   01/05/2018 0.60 0.60 - 1.30 mg/dL Final   01/04/2018 1.10 0.60 - 1.30 mg/dL Final      Calcium Calcium   Date Value Ref Range Status   01/06/2018 8.3 (L) 8.7 - 10.4 mg/dL Final   01/05/2018 7.5 (L) 8.7 - 10.4 mg/dL Final   01/04/2018 7.2 (L) 8.7 - 10.4 mg/dL Final      PO4 No results found for: CAPO4   Albumin Albumin   Date Value Ref Range Status   01/04/2018 2.60 (L) 3.20 - 4.80 g/dL Final      Magnesium Magnesium   Date Value Ref Range Status   01/06/2018 1.6 1.3 - 2.7 mg/dL Final   01/05/2018 1.9 1.3 - 2.7 mg/dL Final   01/04/2018 1.1 (L) 1.3 - 2.7 mg/dL Final      Uric Acid No results found for: URICACID        Results Review:     I reviewed the patient's new clinical results.      amoxicillin-clavulanate 1 tablet Oral Q12H   gabapentin 300 mg Oral TID   heparin (porcine) 5,000 Units Subcutaneous Q12H   insulin regular 0-7 Units Subcutaneous 4x Daily With Meals & Nightly   ipratropium-albuterol 3 mL Nebulization 4x Daily - RT   levETIRAcetam 500 mg Oral BID   nicotine 1 patch Transdermal Q24H   pantoprazole 40 mg Oral Q AM   vancomycin 125 mg Oral Q6H          FINDINGS:   Right kidney measures 11.3 centers in length without evidence of  hydronephrosis, contour deforming  mass or obvious calculi. Appropriate  flow on Doppler interrogation.  Left kidney measures 10.6 cm in length without evidence of  hydronephrosis, contour deforming mass or obvious calculi. Appropriate  flow on Doppler interrogation. Trace nonspecific fluid seen in  perinephric location anterior to the left kidney.      Urinary bladder is decompressed and unremarkable with Joyner catheter in  situ.          IMPRESSION:  1. No hydronephrosis.  2. Nonspecific trace free fluid anteriorly adjacent to left kidney.    Medication Review:     Assessment/Plan     Active Problems:    Hypotension    Tobacco use disorder    Type 2 diabetes mellitus    Seizure disorder    UTI (urinary tract infection)    Dehydration    COPD (chronic obstructive pulmonary disease)    Drug overdose    Acute renal failure superimposed on chronic kidney disease    Sepsis    Metabolic acidosis    Encephalopathy      1- heroin overdose   2- RAGHU - Non oliguric - Resolved.   3- Metabolic acidosis - improving.   4- Hyperkalemia - resolved.   5- Proteinuria - UPCR -0.8  6-  Hypotension - Resolved.     Plan:  Monitor I/O   Monitor H/H   Avoid nephrotoxic agents.   Replete electrolytes per protocol.     Will sign off the service.    Stacy Reddy MD  18  9:17 AM       Electronically signed by Stacy Reddy MD at 2018 11:29 AM      Steve Mckeon MD at 2018  9:06 PM  Version 2 of 2             [facility] Medicine Services  PROGRESS NOTE    Patient Name: Charisma Cortez  : 1960  MRN: [id number]    Date of Admission: 2018  Length of Stay: 4  Primary Care Physician: ANDREA Zhong    Subjective   Subjective     CC:  ams / decreased responsiveness    HPI:  ICU Downgrade  Smoking a lot more than a nicotine patch may supplement.  She reports a history of PTSD  / depression.  EMS report on admission suggesting she did not want to live.  Asking for antidepressant and chronic benzo    Review  of Systems    Gen- No fevers, chills  CV- No chest pain, palpitations  Resp- No cough, dyspnea  GI- No N/V/D, abd pain      Otherwise ROS is negative except as mentioned in the HPI.    Objective   Objective     Vital Signs:   Temp:  [98.9 °F (37.2 °C)-100.2 °F (37.9 °C)] 98.9 °F (37.2 °C)  Heart Rate:  [] 96  Resp:  [16-20] 16  BP: (126-156)/(87-96) 151/96        Physical Exam:  Constitutional: No acute distress, awake, + IJ Right Triple Lumen Catheter  HENT: NCAT, no JVD  Respiratory: Clear to auscultation bilaterally, poor inspiratory effort  Cardiovascular: RRR, s1 and s2  Gastrointestinal: Positive bowel sounds, soft, obese, no guarding  Musculoskeletal: No bilateral ankle edema  Psychiatric: Anxious  Neurologic: Oriented x 3, strength symmetric in all extremities, Cranial Nerves grossly intact to confrontation, speech clear  Skin: dry, +skin tenting      Results Reviewed:  I have personally reviewed current lab, radiology, and data and agree.      Results from last 7 days  Lab Units 01/06/18  0816 01/05/18  0331 01/04/18  0424 01/03/18  0326 01/02/18  1056   WBC 10*3/mm3 18.45* 21.55* 18.11* 20.75* 24.20*   HEMOGLOBIN g/dL 8.3* 8.2* 7.7* 8.9* 10.0*   HEMATOCRIT % 26.6* 25.9* 23.3* 28.1* 32.0*   PLATELETS 10*3/mm3 292 288 291 351 387   INR   --   --   --  1.21 1.12       Results from last 7 days  Lab Units 01/06/18  0816 01/05/18  1946 01/05/18  0331  01/04/18  0424  01/03/18  0326  01/02/18  1614 01/02/18  1132   SODIUM mmol/L 140  --  147*  --  148*  --  142  < > 139 140   POTASSIUM mmol/L 4.1 4.0 3.1*  < > 2.7*  < > 3.4*  < > 5.5 4.6   CHLORIDE mmol/L 107  --  104  --  103  --  112*  < > 118* 116*   CO2 mmol/L 26.0  --  35.0*  --  39.0*  --  17.0*  < > <10.0* 11.0*   BUN mg/dL 10  --  15  --  35*  --  76*  < > 73* 80*   CREATININE mg/dL 0.70  --  0.60  --  1.10  --  4.90*  < > 8.60* 8.40*   GLUCOSE mg/dL 111*  --  100  --  128*  --  161*  < > 102* 90   CALCIUM mg/dL 8.3*  --  7.5*  --  7.2*  --  7.8*  <  > 8.4* 7.9*   ALT (SGPT) U/L  --   --   --   --   --   --  28  --  32 27   AST (SGOT) U/L  --   --   --   --   --   --  54*  --  67* 53*   TROPONIN I ng/mL  --   --   --   --   --   --   --   --   --  0.039   < > = values in this interval not displayed.  No results found for: BNP  No results found for: PHART    Microbiology Results Abnormal     Procedure Component Value - Date/Time    Blood Culture - Blood, [[id number]]  (Normal) Collected:  01/02/18 1245    Lab Status:  Preliminary result Specimen:  Blood from Arm, Left Updated:  01/06/18 1401     Blood Culture No growth at 4 days    Narrative:       Aerobic bottle only    Blood Culture - Blood, [[id number]]  (Normal) Collected:  01/02/18 1320    Lab Status:  Preliminary result Specimen:  Blood from Arm, Left Updated:  01/06/18 1401     Blood Culture No growth at 4 days    Urine Culture - Urine, Urine, Clean Catch [[id number]]  (Abnormal)  (Susceptibility) Collected:  01/02/18 1621    Lab Status:  Final result Specimen:  Urine from Urine, Catheter Updated:  01/05/18 1426     Urine Culture --      >100,000 CFU/mL Enterococcus faecalis (A)    Susceptibility      Enterococcus faecalis     KARI     Ampicillin <=2 ug/ml Susceptible     Gentamicin High Level Synergy <=500 ug/ml Susceptible     Levofloxacin 2 ug/ml Susceptible     Linezolid 2 ug/ml Susceptible     Nitrofurantoin <=32 ug/ml Susceptible     Penicillin G 2 ug/ml Susceptible     Streptomycin High Level Synergy >1000 ug/ml Resistant     Tetracycline >8 ug/ml Resistant     Vancomycin 2 ug/ml Susceptible                    Urine Culture - Urine, Urine, Clean Catch [[id number]]  (Abnormal)  (Susceptibility) Collected:  01/02/18 2022    Lab Status:  Final result Specimen:  Urine from Urine, Clean Catch Updated:  01/05/18 1425     Urine Culture --      >100,000 CFU/mL Enterococcus faecalis (A)    Susceptibility      Enterococcus faecalis     KARI     Ampicillin <=2 ug/ml Susceptible     Gentamicin High Level Synergy  <=500 ug/ml Susceptible     Levofloxacin 2 ug/ml Susceptible     Linezolid 2 ug/ml Susceptible     Nitrofurantoin <=32 ug/ml Susceptible     Penicillin G 2 ug/ml Susceptible     Streptomycin High Level Synergy >1000 ug/ml Resistant     Tetracycline >8 ug/ml Resistant     Vancomycin 2 ug/ml Susceptible                    Clostridium Difficile Toxin - Stool, Per Rectum [[id number]] Collected:  01/04/18 1721    Lab Status:  Final result Specimen:  Stool from Per Rectum Updated:  01/04/18 2035    Narrative:       The following orders were created for panel order Clostridium Difficile Toxin - Stool, Per Rectum.  Procedure                               Abnormality         Status                     ---------                               -----------         ------                     Clostridium Difficile To...[[id number]]  Abnormal            Final result                 Please view results for these tests on the individual orders.    Clostridium Difficile Toxin, PCR - Stool, Per Rectum [[id number]]  (Abnormal) Collected:  01/04/18 1721    Lab Status:  Final result Specimen:  Stool from Per Rectum Updated:  01/04/18 2035     C. Difficile Toxins by PCR Detected (A)    Narrative:         Performance characteristics of test not established for patients <2 years of age.    Urine Culture - Urine, Urine, Clean Catch [[id number]]  (Abnormal)  (Susceptibility) Collected:  01/02/18 1104    Lab Status:  Final result Specimen:  Urine from Urine, Catheter Updated:  01/04/18 1422     Urine Culture --      10,000-20,000 CFU/mL Enterococcus faecalis (A)    Susceptibility      Enterococcus faecalis     KARI     Ampicillin <=2 ug/ml Susceptible     Gentamicin High Level Synergy <=500 ug/ml Susceptible     Levofloxacin <=1 ug/ml Susceptible     Linezolid 2 ug/ml Susceptible     Nitrofurantoin <=32 ug/ml Susceptible     Penicillin G 2 ug/ml Susceptible     Streptomycin High Level Synergy >1000 ug/ml Resistant     Tetracycline >8 ug/ml Resistant      Vancomycin 2 ug/ml Susceptible                    Influenza A & B, RT PCR - Swab, Nasopharynx [[id number]]  (Normal) Collected:  01/02/18 1737    Lab Status:  Final result Specimen:  Swab from Nasopharynx Updated:  01/02/18 1841     Influenza A PCR Not Detected     Influenza B PCR Not Detected          Imaging Results (last 24 hours)     ** No results found for the last 24 hours. **        Results for orders placed during the hospital encounter of 01/02/18   Adult Transthoracic Echo Complete W/ Cont if Necessary Per Protocol    Narrative · Left ventricular systolic function is hyperdynamic (EF > 70).  · Mild mitral valve regurgitation is present  · Mild tricuspid valve regurgitation is present.  · abnormal LVOT contour without significant obstruction        I have reviewed the medications.    Assessment/Plan   Assessment / Plan     Hospital Problem List     Hypotension    Tobacco use disorder    Type 2 diabetes mellitus    Seizure disorder    UTI (urinary tract infection)    Dehydration    COPD (chronic obstructive pulmonary disease)    Drug overdose    Acute renal failure superimposed on chronic kidney disease    Sepsis    Metabolic acidosis    Encephalopathy           Brief Hospital Course to date:  Charisma Cortez is a 57 y.o. female who presented by EMS with decreased responsiveness and septic shock.  Admitted to ICU.    Assessment & Plan:    Severe Sepsis POA  - was on pressors and bicarb in ICU  - central line placed in Right IJ on admission  - ? Recurrent C Diff  - escalate abx today - increase oral vanc  - add IV flagyl  - if no improvement consider ID consultation  Recurrent C Diff  - started on oral vanc in unit at 125 -->increased to 250 mg oral today  - if no improvement consider ID consultation  Heroin Abuse  - patient told EMS that she did not want to live  Tobacco Use Disorder  - has been smoking since age 13  - high point was up to 4 ppd for many years  - increase supplemental nicotine  today  Dehydration  - IV fluids  PTSD / Depression  - restart Zoloft 100 mg  - restart Klonopin   Hypophosphatemia  - improved    Unclear about intentions to harm herself based on documentation  Increase oral vanc / add IV flagyl for sepsis / c diff diagnosed in ICU  If leukocytosis does not improve / symptoms do not improve may consider ID consultation    DVT Prophylaxis:  Heparin 5,000 Units SC every 12 hours    CODE STATUS: Full Code    Disposition: I expect the patient to be discharged to be determined    Steve Mckeon MD  18  9:06 PM         Electronically signed by Steve Mckeon MD at 2018  9:31 PM      Steve Mckeon MD at 2018  9:06 PM  Version 1 of 2             [facility] Medicine Services  PROGRESS NOTE    Patient Name: Charisma Cortez  : 1960  MRN: [id number]    Date of Admission: 2018  Length of Stay: 4  Primary Care Physician: ANDREA Zhong    Subjective   Subjective     CC:  ams / decreased responsiveness    HPI:  ICU Downgrade  Smoking a lot more than a nicotine patch may supplement.  She reports a history of PTSD  / depression.  EMS report on admission suggesting she did not want to live.  Asking for antidepressant and chronic benzo    Review of Systems    Gen- No fevers, chills  CV- No chest pain, palpitations  Resp- No cough, dyspnea  GI- No N/V/D, abd pain      Otherwise ROS is negative except as mentioned in the HPI.    Objective   Objective     Vital Signs:   Temp:  [98.9 °F (37.2 °C)-100.2 °F (37.9 °C)] 98.9 °F (37.2 °C)  Heart Rate:  [] 96  Resp:  [16-20] 16  BP: (126-156)/(87-96) 151/96        Physical Exam:  Constitutional: No acute distress, awake, + IJ Right Triple Lumen Catheter  HENT: NCAT, no JVD  Respiratory: Clear to auscultation bilaterally, poor inspiratory effort  Cardiovascular: RRR, s1 and s2  Gastrointestinal: Positive bowel sounds, soft, obese, no guarding  Musculoskeletal: No bilateral ankle  edema  Psychiatric: Anxious  Neurologic: Oriented x 3, strength symmetric in all extremities, Cranial Nerves grossly intact to confrontation, speech clear  Skin: dry, +skin tenting      Results Reviewed:  I have personally reviewed current lab, radiology, and data and agree.      Results from last 7 days  Lab Units 01/06/18  0816 01/05/18 0331 01/04/18  0424 01/03/18  0326 01/02/18  1056   WBC 10*3/mm3 18.45* 21.55* 18.11* 20.75* 24.20*   HEMOGLOBIN g/dL 8.3* 8.2* 7.7* 8.9* 10.0*   HEMATOCRIT % 26.6* 25.9* 23.3* 28.1* 32.0*   PLATELETS 10*3/mm3 292 288 291 351 387   INR   --   --   --  1.21 1.12       Results from last 7 days  Lab Units 01/06/18  0816 01/05/18  1946 01/05/18 0331 01/04/18 0424  01/03/18 0326  01/02/18  1614 01/02/18  1132   SODIUM mmol/L 140  --  147*  --  148*  --  142  < > 139 140   POTASSIUM mmol/L 4.1 4.0 3.1*  < > 2.7*  < > 3.4*  < > 5.5 4.6   CHLORIDE mmol/L 107  --  104  --  103  --  112*  < > 118* 116*   CO2 mmol/L 26.0  --  35.0*  --  39.0*  --  17.0*  < > <10.0* 11.0*   BUN mg/dL 10  --  15  --  35*  --  76*  < > 73* 80*   CREATININE mg/dL 0.70  --  0.60  --  1.10  --  4.90*  < > 8.60* 8.40*   GLUCOSE mg/dL 111*  --  100  --  128*  --  161*  < > 102* 90   CALCIUM mg/dL 8.3*  --  7.5*  --  7.2*  --  7.8*  < > 8.4* 7.9*   ALT (SGPT) U/L  --   --   --   --   --   --  28  --  32 27   AST (SGOT) U/L  --   --   --   --   --   --  54*  --  67* 53*   TROPONIN I ng/mL  --   --   --   --   --   --   --   --   --  0.039   < > = values in this interval not displayed.  No results found for: BNP  No results found for: PHART    Microbiology Results Abnormal     Procedure Component Value - Date/Time    Blood Culture - Blood, [[id number]]  (Normal) Collected:  01/02/18 1245    Lab Status:  Preliminary result Specimen:  Blood from Arm, Left Updated:  01/06/18 1401     Blood Culture No growth at 4 days    Narrative:       Aerobic bottle only    Blood Culture - Blood, [[id number]]  (Normal) Collected:   01/02/18 1320    Lab Status:  Preliminary result Specimen:  Blood from Arm, Left Updated:  01/06/18 1401     Blood Culture No growth at 4 days    Urine Culture - Urine, Urine, Clean Catch [[id number]]  (Abnormal)  (Susceptibility) Collected:  01/02/18 1621    Lab Status:  Final result Specimen:  Urine from Urine, Catheter Updated:  01/05/18 1426     Urine Culture --      >100,000 CFU/mL Enterococcus faecalis (A)    Susceptibility      Enterococcus faecalis     KARI     Ampicillin <=2 ug/ml Susceptible     Gentamicin High Level Synergy <=500 ug/ml Susceptible     Levofloxacin 2 ug/ml Susceptible     Linezolid 2 ug/ml Susceptible     Nitrofurantoin <=32 ug/ml Susceptible     Penicillin G 2 ug/ml Susceptible     Streptomycin High Level Synergy >1000 ug/ml Resistant     Tetracycline >8 ug/ml Resistant     Vancomycin 2 ug/ml Susceptible                    Urine Culture - Urine, Urine, Clean Catch [[id number]]  (Abnormal)  (Susceptibility) Collected:  01/02/18 2022    Lab Status:  Final result Specimen:  Urine from Urine, Clean Catch Updated:  01/05/18 1425     Urine Culture --      >100,000 CFU/mL Enterococcus faecalis (A)    Susceptibility      Enterococcus faecalis     KARI     Ampicillin <=2 ug/ml Susceptible     Gentamicin High Level Synergy <=500 ug/ml Susceptible     Levofloxacin 2 ug/ml Susceptible     Linezolid 2 ug/ml Susceptible     Nitrofurantoin <=32 ug/ml Susceptible     Penicillin G 2 ug/ml Susceptible     Streptomycin High Level Synergy >1000 ug/ml Resistant     Tetracycline >8 ug/ml Resistant     Vancomycin 2 ug/ml Susceptible                    Clostridium Difficile Toxin - Stool, Per Rectum [[id number]] Collected:  01/04/18 1721    Lab Status:  Final result Specimen:  Stool from Per Rectum Updated:  01/04/18 2035    Narrative:       The following orders were created for panel order Clostridium Difficile Toxin - Stool, Per Rectum.  Procedure                               Abnormality         Status                      ---------                               -----------         ------                     Clostridium Difficile To...[[id number]]  Abnormal            Final result                 Please view results for these tests on the individual orders.    Clostridium Difficile Toxin, PCR - Stool, Per Rectum [[id number]]  (Abnormal) Collected:  01/04/18 1721    Lab Status:  Final result Specimen:  Stool from Per Rectum Updated:  01/04/18 2035     C. Difficile Toxins by PCR Detected (A)    Narrative:         Performance characteristics of test not established for patients <2 years of age.    Urine Culture - Urine, Urine, Clean Catch [[id number]]  (Abnormal)  (Susceptibility) Collected:  01/02/18 1104    Lab Status:  Final result Specimen:  Urine from Urine, Catheter Updated:  01/04/18 1422     Urine Culture --      10,000-20,000 CFU/mL Enterococcus faecalis (A)    Susceptibility      Enterococcus faecalis     KARI     Ampicillin <=2 ug/ml Susceptible     Gentamicin High Level Synergy <=500 ug/ml Susceptible     Levofloxacin <=1 ug/ml Susceptible     Linezolid 2 ug/ml Susceptible     Nitrofurantoin <=32 ug/ml Susceptible     Penicillin G 2 ug/ml Susceptible     Streptomycin High Level Synergy >1000 ug/ml Resistant     Tetracycline >8 ug/ml Resistant     Vancomycin 2 ug/ml Susceptible                    Influenza A & B, RT PCR - Swab, Nasopharynx [[id number]]  (Normal) Collected:  01/02/18 1737    Lab Status:  Final result Specimen:  Swab from Nasopharynx Updated:  01/02/18 1841     Influenza A PCR Not Detected     Influenza B PCR Not Detected          Imaging Results (last 24 hours)     ** No results found for the last 24 hours. **        Results for orders placed during the hospital encounter of 01/02/18   Adult Transthoracic Echo Complete W/ Cont if Necessary Per Protocol    Narrative · Left ventricular systolic function is hyperdynamic (EF > 70).  · Mild mitral valve regurgitation is present  · Mild tricuspid valve  regurgitation is present.  · abnormal LVOT contour without significant obstruction        I have reviewed the medications.    Assessment/Plan   Assessment / Plan     Hospital Problem List     Hypotension    Tobacco use disorder    Type 2 diabetes mellitus    Seizure disorder    UTI (urinary tract infection)    Dehydration    COPD (chronic obstructive pulmonary disease)    Drug overdose    Acute renal failure superimposed on chronic kidney disease    Sepsis    Metabolic acidosis    Encephalopathy           Brief Hospital Course to date:  Charisma Crotez is a 57 y.o. female who presented by EMS with decreased responsiveness and septic shock.  Admitted to ICU.    Assessment & Plan:    Severe Sepsis POA  - was on pressors and bicarb in ICU  - central line placed in Right IJ on admission  - ? Recurrent C Diff  - escalate abx today - increase oral vanc  - add IV flagyl  - if no improvement consider ID consultation  Recurrent C Diff  - started on oral vanc in unit at 125 -->increased to 250 mg oral today  - if no improvement consider ID consultation  Heroin Abuse  - patient told EMS that she did not want to live  Tobacco Use Disorder  - has been smoking since age 13  - high point was up to 4 ppd for many years  - increase supplemental nicotine today  Dehydration  - IV fluids  PTSD / Depression  - restart Zoloft 100 mg  - restart Klonopin   Hypophosphatemia  - improved    DVT Prophylaxis:  Heparin 5,000 Units SC every 12 hours    CODE STATUS: Full Code    Disposition: I expect the patient to be discharged to be determined    Steve Mckeon MD  18  9:06 PM         Electronically signed by Steve Mckeon MD at 2018  9:27 PM      Lacey Candelario MD at 2018  8:25 PM  Version 1 of 1             [facility] Medicine Services  PROGRESS NOTE    Patient Name: Charisma Cortez  : 1960  MRN: [id number]    Date of Admission: 2018  Length of Stay: 5  Primary Care Physician: Kailee HILLMAN  ANDREA Beebe    Subjective   Subjective     CC:  F/U altered mental status    HPI:  Feeling a little better today but generalized pain due to being in bed.  She denies any dysuria greater than baseline (always feels a little uncomfortable due to h/o surgery).  Diarrhea has slowed down.  She reports doing a long taper of vancomycin for her prior C. Diff infection.  She denies any suicidal thoughts and states that she did not overdose intentionally.  States she has been walking.    Review of Systems  Gen- No fevers, chills  CV- No chest pain, palpitations  Resp- No cough, dyspnea  GI- No N/V, abd pain, diarrhea improving      Otherwise ROS is negative except as mentioned in the HPI.    Objective   Objective     Vital Signs:   Temp:  [98.9 °F (37.2 °C)] 98.9 °F (37.2 °C)  Heart Rate:  [] 88  Resp:  [16-18] 18  BP: (149-155)/() 149/101        Physical Exam:  Constitutional: No acute distress, awake, alert, laying in bed  HENT: NCAT, mucous membranes moist  Respiratory: Clear to auscultation bilaterally, respiratory effort normal   Cardiovascular: RRR, no murmurs, rubs, or gallops  Gastrointestinal: Positive bowel sounds, soft, nontender, nondistended  Musculoskeletal: No bilateral ankle edema  Psychiatric: Appropriate affect, cooperative  Neurologic: Oriented x 3, Cranial Nerves grossly intact to confrontation, speech clear  Skin: No rashes      Results Reviewed:  I have personally reviewed current lab, radiology, and data and agree.      Results from last 7 days  Lab Units 01/07/18  0338 01/06/18  0816 01/05/18  0331  01/03/18  0326 01/02/18  1056   WBC 10*3/mm3 14.93* 18.45* 21.55*  < > 20.75* 24.20*   HEMOGLOBIN g/dL 8.4* 8.3* 8.2*  < > 8.9* 10.0*   HEMATOCRIT % 27.6* 26.6* 25.9*  < > 28.1* 32.0*   PLATELETS 10*3/mm3 289 292 288  < > 351 387   INR  1.10  --   --   --  1.21 1.12   < > = values in this interval not displayed.    Results from last 7 days  Lab Units 01/07/18  0338 01/06/18  0816  01/05/18  1946 01/05/18  0331  01/03/18  0326  01/02/18  1614 01/02/18  1132   SODIUM mmol/L 140 140  --  147*  < > 142  < > 139 140   POTASSIUM mmol/L 3.6 4.1 4.0 3.1*  < > 3.4*  < > 5.5 4.6   CHLORIDE mmol/L 112* 107  --  104  < > 112*  < > 118* 116*   CO2 mmol/L 25.0 26.0  --  35.0*  < > 17.0*  < > <10.0* 11.0*   BUN mg/dL 11 10  --  15  < > 76*  < > 73* 80*   CREATININE mg/dL 0.80 0.70  --  0.60  < > 4.90*  < > 8.60* 8.40*   GLUCOSE mg/dL 82 111*  --  100  < > 161*  < > 102* 90   CALCIUM mg/dL 8.0* 8.3*  --  7.5*  < > 7.8*  < > 8.4* 7.9*   ALT (SGPT) U/L 17  --   --   --   --  28  --  32 27   AST (SGOT) U/L 13  --   --   --   --  54*  --  67* 53*   TROPONIN I ng/mL  --   --   --   --   --   --   --   --  0.039   < > = values in this interval not displayed.  No results found for: BNP  No results found for: PHART    Microbiology Results Abnormal     Procedure Component Value - Date/Time    Blood Culture - Blood, [[id number]]  (Normal) Collected:  01/02/18 1245    Lab Status:  Final result Specimen:  Blood from Arm, Left Updated:  01/07/18 1401     Blood Culture No growth at 5 days    Narrative:       Aerobic bottle only    Blood Culture - Blood, [[id number]]  (Normal) Collected:  01/02/18 1320    Lab Status:  Final result Specimen:  Blood from Arm, Left Updated:  01/07/18 1401     Blood Culture No growth at 5 days    Urine Culture - Urine, Urine, Clean Catch [[id number]]  (Abnormal)  (Susceptibility) Collected:  01/02/18 1621    Lab Status:  Final result Specimen:  Urine from Urine, Catheter Updated:  01/05/18 1426     Urine Culture --      >100,000 CFU/mL Enterococcus faecalis (A)    Susceptibility      Enterococcus faecalis     KARI     Ampicillin <=2 ug/ml Susceptible     Gentamicin High Level Synergy <=500 ug/ml Susceptible     Levofloxacin 2 ug/ml Susceptible     Linezolid 2 ug/ml Susceptible     Nitrofurantoin <=32 ug/ml Susceptible     Penicillin G 2 ug/ml Susceptible     Streptomycin High Level Synergy >1000  ug/ml Resistant     Tetracycline >8 ug/ml Resistant     Vancomycin 2 ug/ml Susceptible                    Urine Culture - Urine, Urine, Clean Catch [[id number]]  (Abnormal)  (Susceptibility) Collected:  01/02/18 2022    Lab Status:  Final result Specimen:  Urine from Urine, Clean Catch Updated:  01/05/18 1425     Urine Culture --      >100,000 CFU/mL Enterococcus faecalis (A)    Susceptibility      Enterococcus faecalis     KARI     Ampicillin <=2 ug/ml Susceptible     Gentamicin High Level Synergy <=500 ug/ml Susceptible     Levofloxacin 2 ug/ml Susceptible     Linezolid 2 ug/ml Susceptible     Nitrofurantoin <=32 ug/ml Susceptible     Penicillin G 2 ug/ml Susceptible     Streptomycin High Level Synergy >1000 ug/ml Resistant     Tetracycline >8 ug/ml Resistant     Vancomycin 2 ug/ml Susceptible                    Clostridium Difficile Toxin - Stool, Per Rectum [[id number]] Collected:  01/04/18 1721    Lab Status:  Final result Specimen:  Stool from Per Rectum Updated:  01/04/18 2035    Narrative:       The following orders were created for panel order Clostridium Difficile Toxin - Stool, Per Rectum.  Procedure                               Abnormality         Status                     ---------                               -----------         ------                     Clostridium Difficile To...[[id number]]  Abnormal            Final result                 Please view results for these tests on the individual orders.    Clostridium Difficile Toxin, PCR - Stool, Per Rectum [[id number]]  (Abnormal) Collected:  01/04/18 1721    Lab Status:  Final result Specimen:  Stool from Per Rectum Updated:  01/04/18 2035     C. Difficile Toxins by PCR Detected (A)    Narrative:         Performance characteristics of test not established for patients <2 years of age.    Urine Culture - Urine, Urine, Clean Catch [[id number]]  (Abnormal)  (Susceptibility) Collected:  01/02/18 1104    Lab Status:  Final result Specimen:  Urine from  Urine, Catheter Updated:  01/04/18 1422     Urine Culture --      10,000-20,000 CFU/mL Enterococcus faecalis (A)    Susceptibility      Enterococcus faecalis     KARI     Ampicillin <=2 ug/ml Susceptible     Gentamicin High Level Synergy <=500 ug/ml Susceptible     Levofloxacin <=1 ug/ml Susceptible     Linezolid 2 ug/ml Susceptible     Nitrofurantoin <=32 ug/ml Susceptible     Penicillin G 2 ug/ml Susceptible     Streptomycin High Level Synergy >1000 ug/ml Resistant     Tetracycline >8 ug/ml Resistant     Vancomycin 2 ug/ml Susceptible                    Influenza A & B, RT PCR - Swab, Nasopharynx [[id number]]  (Normal) Collected:  01/02/18 8337    Lab Status:  Final result Specimen:  Swab from Nasopharynx Updated:  01/02/18 1841     Influenza A PCR Not Detected     Influenza B PCR Not Detected          Imaging Results (last 24 hours)     ** No results found for the last 24 hours. **        Results for orders placed during the hospital encounter of 01/02/18   Adult Transthoracic Echo Complete W/ Cont if Necessary Per Protocol    Narrative · Left ventricular systolic function is hyperdynamic (EF > 70).  · Mild mitral valve regurgitation is present  · Mild tricuspid valve regurgitation is present.  · abnormal LVOT contour without significant obstruction        I have reviewed the medications.    Assessment/Plan   Assessment / Plan     Hospital Problem List     Hypotension    Tobacco use disorder    Type 2 diabetes mellitus    Seizure disorder    UTI (urinary tract infection)    Dehydration    COPD (chronic obstructive pulmonary disease)    Drug overdose    Acute renal failure superimposed on chronic kidney disease    Sepsis    Metabolic acidosis    Encephalopathy           Brief Hospital Course to date:  Charisma Cortez is a 57 y.o. female who presented by EMS with decreased responsiveness and septic shock.  Admitted to ICU.    Assessment & Plan:    Septic shock  Possible pneumonia  Recurrent severe C. Difficile  infection  -Continue PO augmentin  -PO vancomycin and IV metronidazole for C. Diff  -ID consult tomorrow-she required a prolonged taper of PO vancomycin for her last bout of C. Diff    Heroin Abuse  -Wean off IV pain medications  -Continue home pain regimen    Tobacco Use Disorder  -NRT    PTSD / Depression  -Continue home Zoloft 100 mg and clonazepam  -Needs to follow up with her outpatient psychiatrist closely    DVT Prophylaxis:  SQ heparin    CODE STATUS: Full Code    Disposition: I expect the patient to be discharged home in 2-3 days    Lacey Candelario MD  01/07/18  8:26 PM         Electronically signed by Lacey Candelario MD at 1/7/2018  8:36 PM           Consult Notes (last 72 hours) (Notes from 1/5/2018  2:17 PM through 1/8/2018  2:17 PM)      Pete Warner MD at 1/8/2018 10:48 AM  Version 1 of 1     Consult Orders:    1. Inpatient Consult to Infectious Diseases [[id number]] ordered by Lacey Candelario MD at 01/07/18 2023                INFECTIOUS DISEASE CONSULT/INITIAL HOSPITAL VISIT    Charisma Cortez  1960  [id number]    Date of consult: 1/8/2018    Admit date: 1/2/2018    Requesting Provider: No ref. provider found  Evaluating physician: Pete Warner MD  Reason for Consultation: C diff, sepsis, enterococcal cystitis  Chief Complaint: as above      Subjective   History of present illness:  Patient is a 57 y.o.  Yr old female with IV drug abuse including heroin found down and admitted to [facility] on 1/2/18 with rhabdomyolysis and sepsis.  The patient was treated with IV antibiotics including Zosyn and developed diarrhea.  She was positive for C. difficile on 1/4/17.  She was thought to have had a seizure and also heroin overdose as starting diagnoses.  The patient was transferred to the floor after an ICU stay.  The patient is a fair to poor historian.  I was consulted on 1/8/17.  She has no other localizing signs or symptoms of infection.  Urine cultures didn't  "yield enterococcus but she was asymptomatic and thought to be colonized.  No other localizing signs or symptoms of infection.    Past Medical History:   Diagnosis Date   • Anxiety    • Arthritis    • Cancer     skin   • Cataracts, bilateral    • Chest pain    • COPD (chronic obstructive pulmonary disease) 3/23/2017   • Depression 3/23/2017   • Diabetes mellitus    • Emphysema lung    • Epilepsy     LAST SEIZURE 2017   • Headache    • High cholesterol    • History of brain shunt    • Hypertension    • Liver disease    • Low back pain    • Lumbosacral disc disease    • BAEZ (nonalcoholic steatohepatitis)    • Neurological disorder    • Osteoporosis    • Pneumonia    • Seizure disorder 3/23/2017   • Wears glasses        Past Surgical History:   Procedure Laterality Date   • APPENDECTOMY     • BRAIN SURGERY     • BREAST BIOPSY Right    •  SECTION  ,   • CHOLECYSTECTOMY     • COLON SURGERY  1970\" REMOVED   • COLONOSCOPY     • CYST REMOVAL      brain, R front lobe - [facility]   • DENTAL PROCEDURE     • ENDOSCOPY     • EXCISION MASS TRUNK Left 2017    Procedure: EXCISION LEFT BUTTOCKS MASS;  Surgeon: Jennifer Galindo MD;  Location: [facility];  Service:    • HYSTERECTOMY     • RECONSTRUCTION URETHROPLASTY     • TUBAL ABDOMINAL LIGATION         Pediatric History   Patient Guardian Status   • Mother:  Neelima Martínez     Other Topics Concern   • Not on file     Social History Narrative   , unemployed, 2 children    family history includes Asthma in her other; COPD in her other; Cancer in her other; Diabetes in her other; Heart disease in her other; Hyperlipidemia in her other; Liver disease in her other; No Known Problems in her brother, father, mother, and sister; Osteoarthritis in her other; Osteoporosis in her other; Ulcers in her other.    Allergies   Allergen Reactions   • Keflex [Cephalexin] Hives   • Sulfa Antibiotics Rash       There is no immunization history for the selected " administration types on file for this patient.    Medication:    Current Facility-Administered Medications:   •  acetaminophen (TYLENOL) tablet 650 mg, 650 mg, Oral, Q4H PRN, Nandini Bunch DO, 650 mg at 01/07/18 1608  •  amoxicillin-clavulanate (AUGMENTIN) 875-125 MG per tablet 1 tablet, 1 tablet, Oral, Q12H, Warner Berger MD, 1 tablet at 01/08/18 0902  •  aspirin EC tablet 81 mg, 81 mg, Oral, Daily, Steve Mckeon MD, 81 mg at 01/08/18 0855  •  clonazePAM (KlonoPIN) tablet 0.5 mg, 0.5 mg, Oral, Q12H, Steve Mckeon MD, 0.5 mg at 01/08/18 0855  •  gabapentin (NEURONTIN) capsule 300 mg, 300 mg, Oral, TID, Loco Najera, APRN, 300 mg at 01/08/18 0856  •  heparin (porcine) 5000 UNIT/ML injection 5,000 Units, 5,000 Units, Subcutaneous, Q12H, Nandini Bunch DO, 5,000 Units at 01/08/18 0855  •  HYDROcodone-acetaminophen (NORCO) 7.5-325 MG per tablet 1 tablet, 1 tablet, Oral, Q6H PRN, Steve Mckeon MD, 1 tablet at 01/08/18 0628  •  insulin regular (humuLIN R,novoLIN R) injection 0-7 Units, 0-7 Units, Subcutaneous, 4x Daily With Meals & Nightly, Neville Alvarez, [facility], 2 Units at 01/06/18 2033  •  ipratropium-albuterol (DUO-NEB) nebulizer solution 3 mL, 3 mL, Nebulization, Q6H PRN, Steve Mckeon MD  •  levETIRAcetam (KEPPRA) tablet 500 mg, 500 mg, Oral, BID, Eve Contreras, [facility], 500 mg at 01/08/18 0856  •  Magnesium Sulfate 2 gram Bolus, followed by 8 gram infusion (total Mg dose 10 grams)- Mg less than or equal to 1mg/dL, 2 g, Intravenous, PRN **OR** Magnesium Sulfate 6 gram Infusion (2 gm x 3) -Mg 1.1 -1.5 mg/dL, 2 g, Intravenous, PRN, Last Rate: 25 mL/hr at 01/07/18 1408, 2 g at 01/07/18 1408 **OR** magnesium sulfate 4 gram infusion- Mg 1.6-1.9 mg/dL, 4 g, Intravenous, PRN, Lacey Candelario MD, Last Rate: 25 mL/hr at 01/08/18 0857, 4 g at 01/08/18 0857  •  metroNIDAZOLE (FLAGYL) IVPB 500 mg, 500 mg, Intravenous, Q8H, Steve Mckeon MD, 500 mg at 01/08/18 0601  •  Morphine sulfate (PF) injection 2 mg, 2 mg,  Intravenous, Q6H PRN, Lacey Candelario MD, 2 mg at 01/08/18 0856  •  nicotine (NICODERM CQ) 21 MG/24HR patch 1 patch, 1 patch, Transdermal, Q24H, ANDREA Mendieta, 1 patch at 01/08/18 0857  •  nicotine polacrilex (COMMIT) lozenge 4 mg, 4 mg, Mouth/Throat, Q1H PRN, Steve Mckeon MD  •  nicotine polacrilex (NICORETTE) gum 4 mg, 4 mg, Mouth/Throat, Q1H PRN, Steve Mckeon MD, 4 mg at 01/07/18 1404  •  ondansetron (ZOFRAN) injection 4 mg, 4 mg, Intravenous, Q6H PRN, Nandini Bunch DO, 4 mg at 01/06/18 0407  •  pantoprazole (PROTONIX) EC tablet 40 mg, 40 mg, Oral, Q AM, Warner Berger MD, 40 mg at 01/08/18 0601  •  potassium chloride (MICRO-K) CR capsule 40 mEq, 40 mEq, Oral, PRN, 40 mEq at 01/07/18 1219 **OR** [DISCONTINUED] potassium chloride (KLOR-CON) packet 40 mEq, 40 mEq, Oral, PRN **OR** [DISCONTINUED] potassium chloride 20 mEq in 50 mL IVPB, 20 mEq, Intravenous, Q1H PRN, Stacy Reddy MD  •  potassium phosphate 15 mmol in sodium chloride 0.9 % 250 mL infusion, 15 mmol, Intravenous, PRN, Neha Allen, [facility]  •  potassium phosphate 30 mmol in sodium chloride 0.9 % 500 mL infusion, 30 mmol, Intravenous, PRN, Warner Berger MD, 30 mmol at 01/05/18 0908  •  potassium phosphate 45 mmol in sodium chloride 0.9 % 500 mL infusion, 45 mmol, Intravenous, PRN, Warner Berger MD  •  sertraline (ZOLOFT) tablet 100 mg, 100 mg, Oral, Daily, Steve Mckeon MD, 100 mg at 01/08/18 0855  •  traZODone (DESYREL) tablet 100 mg, 100 mg, Oral, Nightly, Steve Mckeon MD, 100 mg at 01/07/18 2018  •  vancomycin oral solution 250 mg, 250 mg, Oral, Q6H, Steve Mckeon MD, 250 mg at 01/08/18 0601    Please refer to the medical record for a full medication list    Review of Systems:    Constitutional-- No Fever, chills or sweats.  Appetite good, and no malaise. No fatigue.  HEENT-- No new vision, hearing or throat complaints.  No epistaxis or oral sores.  Denies odynophagia or dysphagia.  No odynophagia or dysphagia. No headache,  "photophobia or neck stiffness.  CV-- No chest pain, palpitation or syncope  Resp-- No SOB/cough/Hemoptysis  GI- No nausea, vomiting, but has some diarrhea.  No hematochezia, melena, or hematemesis. Denies jaundice or chronic liver disease.  -- No dysuria, hematuria, or flank pain.  Denies hesitancy, urgency or flank pain.  Lymph- no swollen lymph nodes in neck/axilla or groin.   Heme- No active bruising or bleeding; no Hx of DVT or PE.  MS-- no swelling or pain in the bones or joints of arms/legs.  No new back pain.  Neuro-- No acute focal weakness or numbness in the arms or legs.  No seizures.  Skin--No rashes or lesions, needle tracks    Physical Exam:   Vital Signs   Temp:  [98 °F (36.7 °C)-98.1 °F (36.7 °C)] 98.1 °F (36.7 °C)  Heart Rate:  [] 82  Resp:  [20] 20  BP: (120-165)/(76-92) 120/76    Temp  Min: 98 °F (36.7 °C)  Max: 98.1 °F (36.7 °C)  BP  Min: 120/76  Max: 165/92  Pulse  Min: 82  Max: 103  Resp  Min: 20  Max: 20  No Data Recorded    Blood pressure 120/76, pulse 82, temperature 98.1 °F (36.7 °C), resp. rate 20, height 152.4 cm (60\"), weight 59.9 kg (132 lb), SpO2 95 %.  GENERAL: Awake and alert, in moderate distress. Appears older than stated age.  HEENT:  Normocephalic, atraumatic.  Oropharynx without thrush. Dentition in good repair. No cervical adenopathy. No neck masses.  Ears externally normal, Nose externally normal.  EYES: PERRL. No conjunctival injection. No icterus. EOM full.  LYMPHATICS: No lymphadenopathy of the neck or axillary or inguinal regions.   HEART: No murmur, gallop, or pericardial friction rub. Reg rate rhythm, No JVD at 45 degrees.  LUNGS: Clear to auscultation and percussion. No respiratory distress, no use of accessory muscles.  ABDOMEN: Soft, nontender, nondistended. No appreciable HSM.  Bowel sounds normal.  Obese.  GENITAL: No external lesions, breasts without masses, back straight, no CVAT, rectal external without lesions.   SKIN: Warm and dry without cutaneous " eruptions.  No nodules.    PSYCHIATRIC: Mental status lucid. No confusion.  EXT:  No cellulitic change.  NEURO: Oriented to name, place, time, CN 2 to 12 intact, DTR 1 + and symmetric, sensory intact to LT upper and lower extremitiy, motor 5/5 upper and lower extremity, cerebellar and gait not tested.      Results Review:   I reviewed the patient's new clinical results.  I reviewed the patient's new imaging results and agree with the interpretation.  I reviewed the patient's other test results and agree with the interpretation      Results from last 7 days  Lab Units 01/08/18  0547 01/07/18  0338 01/06/18  0816   WBC 10*3/mm3 12.82* 14.93* 18.45*   HEMOGLOBIN g/dL 8.1* 8.4* 8.3*   HEMATOCRIT % 26.5* 27.6* 26.6*   PLATELETS 10*3/mm3 268 289 292       Results from last 7 days  Lab Units 01/08/18  0547   SODIUM mmol/L 139   POTASSIUM mmol/L 4.0   CHLORIDE mmol/L 111*   CO2 mmol/L 22.0   BUN mg/dL 12   CREATININE mg/dL 0.70   GLUCOSE mg/dL 86   CALCIUM mg/dL 8.2*       Results from last 7 days  Lab Units 01/07/18  0338   ALK PHOS U/L 82   BILIRUBIN mg/dL 0.2*   ALT (SGPT) U/L 17   AST (SGOT) U/L 13           Results from last 7 days  Lab Units 01/02/18  1056   CRP mg/dL 19.92*           Results from last 7 days  Lab Units 01/07/18  0338   LACTATE mmol/L 0.8     Estimated Creatinine Clearance: 73.5 mL/min (by C-G formula based on Cr of 0.7).    Microbiology:  Microbiology Results Abnormal     Procedure Component Value - Date/Time    Blood Culture - Blood, [[id number]]  (Normal) Collected:  01/02/18 1245    Lab Status:  Final result Specimen:  Blood from Arm, Left Updated:  01/07/18 1401     Blood Culture No growth at 5 days    Narrative:       Aerobic bottle only    Blood Culture - Blood, [[id number]]  (Normal) Collected:  01/02/18 1320    Lab Status:  Final result Specimen:  Blood from Arm, Left Updated:  01/07/18 1401     Blood Culture No growth at 5 days    Urine Culture - Urine, Urine, Clean Catch [[id number]]   (Abnormal)  (Susceptibility) Collected:  01/02/18 1621    Lab Status:  Final result Specimen:  Urine from Urine, Catheter Updated:  01/05/18 1426     Urine Culture --      >100,000 CFU/mL Enterococcus faecalis (A)    Susceptibility      Enterococcus faecalis     KARI     Ampicillin <=2 ug/ml Susceptible     Gentamicin High Level Synergy <=500 ug/ml Susceptible     Levofloxacin 2 ug/ml Susceptible     Linezolid 2 ug/ml Susceptible     Nitrofurantoin <=32 ug/ml Susceptible     Penicillin G 2 ug/ml Susceptible     Streptomycin High Level Synergy >1000 ug/ml Resistant     Tetracycline >8 ug/ml Resistant     Vancomycin 2 ug/ml Susceptible                    Urine Culture - Urine, Urine, Clean Catch [[id number]]  (Abnormal)  (Susceptibility) Collected:  01/02/18 2022    Lab Status:  Final result Specimen:  Urine from Urine, Clean Catch Updated:  01/05/18 1425     Urine Culture --      >100,000 CFU/mL Enterococcus faecalis (A)    Susceptibility      Enterococcus faecalis     KARI     Ampicillin <=2 ug/ml Susceptible     Gentamicin High Level Synergy <=500 ug/ml Susceptible     Levofloxacin 2 ug/ml Susceptible     Linezolid 2 ug/ml Susceptible     Nitrofurantoin <=32 ug/ml Susceptible     Penicillin G 2 ug/ml Susceptible     Streptomycin High Level Synergy >1000 ug/ml Resistant     Tetracycline >8 ug/ml Resistant     Vancomycin 2 ug/ml Susceptible                    Clostridium Difficile Toxin - Stool, Per Rectum [[id number]] Collected:  01/04/18 1721    Lab Status:  Final result Specimen:  Stool from Per Rectum Updated:  01/04/18 2035    Narrative:       The following orders were created for panel order Clostridium Difficile Toxin - Stool, Per Rectum.  Procedure                               Abnormality         Status                     ---------                               -----------         ------                     Clostridium Difficile To...[[id number]]  Abnormal            Final result                 Please view  results for these tests on the individual orders.    Clostridium Difficile Toxin, PCR - Stool, Per Rectum [[id number]]  (Abnormal) Collected:  01/04/18 1721    Lab Status:  Final result Specimen:  Stool from Per Rectum Updated:  01/04/18 2035     C. Difficile Toxins by PCR Detected (A)    Narrative:         Performance characteristics of test not established for patients <2 years of age.    Urine Culture - Urine, Urine, Clean Catch [[id number]]  (Abnormal)  (Susceptibility) Collected:  01/02/18 1104    Lab Status:  Final result Specimen:  Urine from Urine, Catheter Updated:  01/04/18 1422     Urine Culture --      10,000-20,000 CFU/mL Enterococcus faecalis (A)    Susceptibility      Enterococcus faecalis     KARI     Ampicillin <=2 ug/ml Susceptible     Gentamicin High Level Synergy <=500 ug/ml Susceptible     Levofloxacin <=1 ug/ml Susceptible     Linezolid 2 ug/ml Susceptible     Nitrofurantoin <=32 ug/ml Susceptible     Penicillin G 2 ug/ml Susceptible     Streptomycin High Level Synergy >1000 ug/ml Resistant     Tetracycline >8 ug/ml Resistant     Vancomycin 2 ug/ml Susceptible                    Influenza A & B, RT PCR - Swab, Nasopharynx [[id number]]  (Normal) Collected:  01/02/18 1737    Lab Status:  Final result Specimen:  Swab from Nasopharynx Updated:  01/02/18 1841     Influenza A PCR Not Detected     Influenza B PCR Not Detected          Radiology:  Imaging Results (last 72 hours)     ** No results found for the last 72 hours. **          IMPRESSION:     1.  Enterococcal faecalis acute cystitis.  Difficult to ascertain on admission as patient was septic.  Her urinalysis which was repeated twice was significant with numerous WBCs, and cultures have consistently grown Enterococcus faecalis, sensitive to penicillin.  Most recent positive culture from 1/2/18.  2.  C. difficile colitis with positive toxin on 1/4/18.  3.  Sepsis, present on admission.  Current normal lactic acid and pro-calcitonin level last on  1/4/18.  4.  Heroin overdose, IVDA.  5.  Leukocytosis, neutrophilic related to above issues.  6.  Anemia, chronic disease.  7.  Hypocalcemia, 8.2.  8.  Obesity.  9.  Polysubstance abuse.    RECOMMENDATIONS:    1.  Diagnostically, continue to follow patient's physical exam, CBC, CMP, CRP, pro-calcitonin level, lactic acid.  2.  Therapeutically, continue on amoxicillin 875 mg by mouth twice a day to continue until 1/10/17.  Continue on oral vancomycin at 250 mg by mouth 4 times a day until 1/11/18, then decrease to twice a day until 1/18/18, then once a day until 1/25/18, then every 3 days until 2/1/18.  Probiotics recommended.  3.  Drug rehab.    I discussed the patients findings and my recommendations with patient, family and nursing staff.    Increased risk for side effects of meds and readmission.    Thank you for asking me to see Charisma Macdonald Diego.  Our group would be pleased to follow this patient over the course of their hospitalization and assist with outpatient antimicrobial therapy, as indicated.  Further recommendations depend on the results of the cultures and clinical course.   OK for discharge from ID standpoint with f/u in office in 2 weeks.    Pete Warner MD  1/8/2018     Electronically signed by Pete Warner MD at 1/8/2018 11:50 AM
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
## 2018-01-08 NOTE — PROGRESS NOTES
Adult Nutrition  Assessment/PES    Patient Name:  Charisma Cortez  YOB: 1960  MRN: [id number]  Admit Date:  1/2/2018    Assessment Date:  1/8/2018    Comments:            Reason for Assessment       01/08/18 0850    Reason for Assessment    Reason For Assessment/Visit follow up protocol    Time Spent (min) 5                              Problem/Interventions:        Problem 1       01/08/18 0850    Nutrition Diagnoses Problem 1    Problem 1 Inadequate Nutrient Intake    Etiology (related to) --   recent poor appetite, PO diet started yesterday (1/4)    Signs/Symptoms (evidenced by) PO Intake;Report of Mnimal PO Intake    Percent (%) intake recorded 0 %    Over number of meals 2    Resolved? Yes            Problem 2       01/08/18 0850    Nutrition Diagnoses Problem 2    Problem 2 Nutrition Appropriate for Condition at this Time    Signs/Symptoms (evidenced by) PO Intake                        Education/Evaluation       01/08/18 0850    Monitor/Evaluation    Monitor Per protocol        Electronically signed by:  Laura Naik RD  01/08/18 8:51 AM

## 2018-01-08 NOTE — PLAN OF CARE
Problem: Overdose, Ingestion/Inhalants (Adult)  Goal: Signs and Symptoms of Listed Potential Problems Will be Absent or Manageable (Overdose, Ingestion/Inhalants)  Outcome: Ongoing (interventions implemented as appropriate)      Problem: Patient Care Overview (Adult)  Goal: Plan of Care Review  Outcome: Ongoing (interventions implemented as appropriate)   01/08/18 0307   Coping/Psychosocial Response Interventions   Plan Of Care Reviewed With patient   Patient Care Overview   Progress no change   Outcome Evaluation   Outcome Summary/Follow up Plan patiet a&o and cooperative, still requested pain medicine around the clock. continue spore precautions. possible d/c 2-3 days       Problem: Pressure Ulcer Risk (Vinicius Scale) (Adult,Obstetrics,Pediatric)  Goal: Skin Integrity  Outcome: Ongoing (interventions implemented as appropriate)      Problem: Fall Risk (Adult)  Goal: Absence of Falls  Outcome: Ongoing (interventions implemented as appropriate)

## 2018-01-08 NOTE — PROGRESS NOTES
[facility] Medicine Services  PROGRESS NOTE    Patient Name: Charisma Cortez  : 1960  MRN: [id number]    Date of Admission: 2018  Length of Stay: 5  Primary Care Physician: ANDREA Zhong    Subjective   Subjective     CC:  F/U altered mental status    HPI:  Feeling a little better today but generalized pain due to being in bed.  She denies any dysuria greater than baseline (always feels a little uncomfortable due to h/o surgery).  Diarrhea has slowed down.  She reports doing a long taper of vancomycin for her prior C. Diff infection.  She denies any suicidal thoughts and states that she did not overdose intentionally.  States she has been walking.    Review of Systems  Gen- No fevers, chills  CV- No chest pain, palpitations  Resp- No cough, dyspnea  GI- No N/V, abd pain, diarrhea improving      Otherwise ROS is negative except as mentioned in the HPI.    Objective   Objective     Vital Signs:   Temp:  [98.9 °F (37.2 °C)] 98.9 °F (37.2 °C)  Heart Rate:  [] 88  Resp:  [16-18] 18  BP: (149-155)/() 149/101        Physical Exam:  Constitutional: No acute distress, awake, alert, laying in bed  HENT: NCAT, mucous membranes moist  Respiratory: Clear to auscultation bilaterally, respiratory effort normal   Cardiovascular: RRR, no murmurs, rubs, or gallops  Gastrointestinal: Positive bowel sounds, soft, nontender, nondistended  Musculoskeletal: No bilateral ankle edema  Psychiatric: Appropriate affect, cooperative  Neurologic: Oriented x 3, Cranial Nerves grossly intact to confrontation, speech clear  Skin: No rashes      Results Reviewed:  I have personally reviewed current lab, radiology, and data and agree.      Results from last 7 days  Lab Units 18  0338 18  0816 18  0331  18  0326 18  1056   WBC 10*3/mm3 14.93* 18.45* 21.55*  < > 20.75* 24.20*   HEMOGLOBIN g/dL 8.4* 8.3* 8.2*  < > 8.9* 10.0*   HEMATOCRIT % 27.6* 26.6* 25.9*   < > 28.1* 32.0*   PLATELETS 10*3/mm3 289 292 288  < > 351 387   INR  1.10  --   --   --  1.21 1.12   < > = values in this interval not displayed.    Results from last 7 days  Lab Units 01/07/18  0338 01/06/18  0816 01/05/18  1946 01/05/18  0331  01/03/18  0326  01/02/18  1614 01/02/18  1132   SODIUM mmol/L 140 140  --  147*  < > 142  < > 139 140   POTASSIUM mmol/L 3.6 4.1 4.0 3.1*  < > 3.4*  < > 5.5 4.6   CHLORIDE mmol/L 112* 107  --  104  < > 112*  < > 118* 116*   CO2 mmol/L 25.0 26.0  --  35.0*  < > 17.0*  < > <10.0* 11.0*   BUN mg/dL 11 10  --  15  < > 76*  < > 73* 80*   CREATININE mg/dL 0.80 0.70  --  0.60  < > 4.90*  < > 8.60* 8.40*   GLUCOSE mg/dL 82 111*  --  100  < > 161*  < > 102* 90   CALCIUM mg/dL 8.0* 8.3*  --  7.5*  < > 7.8*  < > 8.4* 7.9*   ALT (SGPT) U/L 17  --   --   --   --  28  --  32 27   AST (SGOT) U/L 13  --   --   --   --  54*  --  67* 53*   TROPONIN I ng/mL  --   --   --   --   --   --   --   --  0.039   < > = values in this interval not displayed.  No results found for: BNP  No results found for: PHART    Microbiology Results Abnormal     Procedure Component Value - Date/Time    Blood Culture - Blood, [[id number]]  (Normal) Collected:  01/02/18 1245    Lab Status:  Final result Specimen:  Blood from Arm, Left Updated:  01/07/18 1401     Blood Culture No growth at 5 days    Narrative:       Aerobic bottle only    Blood Culture - Blood, [[id number]]  (Normal) Collected:  01/02/18 1320    Lab Status:  Final result Specimen:  Blood from Arm, Left Updated:  01/07/18 1401     Blood Culture No growth at 5 days    Urine Culture - Urine, Urine, Clean Catch [[id number]]  (Abnormal)  (Susceptibility) Collected:  01/02/18 1621    Lab Status:  Final result Specimen:  Urine from Urine, Catheter Updated:  01/05/18 1426     Urine Culture --      >100,000 CFU/mL Enterococcus faecalis (A)    Susceptibility      Enterococcus faecalis     KARI     Ampicillin <=2 ug/ml Susceptible     Gentamicin High Level Synergy  <=500 ug/ml Susceptible     Levofloxacin 2 ug/ml Susceptible     Linezolid 2 ug/ml Susceptible     Nitrofurantoin <=32 ug/ml Susceptible     Penicillin G 2 ug/ml Susceptible     Streptomycin High Level Synergy >1000 ug/ml Resistant     Tetracycline >8 ug/ml Resistant     Vancomycin 2 ug/ml Susceptible                    Urine Culture - Urine, Urine, Clean Catch [[id number]]  (Abnormal)  (Susceptibility) Collected:  01/02/18 2022    Lab Status:  Final result Specimen:  Urine from Urine, Clean Catch Updated:  01/05/18 1425     Urine Culture --      >100,000 CFU/mL Enterococcus faecalis (A)    Susceptibility      Enterococcus faecalis     KARI     Ampicillin <=2 ug/ml Susceptible     Gentamicin High Level Synergy <=500 ug/ml Susceptible     Levofloxacin 2 ug/ml Susceptible     Linezolid 2 ug/ml Susceptible     Nitrofurantoin <=32 ug/ml Susceptible     Penicillin G 2 ug/ml Susceptible     Streptomycin High Level Synergy >1000 ug/ml Resistant     Tetracycline >8 ug/ml Resistant     Vancomycin 2 ug/ml Susceptible                    Clostridium Difficile Toxin - Stool, Per Rectum [[id number]] Collected:  01/04/18 1721    Lab Status:  Final result Specimen:  Stool from Per Rectum Updated:  01/04/18 2035    Narrative:       The following orders were created for panel order Clostridium Difficile Toxin - Stool, Per Rectum.  Procedure                               Abnormality         Status                     ---------                               -----------         ------                     Clostridium Difficile To...[[id number]]  Abnormal            Final result                 Please view results for these tests on the individual orders.    Clostridium Difficile Toxin, PCR - Stool, Per Rectum [[id number]]  (Abnormal) Collected:  01/04/18 1721    Lab Status:  Final result Specimen:  Stool from Per Rectum Updated:  01/04/18 2035     C. Difficile Toxins by PCR Detected (A)    Narrative:         Performance characteristics of  test not established for patients <2 years of age.    Urine Culture - Urine, Urine, Clean Catch [[id number]]  (Abnormal)  (Susceptibility) Collected:  01/02/18 1104    Lab Status:  Final result Specimen:  Urine from Urine, Catheter Updated:  01/04/18 1422     Urine Culture --      10,000-20,000 CFU/mL Enterococcus faecalis (A)    Susceptibility      Enterococcus faecalis     KARI     Ampicillin <=2 ug/ml Susceptible     Gentamicin High Level Synergy <=500 ug/ml Susceptible     Levofloxacin <=1 ug/ml Susceptible     Linezolid 2 ug/ml Susceptible     Nitrofurantoin <=32 ug/ml Susceptible     Penicillin G 2 ug/ml Susceptible     Streptomycin High Level Synergy >1000 ug/ml Resistant     Tetracycline >8 ug/ml Resistant     Vancomycin 2 ug/ml Susceptible                    Influenza A & B, RT PCR - Swab, Nasopharynx [[id number]]  (Normal) Collected:  01/02/18 1737    Lab Status:  Final result Specimen:  Swab from Nasopharynx Updated:  01/02/18 1841     Influenza A PCR Not Detected     Influenza B PCR Not Detected          Imaging Results (last 24 hours)     ** No results found for the last 24 hours. **        Results for orders placed during the hospital encounter of 01/02/18   Adult Transthoracic Echo Complete W/ Cont if Necessary Per Protocol    Narrative · Left ventricular systolic function is hyperdynamic (EF > 70).  · Mild mitral valve regurgitation is present  · Mild tricuspid valve regurgitation is present.  · abnormal LVOT contour without significant obstruction        I have reviewed the medications.    Assessment/Plan   Assessment / Plan     Hospital Problem List     Hypotension    Tobacco use disorder    Type 2 diabetes mellitus    Seizure disorder    UTI (urinary tract infection)    Dehydration    COPD (chronic obstructive pulmonary disease)    Drug overdose    Acute renal failure superimposed on chronic kidney disease    Sepsis    Metabolic acidosis    Encephalopathy           Brief Hospital Course to  date:  Charisma Cortez is a 57 y.o. female who presented by EMS with decreased responsiveness and septic shock.  Admitted to ICU.    Assessment & Plan:    Septic shock  Possible pneumonia  Recurrent severe C. Difficile infection  -Continue PO augmentin  -PO vancomycin and IV metronidazole for C. Diff  -ID consult tomorrow-she required a prolonged taper of PO vancomycin for her last bout of C. Diff    Heroin Abuse  -Wean off IV pain medications  -Continue home pain regimen    Tobacco Use Disorder  -NRT    PTSD / Depression  -Continue home Zoloft 100 mg and clonazepam  -Needs to follow up with her outpatient psychiatrist closely    DVT Prophylaxis:  SQ heparin    CODE STATUS: Full Code    Disposition: I expect the patient to be discharged home in 2-3 days    Lacey Candelario MD  01/07/18  8:26 PM
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
## 2018-01-08 NOTE — CONSULTS
"INFECTIOUS DISEASE CONSULT/INITIAL HOSPITAL VISIT    Charisma Cortez  1960  [id number]    Date of consult: 2018    Admit date: 2018    Requesting Provider: No ref. provider found  Evaluating physician: Pete Warner MD  Reason for Consultation: C diff, sepsis, enterococcal cystitis  Chief Complaint: as above      Subjective   History of present illness:  Patient is a 57 y.o.  Yr old female with IV drug abuse including heroin found down and admitted to [facility] on 18 with rhabdomyolysis and sepsis.  The patient was treated with IV antibiotics including Zosyn and developed diarrhea.  She was positive for C. difficile on 17.  She was thought to have had a seizure and also heroin overdose as starting diagnoses.  The patient was transferred to the floor after an ICU stay.  The patient is a fair to poor historian.  I was consulted on 17.  She has no other localizing signs or symptoms of infection.  Urine cultures didn't yield enterococcus but she was asymptomatic and thought to be colonized.  No other localizing signs or symptoms of infection.    Past Medical History:   Diagnosis Date   • Anxiety    • Arthritis    • Cancer     skin   • Cataracts, bilateral    • Chest pain    • COPD (chronic obstructive pulmonary disease) 3/23/2017   • Depression 3/23/2017   • Diabetes mellitus    • Emphysema lung    • Epilepsy     LAST SEIZURE 2017   • Headache    • High cholesterol    • History of brain shunt    • Hypertension    • Liver disease    • Low back pain    • Lumbosacral disc disease    • BAEZ (nonalcoholic steatohepatitis)    • Neurological disorder    • Osteoporosis    • Pneumonia    • Seizure disorder 3/23/2017   • Wears glasses        Past Surgical History:   Procedure Laterality Date   • APPENDECTOMY     • BRAIN SURGERY     • BREAST BIOPSY Right    •  SECTION  ,   • CHOLECYSTECTOMY     • COLON SURGERY      2\" REMOVED   • COLONOSCOPY "     • CYST REMOVAL  1987    brain, R front lobe - [facility]   • DENTAL PROCEDURE     • ENDOSCOPY     • EXCISION MASS TRUNK Left 8/7/2017    Procedure: EXCISION LEFT BUTTOCKS MASS;  Surgeon: Jennifer Galindo MD;  Location: [facility];  Service:    • HYSTERECTOMY     • RECONSTRUCTION URETHROPLASTY     • TUBAL ABDOMINAL LIGATION         Pediatric History   Patient Guardian Status   • Mother:  Neelima Martínez     Other Topics Concern   • Not on file     Social History Narrative   , unemployed, 2 children    family history includes Asthma in her other; COPD in her other; Cancer in her other; Diabetes in her other; Heart disease in her other; Hyperlipidemia in her other; Liver disease in her other; No Known Problems in her brother, father, mother, and sister; Osteoarthritis in her other; Osteoporosis in her other; Ulcers in her other.    Allergies   Allergen Reactions   • Keflex [Cephalexin] Hives   • Sulfa Antibiotics Rash       There is no immunization history for the selected administration types on file for this patient.    Medication:    Current Facility-Administered Medications:   •  acetaminophen (TYLENOL) tablet 650 mg, 650 mg, Oral, Q4H PRN, Nandini Bunch DO, 650 mg at 01/07/18 1608  •  amoxicillin-clavulanate (AUGMENTIN) 875-125 MG per tablet 1 tablet, 1 tablet, Oral, Q12H, Warner Berger MD, 1 tablet at 01/08/18 0902  •  aspirin EC tablet 81 mg, 81 mg, Oral, Daily, Steve Mckeon MD, 81 mg at 01/08/18 0855  •  clonazePAM (KlonoPIN) tablet 0.5 mg, 0.5 mg, Oral, Q12H, Steve Mckeon MD, 0.5 mg at 01/08/18 0855  •  gabapentin (NEURONTIN) capsule 300 mg, 300 mg, Oral, TID, Loco Najera, APRN, 300 mg at 01/08/18 0856  •  heparin (porcine) 5000 UNIT/ML injection 5,000 Units, 5,000 Units, Subcutaneous, Q12H, Nandini Bunch DO, 5,000 Units at 01/08/18 0855  •  HYDROcodone-acetaminophen (NORCO) 7.5-325 MG per tablet 1 tablet, 1 tablet, Oral, Q6H PRN, Steve Mckeon MD, 1 tablet at 01/08/18 0628  •  insulin  regular (humuLIN R,novoLIN R) injection 0-7 Units, 0-7 Units, Subcutaneous, 4x Daily With Meals & Nightly, Neville Alvarez, [facility], 2 Units at 01/06/18 2033  •  ipratropium-albuterol (DUO-NEB) nebulizer solution 3 mL, 3 mL, Nebulization, Q6H PRN, Steve Mckeon MD  •  levETIRAcetam (KEPPRA) tablet 500 mg, 500 mg, Oral, BID, Eve Contreras, [facility], 500 mg at 01/08/18 0856  •  Magnesium Sulfate 2 gram Bolus, followed by 8 gram infusion (total Mg dose 10 grams)- Mg less than or equal to 1mg/dL, 2 g, Intravenous, PRN **OR** Magnesium Sulfate 6 gram Infusion (2 gm x 3) -Mg 1.1 -1.5 mg/dL, 2 g, Intravenous, PRN, Last Rate: 25 mL/hr at 01/07/18 1408, 2 g at 01/07/18 1408 **OR** magnesium sulfate 4 gram infusion- Mg 1.6-1.9 mg/dL, 4 g, Intravenous, PRN, Lacey Candelario MD, Last Rate: 25 mL/hr at 01/08/18 0857, 4 g at 01/08/18 0857  •  metroNIDAZOLE (FLAGYL) IVPB 500 mg, 500 mg, Intravenous, Q8H, Steve Mckeon MD, 500 mg at 01/08/18 0601  •  Morphine sulfate (PF) injection 2 mg, 2 mg, Intravenous, Q6H PRN, Lacey Candelario MD, 2 mg at 01/08/18 0856  •  nicotine (NICODERM CQ) 21 MG/24HR patch 1 patch, 1 patch, Transdermal, Q24H, Loco Najera, APRN, 1 patch at 01/08/18 0857  •  nicotine polacrilex (COMMIT) lozenge 4 mg, 4 mg, Mouth/Throat, Q1H PRN, Steve Mckeon MD  •  nicotine polacrilex (NICORETTE) gum 4 mg, 4 mg, Mouth/Throat, Q1H PRN, Steve Mckeon MD, 4 mg at 01/07/18 1404  •  ondansetron (ZOFRAN) injection 4 mg, 4 mg, Intravenous, Q6H PRN, Nandini Bunch, DO, 4 mg at 01/06/18 0407  •  pantoprazole (PROTONIX) EC tablet 40 mg, 40 mg, Oral, Q AM, Warner Berger MD, 40 mg at 01/08/18 0601  •  potassium chloride (MICRO-K) CR capsule 40 mEq, 40 mEq, Oral, PRN, 40 mEq at 01/07/18 1219 **OR** [DISCONTINUED] potassium chloride (KLOR-CON) packet 40 mEq, 40 mEq, Oral, PRN **OR** [DISCONTINUED] potassium chloride 20 mEq in 50 mL IVPB, 20 mEq, Intravenous, Q1H PRN, Stacy Reddy MD  •  potassium phosphate 15 mmol in sodium chloride  0.9 % 250 mL infusion, 15 mmol, Intravenous, PRN, Neha Allen, [facility]  •  potassium phosphate 30 mmol in sodium chloride 0.9 % 500 mL infusion, 30 mmol, Intravenous, PRN, Warner Berger MD, 30 mmol at 01/05/18 0908  •  potassium phosphate 45 mmol in sodium chloride 0.9 % 500 mL infusion, 45 mmol, Intravenous, PRN, Warner Berger MD  •  sertraline (ZOLOFT) tablet 100 mg, 100 mg, Oral, Daily, Steve Mckeon MD, 100 mg at 01/08/18 0855  •  traZODone (DESYREL) tablet 100 mg, 100 mg, Oral, Nightly, Steve Mckeon MD, 100 mg at 01/07/18 2018  •  vancomycin oral solution 250 mg, 250 mg, Oral, Q6H, Steve Mckeon MD, 250 mg at 01/08/18 0601    Please refer to the medical record for a full medication list    Review of Systems:    Constitutional-- No Fever, chills or sweats.  Appetite good, and no malaise. No fatigue.  HEENT-- No new vision, hearing or throat complaints.  No epistaxis or oral sores.  Denies odynophagia or dysphagia.  No odynophagia or dysphagia. No headache, photophobia or neck stiffness.  CV-- No chest pain, palpitation or syncope  Resp-- No SOB/cough/Hemoptysis  GI- No nausea, vomiting, but has some diarrhea.  No hematochezia, melena, or hematemesis. Denies jaundice or chronic liver disease.  -- No dysuria, hematuria, or flank pain.  Denies hesitancy, urgency or flank pain.  Lymph- no swollen lymph nodes in neck/axilla or groin.   Heme- No active bruising or bleeding; no Hx of DVT or PE.  MS-- no swelling or pain in the bones or joints of arms/legs.  No new back pain.  Neuro-- No acute focal weakness or numbness in the arms or legs.  No seizures.  Skin--No rashes or lesions, needle tracks    Physical Exam:   Vital Signs   Temp:  [98 °F (36.7 °C)-98.1 °F (36.7 °C)] 98.1 °F (36.7 °C)  Heart Rate:  [] 82  Resp:  [20] 20  BP: (120-165)/(76-92) 120/76    Temp  Min: 98 °F (36.7 °C)  Max: 98.1 °F (36.7 °C)  BP  Min: 120/76  Max: 165/92  Pulse  Min: 82  Max: 103  Resp  Min: 20  Max: 20  No Data  "Recorded    Blood pressure 120/76, pulse 82, temperature 98.1 °F (36.7 °C), resp. rate 20, height 152.4 cm (60\"), weight 59.9 kg (132 lb), SpO2 95 %.  GENERAL: Awake and alert, in moderate distress. Appears older than stated age.  HEENT:  Normocephalic, atraumatic.  Oropharynx without thrush. Dentition in good repair. No cervical adenopathy. No neck masses.  Ears externally normal, Nose externally normal.  EYES: PERRL. No conjunctival injection. No icterus. EOM full.  LYMPHATICS: No lymphadenopathy of the neck or axillary or inguinal regions.   HEART: No murmur, gallop, or pericardial friction rub. Reg rate rhythm, No JVD at 45 degrees.  LUNGS: Clear to auscultation and percussion. No respiratory distress, no use of accessory muscles.  ABDOMEN: Soft, nontender, nondistended. No appreciable HSM.  Bowel sounds normal.  Obese.  GENITAL: No external lesions, breasts without masses, back straight, no CVAT, rectal external without lesions.   SKIN: Warm and dry without cutaneous eruptions.  No nodules.    PSYCHIATRIC: Mental status lucid. No confusion.  EXT:  No cellulitic change.  NEURO: Oriented to name, place, time, CN 2 to 12 intact, DTR 1 + and symmetric, sensory intact to LT upper and lower extremitiy, motor 5/5 upper and lower extremity, cerebellar and gait not tested.      Results Review:   I reviewed the patient's new clinical results.  I reviewed the patient's new imaging results and agree with the interpretation.  I reviewed the patient's other test results and agree with the interpretation      Results from last 7 days  Lab Units 01/08/18  0547 01/07/18  0338 01/06/18  0816   WBC 10*3/mm3 12.82* 14.93* 18.45*   HEMOGLOBIN g/dL 8.1* 8.4* 8.3*   HEMATOCRIT % 26.5* 27.6* 26.6*   PLATELETS 10*3/mm3 268 289 292       Results from last 7 days  Lab Units 01/08/18  0547   SODIUM mmol/L 139   POTASSIUM mmol/L 4.0   CHLORIDE mmol/L 111*   CO2 mmol/L 22.0   BUN mg/dL 12   CREATININE mg/dL 0.70   GLUCOSE mg/dL 86   CALCIUM " mg/dL 8.2*       Results from last 7 days  Lab Units 01/07/18  0338   ALK PHOS U/L 82   BILIRUBIN mg/dL 0.2*   ALT (SGPT) U/L 17   AST (SGOT) U/L 13           Results from last 7 days  Lab Units 01/02/18  1056   CRP mg/dL 19.92*           Results from last 7 days  Lab Units 01/07/18  0338   LACTATE mmol/L 0.8     Estimated Creatinine Clearance: 73.5 mL/min (by C-G formula based on Cr of 0.7).    Microbiology:  Microbiology Results Abnormal     Procedure Component Value - Date/Time    Blood Culture - Blood, [[id number]]  (Normal) Collected:  01/02/18 1245    Lab Status:  Final result Specimen:  Blood from Arm, Left Updated:  01/07/18 1401     Blood Culture No growth at 5 days    Narrative:       Aerobic bottle only    Blood Culture - Blood, [[id number]]  (Normal) Collected:  01/02/18 1320    Lab Status:  Final result Specimen:  Blood from Arm, Left Updated:  01/07/18 1401     Blood Culture No growth at 5 days    Urine Culture - Urine, Urine, Clean Catch [[id number]]  (Abnormal)  (Susceptibility) Collected:  01/02/18 1621    Lab Status:  Final result Specimen:  Urine from Urine, Catheter Updated:  01/05/18 1426     Urine Culture --      >100,000 CFU/mL Enterococcus faecalis (A)    Susceptibility      Enterococcus faecalis     KARI     Ampicillin <=2 ug/ml Susceptible     Gentamicin High Level Synergy <=500 ug/ml Susceptible     Levofloxacin 2 ug/ml Susceptible     Linezolid 2 ug/ml Susceptible     Nitrofurantoin <=32 ug/ml Susceptible     Penicillin G 2 ug/ml Susceptible     Streptomycin High Level Synergy >1000 ug/ml Resistant     Tetracycline >8 ug/ml Resistant     Vancomycin 2 ug/ml Susceptible                    Urine Culture - Urine, Urine, Clean Catch [[id number]]  (Abnormal)  (Susceptibility) Collected:  01/02/18 2022    Lab Status:  Final result Specimen:  Urine from Urine, Clean Catch Updated:  01/05/18 1425     Urine Culture --      >100,000 CFU/mL Enterococcus faecalis (A)    Susceptibility      Enterococcus  faecalis     KARI     Ampicillin <=2 ug/ml Susceptible     Gentamicin High Level Synergy <=500 ug/ml Susceptible     Levofloxacin 2 ug/ml Susceptible     Linezolid 2 ug/ml Susceptible     Nitrofurantoin <=32 ug/ml Susceptible     Penicillin G 2 ug/ml Susceptible     Streptomycin High Level Synergy >1000 ug/ml Resistant     Tetracycline >8 ug/ml Resistant     Vancomycin 2 ug/ml Susceptible                    Clostridium Difficile Toxin - Stool, Per Rectum [[id number]] Collected:  01/04/18 1721    Lab Status:  Final result Specimen:  Stool from Per Rectum Updated:  01/04/18 2035    Narrative:       The following orders were created for panel order Clostridium Difficile Toxin - Stool, Per Rectum.  Procedure                               Abnormality         Status                     ---------                               -----------         ------                     Clostridium Difficile To...[[id number]]  Abnormal            Final result                 Please view results for these tests on the individual orders.    Clostridium Difficile Toxin, PCR - Stool, Per Rectum [[id number]]  (Abnormal) Collected:  01/04/18 1721    Lab Status:  Final result Specimen:  Stool from Per Rectum Updated:  01/04/18 2035     C. Difficile Toxins by PCR Detected (A)    Narrative:         Performance characteristics of test not established for patients <2 years of age.    Urine Culture - Urine, Urine, Clean Catch [[id number]]  (Abnormal)  (Susceptibility) Collected:  01/02/18 1104    Lab Status:  Final result Specimen:  Urine from Urine, Catheter Updated:  01/04/18 1422     Urine Culture --      10,000-20,000 CFU/mL Enterococcus faecalis (A)    Susceptibility      Enterococcus faecalis     KARI     Ampicillin <=2 ug/ml Susceptible     Gentamicin High Level Synergy <=500 ug/ml Susceptible     Levofloxacin <=1 ug/ml Susceptible     Linezolid 2 ug/ml Susceptible     Nitrofurantoin <=32 ug/ml Susceptible     Penicillin G 2 ug/ml Susceptible      Streptomycin High Level Synergy >1000 ug/ml Resistant     Tetracycline >8 ug/ml Resistant     Vancomycin 2 ug/ml Susceptible                    Influenza A & B, RT PCR - Swab, Nasopharynx [[id number]]  (Normal) Collected:  01/02/18 1737    Lab Status:  Final result Specimen:  Swab from Nasopharynx Updated:  01/02/18 1841     Influenza A PCR Not Detected     Influenza B PCR Not Detected          Radiology:  Imaging Results (last 72 hours)     ** No results found for the last 72 hours. **          IMPRESSION:     1.  Enterococcal faecalis acute cystitis.  Difficult to ascertain on admission as patient was septic.  Her urinalysis which was repeated twice was significant with numerous WBCs, and cultures have consistently grown Enterococcus faecalis, sensitive to penicillin.  Most recent positive culture from 1/2/18.  2.  C. difficile colitis with positive toxin on 1/4/18.  3.  Sepsis, present on admission.  Current normal lactic acid and pro-calcitonin level last on 1/4/18.  4.  Heroin overdose, IVDA.  5.  Leukocytosis, neutrophilic related to above issues.  6.  Anemia, chronic disease.  7.  Hypocalcemia, 8.2.  8.  Obesity.  9.  Polysubstance abuse.    RECOMMENDATIONS:    1.  Diagnostically, continue to follow patient's physical exam, CBC, CMP, CRP, pro-calcitonin level, lactic acid.  2.  Therapeutically, continue on amoxicillin 875 mg by mouth twice a day to continue until 1/10/17.  Continue on oral vancomycin at 250 mg by mouth 4 times a day until 1/11/18, then decrease to twice a day until 1/18/18, then once a day until 1/25/18, then every 3 days until 2/1/18.  Probiotics recommended.  3.  Drug rehab.    I discussed the patients findings and my recommendations with patient, family and nursing staff.    Increased risk for side effects of meds and readmission.    Thank you for asking me to see Charisma Cortez.  Our group would be pleased to follow this patient over the course of their hospitalization and assist  with outpatient antimicrobial therapy, as indicated.  Further recommendations depend on the results of the cultures and clinical course.   OK for discharge from ID standpoint with f/u in office in 2 weeks.    Pete Warner MD  1/8/2018

## 2018-01-09 VITALS
HEART RATE: 89 BPM | HEIGHT: 60 IN | RESPIRATION RATE: 16 BRPM | SYSTOLIC BLOOD PRESSURE: 158 MMHG | DIASTOLIC BLOOD PRESSURE: 96 MMHG | TEMPERATURE: 97.8 F | OXYGEN SATURATION: 93 % | WEIGHT: 132 LBS | BODY MASS INDEX: 25.91 KG/M2

## 2018-01-09 PROBLEM — E87.20 METABOLIC ACIDOSIS: Status: RESOLVED | Noted: 2018-01-02 | Resolved: 2018-01-09

## 2018-01-09 PROBLEM — N18.9 ACUTE RENAL FAILURE SUPERIMPOSED ON CHRONIC KIDNEY DISEASE (HCC): Status: RESOLVED | Noted: 2018-01-02 | Resolved: 2018-01-09

## 2018-01-09 PROBLEM — A41.9 SEPSIS (HCC): Status: RESOLVED | Noted: 2018-01-02 | Resolved: 2018-01-09

## 2018-01-09 PROBLEM — N17.9 ACUTE RENAL FAILURE SUPERIMPOSED ON CHRONIC KIDNEY DISEASE (HCC): Status: RESOLVED | Noted: 2018-01-02 | Resolved: 2018-01-09

## 2018-01-09 PROBLEM — G93.40 ENCEPHALOPATHY: Status: RESOLVED | Noted: 2018-01-02 | Resolved: 2018-01-09

## 2018-01-09 PROBLEM — E83.42 HYPOMAGNESEMIA: Status: RESOLVED | Noted: 2018-01-09 | Resolved: 2018-01-09

## 2018-01-09 PROBLEM — E83.42 HYPOMAGNESEMIA: Status: ACTIVE | Noted: 2018-01-09

## 2018-01-09 PROBLEM — I95.9 HYPOTENSION: Status: RESOLVED | Noted: 2017-03-23 | Resolved: 2018-01-09

## 2018-01-09 PROBLEM — E86.0 DEHYDRATION: Status: RESOLVED | Noted: 2017-03-23 | Resolved: 2018-01-09

## 2018-01-09 PROBLEM — T50.901A DRUG OVERDOSE: Status: RESOLVED | Noted: 2018-01-02 | Resolved: 2018-01-09

## 2018-01-09 LAB
GLUCOSE BLDC GLUCOMTR-MCNC: 102 MG/DL (ref 70–130)
GLUCOSE BLDC GLUCOMTR-MCNC: 128 MG/DL (ref 70–130)
MAGNESIUM SERPL-MCNC: 1.5 MG/DL (ref 1.3–2.7)

## 2018-01-09 PROCEDURE — 99239 HOSP IP/OBS DSCHRG MGMT >30: CPT | Performed by: NURSE PRACTITIONER

## 2018-01-09 PROCEDURE — 25010000002 HEPARIN (PORCINE) PER 1000 UNITS: Performed by: INTERNAL MEDICINE

## 2018-01-09 PROCEDURE — 83735 ASSAY OF MAGNESIUM: CPT

## 2018-01-09 PROCEDURE — 82962 GLUCOSE BLOOD TEST: CPT

## 2018-01-09 RX ORDER — SERTRALINE HYDROCHLORIDE 100 MG/1
100 TABLET, FILM COATED ORAL DAILY
Start: 2018-01-10

## 2018-01-09 RX ORDER — SACCHAROMYCES BOULARDII 250 MG
250 CAPSULE ORAL 2 TIMES DAILY
Qty: 60 CAPSULE | Refills: 1 | Status: SHIPPED | OUTPATIENT
Start: 2018-01-09

## 2018-01-09 RX ORDER — GABAPENTIN 600 MG/1
300 TABLET ORAL 3 TIMES DAILY
Start: 2018-01-09

## 2018-01-09 RX ORDER — CLONAZEPAM 0.5 MG/1
0.5 TABLET ORAL EVERY 12 HOURS SCHEDULED
Start: 2018-01-09 | End: 2018-08-06

## 2018-01-09 RX ORDER — AMOXICILLIN 875 MG/1
875 TABLET, COATED ORAL EVERY 12 HOURS SCHEDULED
Qty: 4 TABLET | Refills: 0 | Status: SHIPPED | OUTPATIENT
Start: 2018-01-09 | End: 2018-08-06

## 2018-01-09 RX ADMIN — LEVETIRACETAM 500 MG: 500 TABLET, FILM COATED ORAL at 09:51

## 2018-01-09 RX ADMIN — Medication 250 MG: at 09:51

## 2018-01-09 RX ADMIN — CLONAZEPAM 0.5 MG: 0.5 TABLET ORAL at 09:51

## 2018-01-09 RX ADMIN — VANCOMYCIN 250 MG: KIT at 11:53

## 2018-01-09 RX ADMIN — GABAPENTIN 300 MG: 300 CAPSULE ORAL at 09:51

## 2018-01-09 RX ADMIN — SERTRALINE HYDROCHLORIDE 100 MG: 100 TABLET ORAL at 09:51

## 2018-01-09 RX ADMIN — AMOXICILLIN 500 MG: 250 CAPSULE ORAL at 06:10

## 2018-01-09 RX ADMIN — HEPARIN SODIUM 5000 UNITS: 5000 INJECTION, SOLUTION INTRAVENOUS; SUBCUTANEOUS at 09:51

## 2018-01-09 RX ADMIN — NICOTINE 1 PATCH: 21 PATCH, EXTENDED RELEASE TRANSDERMAL at 09:51

## 2018-01-09 RX ADMIN — HYDROCODONE BITARTRATE AND ACETAMINOPHEN 1 TABLET: 7.5; 325 TABLET ORAL at 06:10

## 2018-01-09 RX ADMIN — MAGNESIUM SULFATE IN WATER 4 G: 40 INJECTION, SOLUTION INTRAVENOUS at 09:52

## 2018-01-09 RX ADMIN — HYDROCODONE BITARTRATE AND ACETAMINOPHEN 1 TABLET: 7.5; 325 TABLET ORAL at 11:53

## 2018-01-09 RX ADMIN — VANCOMYCIN 250 MG: KIT at 06:11

## 2018-01-09 RX ADMIN — ASPIRIN 81 MG: 81 TABLET, COATED ORAL at 09:51

## 2018-01-09 RX ADMIN — PANTOPRAZOLE SODIUM 40 MG: 40 TABLET, DELAYED RELEASE ORAL at 06:10

## 2018-01-09 NOTE — DISCHARGE SUMMARY
[facility] Medicine Services  DISCHARGE SUMMARY    Patient Name: Charisma Cortez  : 1960  MRN: [id number]    Date of Admission: 2018  Date of Discharge: 18  Primary Care Physician: ANDREA Zhong    Consults     Date and Time Order Name Status Description    2018 Inpatient Consult to Infectious Diseases Completed     2018 1547 Inpatient Consult to Nephrology          Hospital Course     Presenting Problem:   Drug overdose [T50.901A]    Active Hospital Problems (** Indicates Principal Problem)    Diagnosis Date Noted   • UTI (urinary tract infection) [N39.0] 2017   • Type 2 diabetes mellitus [E11.9] 2017   • Tobacco use disorder [F17.200] 2017   • Seizure disorder [G40.909] 2017   • COPD (chronic obstructive pulmonary disease) [J44.9] 2017      Resolved Hospital Problems    Diagnosis Date Noted Date Resolved   • Hypomagnesemia [E83.42] 2018   • Drug overdose [T50.901A] 2018   • Acute renal failure superimposed on chronic kidney disease [N17.9, N18.9] 2018   • Sepsis [A41.9] 2018   • Metabolic acidosis [E87.2] 2018   • Encephalopathy [G93.40] 2018   • Hypotension [I95.9] 2017   • Dehydration [E86.0] 2017          Hospital Course:  Charisma Cortez is a 57 y.o. female who presents to the ED after being found altered at home by her .  She has a history of polysubstance abuse and reported last heroine use 17, but  not always aware when she uses.  EMS was contacted as there were concerns of seizure activity although she was able to walk during this activity.  She was hypotensive and her mental status improved with narcan.  ED eval revealed sepsis and RAGHU  and she was admitted to ICU as she needed pressors.  Cdiff toxin returned positive and antibiotics were continued  for UTI.  ID was consulted and recommended oral vancomycin taper and continuing amoxicillin through 1.10.18.  She was able to be moved out of ICU.  Her creatinine improved and did not require dialysis.  She was stable and ready for dc home 1.9.18    Patient reported this was an accidental overdose and denied any suicidal ideations.  Urine drug screen on admission was opiate positive.  She was not interested in rehab and reported she was aware of resources available when she was ready.        Day of Discharge     HPI:   Hospital follow up for cdiff/pneumonia    Review of Systems  Gen- No fevers, chills  CV- No chest pain, palpitations  Resp- No cough, dyspnea  GI- No N/V/D, stools soft, no abd pain    Otherwise ROS is negative except as mentioned in the HPI.    Vital Signs:   Temp:  [97.8 °F (36.6 °C)-98.1 °F (36.7 °C)] 97.8 °F (36.6 °C)  Heart Rate:  [78-84] 80  Resp:  [16-20] 16  BP: (142-158)/(75-96) 158/96     Physical Exam:  Constitutional: No acute distress, sleeping on arrival, easily awakened  Respiratory: Clear to auscultation bilaterally, respiratory effort normal, no wheezing  Cardiovascular: RRR, no murmurs, rubs, or gallops, palpable pedal pulses bilaterally  Gastrointestinal: Positive bowel sounds, soft, nontender, nondistended  Musculoskeletal: No bilateral ankle edema  Psychiatric: Appropriate affect, cooperative  Neurologic: Oriented x 3, strength symmetric in all extremities, speech clear  Skin: No rashes      Pertinent  and/or Most Recent Results       Results from last 7 days  Lab Units 01/08/18  0547 01/07/18  0338 01/06/18  0816 01/05/18  1946 01/05/18  0331 01/04/18  1644 01/04/18  0424  01/03/18  0326 01/02/18  1928  01/02/18  1056   WBC 10*3/mm3 12.82* 14.93* 18.45*  --  21.55*  --  18.11*  --  20.75*  --   --  24.20*   HEMOGLOBIN g/dL 8.1* 8.4* 8.3*  --  8.2*  --  7.7*  --  8.9*  --   --  10.0*   HEMATOCRIT % 26.5* 27.6* 26.6*  --  25.9*  --  23.3*  --  28.1*  --   --  32.0*   PLATELETS  10*3/mm3 268 289 292  --  288  --  291  --  351  --   --  387   SODIUM mmol/L 139 140 140  --  147*  --  148*  --  142 139  < >  --    POTASSIUM mmol/L 4.0 3.6 4.1 4.0 3.1* 2.9* 2.7*  < > 3.4* 5.1  < >  --    CHLORIDE mmol/L 111* 112* 107  --  104  --  103  --  112* 117*  < >  --    CO2 mmol/L 22.0 25.0 26.0  --  35.0*  --  39.0*  --  17.0* <10.0*  < >  --    BUN mg/dL 12 11 10  --  15  --  35*  --  76* 75*  < >  --    CREATININE mg/dL 0.70 0.80 0.70  --  0.60  --  1.10  --  4.90* 7.40*  < >  --    GLUCOSE mg/dL 86 82 111*  --  100  --  128*  --  161* 130*  < >  --    CALCIUM mg/dL 8.2* 8.0* 8.3*  --  7.5*  --  7.2*  --  7.8* 8.2*  < >  --    < > = values in this interval not displayed.    Results from last 7 days  Lab Units 01/07/18  0338 01/03/18 0326 01/02/18  1614 01/02/18  1132 01/02/18  1056   BILIRUBIN mg/dL 0.2* 0.2* 0.0* 0.0*  --    ALK PHOS U/L 82 114* 120* 120*  --    ALT (SGPT) U/L 17 28 32 27  --    AST (SGOT) U/L 13 54* 67* 53*  --    PROTIME Seconds 12.0* 13.3*  --   --  12.3*   INR  1.10 1.21  --   --  1.12   APTT seconds  --  34.2*  --   --  31.0       Results from last 7 days  Lab Units 01/03/18 0326 01/02/18  1132   HEMOGLOBIN A1C % 5.60  --    TROPONIN I ng/mL  --  0.039     Brief Urine Lab Results  (Last result in the past 365 days)      Color   Clarity   Blood   Leuk Est   Nitrite   Protein   CREAT   Urine HCG        01/02/18 2022 Yellow Turbid(A) Large (3+)(A) Large (3+)(A) Negative 100 mg/dL (2+)(A)         01/02/18 2022             75.7             Microbiology Results Abnormal     Procedure Component Value - Date/Time    Blood Culture - Blood, [[id number]]  (Normal) Collected:  01/02/18 1245    Lab Status:  Final result Specimen:  Blood from Arm, Left Updated:  01/07/18 1401     Blood Culture No growth at 5 days    Narrative:       Aerobic bottle only    Blood Culture - Blood, [[id number]]  (Normal) Collected:  01/02/18 1320    Lab Status:  Final result Specimen:  Blood from Arm, Left  Updated:  01/07/18 1401     Blood Culture No growth at 5 days    Urine Culture - Urine, Urine, Clean Catch [[id number]]  (Abnormal)  (Susceptibility) Collected:  01/02/18 1621    Lab Status:  Final result Specimen:  Urine from Urine, Catheter Updated:  01/05/18 1426     Urine Culture --      >100,000 CFU/mL Enterococcus faecalis (A)    Susceptibility      Enterococcus faecalis     KARI     Ampicillin <=2 ug/ml Susceptible     Gentamicin High Level Synergy <=500 ug/ml Susceptible     Levofloxacin 2 ug/ml Susceptible     Linezolid 2 ug/ml Susceptible     Nitrofurantoin <=32 ug/ml Susceptible     Penicillin G 2 ug/ml Susceptible     Streptomycin High Level Synergy >1000 ug/ml Resistant     Tetracycline >8 ug/ml Resistant     Vancomycin 2 ug/ml Susceptible                    Urine Culture - Urine, Urine, Clean Catch [[id number]]  (Abnormal)  (Susceptibility) Collected:  01/02/18 2022    Lab Status:  Final result Specimen:  Urine from Urine, Clean Catch Updated:  01/05/18 1425     Urine Culture --      >100,000 CFU/mL Enterococcus faecalis (A)    Susceptibility      Enterococcus faecalis     KARI     Ampicillin <=2 ug/ml Susceptible     Gentamicin High Level Synergy <=500 ug/ml Susceptible     Levofloxacin 2 ug/ml Susceptible     Linezolid 2 ug/ml Susceptible     Nitrofurantoin <=32 ug/ml Susceptible     Penicillin G 2 ug/ml Susceptible     Streptomycin High Level Synergy >1000 ug/ml Resistant     Tetracycline >8 ug/ml Resistant     Vancomycin 2 ug/ml Susceptible                    Clostridium Difficile Toxin - Stool, Per Rectum [[id number]] Collected:  01/04/18 1721    Lab Status:  Final result Specimen:  Stool from Per Rectum Updated:  01/04/18 2035    Narrative:       The following orders were created for panel order Clostridium Difficile Toxin - Stool, Per Rectum.  Procedure                               Abnormality         Status                     ---------                               -----------         ------                      Clostridium Difficile To...[[id number]]  Abnormal            Final result                 Please view results for these tests on the individual orders.    Clostridium Difficile Toxin, PCR - Stool, Per Rectum [[id number]]  (Abnormal) Collected:  01/04/18 1721    Lab Status:  Final result Specimen:  Stool from Per Rectum Updated:  01/04/18 2035     C. Difficile Toxins by PCR Detected (A)    Narrative:         Performance characteristics of test not established for patients <2 years of age.    Urine Culture - Urine, Urine, Clean Catch [[id number]]  (Abnormal)  (Susceptibility) Collected:  01/02/18 1104    Lab Status:  Final result Specimen:  Urine from Urine, Catheter Updated:  01/04/18 1422     Urine Culture --      10,000-20,000 CFU/mL Enterococcus faecalis (A)    Susceptibility      Enterococcus faecalis     KARI     Ampicillin <=2 ug/ml Susceptible     Gentamicin High Level Synergy <=500 ug/ml Susceptible     Levofloxacin <=1 ug/ml Susceptible     Linezolid 2 ug/ml Susceptible     Nitrofurantoin <=32 ug/ml Susceptible     Penicillin G 2 ug/ml Susceptible     Streptomycin High Level Synergy >1000 ug/ml Resistant     Tetracycline >8 ug/ml Resistant     Vancomycin 2 ug/ml Susceptible                    Influenza A & B, RT PCR - Swab, Nasopharynx [[id number]]  (Normal) Collected:  01/02/18 1737    Lab Status:  Final result Specimen:  Swab from Nasopharynx Updated:  01/02/18 1841     Influenza A PCR Not Detected     Influenza B PCR Not Detected          Imaging Results (all)     Procedure Component Value Units Date/Time    XR Chest 1 View [[id number]] Collected:  01/02/18 1347     Updated:  01/02/18 1437    Narrative:       EXAMINATION: XR CHEST 1 VW-01/02/2018:      INDICATION: Severe sepsis, triage protocol.      COMPARISON: NONE.     FINDINGS: The cardiac silhouette is normal. There is no pulmonary  inflammatory process. There is no mass or effusion.           Impression:       No active disease.     D:   01/02/2018  E:  01/02/2018     This report was finalized on 1/2/2018 2:35 PM by Dr. Nahid Menjivar MD.       CT Head Without Contrast [[id number]] Collected:  01/02/18 1531     Updated:  01/02/18 1538    Narrative:       EXAMINATION: CT HEAD WO CONTRAST- 01/02/2018     INDICATION: AMS      TECHNIQUE: CT scan of the head was performed at 5 mm intervals. No  intravenous contrast was utilized.     The radiation dose reduction device was turned on for each scan per the  ALARA (As Low as Reasonably Achievable) protocol.     COMPARISON: 12/24/2013     FINDINGS: There is no intracranial mass. There is no hemorrhage. There  is no midline shift. There is an arachnoid cyst once again noted at the  anterior portion of the right middle cranial fossa. There is no  extra-axial fluid collection.       Impression:       There is a congenital anomaly of arachnoid cyst in the right  middle cranial fossa. There are no acute findings and there has been no  change since 12/24/2013.     D:  01/02/2018  E:  01/02/2018        This report was finalized on 1/2/2018 3:36 PM by Dr. Nahid Menjivar MD.       CT Chest Without Contrast [[id number]] Collected:  01/02/18 1532     Updated:  01/02/18 1538    Narrative:       EXAMINATION: CT CHEST WO CONTRAST- 01/02/2018     INDICATION: SOB      TECHNIQUE: CT scan of the chest was performed without contrast.     The radiation dose reduction device was turned on for each scan per the  ALARA (As Low as Reasonably Achievable) protocol.     COMPARISON: NONE     FINDINGS: There is no axillary lymphadenopathy. There are small  mediastinal and hilar nodes which are not abnormal based on size  criteria. There is no pericardial or pleural effusion. Images displayed  at lung window settings reveal patchy bibasilar airspace disease without  consolidation. There are areas of mild basilar pleural thickening.       Impression:       Patchy bibasilar airspace disease, left greater than right,  but without consolidation or  effusion.     D:  01/02/2018  E:  01/02/2018        This report was finalized on 1/2/2018 3:36 PM by Dr. Nahid Menjivar MD.       US Renal Bilateral [[id number]] Collected:  01/03/18 0818     Updated:  01/03/18 0947    Narrative:       EXAMINATION: US RENAL BILATERAL-     INDICATION: A41.9-Sepsis, unspecified organism; N39.0-Urinary tract  infection, site not specified; R31.9-Hematuria, unspecified;  M62.82-Rhabdomyolysis; R40.2413-Jose coma scale score 13-15, at  hospital admission; N17.9-Acute kidney failure, unspecified;  A41.9-Sepsis, unspecified organism; R65.21-Severe sepsis with septic  shock; J18.1-Lobar pneumonia, unspecified organism.      TECHNIQUE: Ultrasound kidneys and urinary bladder.     COMPARISON: None.     FINDINGS:   Right kidney measures 11.3 cm in length without evidence of  hydronephrosis, contour deforming mass or obvious calculi.  Appropriate  flow on Doppler interrogation.     Left kidney measures 10.6 cm in length without evidence of  hydronephrosis, contour deforming mass or obvious calculi. Appropriate  flow on Doppler interrogation. Trace nonspecific fluid seen in  perinephric location anterior to the left kidney.     Urinary bladder is decompressed and unremarkable with Joyner catheter in  situ.       Impression:       1. No hydronephrosis.  2. Nonspecific trace free fluid anteriorly adjacent to left kidney.      D:  01/03/2018  E:  01/03/2018     This report was finalized on 1/3/2018 9:45 AM by Dr. Jose Everett.       XR Chest 1 View [[id number]] Collected:  01/02/18 1852     Updated:  01/03/18 1517    Narrative:       EXAMINATION: XR CHEST 1 VW-      INDICATION: Central line placement; A41.9-Sepsis, unspecified organism;  N39.0-Urinary tract infection, site not specified; R31.9-Hematuria,  unspecified; M62.82-Rhabdomyolysis; R40.2413-[place] coma scale score  13-15, at hospital admission; N17.9-Acute kidney failure, unspecified;  R65.21-Severe sepsis with septic shock; J18.1-Lobar  pneumonia,  unspecified organism.     COMPARISON: None.     FINDINGS:   1. A right IJ catheter has been placed from the right internal jugular  approach and the tip is in the SVC above the right atrium in perfect  position. There is no pneumothorax.  2. Slight increase interstitial markings are seen diffusely in the mid  and lower chest.       Impression:       1. Right IJ deep line perfectly positioned above the right atrium in the  lower SVC.  2. No pneumothorax.  3. Mild diffuse interstitial abnormality seen in the mid and lower lung  zones.     D:  01/02/2018  E:  01/03/2018     This report was finalized on 1/3/2018 3:14 PM by Dr. Daniel Arora MD.       XR Chest 1 View [[id number]] Collected:  01/04/18 0845     Updated:  01/04/18 2314    Narrative:       EXAMINATION: XR CHEST 1 VW-01/04/2018:      INDICATION: Respiratory failure; A41.9-Sepsis, unspecified organism;  N39.0-Urinary tract infection, site not specified; R31.9-Hematuria,  unspecified; M62.82-Rhabdomyolysis; R40.2413-[place] coma scale score  13-15, at hospital admission; N17.9-Acute kidney failure, unspecified;  A41.9-Sepsis, unspecified organism; R65.21-Severe sepsis with septic  shock; J18.1-Lobar pneumonia, unspecified organism; J18.1-Lobar.       COMPARISON: 01/02/2018.     FINDINGS: Right IJ catheter tip remains at the level of cavoatrial  junction. The heart is normal in size. There is mildly increase in  interstitial disease, particularly of the upper lungs perhaps mild  interstitial edema, possibly early changes of ARDS or atypical  pneumonia. No lung consolidation, effusion or pneumothorax is seen.        Impression:       Increased diffuse interstitial disease, perhaps developing  interstitial edema.     D:  01/04/2018  E:  01/04/2018     This report was finalized on 1/4/2018 11:12 PM by DR. Sekou Wan MD.       CT Abdomen Pelvis Without Contrast [[id number]] Collected:  01/02/18 1305     Updated:  01/05/18 0416    Narrative:       EXAM:   CT Abdomen and Pelvis Without Intravenous Contrast    EXAM DATE/TIME:  1/2/2018 1:05 PM    CLINICAL HISTORY:  57 years old, female; Pain; Abdominal pain; Generalized;   Additional info: SOB    TECHNIQUE:  Axial computed tomography images of the abdomen and pelvis without   intravenous contrast.  All CT scans at this facility use one or more dose   reduction techniques, viz.: automated exposure control; ma/kV adjustment per   patient size (including targeted exams where dose is matched to indication;   i.e. head); or iterative reconstruction technique.  Coronal reformatted images   were created and reviewed.    COMPARISON:  No relevant prior studies available.    FINDINGS:    Lower thorax:  Bibasilar atelectasis or scarring.     ABDOMEN:    Liver:  No acute abnormality.    Gallbladder and bile ducts:  Previous cholecystectomy.  No biliary ductal   dilatation.    Pancreas:  Unremarkable.  No ductal dilation.    Spleen:  No acute abnormality.    Adrenals:  Unremarkable.  No mass.    Kidneys and ureters:  A few tiny punctate nonobstructing bilateral renal   calculi.  Nonspecific bilateral perinephric fat stranding.  No hydronephrosis,   hydroureter or distal urinary calculus.    Stomach and bowel:  No significant large or small bowel distention.  No   evidence of diverticulitis.    Appendix:  No findings to suggest acute appendicitis.     PELVIS:    Bladder:  Unremarkable.  No stones.    Reproductive:  Previous hysterectomy.     ABDOMEN and PELVIS:    Intraperitoneal space:  No significant fluid collection.  No free air.    Bones/joints:  No acute osseous abnormality.  Old mild L1 vertebral body   compression deformity.    Soft tissues:  No significant soft tissue abnormalities.    Vasculature:  Atherosclerotic vascular calcifications are noted.  No aortic   aneurysm.    Lymph nodes:  No enlarged lymph nodes.      Impression:       1.  A few tiny punctate nonobstructing bilateral renal calculi.  2.  Nonspecific  bilateral perinephric fat stranding.  Pyelonephritis/UTI is not   excluded. Recommend clinical correlation.  3.  Previous cholecystectomy and hysterectomy.    THIS DOCUMENT HAS BEEN ELECTRONICALLY SIGNED BY THERESA LUCAS JR. MD    XR Abdomen KUB [[id number]] Collected:  01/05/18 0246     Updated:  01/05/18 0418    Narrative:       EXAM:  XR Abdomen, 1 View    EXAM DATE/TIME:  1/5/2018 2:46 AM    CLINICAL HISTORY:  57 years old, female; Sepsis, unspecified organism; Sepsis,   unspecified organism; Lobar pneumonia, unspecified organism; Lobar pneumonia,   unspecified organism; Rhabdomyolysis; Acute kidney failure, unspecified; Acute   kidney failure, unspecified; Chronic kidney disease, unspecified; Urinary tract   infection, site not specified; Hematuria, unspecified; Severe sepsis with   septic shock; Jose coma scale score 13-15, at hospital admission; Pain;   Abdominal pain; Flank; Left lower quadrant (llq); Additional info: Left lower   quad/ flank abdominal pain    TECHNIQUE:  Frontal supine view of the abdomen/pelvis.    COMPARISON:  No relevant prior studies available.    FINDINGS:    Gastrointestinal tract:  Gas within stomach, small bowel and colon with mild   gaseous distention of the abdomen.    Organs:  Cholecystectomy clips are seen in the right upper quadrant.    Bones/joints:  No acute osseous abnormality.      Impression:         Mild nonspecific ileus.    THIS DOCUMENT HAS BEEN ELECTRONICALLY SIGNED BY THERESA LUCAS JR. MD          Results for orders placed during the hospital encounter of 01/02/18   Adult Transthoracic Echo Complete W/ Cont if Necessary Per Protocol    Narrative · Left ventricular systolic function is hyperdynamic (EF > 70).  · Mild mitral valve regurgitation is present  · Mild tricuspid valve regurgitation is present.  · abnormal LVOT contour without significant obstruction            Discharge Details      Charisma Cortez   Home Medication Instructions [place]:[id number]     Printed on:01/09/18 1016   Medication Information                      amoxicillin (AMOXIL) 875 MG tablet  Take 1 tablet by mouth Every 12 (Twelve) Hours.             aspirin 81 MG EC tablet  Take 81 mg by mouth Daily.             clonazePAM (KlonoPIN) 0.5 MG tablet  Take 1 tablet by mouth Every 12 (Twelve) Hours.             gabapentin (NEURONTIN) 600 MG tablet  Take 0.5 tablets by mouth 3 (Three) Times a Day.             HYDROcodone-acetaminophen (NORCO) 7.5-325 MG per tablet  Take 1 tablet by mouth 2 (Two) Times a Day As Needed for Moderate Pain .             levETIRAcetam (KEPPRA) 500 MG tablet  Take 500 mg by mouth 2 (Two) Times a Day.             lisinopril (PRINIVIL,ZESTRIL) 10 MG tablet  Take 10 mg by mouth Daily.             omeprazole (priLOSEC) 20 MG capsule  Take 20 mg by mouth Daily.             saccharomyces boulardii (FLORASTOR) 250 MG capsule  Take 1 capsule by mouth 2 (Two) Times a Day.             sertraline (ZOLOFT) 100 MG tablet  Take 1 tablet by mouth Daily.             tiotropium (SPIRIVA) 18 MCG per inhalation capsule  Place 1 capsule into inhaler and inhale Daily.             traZODone (DESYREL) 100 MG tablet  Take 100 mg by mouth Every Night.             vancomycin 50 MG/ML solution oral solution  250mg po QID until 1.11.18, then decrease to BID until 1.18.18, then once a day until 1.25.18, then every 3d until 2.1.18                   Discharge Disposition:  Home or Self Care    Discharge Diet:  Diet Instructions     Diet: Regular, Consistent Carbohydrate; Thin       Discharge Diet:   Regular  Consistent Carbohydrate      Fluid Consistency:  Thin                 Discharge Activity:   Activity Instructions     Activity as Tolerated                     No future appointments.    Additional Instructions for the Follow-ups that You Need to Schedule     Discharge Follow-up with PCP    As directed    Follow Up Details:  1 week           Discharge Follow-up with Specified Provider: ID; 2 Weeks     As directed    To:  ID    Follow Up:  2 Weeks    Follow Up Details:  pt prefers afternoon appointment                     Time Spent on Discharge:  35 minutes    ANDREA Peterson  01/09/18  10:10 AM

## 2018-01-09 NOTE — PAYOR COMM NOTE
"Notification of discharge, d/c summary included  auth #[id number]     Thank You,  Roxanna Crooks RN  [phone]  Fax [phone]    Lexi Person (57 y.o. Female)     Date of Birth Social Security Number Address Home Phone MRN    1960  [address] [place], [place] [address] [phone] [id number]    Sabianist Marital Status          None        Admission Date Admission Type Admitting Provider Attending Provider Department, Room/Bed    18 Emergency Lacey Candelario MD Brown, Hannah, MD [address], S483/1    Discharge Date Discharge Disposition Discharge Destination         Home or Self Care             Attending Provider: Lacey Candelario MD     Allergies:  Keflex [Cephalexin], Sulfa Antibiotics    Isolation:  Spore   Infection:  C.difficile (18)   Code Status:  FULL    Ht:  152.4 cm (60\")   Wt:  59.9 kg (132 lb)    Admission Cmt:  None   Principal Problem:  None                Active Insurance as of 2018     Primary Coverage     Payor Plan Insurance Group Employer/Plan Group    [employer] [place] [place] [employer] [place] [place]      Payor Plan Address Payor Plan Phone Number Effective From Effective To    [address] [address]  [address]     [place] [address]       Subscriber Name Subscriber Birth Date Member ID       LEXI PERSON 1960 [id number]                 Emergency Contacts      (Rel.) Home Phone Work Phone Mobile Phone    Neelima Martínez (Mother) [phone] -- --               Discharge Summary      ANDREA Peterson at 2018  9:42 AM              [facility] Medicine Services  DISCHARGE SUMMARY    Patient Name: Lexi Person  : 1960  MRN: [id number]    Date of Admission: 2018  Date of Discharge: 18  Primary Care Physician: ANDREA Zhong    Consults     Date and Time Order Name Status Description    2018 Inpatient Consult to Infectious Diseases " Completed     1/2/2018 1547 Inpatient Consult to Nephrology          Hospital Course     Presenting Problem:   Drug overdose [T50.901A]    Active Hospital Problems (** Indicates Principal Problem)    Diagnosis Date Noted   • UTI (urinary tract infection) [N39.0] 03/23/2017   • Type 2 diabetes mellitus [E11.9] 03/23/2017   • Tobacco use disorder [F17.200] 03/23/2017   • Seizure disorder [G40.909] 03/23/2017   • COPD (chronic obstructive pulmonary disease) [J44.9] 03/23/2017      Resolved Hospital Problems    Diagnosis Date Noted Date Resolved   • Hypomagnesemia [E83.42] 01/09/2018 01/09/2018   • Drug overdose [T50.901A] 01/02/2018 01/09/2018   • Acute renal failure superimposed on chronic kidney disease [N17.9, N18.9] 01/02/2018 01/09/2018   • Sepsis [A41.9] 01/02/2018 01/09/2018   • Metabolic acidosis [E87.2] 01/02/2018 01/09/2018   • Encephalopathy [G93.40] 01/02/2018 01/09/2018   • Hypotension [I95.9] 03/23/2017 01/09/2018   • Dehydration [E86.0] 03/23/2017 01/09/2018          Hospital Course:  Charisma Cortez is a 57 y.o. female who presents to the ED after being found altered at home by her .  She has a history of polysubstance abuse and reported last heroine use 12.29.17, but  not always aware when she uses.  EMS was contacted as there were concerns of seizure activity although she was able to walk during this activity.  She was hypotensive and her mental status improved with narcan.  ED eval revealed sepsis and RAGHU  and she was admitted to ICU as she needed pressors.  Cdiff toxin returned positive and antibiotics were continued for UTI.  ID was consulted and recommended oral vancomycin taper and continuing amoxicillin through 1.10.18.  She was able to be moved out of ICU.  Her creatinine improved and did not require dialysis.  She was stable and ready for dc home 1.9.18    Patient reported this was an accidental overdose and denied any suicidal ideations.  Urine drug screen on admission was  opiate positive.  She was not interested in rehab and reported she was aware of resources available when she was ready.        Day of Discharge     HPI:   Hospital follow up for cdiff/pneumonia    Review of Systems  Gen- No fevers, chills  CV- No chest pain, palpitations  Resp- No cough, dyspnea  GI- No N/V/D, stools soft, no abd pain    Otherwise ROS is negative except as mentioned in the HPI.    Vital Signs:   Temp:  [97.8 °F (36.6 °C)-98.1 °F (36.7 °C)] 97.8 °F (36.6 °C)  Heart Rate:  [78-84] 80  Resp:  [16-20] 16  BP: (142-158)/(75-96) 158/96     Physical Exam:  Constitutional: No acute distress, sleeping on arrival, easily awakened  Respiratory: Clear to auscultation bilaterally, respiratory effort normal, no wheezing  Cardiovascular: RRR, no murmurs, rubs, or gallops, palpable pedal pulses bilaterally  Gastrointestinal: Positive bowel sounds, soft, nontender, nondistended  Musculoskeletal: No bilateral ankle edema  Psychiatric: Appropriate affect, cooperative  Neurologic: Oriented x 3, strength symmetric in all extremities, speech clear  Skin: No rashes      Pertinent  and/or Most Recent Results       Results from last 7 days  Lab Units 01/08/18  0547 01/07/18  0338 01/06/18  0816 01/05/18  1946 01/05/18  0331 01/04/18  1644 01/04/18  0424  01/03/18  0326 01/02/18  1928  01/02/18  1056   WBC 10*3/mm3 12.82* 14.93* 18.45*  --  21.55*  --  18.11*  --  20.75*  --   --  24.20*   HEMOGLOBIN g/dL 8.1* 8.4* 8.3*  --  8.2*  --  7.7*  --  8.9*  --   --  10.0*   HEMATOCRIT % 26.5* 27.6* 26.6*  --  25.9*  --  23.3*  --  28.1*  --   --  32.0*   PLATELETS 10*3/mm3 268 289 292  --  288  --  291  --  351  --   --  387   SODIUM mmol/L 139 140 140  --  147*  --  148*  --  142 139  < >  --    POTASSIUM mmol/L 4.0 3.6 4.1 4.0 3.1* 2.9* 2.7*  < > 3.4* 5.1  < >  --    CHLORIDE mmol/L 111* 112* 107  --  104  --  103  --  112* 117*  < >  --    CO2 mmol/L 22.0 25.0 26.0  --  35.0*  --  39.0*  --  17.0* <10.0*  < >  --    BUN mg/dL 12  11 10  --  15  --  35*  --  76* 75*  < >  --    CREATININE mg/dL 0.70 0.80 0.70  --  0.60  --  1.10  --  4.90* 7.40*  < >  --    GLUCOSE mg/dL 86 82 111*  --  100  --  128*  --  161* 130*  < >  --    CALCIUM mg/dL 8.2* 8.0* 8.3*  --  7.5*  --  7.2*  --  7.8* 8.2*  < >  --    < > = values in this interval not displayed.    Results from last 7 days  Lab Units 01/07/18  0338 01/03/18  0326 01/02/18  1614 01/02/18  1132 01/02/18  1056   BILIRUBIN mg/dL 0.2* 0.2* 0.0* 0.0*  --    ALK PHOS U/L 82 114* 120* 120*  --    ALT (SGPT) U/L 17 28 32 27  --    AST (SGOT) U/L 13 54* 67* 53*  --    PROTIME Seconds 12.0* 13.3*  --   --  12.3*   INR  1.10 1.21  --   --  1.12   APTT seconds  --  34.2*  --   --  31.0       Results from last 7 days  Lab Units 01/03/18  0326 01/02/18  1132   HEMOGLOBIN A1C % 5.60  --    TROPONIN I ng/mL  --  0.039     Brief Urine Lab Results  (Last result in the past 365 days)      Color   Clarity   Blood   Leuk Est   Nitrite   Protein   CREAT   Urine HCG        01/02/18 2022 Yellow Turbid(A) Large (3+)(A) Large (3+)(A) Negative 100 mg/dL (2+)(A)         01/02/18 2022             75.7             Microbiology Results Abnormal     Procedure Component Value - Date/Time    Blood Culture - Blood, [[id number]]  (Normal) Collected:  01/02/18 1245    Lab Status:  Final result Specimen:  Blood from Arm, Left Updated:  01/07/18 1401     Blood Culture No growth at 5 days    Narrative:       Aerobic bottle only    Blood Culture - Blood, [[id number]]  (Normal) Collected:  01/02/18 1320    Lab Status:  Final result Specimen:  Blood from Arm, Left Updated:  01/07/18 1401     Blood Culture No growth at 5 days    Urine Culture - Urine, Urine, Clean Catch [[id number]]  (Abnormal)  (Susceptibility) Collected:  01/02/18 1621    Lab Status:  Final result Specimen:  Urine from Urine, Catheter Updated:  01/05/18 1426     Urine Culture --      >100,000 CFU/mL Enterococcus faecalis (A)    Susceptibility      Enterococcus faecalis      KARI     Ampicillin <=2 ug/ml Susceptible     Gentamicin High Level Synergy <=500 ug/ml Susceptible     Levofloxacin 2 ug/ml Susceptible     Linezolid 2 ug/ml Susceptible     Nitrofurantoin <=32 ug/ml Susceptible     Penicillin G 2 ug/ml Susceptible     Streptomycin High Level Synergy >1000 ug/ml Resistant     Tetracycline >8 ug/ml Resistant     Vancomycin 2 ug/ml Susceptible                    Urine Culture - Urine, Urine, Clean Catch [[id number]]  (Abnormal)  (Susceptibility) Collected:  01/02/18 2022    Lab Status:  Final result Specimen:  Urine from Urine, Clean Catch Updated:  01/05/18 1425     Urine Culture --      >100,000 CFU/mL Enterococcus faecalis (A)    Susceptibility      Enterococcus faecalis     KARI     Ampicillin <=2 ug/ml Susceptible     Gentamicin High Level Synergy <=500 ug/ml Susceptible     Levofloxacin 2 ug/ml Susceptible     Linezolid 2 ug/ml Susceptible     Nitrofurantoin <=32 ug/ml Susceptible     Penicillin G 2 ug/ml Susceptible     Streptomycin High Level Synergy >1000 ug/ml Resistant     Tetracycline >8 ug/ml Resistant     Vancomycin 2 ug/ml Susceptible                    Clostridium Difficile Toxin - Stool, Per Rectum [[id number]] Collected:  01/04/18 1721    Lab Status:  Final result Specimen:  Stool from Per Rectum Updated:  01/04/18 2035    Narrative:       The following orders were created for panel order Clostridium Difficile Toxin - Stool, Per Rectum.  Procedure                               Abnormality         Status                     ---------                               -----------         ------                     Clostridium Difficile To...[[id number]]  Abnormal            Final result                 Please view results for these tests on the individual orders.    Clostridium Difficile Toxin, PCR - Stool, Per Rectum [[id number]]  (Abnormal) Collected:  01/04/18 1721    Lab Status:  Final result Specimen:  Stool from Per Rectum Updated:  01/04/18 2035     C. Difficile  Toxins by PCR Detected (A)    Narrative:         Performance characteristics of test not established for patients <2 years of age.    Urine Culture - Urine, Urine, Clean Catch [[id number]]  (Abnormal)  (Susceptibility) Collected:  01/02/18 1104    Lab Status:  Final result Specimen:  Urine from Urine, Catheter Updated:  01/04/18 1422     Urine Culture --      10,000-20,000 CFU/mL Enterococcus faecalis (A)    Susceptibility      Enterococcus faecalis     KARI     Ampicillin <=2 ug/ml Susceptible     Gentamicin High Level Synergy <=500 ug/ml Susceptible     Levofloxacin <=1 ug/ml Susceptible     Linezolid 2 ug/ml Susceptible     Nitrofurantoin <=32 ug/ml Susceptible     Penicillin G 2 ug/ml Susceptible     Streptomycin High Level Synergy >1000 ug/ml Resistant     Tetracycline >8 ug/ml Resistant     Vancomycin 2 ug/ml Susceptible                    Influenza A & B, RT PCR - Swab, Nasopharynx [[id number]]  (Normal) Collected:  01/02/18 1737    Lab Status:  Final result Specimen:  Swab from Nasopharynx Updated:  01/02/18 1841     Influenza A PCR Not Detected     Influenza B PCR Not Detected          Imaging Results (all)     Procedure Component Value Units Date/Time    XR Chest 1 View [[id number]] Collected:  01/02/18 1347     Updated:  01/02/18 1437    Narrative:       EXAMINATION: XR CHEST 1 VW-01/02/2018:      INDICATION: Severe sepsis, triage protocol.      COMPARISON: NONE.     FINDINGS: The cardiac silhouette is normal. There is no pulmonary  inflammatory process. There is no mass or effusion.           Impression:       No active disease.     D:  01/02/2018  E:  01/02/2018     This report was finalized on 1/2/2018 2:35 PM by Dr. Nahid Menjivar MD.       CT Head Without Contrast [[id number]] Collected:  01/02/18 1531     Updated:  01/02/18 1538    Narrative:       EXAMINATION: CT HEAD WO CONTRAST- 01/02/2018     INDICATION: AMS      TECHNIQUE: CT scan of the head was performed at 5 mm intervals. No  intravenous contrast  was utilized.     The radiation dose reduction device was turned on for each scan per the  ALARA (As Low as Reasonably Achievable) protocol.     COMPARISON: 12/24/2013     FINDINGS: There is no intracranial mass. There is no hemorrhage. There  is no midline shift. There is an arachnoid cyst once again noted at the  anterior portion of the right middle cranial fossa. There is no  extra-axial fluid collection.       Impression:       There is a congenital anomaly of arachnoid cyst in the right  middle cranial fossa. There are no acute findings and there has been no  change since 12/24/2013.     D:  01/02/2018  E:  01/02/2018        This report was finalized on 1/2/2018 3:36 PM by Dr. Nahid Menjivar MD.       CT Chest Without Contrast [[id number]] Collected:  01/02/18 1532     Updated:  01/02/18 1538    Narrative:       EXAMINATION: CT CHEST WO CONTRAST- 01/02/2018     INDICATION: SOB      TECHNIQUE: CT scan of the chest was performed without contrast.     The radiation dose reduction device was turned on for each scan per the  ALARA (As Low as Reasonably Achievable) protocol.     COMPARISON: NONE     FINDINGS: There is no axillary lymphadenopathy. There are small  mediastinal and hilar nodes which are not abnormal based on size  criteria. There is no pericardial or pleural effusion. Images displayed  at lung window settings reveal patchy bibasilar airspace disease without  consolidation. There are areas of mild basilar pleural thickening.       Impression:       Patchy bibasilar airspace disease, left greater than right,  but without consolidation or effusion.     D:  01/02/2018  E:  01/02/2018        This report was finalized on 1/2/2018 3:36 PM by Dr. Nahid Menjivar MD.       US Renal Bilateral [[id number]] Collected:  01/03/18 0818     Updated:  01/03/18 0947    Narrative:       EXAMINATION: US RENAL BILATERAL-     INDICATION: A41.9-Sepsis, unspecified organism; N39.0-Urinary tract  infection, site not specified;  R31.9-Hematuria, unspecified;  M62.82-Rhabdomyolysis; R40.2413-Jose coma scale score 13-15, at  hospital admission; N17.9-Acute kidney failure, unspecified;  A41.9-Sepsis, unspecified organism; R65.21-Severe sepsis with septic  shock; J18.1-Lobar pneumonia, unspecified organism.      TECHNIQUE: Ultrasound kidneys and urinary bladder.     COMPARISON: None.     FINDINGS:   Right kidney measures 11.3 cm in length without evidence of  hydronephrosis, contour deforming mass or obvious calculi.  Appropriate  flow on Doppler interrogation.     Left kidney measures 10.6 cm in length without evidence of  hydronephrosis, contour deforming mass or obvious calculi. Appropriate  flow on Doppler interrogation. Trace nonspecific fluid seen in  perinephric location anterior to the left kidney.     Urinary bladder is decompressed and unremarkable with Joyner catheter in  situ.       Impression:       1. No hydronephrosis.  2. Nonspecific trace free fluid anteriorly adjacent to left kidney.      D:  01/03/2018  E:  01/03/2018     This report was finalized on 1/3/2018 9:45 AM by Dr. Jose Everett.       XR Chest 1 View [[id number]] Collected:  01/02/18 1852     Updated:  01/03/18 1517    Narrative:       EXAMINATION: XR CHEST 1 VW-      INDICATION: Central line placement; A41.9-Sepsis, unspecified organism;  N39.0-Urinary tract infection, site not specified; R31.9-Hematuria,  unspecified; M62.82-Rhabdomyolysis; R40.2413-[place] coma scale score  13-15, at hospital admission; N17.9-Acute kidney failure, unspecified;  R65.21-Severe sepsis with septic shock; J18.1-Lobar pneumonia,  unspecified organism.     COMPARISON: None.     FINDINGS:   1. A right IJ catheter has been placed from the right internal jugular  approach and the tip is in the SVC above the right atrium in perfect  position. There is no pneumothorax.  2. Slight increase interstitial markings are seen diffusely in the mid  and lower chest.       Impression:       1. Right  IJ deep line perfectly positioned above the right atrium in the  lower SVC.  2. No pneumothorax.  3. Mild diffuse interstitial abnormality seen in the mid and lower lung  zones.     D:  01/02/2018  E:  01/03/2018     This report was finalized on 1/3/2018 3:14 PM by Dr. Daniel Arora MD.       XR Chest 1 View [[id number]] Collected:  01/04/18 0845     Updated:  01/04/18 2314    Narrative:       EXAMINATION: XR CHEST 1 VW-01/04/2018:      INDICATION: Respiratory failure; A41.9-Sepsis, unspecified organism;  N39.0-Urinary tract infection, site not specified; R31.9-Hematuria,  unspecified; M62.82-Rhabdomyolysis; R40.2413-[place] coma scale score  13-15, at hospital admission; N17.9-Acute kidney failure, unspecified;  A41.9-Sepsis, unspecified organism; R65.21-Severe sepsis with septic  shock; J18.1-Lobar pneumonia, unspecified organism; J18.1-Lobar.       COMPARISON: 01/02/2018.     FINDINGS: Right IJ catheter tip remains at the level of cavoatrial  junction. The heart is normal in size. There is mildly increase in  interstitial disease, particularly of the upper lungs perhaps mild  interstitial edema, possibly early changes of ARDS or atypical  pneumonia. No lung consolidation, effusion or pneumothorax is seen.        Impression:       Increased diffuse interstitial disease, perhaps developing  interstitial edema.     D:  01/04/2018  E:  01/04/2018     This report was finalized on 1/4/2018 11:12 PM by DR. Sekou Wan MD.       CT Abdomen Pelvis Without Contrast [[id number]] Collected:  01/02/18 1305     Updated:  01/05/18 0416    Narrative:       EXAM:  CT Abdomen and Pelvis Without Intravenous Contrast    EXAM DATE/TIME:  1/2/2018 1:05 PM    CLINICAL HISTORY:  57 years old, female; Pain; Abdominal pain; Generalized;   Additional info: SOB    TECHNIQUE:  Axial computed tomography images of the abdomen and pelvis without   intravenous contrast.  All CT scans at this facility use one or more dose   reduction techniques,  viz.: automated exposure control; ma/kV adjustment per   patient size (including targeted exams where dose is matched to indication;   i.e. head); or iterative reconstruction technique.  Coronal reformatted images   were created and reviewed.    COMPARISON:  No relevant prior studies available.    FINDINGS:    Lower thorax:  Bibasilar atelectasis or scarring.     ABDOMEN:    Liver:  No acute abnormality.    Gallbladder and bile ducts:  Previous cholecystectomy.  No biliary ductal   dilatation.    Pancreas:  Unremarkable.  No ductal dilation.    Spleen:  No acute abnormality.    Adrenals:  Unremarkable.  No mass.    Kidneys and ureters:  A few tiny punctate nonobstructing bilateral renal   calculi.  Nonspecific bilateral perinephric fat stranding.  No hydronephrosis,   hydroureter or distal urinary calculus.    Stomach and bowel:  No significant large or small bowel distention.  No   evidence of diverticulitis.    Appendix:  No findings to suggest acute appendicitis.     PELVIS:    Bladder:  Unremarkable.  No stones.    Reproductive:  Previous hysterectomy.     ABDOMEN and PELVIS:    Intraperitoneal space:  No significant fluid collection.  No free air.    Bones/joints:  No acute osseous abnormality.  Old mild L1 vertebral body   compression deformity.    Soft tissues:  No significant soft tissue abnormalities.    Vasculature:  Atherosclerotic vascular calcifications are noted.  No aortic   aneurysm.    Lymph nodes:  No enlarged lymph nodes.      Impression:       1.  A few tiny punctate nonobstructing bilateral renal calculi.  2.  Nonspecific bilateral perinephric fat stranding.  Pyelonephritis/UTI is not   excluded. Recommend clinical correlation.  3.  Previous cholecystectomy and hysterectomy.    THIS DOCUMENT HAS BEEN ELECTRONICALLY SIGNED BY THERESA LUCAS JR. MD    XR Abdomen KUB [[id number]] Collected:  01/05/18 0246     Updated:  01/05/18 0418    Narrative:       EXAM:  XR Abdomen, 1 View    EXAM DATE/TIME:   1/5/2018 2:46 AM    CLINICAL HISTORY:  57 years old, female; Sepsis, unspecified organism; Sepsis,   unspecified organism; Lobar pneumonia, unspecified organism; Lobar pneumonia,   unspecified organism; Rhabdomyolysis; Acute kidney failure, unspecified; Acute   kidney failure, unspecified; Chronic kidney disease, unspecified; Urinary tract   infection, site not specified; Hematuria, unspecified; Severe sepsis with   septic shock; Jose coma scale score 13-15, at hospital admission; Pain;   Abdominal pain; Flank; Left lower quadrant (llq); Additional info: Left lower   quad/ flank abdominal pain    TECHNIQUE:  Frontal supine view of the abdomen/pelvis.    COMPARISON:  No relevant prior studies available.    FINDINGS:    Gastrointestinal tract:  Gas within stomach, small bowel and colon with mild   gaseous distention of the abdomen.    Organs:  Cholecystectomy clips are seen in the right upper quadrant.    Bones/joints:  No acute osseous abnormality.      Impression:         Mild nonspecific ileus.    THIS DOCUMENT HAS BEEN ELECTRONICALLY SIGNED BY THERESA LUCAS JR. MD          Results for orders placed during the hospital encounter of 01/02/18   Adult Transthoracic Echo Complete W/ Cont if Necessary Per Protocol    Narrative · Left ventricular systolic function is hyperdynamic (EF > 70).  · Mild mitral valve regurgitation is present  · Mild tricuspid valve regurgitation is present.  · abnormal LVOT contour without significant obstruction            Discharge Details      Charisma Cortez   Home Medication Instructions [place]ON:[id number]    Printed on:01/09/18 1010   Medication Information                      amoxicillin (AMOXIL) 875 MG tablet  Take 1 tablet by mouth Every 12 (Twelve) Hours.             aspirin 81 MG EC tablet  Take 81 mg by mouth Daily.             clonazePAM (KlonoPIN) 0.5 MG tablet  Take 1 tablet by mouth Every 12 (Twelve) Hours.             gabapentin (NEURONTIN) 600 MG tablet  Take 0.5  tablets by mouth 3 (Three) Times a Day.             HYDROcodone-acetaminophen (NORCO) 7.5-325 MG per tablet  Take 1 tablet by mouth 2 (Two) Times a Day As Needed for Moderate Pain .             levETIRAcetam (KEPPRA) 500 MG tablet  Take 500 mg by mouth 2 (Two) Times a Day.             lisinopril (PRINIVIL,ZESTRIL) 10 MG tablet  Take 10 mg by mouth Daily.             omeprazole (priLOSEC) 20 MG capsule  Take 20 mg by mouth Daily.             saccharomyces boulardii (FLORASTOR) 250 MG capsule  Take 1 capsule by mouth 2 (Two) Times a Day.             sertraline (ZOLOFT) 100 MG tablet  Take 1 tablet by mouth Daily.             tiotropium (SPIRIVA) 18 MCG per inhalation capsule  Place 1 capsule into inhaler and inhale Daily.             traZODone (DESYREL) 100 MG tablet  Take 100 mg by mouth Every Night.             vancomycin 50 MG/ML solution oral solution  250mg po QID until 1.11.18, then decrease to BID until 1.18.18, then once a day until 1.25.18, then every 3d until 2.1.18                   Discharge Disposition:  Home or Self Care    Discharge Diet:  Diet Instructions     Diet: Regular, Consistent Carbohydrate; Thin       Discharge Diet:   Regular  Consistent Carbohydrate      Fluid Consistency:  Thin                 Discharge Activity:   Activity Instructions     Activity as Tolerated                     No future appointments.    Additional Instructions for the Follow-ups that You Need to Schedule     Discharge Follow-up with PCP    As directed    Follow Up Details:  1 week           Discharge Follow-up with Specified Provider: ID; 2 Weeks    As directed    To:  ID    Follow Up:  2 Weeks    Follow Up Details:  pt prefers afternoon appointment                     Time Spent on Discharge:  35 minutes    ANDREA Peterson  01/09/18  10:10 AM         Electronically signed by ANDREA Peterson at 1/9/2018 10:31 AM

## 2018-01-09 NOTE — PROGRESS NOTES
[facility] Progress Note    Admission Date: 1/2/2018    Charisma Cortez  1960  [id number]    Date: 1/9/2018    Antibiotics:  Anti-Infectives     Ordered     Dose/Rate Route Frequency Start Stop    01/09/18 1009  amoxicillin (AMOXIL) 875 MG tablet     Ordering Provider:  ANDREA Peterson    875 mg Oral Every 12 Hours Scheduled 01/09/18 0000      01/09/18 1009  vancomycin 50 MG/ML solution oral solution     Ordering Provider:  ANDREA Peterson       01/09/18 0000      01/08/18 1151  amoxicillin (AMOXIL) capsule 500 mg     Ordering Provider:  Pete Warner MD    500 mg Oral Every 8 Hours Scheduled 01/08/18 1400 01/10/18 1359    01/06/18 2118  vancomycin oral solution 250 mg     Ordering Provider:  Steve Mckeon MD    250 mg Oral Every 6 Hours Scheduled 01/07/18 0000 01/18/18 2359    01/02/18 1918  DAPTOmycin (CUBICIN) 250 mg in sterile water (preservative free) 5 mL IV syringe     Comments:  xena possible pv shunt infection avoid vancomycin   Ordering Provider:  Nahid Flores MD    4 mg/kg × 61.2 kg  over 2 Minutes Intravenous Every 48 Hours 01/03/18 1800 01/03/18 1745    01/02/18 1617  vancomycin 1250 mg/250 mL 0.9% NS IVPB ([facility])     Ordering Provider:  Kedar Sharma MD    20 mg/kg × 61.2 kg  over 90 Minutes Intravenous Once 01/02/18 1700 01/02/18 1856    01/02/18 1617  piperacillin-tazobactam (ZOSYN) 4.5 g in iso-osmotic dextrose 100 mL IVPB (premix)     Ordering Provider:  Kedar Sharma MD    4.5 g  over 30 Minutes Intravenous Once 01/02/18 1700 01/02/18 1802    01/02/18 1455  levoFLOXacin (LEVAQUIN) 750 mg/150 mL D5W (premix) (LEVAQUIN) 750 mg     Ordering Provider:  Trent Curiel MD    750 mg Intravenous Once 01/02/18 1457 01/02/18 1815    01/02/18 1143  cefTRIAXone (ROCEPHIN) IVPB 1 g     Ordering Provider:  Trent Curiel MD    1 g  100 mL/hr over 30 Minutes Intravenous Once 01/02/18 1145 01/02/18 1430             CC: enterococcal cystitis, c diff    HPI:  Patient is a 57  y.o.  Yr old female with IV drug abuse including heroin found down and admitted to [facility] on 1/2/18 with rhabdomyolysis and sepsis.  The patient was treated with IV antibiotics including Zosyn and developed diarrhea.  She was positive for C. difficile on 1/4/17.  She was thought to have had a seizure and also heroin overdose as starting diagnoses.  The patient was transferred to the floor after an ICU stay.  The patient is a fair to poor historian.  I was consulted on 1/8/17.  She has no other localizing signs or symptoms of infection.  Urine cultures didn't yield enterococcus but she was asymptomatic and thought to be colonized.  No other localizing signs or symptoms of infection.  1/9/18 hx rev.  Doing better and ready for home.  No fever or abd pain.  No  sx.       ROS:  No f/c/s. No n/v/d. No rash. No new ADR to Abx.     Constitutional-- No Fever, chills or sweats.  Appetite good, and no malaise. No fatigue.  Heent-- No new vision, hearing or throat complaints.  No epistaxis or oral sores.  Denies odynophagia or dysphagia.  No flashers, floaters or eye pain. No odynophagia or dysphagia. No headache, photophobia or neck stiffness.  CV-- No chest pain, palpitation or syncope  Resp-- No SOB/cough/Hemoptysis  GI- No nausea, vomiting, or diarrhea.  No hematochezia, melena, or hematemesis. Denies jaundice or chronic liver disease.  -- No dysuria, hematuria, or flank pain.  Denies hesitancy, urgency or flank pain.  Lymph- no swollen lymph nodes in neck/axilla or groin.   Heme- No active bruising or bleeding; no Hx of DVT or PE.  MS-- no swelling or pain in the bones or joints of arms/legs.  No new back pain.  Neuro-- No acute focal weakness or numbness in the arms or legs.  No seizures.  Skin--No rash, nodules, blisters.    Objective   PE:  Vital Signs  Temp  Min: 97.8 °F (36.6 °C)  Max: 98.1 °F (36.7 °C)  BP  Min: 142/75  Max: 158/96  Pulse  Min: 78  Max: 89  Resp  Min: 16  Max: 20  SpO2   Min: 93 %  Max: 94 %    GENERAL: Awake and alert, in minimal distress.   HEENT: Normocephalic, atraumatic.  PERRL. EOMI. No conjunctival injection. No icterus. Oropharynx clear without evidence of thrush or exudate. Poor dentition.    NECK: Supple without nuchal rigidity. No mass.  LYMPH: No cervical, axillary or inguinal lymphadenopathy.  HEART: RRR; No murmur, rubs, gallops. No JVD.  LUNGS: Clear to auscultation bilaterally without wheezing, rales, rhonchi. Normal respiratory effort. Nonlabored. No dullness.  ABDOMEN: Soft, nontender, nondistended. Positive bowel sounds. No rebound or guarding. NO mass or HSM.  EXT:  No cyanosis, clubbing or edema. No cord.  MSK: FROM without joint effusions noted arms/legs.    SKIN: Warm and dry without cutaneous eruptions on Inspection/palpation.    NEURO: Oriented to PPT. No focal deficits on motor/sensory exam at arms/legs.    Laboratory Data      Results from last 7 days  Lab Units 01/08/18  0547 01/07/18  0338 01/06/18  0816   WBC 10*3/mm3 12.82* 14.93* 18.45*   HEMOGLOBIN g/dL 8.1* 8.4* 8.3*   HEMATOCRIT % 26.5* 27.6* 26.6*   PLATELETS 10*3/mm3 268 289 292       Results from last 7 days  Lab Units 01/08/18  0547   SODIUM mmol/L 139   POTASSIUM mmol/L 4.0   CHLORIDE mmol/L 111*   CO2 mmol/L 22.0   BUN mg/dL 12   CREATININE mg/dL 0.70   GLUCOSE mg/dL 86   CALCIUM mg/dL 8.2*       Results from last 7 days  Lab Units 01/07/18  0338   ALK PHOS U/L 82   BILIRUBIN mg/dL 0.2*   ALT (SGPT) U/L 17   AST (SGOT) U/L 13               Results from last 7 days  Lab Units 01/06/18  0816 01/05/18  0331 01/04/18  0424   CK TOTAL U/L 199* 546* 1016*           Results from last 7 days  Lab Units 01/07/18  0338   LACTATE mmol/L 0.8     Estimated Creatinine Clearance: 73.5 mL/min (by C-G formula based on Cr of 0.7).      Microbiology:  Microbiology Results Abnormal     Procedure Component Value - Date/Time    Blood Culture - Blood, [[id number]]  (Normal) Collected:  01/02/18 1245    Lab Status:   Final result Specimen:  Blood from Arm, Left Updated:  01/07/18 1401     Blood Culture No growth at 5 days    Narrative:       Aerobic bottle only    Blood Culture - Blood, [[id number]]  (Normal) Collected:  01/02/18 1320    Lab Status:  Final result Specimen:  Blood from Arm, Left Updated:  01/07/18 1401     Blood Culture No growth at 5 days    Urine Culture - Urine, Urine, Clean Catch [[id number]]  (Abnormal)  (Susceptibility) Collected:  01/02/18 1621    Lab Status:  Final result Specimen:  Urine from Urine, Catheter Updated:  01/05/18 1426     Urine Culture --      >100,000 CFU/mL Enterococcus faecalis (A)    Susceptibility      Enterococcus faecalis     KARI     Ampicillin <=2 ug/ml Susceptible     Gentamicin High Level Synergy <=500 ug/ml Susceptible     Levofloxacin 2 ug/ml Susceptible     Linezolid 2 ug/ml Susceptible     Nitrofurantoin <=32 ug/ml Susceptible     Penicillin G 2 ug/ml Susceptible     Streptomycin High Level Synergy >1000 ug/ml Resistant     Tetracycline >8 ug/ml Resistant     Vancomycin 2 ug/ml Susceptible                    Urine Culture - Urine, Urine, Clean Catch [[id number]]  (Abnormal)  (Susceptibility) Collected:  01/02/18 2022    Lab Status:  Final result Specimen:  Urine from Urine, Clean Catch Updated:  01/05/18 1425     Urine Culture --      >100,000 CFU/mL Enterococcus faecalis (A)    Susceptibility      Enterococcus faecalis     KARI     Ampicillin <=2 ug/ml Susceptible     Gentamicin High Level Synergy <=500 ug/ml Susceptible     Levofloxacin 2 ug/ml Susceptible     Linezolid 2 ug/ml Susceptible     Nitrofurantoin <=32 ug/ml Susceptible     Penicillin G 2 ug/ml Susceptible     Streptomycin High Level Synergy >1000 ug/ml Resistant     Tetracycline >8 ug/ml Resistant     Vancomycin 2 ug/ml Susceptible                    Clostridium Difficile Toxin - Stool, Per Rectum [[id number]] Collected:  01/04/18 1721    Lab Status:  Final result Specimen:  Stool from Per Rectum Updated:  01/04/18  2035    Narrative:       The following orders were created for panel order Clostridium Difficile Toxin - Stool, Per Rectum.  Procedure                               Abnormality         Status                     ---------                               -----------         ------                     Clostridium Difficile To...[[id number]]  Abnormal            Final result                 Please view results for these tests on the individual orders.    Clostridium Difficile Toxin, PCR - Stool, Per Rectum [[id number]]  (Abnormal) Collected:  01/04/18 1721    Lab Status:  Final result Specimen:  Stool from Per Rectum Updated:  01/04/18 2035     C. Difficile Toxins by PCR Detected (A)    Narrative:         Performance characteristics of test not established for patients <2 years of age.    Urine Culture - Urine, Urine, Clean Catch [[id number]]  (Abnormal)  (Susceptibility) Collected:  01/02/18 1104    Lab Status:  Final result Specimen:  Urine from Urine, Catheter Updated:  01/04/18 1422     Urine Culture --      10,000-20,000 CFU/mL Enterococcus faecalis (A)    Susceptibility      Enterococcus faecalis     KARI     Ampicillin <=2 ug/ml Susceptible     Gentamicin High Level Synergy <=500 ug/ml Susceptible     Levofloxacin <=1 ug/ml Susceptible     Linezolid 2 ug/ml Susceptible     Nitrofurantoin <=32 ug/ml Susceptible     Penicillin G 2 ug/ml Susceptible     Streptomycin High Level Synergy >1000 ug/ml Resistant     Tetracycline >8 ug/ml Resistant     Vancomycin 2 ug/ml Susceptible                    Influenza A & B, RT PCR - Swab, Nasopharynx [[id number]]  (Normal) Collected:  01/02/18 1737    Lab Status:  Final result Specimen:  Swab from Nasopharynx Updated:  01/02/18 1841     Influenza A PCR Not Detected     Influenza B PCR Not Detected          Radiology:  Imaging Results (last 72 hours)     ** No results found for the last 72 hours. **          I personally reviewed the radiographic studies     Assessment/Plan    IMPRESSION:   1.  Enterococcal faecalis acute cystitis.  Difficult to ascertain on admission as patient was septic.  Her urinalysis which was repeated twice was significant with numerous WBCs, and cultures have consistently grown Enterococcus faecalis, sensitive to penicillin.  Most recent positive culture from 1/2/18.  2.  C. difficile colitis with positive toxin on 1/4/18.  3.  Sepsis, present on admission.  Current normal lactic acid and pro-calcitonin level last on 1/4/18.  4.  Heroin overdose, IVDA.  5.  Leukocytosis, neutrophilic related to above issues.  6.  Anemia, chronic disease.  7.  Hypocalcemia, 8.2.  8.  Obesity.  9.  Polysubstance abuse.    High risk for readmission given compliance and drug use.     RECOMMENDATIONS:     1.  Diagnostically, continue to follow patient's physical exam, CBC, CMP, CRP, pro-calcitonin level, lactic acid.  2.  Therapeutically, continue on amoxicillin 875 mg by mouth twice a day to continue until 1/10/17.  Continue on oral vancomycin at 250 mg by mouth 4 times a day until 1/11/18, then decrease to twice a day until 1/18/18, then once a day until 1/25/18, then every 3 days until 2/1/18.  Probiotics recommended.  3.  Drug rehab.    OK for home from ID standpoint with primary care f/u.  D/w case management and nsg.       Pete Warner MD  1/9/2018

## 2018-01-09 NOTE — PROGRESS NOTES
Continued Stay Note   Emily     Patient Name: Charisma Cortez  MRN: [id number]  Today's Date: 1/9/2018    Admit Date: 1/2/2018          Discharge Plan       01/09/18 1045    Case Management/Social Work Plan    Plan Social work provided a list of substance abuse treatment programs for heroin abuse to Ms. Cortez and she said she was using heroin once and she won't plan to use it again.    Patient/Family In Agreement With Plan yes              Discharge Codes     None        Expected Discharge Date and Time     Expected Discharge Date Expected Discharge Time    Jan 9, 2018             TOBIN Palma

## 2018-01-09 NOTE — PROGRESS NOTES
Continued Stay Note   Emily     Patient Name: Charisma Cortez  MRN: [id number]  Today's Date: 1/9/2018    Admit Date: 1/2/2018          Discharge Plan       01/09/18 1059    Case Management/Social Work Plan    Plan Home    Patient/Family In Agreement With Plan yes    Additional Comments Mrs. Cortez is being discharged home today. She will need a supply of oral vancomycin. Orders called to Raya with [facility]. She stated that her copay would be $0. She will deliver it to the bedside prior to discharge. Mrs. Cortez denies having any needs for [facility] or home health services.  has provided her with substance abuse resournces. Her mother will transport her home by car.      01/09/18 1045    Case Management/Social Work Plan    Plan Social work provided a list of substance abuse treatment programs for heroin abuse to Ms. Cortez and she said she was using heroin once and she won't plan to use it again.    Patient/Family In Agreement With Plan yes              Discharge Codes       01/09/18 1100    Discharge Codes    Discharge Codes 01  Discharge to home        Expected Discharge Date and Time     Expected Discharge Date Expected Discharge Time    Jan 9, 2018             Vish Romero

## 2018-01-26 ENCOUNTER — APPOINTMENT (OUTPATIENT)
Dept: CT IMAGING | Facility: HOSPITAL | Age: 58
End: 2018-01-26

## 2018-01-26 ENCOUNTER — HOSPITAL ENCOUNTER (EMERGENCY)
Facility: HOSPITAL | Age: 58
Discharge: LEFT BEFORE DISCHARGE | End: 2018-01-26
Attending: EMERGENCY MEDICINE | Admitting: EMERGENCY MEDICINE

## 2018-01-26 ENCOUNTER — APPOINTMENT (OUTPATIENT)
Dept: GENERAL RADIOLOGY | Facility: HOSPITAL | Age: 58
End: 2018-01-26

## 2018-01-26 ENCOUNTER — APPOINTMENT (OUTPATIENT)
Dept: GENERAL RADIOLOGY | Facility: HOSPITAL | Age: 58
End: 2018-01-26
Attending: EMERGENCY MEDICINE

## 2018-01-26 VITALS
RESPIRATION RATE: 14 BRPM | BODY MASS INDEX: 20.49 KG/M2 | TEMPERATURE: 98.6 F | SYSTOLIC BLOOD PRESSURE: 117 MMHG | WEIGHT: 120 LBS | HEIGHT: 64 IN | DIASTOLIC BLOOD PRESSURE: 71 MMHG | HEART RATE: 71 BPM | OXYGEN SATURATION: 99 %
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
## 2018-01-26 DIAGNOSIS — I95.9 HYPOTENSIVE EPISODE: ICD-10-CM

## 2018-01-26 DIAGNOSIS — F19.10 POLYSUBSTANCE ABUSE (HCC): ICD-10-CM

## 2018-01-26 DIAGNOSIS — N17.9 ACUTE RENAL FAILURE, UNSPECIFIED ACUTE RENAL FAILURE TYPE (HCC): ICD-10-CM

## 2018-01-26 LAB
ALBUMIN SERPL-MCNC: 4.1 G/DL (ref 3.5–5)
ALBUMIN/GLOB SERPL: 1.1 G/DL (ref 1–2)
ALP SERPL-CCNC: 110 U/L (ref 38–126)
ALT SERPL W P-5'-P-CCNC: 35 U/L (ref 13–69)
AMPHET+METHAMPHET UR QL: POSITIVE
AMPHETAMINES UR QL: POSITIVE
ANION GAP SERPL CALCULATED.3IONS-SCNC: 14.3 MMOL/L
AST SERPL-CCNC: 31 U/L (ref 15–46)
B-HCG UR QL: NEGATIVE
BARBITURATES UR QL SCN: NEGATIVE
BASOPHILS # BLD AUTO: 0.07 10*3/MM3 (ref 0–0.2)
BASOPHILS NFR BLD AUTO: 0.5 % (ref 0–2.5)
BENZODIAZ UR QL SCN: POSITIVE
BILIRUB SERPL-MCNC: 0.2 MG/DL (ref 0.2–1.3)
BILIRUB UR QL STRIP: NEGATIVE
BUN BLD-MCNC: 17 MG/DL (ref 7–20)
BUN/CREAT SERPL: 8.1 (ref 7.1–23.5)
BUPRENORPHINE SERPL-MCNC: NEGATIVE NG/ML
CALCIUM SPEC-SCNC: 9.8 MG/DL (ref 8.4–10.2)
CANNABINOIDS SERPL QL: NEGATIVE
CHLORIDE SERPL-SCNC: 110 MMOL/L (ref 98–107)
CLARITY UR: CLEAR
CO2 SERPL-SCNC: 20 MMOL/L (ref 26–30)
COCAINE UR QL: NEGATIVE
COLOR UR: YELLOW
CREAT BLD-MCNC: 2.1 MG/DL (ref 0.6–1.3)
D-LACTATE SERPL-SCNC: 1.2 MMOL/L (ref 0.5–2)
DEPRECATED RDW RBC AUTO: 50.1 FL (ref 37–54)
EOSINOPHIL # BLD AUTO: 0.35 10*3/MM3 (ref 0–0.7)
EOSINOPHIL NFR BLD AUTO: 2.7 % (ref 0–7)
ERYTHROCYTE [DISTWIDTH] IN BLOOD BY AUTOMATED COUNT: 14.7 % (ref 11.5–14.5)
ETHANOL BLD-MCNC: <10 MG/DL
ETHANOL UR QL: <0.01 %
GFR SERPL CREATININE-BSD FRML MDRD: 24 ML/MIN/1.73
GLOBULIN UR ELPH-MCNC: 3.6 GM/DL
GLUCOSE BLD-MCNC: 117 MG/DL (ref 74–98)
GLUCOSE UR STRIP-MCNC: NEGATIVE MG/DL
HCT VFR BLD AUTO: 35.3 % (ref 37–47)
HGB BLD-MCNC: 11.2 G/DL (ref 12–16)
HGB UR QL STRIP.AUTO: NEGATIVE
HOLD SPECIMEN: NORMAL
HOLD SPECIMEN: NORMAL
IMM GRANULOCYTES # BLD: 0.06 10*3/MM3 (ref 0–0.06)
IMM GRANULOCYTES NFR BLD: 0.5 % (ref 0–0.6)
KETONES UR QL STRIP: NEGATIVE
LEUKOCYTE ESTERASE UR QL STRIP.AUTO: NEGATIVE
LYMPHOCYTES # BLD AUTO: 3.74 10*3/MM3 (ref 0.6–3.4)
LYMPHOCYTES NFR BLD AUTO: 28.7 % (ref 10–50)
MCH RBC QN AUTO: 29.6 PG (ref 27–31)
MCHC RBC AUTO-ENTMCNC: 31.7 G/DL (ref 30–37)
MCV RBC AUTO: 93.1 FL (ref 81–99)
METHADONE UR QL SCN: NEGATIVE
MONOCYTES # BLD AUTO: 0.92 10*3/MM3 (ref 0–0.9)
MONOCYTES NFR BLD AUTO: 7.1 % (ref 0–12)
NEUTROPHILS # BLD AUTO: 7.87 10*3/MM3 (ref 2–6.9)
NEUTROPHILS NFR BLD AUTO: 60.5 % (ref 37–80)
NITRITE UR QL STRIP: NEGATIVE
NRBC BLD MANUAL-RTO: 0 /100 WBC (ref 0–0)
OPIATES UR QL: NEGATIVE
OXYCODONE UR QL SCN: NEGATIVE
PCP UR QL SCN: NEGATIVE
PH UR STRIP.AUTO: 6.5 [PH] (ref 5–8)
PLATELET # BLD AUTO: 356 10*3/MM3 (ref 130–400)
PMV BLD AUTO: 9 FL (ref 6–12)
POTASSIUM BLD-SCNC: 3.3 MMOL/L (ref 3.5–5.1)
PROPOXYPH UR QL: NEGATIVE
PROT SERPL-MCNC: 7.7 G/DL (ref 6.3–8.2)
PROT UR QL STRIP: ABNORMAL
RBC # BLD AUTO: 3.79 10*6/MM3 (ref 4.2–5.4)
SODIUM BLD-SCNC: 141 MMOL/L (ref 137–145)
SP GR UR STRIP: 1.01 (ref 1–1.03)
TRICYCLICS UR QL SCN: NEGATIVE
UROBILINOGEN UR QL STRIP: ABNORMAL
WBC NRBC COR # BLD: 13.01 10*3/MM3 (ref 4.8–10.8)
WHOLE BLOOD HOLD SPECIMEN: NORMAL
WHOLE BLOOD HOLD SPECIMEN: NORMAL

## 2018-01-26 PROCEDURE — 73030 X-RAY EXAM OF SHOULDER: CPT

## 2018-01-26 PROCEDURE — 81025 URINE PREGNANCY TEST: CPT | Performed by: EMERGENCY MEDICINE

## 2018-01-26 PROCEDURE — 99285 EMERGENCY DEPT VISIT HI MDM: CPT

## 2018-01-26 PROCEDURE — 70450 CT HEAD/BRAIN W/O DYE: CPT

## 2018-01-26 PROCEDURE — 96360 HYDRATION IV INFUSION INIT: CPT

## 2018-01-26 PROCEDURE — 85025 COMPLETE CBC W/AUTO DIFF WBC: CPT | Performed by: EMERGENCY MEDICINE

## 2018-01-26 PROCEDURE — 80307 DRUG TEST PRSMV CHEM ANLYZR: CPT | Performed by: EMERGENCY MEDICINE

## 2018-01-26 PROCEDURE — 74176 CT ABD & PELVIS W/O CONTRAST: CPT

## 2018-01-26 PROCEDURE — 80053 COMPREHEN METABOLIC PANEL: CPT | Performed by: EMERGENCY MEDICINE

## 2018-01-26 PROCEDURE — 80306 DRUG TEST PRSMV INSTRMNT: CPT | Performed by: EMERGENCY MEDICINE

## 2018-01-26 PROCEDURE — 73502 X-RAY EXAM HIP UNI 2-3 VIEWS: CPT

## 2018-01-26 PROCEDURE — 83605 ASSAY OF LACTIC ACID: CPT | Performed by: EMERGENCY MEDICINE

## 2018-01-26 PROCEDURE — 81003 URINALYSIS AUTO W/O SCOPE: CPT | Performed by: EMERGENCY MEDICINE

## 2018-01-26 PROCEDURE — 71046 X-RAY EXAM CHEST 2 VIEWS: CPT

## 2018-01-26 PROCEDURE — 87040 BLOOD CULTURE FOR BACTERIA: CPT | Performed by: EMERGENCY MEDICINE

## 2018-01-26 PROCEDURE — 72125 CT NECK SPINE W/O DYE: CPT

## 2018-01-26 RX ORDER — ACETAMINOPHEN 500 MG
1000 TABLET ORAL ONCE
Status: COMPLETED | OUTPATIENT
Start: 2018-01-26 | End: 2018-01-26

## 2018-01-26 RX ADMIN — SODIUM CHLORIDE 1000 ML: 9 INJECTION, SOLUTION INTRAVENOUS at 06:35

## 2018-01-26 RX ADMIN — SODIUM CHLORIDE 1000 ML: 9 INJECTION, SOLUTION INTRAVENOUS at 07:52

## 2018-01-26 RX ADMIN — ACETAMINOPHEN 1000 MG: 500 TABLET, FILM COATED ORAL at 08:03

## 2018-01-26 NOTE — ED NOTES
Patient repetitively asking for narcotic pain medication. Patient is drowsy and slurring words and appears sedated. Reported pain med request to MD. Remains hypotensive. MD aware     Alexandre Andre RN  01/26/18 0752

## 2018-01-26 NOTE — DISCHARGE INSTRUCTIONS
Acute Kidney Injury, Adult  Acute kidney injury (RAGHU) occurs when there is sudden (acute) damage to the kidneys. A small amount of kidney damage may not cause problems, but a large amount of damage may make it difficult or impossible for the kidneys to work the way they should. RAGHU may develop into long-lasting (chronic) kidney disease. Early detection and treatment of RAGHU may prevent kidney damage from becoming permanent or getting worse.  What are the causes?  Common causes of this condition include:  · A problem with blood flow to the kidneys. This may be caused by:  ¨ Blood loss.  ¨ Heart and blood vessel (cardiovascular) disease.  ¨ Severe burns.  ¨ Liver disease.  · Direct damage to the kidneys. This may be caused by:  ¨ Some medicines.  ¨ A kidney infection.  ¨ Poisoning.  ¨ Being around or in contact with poisonous (toxic) substances.  ¨ A surgical wound.  ¨ A hard, direct force to the kidney area.  · A sudden blockage of urine flow. This may be caused by:  ¨ Cancer.  ¨ Kidney stones.  ¨ Enlarged prostate in males.  What are the signs or symptoms?  Symptoms develop slowly and may not be obvious until the kidney damage becomes severe. It is possible to have RAGHU for years without showing any symptoms. Symptoms of this condition can include:  · Swelling (edema) of the face, legs, ankles, or feet.  · Numbness, tingling, or loss of feeling (sensation) in the hands or feet.  · Tiredness (lethargy).  · Nausea or vomiting.  · Confusion or trouble concentrating.  · Problems with urination, such as:  ¨ Painful or burning feeling during urination.  ¨ Decreased urine production.  ¨ Frequent urination, especially at night.  ¨ Bloody urine.  · Muscle twitches and cramps, especially in the legs.  · Shortness of breath.  · Weakness.  · Constant itchiness.  · Loss of appetite.  · Metallic taste in the mouth.  · Trouble sleeping.  · Pale lining of the eyelids and surface of the eye (conjunctiva).  How is this diagnosed?  This  condition may be diagnosed with various tests. Tests may include:  · Blood tests.  · Urine tests.  · Imaging tests.  · A test in which a sample of tissue is removed from the kidneys to be looked at under a microscope (kidney biopsy).  How is this treated?  Treatment of RAGHU varies depending on the cause and severity of the kidney damage. In mild cases, treatment may not be needed. The kidneys may heal on their own.  If RAGHU is more severe, your health care provider will treat the cause of the kidney damage, help the kidneys heal, and prevent problems from occurring. Severe cases may require a procedure to remove toxic wastes from the body (dialysis) or surgery to repair kidney damage. Surgery may involve:  · Repair of a torn kidney.  · Removal of a urine flow obstruction.  Follow these instructions at home:  · Follow your prescribed diet.  · Take over-the-counter and prescription medicines only as told by your health care provider.  ¨ Do not take any new medicines unless approved by your health care provider. Many medicines can worsen your kidney damage.  ¨ Do not take any vitamin and mineral supplements unless approved by your health care provider. Many nutritional supplements can worsen your kidney damage.  ¨ The dose of some medicines that you take may need to be adjusted.  · Do not use any tobacco products, such as cigarettes, chewing tobacco, and e-cigarettes. If you need help quitting, ask your health care provider.  · Keep all follow-up visits as told by your health care provider. This is important.  · Keep track of your blood pressure. Report changes in your blood pressure as told by your health care provider.  · Achieve and maintain a healthy weight. If you need help with this, ask your health care provider.  · Start or continue an exercise plan. Try to exercise at least 30 minutes a day, 5 days a week.  · Stay current with immunizations as told by your health care provider.  Where to find more  information:  · American Association of Kidney Patients: www.aakp.org  · National Kidney Foundation: www.kidney.org  · American Kid[employer] Fund: www.akfinc.org  · [employer] [facility]: www.lifeoptions.org and www.kidneyschool.org  Contact a health care provider if:  · Your symptoms get worse.  · You develop new symptoms.  Get help right away if:  · You develop symptoms of end-stage kidney disease, which include:  ¨ Headaches.  ¨ Abnormally dark or light skin.  ¨ Numbness in the hands or feet.  ¨ Easy bruising.  ¨ Frequent hiccups.  ¨ Chest pain.  ¨ Shortness of breath.  ¨ End of menstruation in women.  · You have a fever.  · You have decreased urine production.  · You have pain or bleeding when you urinate.  This information is not intended to replace advice given to you by your health care provider. Make sure you discuss any questions you have with your health care provider.  Document Released: 07/02/2012 Document Revised: 07/27/2017 Document Reviewed: 08/16/2013  [employer] Interactive Patient Education © 2017 [employer]      Hypotension  As your heart beats, it forces blood through your body. This force is called blood pressure. If you have hypotension, you have low blood pressure. When your blood pressure is too low, you may not get enough blood to your brain. You may feel weak, feel light-headed, have a fast heartbeat, or even pass out (faint).  Follow these instructions at home:  Eating and drinking  · Drink enough fluids to keep your pee (urine) clear or pale yellow.  · Eat a healthy diet, and follow instructions from your doctor about eating or drinking restrictions. A healthy diet includes:  ¨ Fresh fruits and vegetables.  ¨ Whole grains.  ¨ Low-fat (lean) meats.  ¨ Low-fat dairy products.  · Eat extra salt only as told. Do not add extra salt to your diet unless your doctor tells you to.  · Eat small meals often.  · Avoid standing up quickly after you eat.  Medicines  · Take over-the-counter and  prescription medicines only as told by your doctor.  ¨ Follow instructions from your doctor about changing how much you take (the dosage) of your medicines, if this applies.  ¨ Do not stop or change your medicine on your own.  General instructions  · Wear compression stockings as told by your doctor.  · Get up slowly from lying down or sitting.  · Avoid hot showers and a lot of heat as told by your doctor.  · Return to your normal activities as told by your doctor. Ask what activities are safe for you.  · Do not use any products that contain nicotine or tobacco, such as cigarettes and e-cigarettes. If you need help quitting, ask your doctor.  · Keep all follow-up visits as told by your doctor. This is important.  Contact a doctor if:  · You throw up (vomit).  · You have watery poop (diarrhea).  · You have a fever for more than 2-3 days.  · You feel more thirsty than normal.  · You feel weak and tired.  Get help right away if:  · You have chest pain.  · You have a fast or irregular heartbeat.  · You lose feeling (get numbness) in any part of your body.  · You cannot move your arms or your legs.  · You have trouble talking.  · You get sweaty or feel light-headed.  · You faint.  · You have trouble breathing.  · You have trouble staying awake.  · You feel confused.  This information is not intended to replace advice given to you by your health care provider. Make sure you discuss any questions you have with your health care provider.  Document Released: 03/14/2011 Document Revised: 09/05/2017 Document Reviewed: 09/05/2017  [employer] Interactive Patient Education © 2017 [employer]

## 2018-01-26 NOTE — ED PROVIDER NOTES
TRIAGE CHIEF COMPLAINT:     Nursing and triage notes reviewed    Chief Complaint   Patient presents with   • Fall      HPI: Charisma Cortez is a 57 y.o. female who presents to the emergency department complaining of A fall.  Patient states she fell walking through [employer]'s parking lot yesterday afternoon.  Patient states she tripped and fell down 4 feet onto the ground.  Patient states she landed on her left side.  She states she hit her head and lost consciousness for an unknown amount of time.  Patient arrives complaining of pain in her left hip, left side, left shoulder.  She states she has chronic pain in her neck and back and this feels like it is slightly worsened since this occurred.  She denies any numbness or tingling.  No changes in her vision.  She also states she has not urinated in over 24 hours stating she has bladder issues.  Patient states she was recently discharged from the hospital where she had been treated for C. difficile.    REVIEW OF SYSTEMS: All other systems reviewed and are negative     PAST MEDICAL HISTORY:   Past Medical History:   Diagnosis Date   • Anxiety    • Arthritis    • Cancer     skin   • Cataracts, bilateral    • Chest pain    • COPD (chronic obstructive pulmonary disease) 3/23/2017   • Depression 3/23/2017   • Diabetes mellitus    • Emphysema lung    • Epilepsy     LAST SEIZURE 2/2017   • Headache    • High cholesterol    • History of brain shunt 1983   • Hypertension    • Liver disease    • Low back pain    • Lumbosacral disc disease    • BAEZ (nonalcoholic steatohepatitis)    • Neurological disorder    • Osteoporosis    • Pneumonia    • Seizure disorder 3/23/2017   • Wears glasses         FAMILY HISTORY:   Family History   Problem Relation Age of Onset   • Osteoarthritis Other    • Osteoporosis Other    • Heart disease Other    • Asthma Other    • COPD Other    • Ulcers Other    • Diabetes Other    • Cancer Other    • Hyperlipidemia Other    • Liver disease Other    •  "No Known Problems Mother    • No Known Problems Father    • No Known Problems Sister    • No Known Problems Brother         SOCIAL HISTORY:   Social History     Social History   • Marital status:      Spouse name: N/A   • Number of children: N/A   • Years of education: N/A     Occupational History   • Not on file.     Social History Main Topics   • Smoking status: Current Every Day Smoker     Packs/day: 1.00     Years: 45.00     Types: Cigarettes   • Smokeless tobacco: Never Used   • Alcohol use No   • Drug use: No   • Sexual activity: Defer     Other Topics Concern   • Not on file     Social History Narrative        SURGICAL HISTORY:   Past Surgical History:   Procedure Laterality Date   • APPENDECTOMY     • BRAIN SURGERY     • BREAST BIOPSY Right    •  SECTION  ,   • CHOLECYSTECTOMY     • COLON SURGERY  1970\" REMOVED   • COLONOSCOPY     • CYST REMOVAL      brain, R front lobe - [facility]   • DENTAL PROCEDURE     • ENDOSCOPY     • EXCISION MASS TRUNK Left 2017    Procedure: EXCISION LEFT BUTTOCKS MASS;  Surgeon: Jennifer Galindo MD;  Location: [facility];  Service:    • HYSTERECTOMY     • RECONSTRUCTION URETHROPLASTY     • TUBAL ABDOMINAL LIGATION          CURRENT MEDICATIONS:      Medication List      ASK your doctor about these medications          amoxicillin 875 MG tablet   Commonly known as:  AMOXIL   Take 1 tablet by mouth Every 12 (Twelve) Hours.       aspirin 81 MG EC tablet       clonazePAM 0.5 MG tablet   Commonly known as:  KlonoPIN   Take 1 tablet by mouth Every 12 (Twelve) Hours.       gabapentin 600 MG tablet   Commonly known as:  NEURONTIN   Take 0.5 tablets by mouth 3 (Three) Times a Day.       HYDROcodone-acetaminophen 7.5-325 MG per tablet   Commonly known as:  NORCO       levETIRAcetam 500 MG tablet   Commonly known as:  KEPPRA       lisinopril 10 MG tablet   Commonly known as:  PRINIVIL,ZESTRIL       omeprazole 20 MG capsule   Commonly known as:  priLOSEC       " saccharomyces boulardii 250 MG capsule   Commonly known as:  FLORASTOR   Take 1 capsule by mouth 2 (Two) Times a Day.       sertraline 100 MG tablet   Commonly known as:  ZOLOFT   Take 1 tablet by mouth Daily.       tiotropium 18 MCG per inhalation capsule   Commonly known as:  SPIRIVA       traZODone 100 MG tablet   Commonly known as:  DESYREL       vancomycin 50 MG/ML solution oral solution   250mg po QID until 1.11.18, then decrease to BID until 1.18.18, then once   a day until 1.25.18, then every 3d until 2.1.18            ALLERGIES: Keflex [cephalexin] and Sulfa antibiotics     PHYSICAL EXAM:   VITAL SIGNS:   Vitals:    01/26/18 0630   BP: (!) 74/51   Pulse: 82   Resp: 14   Temp: 98.6 °F (37 °C)   SpO2: 95%      CONSTITUTIONAL: Awake, oriented, appears Intoxicated   HENT: Atraumatic, normocephalic, oral mucosa pink and dry, airway patent. Nares patent without drainage. External ears normal.   EYES: Conjunctiva clear, EOMI, pupils approximately 3-4 mm and reactive  NECK: Trachea midline, non-tender, supple   CARDIOVASCULAR: Normal heart rate, Normal rhythm, No murmurs, rubs, gallops   PULMONARY/CHEST: Clear to auscultation, no rhonchi, wheezes, or rales. Symmetrical breath sounds.   ABDOMINAL: Non-distended, soft, there is moderate tenderness in the suprapubic abdomen - no rebound or guarding. BS normal.   NEUROLOGIC: Non-focal, moving all four extremities, no gross sensory or motor deficits.  Normal strength and sensation in all extremities.  Cranial nerves are intact.  EXTREMITIES: No clubbing, cyanosis, or edema.  Distal pulses palpable in all extremities.  Patient complains of discomfort diffusely with palpation of the left shoulder.  There is also tenderness with palpation over the left greater trochanter.  Some discomfort with passive range of motion of the left hip.  Pelvis stable.   SKIN: Warm, Dry, No erythema, No rash     ED COURSE / MEDICAL DECISION MAKING:   Charisma Cortez is a 57 y.o. female  who presents to the emergency department for evaluation of pain following a fall.  Patient is found to be hypotensive on arrival in the emergency department with a systolic blood pressure in the 70s initially.  This did improve after IV fluids were started.  She appears somewhat intoxicated and does admit to taking her Klonopin this morning and states she takes Neurontin as well as hydrocodone at home but denies taking any hydrocodone this morning.  Patient recently was also in the hospital for Clostridium difficile as well as a urinary tract infection.  We'll obtain multiple imaging studies as well as laboratory tests for further evaluation.  While awaiting results the care of the patient was checked out to the oncoming physician.  Disposition is pending patient response and results.     9:40 AM I received patient in sign out.  Her lab work is notable for acute renal insufficiency, white count is similar to previous.  No other acute or significant abnormality.  Urine drug screen is notable for amphetamines and benzodiazepines.  Her imaging is all negative for any acute traumatic injury.  She has repeatedly asked for narcotic pain medication.  I told her that I would not be providing her with any narcotic pain medication given her initial low blood pressures.  Her blood pressures have dramatically improved though I still do not for comfortable with this.  She became very agitated and angry with this answer and wants to leave.  We discussed the risks of leaving including permanent disability and death.  She is awake, alert, oriented, able to make decisions.  She is understanding of these risks.  She has requested to leave AGAINST MEDICAL ADVICE.    DECISION TO DISCHARGE/ADMIT: see ED care timeline     FINAL IMPRESSION:   1 -- fall   2 -- hypotension  3 -- acute renal failure  4 -- polysubstance abuse  5 -- left AGAINST MEDICAL ADVICE    Electronically signed by: Karmen Healy MD, 1/26/2018 6:53 AM       Benny  Tramaine Armenta MD  01/26/18 0903

## 2018-01-26 NOTE — ED NOTES
"Patient called nurse to bedside again demanding pain medicine stronger than tylenol. States \"if that doctor doesn't come in here and give me pain medicine I am pulling out my IV and I'm leaving!\"    Have asked multiple times if I can do anything to assist patient, and have fully explained in detail to patient the risks of sedating medication with hypotension.      Alexandre Andre RN  01/26/18 0948    "

## 2018-01-26 NOTE — ED NOTES
"Patient yelled at nursing staff stating \"I am going home to get my pain medicine!\" \"Next time I am going to [place]\" AMA form signed      Alexandre Andre RN  01/26/18 0949    "

## 2018-01-31 LAB
BACTERIA SPEC AEROBE CULT: NORMAL
BACTERIA SPEC AEROBE CULT: NORMAL

## 2018-03-06 ENCOUNTER — APPOINTMENT (OUTPATIENT)
Dept: GENERAL RADIOLOGY | Facility: HOSPITAL | Age: 58
End: 2018-03-06

## 2018-03-06 ENCOUNTER — HOSPITAL ENCOUNTER (INPATIENT)
Facility: HOSPITAL | Age: 58
LOS: 6 days | Discharge: LEFT BEFORE DISCHARGE | End: 2018-03-12
Attending: EMERGENCY MEDICINE | Admitting: INTERNAL MEDICINE

## 2018-03-06 DIAGNOSIS — N17.9 AKI (ACUTE KIDNEY INJURY) (HCC): ICD-10-CM

## 2018-03-06 DIAGNOSIS — J18.9 PNEUMONIA OF BOTH LUNGS DUE TO INFECTIOUS ORGANISM, UNSPECIFIED PART OF LUNG: ICD-10-CM

## 2018-03-06 DIAGNOSIS — A41.9 SEPSIS, DUE TO UNSPECIFIED ORGANISM: Primary | ICD-10-CM

## 2018-03-06 DIAGNOSIS — Z74.09 IMPAIRED MOBILITY AND ADLS: ICD-10-CM

## 2018-03-06 DIAGNOSIS — E87.20 METABOLIC ACIDOSIS: ICD-10-CM

## 2018-03-06 DIAGNOSIS — Z78.9 IMPAIRED MOBILITY AND ADLS: ICD-10-CM

## 2018-03-06 LAB
ABO GROUP BLD: NORMAL
ABO GROUP BLD: NORMAL
ALBUMIN SERPL-MCNC: 3.4 G/DL (ref 3.5–5)
ALBUMIN/GLOB SERPL: 1 G/DL (ref 1–2)
ALP SERPL-CCNC: 129 U/L (ref 38–126)
ALT SERPL W P-5'-P-CCNC: 43 U/L (ref 13–69)
AMPHET+METHAMPHET UR QL: POSITIVE
AMPHETAMINES UR QL: POSITIVE
ANION GAP SERPL CALCULATED.3IONS-SCNC: 23.4 MMOL/L
ANION GAP SERPL CALCULATED.3IONS-SCNC: 27.5 MMOL/L
ARTERIAL PATENCY WRIST A: ABNORMAL
ARTERIAL PATENCY WRIST A: POSITIVE
AST SERPL-CCNC: 41 U/L (ref 15–46)
ATMOSPHERIC PRESS: 726 MMHG
ATMOSPHERIC PRESS: 731 MMHG
BACTERIA UR QL AUTO: ABNORMAL /HPF
BARBITURATES UR QL SCN: NEGATIVE
BASE EXCESS BLDA CALC-SCNC: -19.1 MMOL/L
BASE EXCESS BLDA CALC-SCNC: -21.8 MMOL/L
BASE EXCESS BLDV CALC-SCNC: -20.7 MMOL/L
BDY SITE: ABNORMAL
BENZODIAZ UR QL SCN: POSITIVE
BILIRUB SERPL-MCNC: 0.9 MG/DL (ref 0.2–1.3)
BILIRUB UR QL STRIP: NEGATIVE
BLD GP AB SCN SERPL QL: NEGATIVE
BUN BLD-MCNC: 60 MG/DL (ref 7–20)
BUN BLD-MCNC: 69 MG/DL (ref 7–20)
BUN/CREAT SERPL: 7.4 (ref 7.1–23.5)
BUN/CREAT SERPL: 7.4 (ref 7.1–23.5)
BUPRENORPHINE SERPL-MCNC: NEGATIVE NG/ML
CALCIUM SPEC-SCNC: 7.4 MG/DL (ref 8.4–10.2)
CALCIUM SPEC-SCNC: 8.1 MG/DL (ref 8.4–10.2)
CANNABINOIDS SERPL QL: NEGATIVE
CHLORIDE SERPL-SCNC: 100 MMOL/L (ref 98–107)
CHLORIDE SERPL-SCNC: 106 MMOL/L (ref 98–107)
CK MB SERPL-CCNC: 34.9 NG/ML (ref 0.2–2.4)
CK MB SERPL-RTO: 3.9 % (ref 0–2)
CK SERPL-CCNC: 904 U/L (ref 30–170)
CLARITY UR: ABNORMAL
CO2 SERPL-SCNC: 9 MMOL/L (ref 26–30)
CO2 SERPL-SCNC: 9 MMOL/L (ref 26–30)
COCAINE UR QL: NEGATIVE
COHGB MFR BLD: 1.4 %
COLOR UR: YELLOW
CREAT BLD-MCNC: 8.1 MG/DL (ref 0.6–1.3)
CREAT BLD-MCNC: 9.3 MG/DL (ref 0.6–1.3)
D-LACTATE SERPL-SCNC: 1 MMOL/L (ref 0.5–2)
DEPRECATED RDW RBC AUTO: 47.4 FL (ref 37–54)
ERYTHROCYTE [DISTWIDTH] IN BLOOD BY AUTOMATED COUNT: 14.7 % (ref 11.5–14.5)
GFR SERPL CREATININE-BSD FRML MDRD: 4 ML/MIN/1.73
GFR SERPL CREATININE-BSD FRML MDRD: 5 ML/MIN/1.73
GLOBULIN UR ELPH-MCNC: 3.3 GM/DL
GLUCOSE BLD-MCNC: 75 MG/DL (ref 74–98)
GLUCOSE BLD-MCNC: 77 MG/DL (ref 74–98)
GLUCOSE BLDC GLUCOMTR-MCNC: 122 MG/DL (ref 70–130)
GLUCOSE BLDC GLUCOMTR-MCNC: 87 MG/DL (ref 70–130)
GLUCOSE UR STRIP-MCNC: NEGATIVE MG/DL
HCO3 BLDA-SCNC: 10.8 MMOL/L (ref 22–28)
HCO3 BLDA-SCNC: 8.9 MMOL/L (ref 22–28)
HCO3 BLDV-SCNC: 9.6 MMOL/L (ref 22–28)
HCT VFR BLD AUTO: 27.3 % (ref 37–47)
HGB BLD-MCNC: 8.8 G/DL (ref 12–16)
HGB BLDA-MCNC: 8.7 G/DL (ref 12–18)
HGB BLDA-MCNC: 8.8 G/DL (ref 12–18)
HGB BLDA-MCNC: 8.8 G/DL (ref 12–18)
HGB UR QL STRIP.AUTO: ABNORMAL
HOLD SPECIMEN: NORMAL
HOROWITZ INDEX BLD+IHG-RTO: 28 %
HOROWITZ INDEX BLD+IHG-RTO: 40 %
HOROWITZ INDEX BLD+IHG-RTO: 44 %
HYALINE CASTS UR QL AUTO: ABNORMAL /LPF
KETONES UR QL STRIP: NEGATIVE
LEUKOCYTE ESTERASE UR QL STRIP.AUTO: ABNORMAL
LYMPHOCYTES # BLD MANUAL: 1.44 10*3/MM3 (ref 0.6–3.4)
LYMPHOCYTES NFR BLD MANUAL: 6 % (ref 10–50)
LYMPHOCYTES NFR BLD MANUAL: 8 % (ref 0–12)
MAGNESIUM SERPL-MCNC: 1.5 MG/DL (ref 1.6–2.3)
MCH RBC QN AUTO: 28.1 PG (ref 27–31)
MCHC RBC AUTO-ENTMCNC: 32.2 G/DL (ref 30–37)
MCV RBC AUTO: 87.2 FL (ref 81–99)
METAMYELOCYTES NFR BLD MANUAL: 1 % (ref 0–0)
METHADONE UR QL SCN: NEGATIVE
METHGB BLD QL: 0.3 %
MODALITY: ABNORMAL
MONOCYTES # BLD AUTO: 1.91 10*3/MM3 (ref 0–0.9)
NEUTROPHILS # BLD AUTO: 20.33 10*3/MM3 (ref 2–6.9)
NEUTROPHILS NFR BLD MANUAL: 66 % (ref 37–80)
NEUTS BAND NFR BLD MANUAL: 19 % (ref 0–6)
NITRITE UR QL STRIP: NEGATIVE
NRBC SPEC MANUAL: 1 /100 WBC (ref 0–0)
OPIATES UR QL: POSITIVE
OXYCODONE UR QL SCN: NEGATIVE
OXYHGB MFR BLDV: 91.3 % (ref 94–99)
PCO2 BLDA: 33.2 MM HG (ref 35–45)
PCO2 BLDA: 36.1 MM HG (ref 35–45)
PCO2 BLDV: 34.2 MM HG (ref 35–45)
PCP UR QL SCN: NEGATIVE
PH BLDA: 7.04 PH UNITS (ref 7.3–7.5)
PH BLDA: 7.08 PH UNITS (ref 7.3–7.5)
PH BLDV: 7.06 PH UNITS
PH UR STRIP.AUTO: 6.5 [PH] (ref 5–8)
PHOSPHATE SERPL-MCNC: 9.5 MG/DL (ref 2.5–4.5)
PLATELET # BLD AUTO: 546 10*3/MM3 (ref 130–400)
PMV BLD AUTO: 9.3 FL (ref 6–12)
PO2 BLDA: 57.8 MM HG (ref 75–100)
PO2 BLDA: 79.1 MM HG (ref 75–100)
PO2 BLDV: 32.9 MM HG (ref 75–100)
POTASSIUM BLD-SCNC: 4.4 MMOL/L (ref 3.5–5.1)
POTASSIUM BLD-SCNC: 4.5 MMOL/L (ref 3.5–5.1)
PROPOXYPH UR QL: NEGATIVE
PROT SERPL-MCNC: 6.7 G/DL (ref 6.3–8.2)
PROT UR QL STRIP: ABNORMAL
RBC # BLD AUTO: 3.13 10*6/MM3 (ref 4.2–5.4)
RBC # UR: ABNORMAL /HPF
RBC MORPH BLD: NORMAL
REF LAB TEST METHOD: ABNORMAL
RH BLD: POSITIVE
RH BLD: POSITIVE
SAO2 % BLDCOA: 87.3 %
SAO2 % BLDCOA: 92.9 %
SAO2 % BLDCOV: 54.2 %
SCAN SLIDE: NORMAL
SMALL PLATELETS BLD QL SMEAR: ABNORMAL
SODIUM BLD-SCNC: 132 MMOL/L (ref 137–145)
SODIUM BLD-SCNC: 134 MMOL/L (ref 137–145)
SP GR UR STRIP: 1.02 (ref 1–1.03)
SQUAMOUS #/AREA URNS HPF: ABNORMAL /HPF
TOXIC GRANULATION: ABNORMAL
TRICYCLICS UR QL SCN: NEGATIVE
TROPONIN I SERPL-MCNC: <0.012 NG/ML (ref 0–0.03)
UROBILINOGEN UR QL STRIP: ABNORMAL
WBC NRBC COR # BLD: 23.92 10*3/MM3 (ref 4.8–10.8)
WBC UR QL AUTO: ABNORMAL /HPF
WHOLE BLOOD HOLD SPECIMEN: NORMAL
WHOLE BLOOD HOLD SPECIMEN: NORMAL

## 2018-03-06 PROCEDURE — 82805 BLOOD GASES W/O2 SATURATION: CPT

## 2018-03-06 PROCEDURE — 94640 AIRWAY INHALATION TREATMENT: CPT

## 2018-03-06 PROCEDURE — 82375 ASSAY CARBOXYHB QUANT: CPT

## 2018-03-06 PROCEDURE — 84100 ASSAY OF PHOSPHORUS: CPT | Performed by: INTERNAL MEDICINE

## 2018-03-06 PROCEDURE — 87040 BLOOD CULTURE FOR BACTERIA: CPT | Performed by: EMERGENCY MEDICINE

## 2018-03-06 PROCEDURE — 71045 X-RAY EXAM CHEST 1 VIEW: CPT

## 2018-03-06 PROCEDURE — 04HL33Z INSERTION OF INFUSION DEVICE INTO LEFT FEMORAL ARTERY, PERCUTANEOUS APPROACH: ICD-10-PCS | Performed by: INTERNAL MEDICINE

## 2018-03-06 PROCEDURE — 82962 GLUCOSE BLOOD TEST: CPT

## 2018-03-06 PROCEDURE — 25010000002 LEVETIRACETAM IN NACL 0.82% 500 MG/100ML SOLUTION: Performed by: INTERNAL MEDICINE

## 2018-03-06 PROCEDURE — 25010000002 MAGNESIUM SULFATE 2 GM/50ML SOLUTION: Performed by: INTERNAL MEDICINE

## 2018-03-06 PROCEDURE — 83605 ASSAY OF LACTIC ACID: CPT | Performed by: EMERGENCY MEDICINE

## 2018-03-06 PROCEDURE — 80053 COMPREHEN METABOLIC PANEL: CPT | Performed by: EMERGENCY MEDICINE

## 2018-03-06 PROCEDURE — 83735 ASSAY OF MAGNESIUM: CPT | Performed by: EMERGENCY MEDICINE

## 2018-03-06 PROCEDURE — 25010000002 MEROPENEM IN SODIUM CHLORIDE 0.9% 50 ML 500 MG/50ML RECONSTITUTED SOLUTION: Performed by: INTERNAL MEDICINE

## 2018-03-06 PROCEDURE — 87186 SC STD MICRODIL/AGAR DIL: CPT | Performed by: EMERGENCY MEDICINE

## 2018-03-06 PROCEDURE — 25010000002 LORAZEPAM PER 2 MG: Performed by: INTERNAL MEDICINE

## 2018-03-06 PROCEDURE — 74018 RADEX ABDOMEN 1 VIEW: CPT

## 2018-03-06 PROCEDURE — 85025 COMPLETE CBC W/AUTO DIFF WBC: CPT | Performed by: EMERGENCY MEDICINE

## 2018-03-06 PROCEDURE — 94799 UNLISTED PULMONARY SVC/PX: CPT

## 2018-03-06 PROCEDURE — 86900 BLOOD TYPING SEROLOGIC ABO: CPT | Performed by: INTERNAL MEDICINE

## 2018-03-06 PROCEDURE — 86901 BLOOD TYPING SEROLOGIC RH(D): CPT

## 2018-03-06 PROCEDURE — 81001 URINALYSIS AUTO W/SCOPE: CPT | Performed by: EMERGENCY MEDICINE

## 2018-03-06 PROCEDURE — 84484 ASSAY OF TROPONIN QUANT: CPT | Performed by: EMERGENCY MEDICINE

## 2018-03-06 PROCEDURE — 86900 BLOOD TYPING SEROLOGIC ABO: CPT

## 2018-03-06 PROCEDURE — 25010000002 VANCOMYCIN PER 500 MG: Performed by: EMERGENCY MEDICINE

## 2018-03-06 PROCEDURE — 86920 COMPATIBILITY TEST SPIN: CPT

## 2018-03-06 PROCEDURE — 99291 CRITICAL CARE FIRST HOUR: CPT | Performed by: INTERNAL MEDICINE

## 2018-03-06 PROCEDURE — 80306 DRUG TEST PRSMV INSTRMNT: CPT | Performed by: EMERGENCY MEDICINE

## 2018-03-06 PROCEDURE — 85007 BL SMEAR W/DIFF WBC COUNT: CPT | Performed by: EMERGENCY MEDICINE

## 2018-03-06 PROCEDURE — 25010000002 LEVETIRACETAM IN NACL 0.82% 500 MG/100ML SOLUTION: Performed by: EMERGENCY MEDICINE

## 2018-03-06 PROCEDURE — 25010000002 PIPERACILLIN SOD-TAZOBACTAM PER 1 G: Performed by: EMERGENCY MEDICINE

## 2018-03-06 PROCEDURE — 82550 ASSAY OF CK (CPK): CPT | Performed by: EMERGENCY MEDICINE

## 2018-03-06 PROCEDURE — 25010000002 FLUCONAZOLE PER 200 MG: Performed by: INTERNAL MEDICINE

## 2018-03-06 PROCEDURE — 87086 URINE CULTURE/COLONY COUNT: CPT | Performed by: EMERGENCY MEDICINE

## 2018-03-06 PROCEDURE — 86850 RBC ANTIBODY SCREEN: CPT | Performed by: INTERNAL MEDICINE

## 2018-03-06 PROCEDURE — 83050 HGB METHEMOGLOBIN QUAN: CPT

## 2018-03-06 PROCEDURE — 93005 ELECTROCARDIOGRAM TRACING: CPT

## 2018-03-06 PROCEDURE — 25010000002 ONDANSETRON PER 1 MG: Performed by: EMERGENCY MEDICINE

## 2018-03-06 PROCEDURE — 82553 CREATINE MB FRACTION: CPT | Performed by: EMERGENCY MEDICINE

## 2018-03-06 PROCEDURE — 86901 BLOOD TYPING SEROLOGIC RH(D): CPT | Performed by: INTERNAL MEDICINE

## 2018-03-06 PROCEDURE — 99291 CRITICAL CARE FIRST HOUR: CPT

## 2018-03-06 PROCEDURE — 25010000002 LEVOFLOXACIN PER 250 MG: Performed by: EMERGENCY MEDICINE

## 2018-03-06 RX ORDER — LORAZEPAM 2 MG/ML
1 INJECTION INTRAMUSCULAR
Status: DISCONTINUED | OUTPATIENT
Start: 2018-03-06 | End: 2018-03-12 | Stop reason: HOSPADM

## 2018-03-06 RX ORDER — SODIUM CHLORIDE 0.9 % (FLUSH) 0.9 %
10 SYRINGE (ML) INJECTION AS NEEDED
Status: DISCONTINUED | OUTPATIENT
Start: 2018-03-06 | End: 2018-03-12 | Stop reason: HOSPADM

## 2018-03-06 RX ORDER — SODIUM CHLORIDE 0.9 % (FLUSH) 0.9 %
1-10 SYRINGE (ML) INJECTION AS NEEDED
Status: DISCONTINUED | OUTPATIENT
Start: 2018-03-06 | End: 2018-03-12 | Stop reason: HOSPADM

## 2018-03-06 RX ORDER — LEVETIRACETAM 5 MG/ML
500 INJECTION INTRAVASCULAR EVERY 12 HOURS SCHEDULED
Status: COMPLETED | OUTPATIENT
Start: 2018-03-06 | End: 2018-03-06

## 2018-03-06 RX ORDER — MEROPENEM AND SODIUM CHLORIDE 500 MG/50ML
500 INJECTION, SOLUTION INTRAVENOUS EVERY 24 HOURS
Status: DISCONTINUED | OUTPATIENT
Start: 2018-03-06 | End: 2018-03-07

## 2018-03-06 RX ORDER — LEVOFLOXACIN 5 MG/ML
750 INJECTION, SOLUTION INTRAVENOUS ONCE
Status: COMPLETED | OUTPATIENT
Start: 2018-03-06 | End: 2018-03-06

## 2018-03-06 RX ORDER — IPRATROPIUM BROMIDE AND ALBUTEROL SULFATE 2.5; .5 MG/3ML; MG/3ML
3 SOLUTION RESPIRATORY (INHALATION)
Status: DISCONTINUED | OUTPATIENT
Start: 2018-03-06 | End: 2018-03-12 | Stop reason: HOSPADM

## 2018-03-06 RX ORDER — MAGNESIUM SULFATE HEPTAHYDRATE 40 MG/ML
2 INJECTION, SOLUTION INTRAVENOUS ONCE
Status: COMPLETED | OUTPATIENT
Start: 2018-03-06 | End: 2018-03-06

## 2018-03-06 RX ORDER — FLUCONAZOLE 2 MG/ML
200 INJECTION, SOLUTION INTRAVENOUS ONCE
Status: COMPLETED | OUTPATIENT
Start: 2018-03-06 | End: 2018-03-06

## 2018-03-06 RX ORDER — LEVETIRACETAM 5 MG/ML
500 INJECTION INTRAVASCULAR EVERY 12 HOURS SCHEDULED
Status: DISCONTINUED | OUTPATIENT
Start: 2018-03-06 | End: 2018-03-08

## 2018-03-06 RX ORDER — FLUCONAZOLE, SODIUM CHLORIDE 2 MG/ML
100 INJECTION INTRAVENOUS EVERY 24 HOURS
Status: DISCONTINUED | OUTPATIENT
Start: 2018-03-07 | End: 2018-03-09

## 2018-03-06 RX ORDER — ONDANSETRON 2 MG/ML
4 INJECTION INTRAMUSCULAR; INTRAVENOUS ONCE
Status: COMPLETED | OUTPATIENT
Start: 2018-03-06 | End: 2018-03-06

## 2018-03-06 RX ORDER — LEVOFLOXACIN 5 MG/ML
500 INJECTION, SOLUTION INTRAVENOUS
Status: DISCONTINUED | OUTPATIENT
Start: 2018-03-08 | End: 2018-03-09

## 2018-03-06 RX ORDER — FAMOTIDINE 10 MG/ML
10 INJECTION, SOLUTION INTRAVENOUS ONCE
Status: COMPLETED | OUTPATIENT
Start: 2018-03-06 | End: 2018-03-06

## 2018-03-06 RX ORDER — IPRATROPIUM BROMIDE AND ALBUTEROL SULFATE 2.5; .5 MG/3ML; MG/3ML
3 SOLUTION RESPIRATORY (INHALATION) EVERY 4 HOURS PRN
Status: DISCONTINUED | OUTPATIENT
Start: 2018-03-06 | End: 2018-03-12 | Stop reason: HOSPADM

## 2018-03-06 RX ADMIN — MAGNESIUM SULFATE IN WATER 2 G: 40 INJECTION, SOLUTION INTRAVENOUS at 14:31

## 2018-03-06 RX ADMIN — FLUCONAZOLE 200 MG: 2 INJECTION, SOLUTION INTRAVENOUS at 17:32

## 2018-03-06 RX ADMIN — SODIUM BICARBONATE 150 ML/HR: 84 INJECTION, SOLUTION INTRAVENOUS at 22:48

## 2018-03-06 RX ADMIN — IPRATROPIUM BROMIDE AND ALBUTEROL SULFATE 3 ML: .5; 3 SOLUTION RESPIRATORY (INHALATION) at 19:28

## 2018-03-06 RX ADMIN — FAMOTIDINE 10 MG: 10 INJECTION, SOLUTION INTRAVENOUS at 15:13

## 2018-03-06 RX ADMIN — LORAZEPAM 1 MG: 2 INJECTION INTRAMUSCULAR; INTRAVENOUS at 20:20

## 2018-03-06 RX ADMIN — LEVOFLOXACIN 750 MG: 5 INJECTION, SOLUTION INTRAVENOUS at 11:07

## 2018-03-06 RX ADMIN — MEROPENEM AND SODIUM CHLORIDE 500 MG: 500 INJECTION, SOLUTION INTRAVENOUS at 15:10

## 2018-03-06 RX ADMIN — NOREPINEPHRINE BITARTRATE 0.02 MCG/KG/MIN: 1 INJECTION INTRAVENOUS at 11:53

## 2018-03-06 RX ADMIN — SODIUM CHLORIDE 1000 ML: 9 INJECTION, SOLUTION INTRAVENOUS at 08:49

## 2018-03-06 RX ADMIN — SODIUM CHLORIDE 1000 ML: 9 INJECTION, SOLUTION INTRAVENOUS at 09:04

## 2018-03-06 RX ADMIN — SODIUM CHLORIDE 1000 ML: 9 INJECTION, SOLUTION INTRAVENOUS at 10:34

## 2018-03-06 RX ADMIN — TAZOBACTAM SODIUM AND PIPERACILLIN SODIUM 4.5 G: 500; 4 INJECTION, SOLUTION INTRAVENOUS at 09:40

## 2018-03-06 RX ADMIN — LEVETIRACETAM 500 MG: 5 INJECTION INTRAVENOUS at 20:21

## 2018-03-06 RX ADMIN — VANCOMYCIN HYDROCHLORIDE 1250 MG: 500 INJECTION, POWDER, LYOPHILIZED, FOR SOLUTION INTRAVENOUS at 09:43

## 2018-03-06 RX ADMIN — LEVETIRACETAM 500 MG: 5 INJECTION INTRAVENOUS at 08:59

## 2018-03-06 RX ADMIN — ONDANSETRON 4 MG: 2 INJECTION INTRAMUSCULAR; INTRAVENOUS at 09:03

## 2018-03-06 RX ADMIN — SODIUM BICARBONATE 150 ML/HR: 84 INJECTION, SOLUTION INTRAVENOUS at 15:10

## 2018-03-06 RX ADMIN — SODIUM CHLORIDE 1000 ML: 9 INJECTION, SOLUTION INTRAVENOUS at 14:31

## 2018-03-06 NOTE — ED PROVIDER NOTES
Subjective   History of Present Illness   57-year-old female with a history of seizure disorder with nonadherence to her medications, polysubstance abuse, diabetes brought in by EMS after seizure-like activity.  Per EMS, she was initially somnolent with a blood pressure 50/30.  They gave 600 mL bolus of normal saline under pressure improved to 77/44 disorder on a dopamine drip.  Afterwards, she became alert, oriented to self and place.  On arrival to the ER, her blood pressure was in the 140s over 90s so the dopamine was stopped.  She states that she did use some injection drugs in the last 2 days but denies missing any of her Keppra.  States she does not remember happen today.  She denies any complaints currently.  However, she is somewhat somnolent on arrival.,  Review of her prior records show that she has had similar presentations in the past and the setting of accidental overdose of illicit or prescribed drugs.  Furthermore, she is prescribed trazodone, gabapentin, clonidine.    Review of Systems   Neurological: Positive for seizures.   All other systems reviewed and are negative.      Past Medical History:   Diagnosis Date   • Anxiety    • Arthritis    • Cancer     skin   • Cataracts, bilateral    • Chest pain    • COPD (chronic obstructive pulmonary disease) 3/23/2017   • Depression 3/23/2017   • Diabetes mellitus    • Emphysema lung    • Epilepsy     LAST SEIZURE 2017   • Headache    • High cholesterol    • History of brain shunt    • Hypertension    • Liver disease    • Low back pain    • Lumbosacral disc disease    • BAEZ (nonalcoholic steatohepatitis)    • Neurological disorder    • Osteoporosis    • Pneumonia    • Seizure disorder 3/23/2017   • Wears glasses        Allergies   Allergen Reactions   • Keflex [Cephalexin] Hives   • Sulfa Antibiotics Rash       Past Surgical History:   Procedure Laterality Date   • APPENDECTOMY     • BRAIN SURGERY     • BREAST BIOPSY Right    •  SECTION   "1984,1986   • CHOLECYSTECTOMY     • COLON SURGERY  1970    2\" REMOVED   • COLONOSCOPY     • CYST REMOVAL  1987    brain, R front lobe - [facility]   • DENTAL PROCEDURE     • ENDOSCOPY     • EXCISION MASS TRUNK Left 8/7/2017    Procedure: EXCISION LEFT BUTTOCKS MASS;  Surgeon: Jennifer Galindo MD;  Location: [facility];  Service:    • HYSTERECTOMY     • RECONSTRUCTION URETHROPLASTY     • TUBAL ABDOMINAL LIGATION         Family History   Problem Relation Age of Onset   • Osteoarthritis Other    • Osteoporosis Other    • Heart disease Other    • Asthma Other    • COPD Other    • Ulcers Other    • Diabetes Other    • Cancer Other    • Hyperlipidemia Other    • Liver disease Other    • No Known Problems Mother    • No Known Problems Father    • No Known Problems Sister    • No Known Problems Brother        Social History     Social History   • Marital status:      Social History Main Topics   • Smoking status: Current Every Day Smoker     Packs/day: 1.00     Years: 45.00     Types: Cigarettes   • Smokeless tobacco: Never Used   • Alcohol use No   • Drug use: No   • Sexual activity: Defer           Objective   Physical Exam   Constitutional: She is oriented to person, place, and time. She appears well-developed and well-nourished. No distress.   HENT:   Head: Normocephalic.   Mouth/Throat: Oropharynx is clear and moist. No oropharyngeal exudate.   Vomitus dried on the outside of the mouth.   Eyes: Pupils are equal, round, and reactive to light. No scleral icterus.   Neck: Neck supple. No tracheal deviation present.   Cardiovascular: Normal rate, regular rhythm, normal heart sounds and intact distal pulses.  Exam reveals no gallop and no friction rub.    No murmur heard.  Pulmonary/Chest: Effort normal and breath sounds normal. No stridor. No respiratory distress. She has no wheezes. She has no rales.   Abdominal: Soft. She exhibits no distension and no mass. There is no tenderness. There is no rebound and no guarding. "   Musculoskeletal: She exhibits no edema or deformity.   Neurological: She is alert and oriented to person, place, and time.   RASS -1   Skin: Skin is warm and dry. She is not diaphoretic. No erythema. No pallor.   Psychiatric: She has a normal mood and affect. Her behavior is normal.   Nursing note and vitals reviewed.      Critical Care  Performed by: CONNIE DILL  Authorized by: CONNIE DILL     Critical care provider statement:     Critical care time (minutes):  60    Critical care was necessary to treat or prevent imminent or life-threatening deterioration of the following conditions:  Cardiac failure, CNS failure or compromise, dehydration, renal failure, sepsis and shock    Critical care was time spent personally by me on the following activities:  Blood draw for specimens, development of treatment plan with patient or surrogate, discussions with consultants, evaluation of patient's response to treatment, examination of patient, obtaining history from patient or surrogate, review of old charts, re-evaluation of patient's condition, ordering and review of laboratory studies and ordering and performing treatments and interventions  Central Line At Bedside  Date/Time: 3/6/2018 11:08 AM  Performed by: CONNIE DILL  Authorized by: CONNIE DILL     Consent:     Consent obtained:  Verbal    Consent given by:  Patient    Risks discussed:  Arterial puncture, bleeding, infection, nerve damage and incorrect placement  Pre-procedure details:     Hand hygiene: Hand hygiene performed prior to insertion      Sterile barrier technique: All elements of maximal sterile technique followed      Skin preparation:  2% chlorhexidine    Skin preparation agent: Skin preparation agent completely dried prior to procedure    Anesthesia (see MAR for exact dosages):     Anesthesia method:  Local infiltration    Local anesthetic:  Lidocaine 1% w/o epi  Procedure details:     Location:  R femoral    Site selection  rationale:  Compressible, pt awake    Patient position:  Flat    Procedural supplies:  Triple lumen    Ultrasound guidance: yes      Sterile ultrasound techniques: Sterile gel and sterile probe covers were used      Number of attempts:  1    Successful placement: yes    Post-procedure details:     Post-procedure:  Dressing applied and line sutured    Assessment:  Free fluid flow and blood return through all ports    Patient tolerance of procedure:  Tolerated well, no immediate complications  Comments:      Of note, the femoral line did not easily advance and would not advance past 15cm. All three ports markie back and flowed well and I confirmed venous placement at bedside with US before suturing the line in place.              ED Course  ED Course                  MDM   57-year-old female here after possible seizure activity in the setting of substance abuse.  Initially hypotensive that improved after IV fluids and short run of dopamine.  Now, mildly tachycardic and somnolent but exam otherwise without any specific findings.  High suspicion that she has overdosed on a medication or had a seizure and is postictal or as an occult infection.  Will send broad labs, continue IV fluids, supportive care, and reassess.    9:37 AM labs are remarkable for leukocytosis with a bandemia, marketed acidosis without elevated lactate but with an anion gap of 27, probably substance positive in the urine including benzos and opiates which she is prescribed as well as amphetamines and methamphetamines which she has not, acute kidney injury with a creatinine of 9.3 and a be given of 69, and evidence of a UTI.  Her chest x-ray shows bilateral airspace disease suspicious for pneumonia.  She is getting her second and third liter of IV fluid, vancomycin and Zosyn, and her blood pressure remains in the 80s over 60s on her leg.  Of note, her blood pressure on her left arm appears to be elevated and on her right arm appears to be much lower.   "In her legs, the blood pressure is somewhat in between these other 2 readings.  Her heart rate remains in the 80s.  I suspect she is septic and very dehydrated.  She is, however, able to wake up and answer questions at this time.  I discussed with her family states that she has been more fatigued and slightly \"out of it\" over the last 3 days.  They are unaware of any substance abuse.  I attempted to call our hospitalist for admission to the ICU but have not been able to get through to her and we'll try again.    11:06 AM in the interim, the patient's blood pressures have stabilized in the 90s over 70s region.  I discussed her with the hospitalist who agreed to accept her to the ICU if she had 2 blood cultures drawn, nephrology called, and a central line placed.  I did place a right femoral central line and discussed with nephrology.  I relayed to them her labs including her evidence of kidney failure and acidosis with an elevated BUNs as well.  They felt that she did not need emergent dialysis at this time unless she had hyperkalemia but they did agree to see her this afternoon in consult. I ordered her a bed in the ICU.     Final diagnoses:   Sepsis, due to unspecified organism   RAGHU (acute kidney injury)   Metabolic acidosis   Pneumonia of both lungs due to infectious organism, unspecified part of lung            Johny Hernández MD  03/06/18 1111       Johny Hernández MD  04/05/18 1906    "

## 2018-03-06 NOTE — PROGRESS NOTES
Continued Stay Note   Fer     Patient Name: Charisma Cortez  MRN: [id number]  Today's Date: 3/6/2018    Admit Date: 3/6/2018          Discharge Plan       03/06/18 1533    Case Management/Social Work Plan    Plan SW following for dcp. SW spoke to  and mother of patient. Patient is still too ill to discuss dcp.               Discharge Codes     None        Expected Discharge Date and Time     Expected Discharge Date Expected Discharge Time    Mar 12, 2018             Cassandra Bryant

## 2018-03-06 NOTE — H&P
[facility] HOSPITALIST   HISTORY AND PHYSICAL      Name:  Charisma Cortez   Age:  57 y.o.  Sex:  female  :  1960  MRN:  [id number]   Visit Number:  [id number]  Admission Date:  3/6/2018  Date Of Service:  18  Primary Care Physician:  ANDREA Zhong    Chief Complaint:     Encephalopathy    History Of Presenting Illness:      Miss Cortez is a 58 yo WF with past medical history significant for polysubstance abuse recently discharged from [facility] in [place] after being admitted with heroin overdose and septic shock with evidence of acute urinary tract infection and Clostridium difficile.  Today she is presented after her  called EMS when she was difficult to arouse this morning.  Per the ER physician who spoke with EMS they stated that she was initially found to be hypotensive with systolics in the 50s.  They started in IV and initiated dopamine.  She then presented to the ER and additional evaluation included a CBC with leukocytosis and bandemia, and BMP demonstrating severe acidosis with elevated creatinine greater than 9 (baseline Cr 2.3).  A urine drug screen demonstrated evidence of amphetamines, methamphetamines, benzodiazepines and opiates.  Chest x-ray showed bilateral airspace disease suspicious for pneumonia.  The ER physician contacted the hospitalist service for admission during which we recommended that he obtain a central line for pressors for severe hypotension and ER to discuss patient's current status with nephrology for her severe acidemia and association with her renal failure.  The ER physician has stated that he has spoken with nephrology who is agreeable to see her in consultation here, the ER physician states the nephrologist is aware of the patients status and labs.    Her significant other is present with her.  He states that they have lived together greater than 30 years but they are not legally .  The patient's mother is here.   "The patient also has a son who is not present.  The patient lives with her significant other who initially denied any illegal substance use, but then when confronted regarding the UDS admitted that they were using drugs approximately a week ago.  They stated that she had a urine drug screen done on 3/1/2018 for her pain clinic, so it was after this.  He states that she was behaving normally yesterday with only some complaints of cough.  He denies she complained of anything else such as dysuria or increasing shortness of breath.  He states that this morning when he woke up she was difficult to arouse and as a result he called EMS for assistance.    Review Of Systems:     Unable to obtain ROS due to encephalopathy     Past Medical History:    Past Medical History:   Diagnosis Date   • Anxiety    • Arthritis    • Cancer     skin   • Cataracts, bilateral    • Chest pain    • COPD (chronic obstructive pulmonary disease) 3/23/2017   • Depression 3/23/2017   • Diabetes mellitus    • Emphysema lung    • Epilepsy     LAST SEIZURE 2017   • Headache    • High cholesterol    • History of brain shunt    • Hypertension    • Liver disease    • Low back pain    • Lumbosacral disc disease    • BAEZ (nonalcoholic steatohepatitis)    • Neurological disorder    • Osteoporosis    • Pneumonia    • Seizure disorder 3/23/2017   • Wears glasses        Past Surgical history:    Past Surgical History:   Procedure Laterality Date   • APPENDECTOMY     • BRAIN SURGERY     • BREAST BIOPSY Right    •  SECTION  ,   • CHOLECYSTECTOMY     • COLON SURGERY  1970\" REMOVED   • COLONOSCOPY     • CYST REMOVAL      brain, R front lobe - [facility]   • DENTAL PROCEDURE     • ENDOSCOPY     • EXCISION MASS TRUNK Left 2017    Procedure: EXCISION LEFT BUTTOCKS MASS;  Surgeon: Jennifer Galindo MD;  Location: [facility];  Service:    • HYSTERECTOMY     • RECONSTRUCTION URETHROPLASTY     • TUBAL ABDOMINAL LIGATION         Social " History:    Social History     Social History   • Marital status:      Spouse name: N/A   • Number of children: N/A   • Years of education: N/A     Occupational History   • Not on file.     Social History Main Topics   • Smoking status: Current Every Day Smoker     Packs/day: 1.00     Years: 45.00     Types: Cigarettes   • Smokeless tobacco: Never Used   • Alcohol use No   • Drug use: No   • Sexual activity: Defer     Other Topics Concern   • Not on file     Social History Narrative       Family History:    Family History   Problem Relation Age of Onset   • Osteoarthritis Other    • Osteoporosis Other    • Heart disease Other    • Asthma Other    • COPD Other    • Ulcers Other    • Diabetes Other    • Cancer Other    • Hyperlipidemia Other    • Liver disease Other    • No Known Problems Mother    • No Known Problems Father    • No Known Problems Sister    • No Known Problems Brother        Allergies:      Keflex [cephalexin] and Sulfa antibiotics    Home Medications:    Prior to Admission Medications     Prescriptions Last Dose Informant Patient Reported? Taking?    amoxicillin (AMOXIL) 875 MG tablet   No No    Take 1 tablet by mouth Every 12 (Twelve) Hours.    aspirin 81 MG EC tablet   Yes No    Take 81 mg by mouth Daily.    clonazePAM (KlonoPIN) 0.5 MG tablet   No No    Take 1 tablet by mouth Every 12 (Twelve) Hours.    gabapentin (NEURONTIN) 600 MG tablet   No No    Take 0.5 tablets by mouth 3 (Three) Times a Day.    HYDROcodone-acetaminophen (NORCO) 7.5-325 MG per tablet   Yes No    Take 1 tablet by mouth 2 (Two) Times a Day As Needed for Moderate Pain .    levETIRAcetam (KEPPRA) 500 MG tablet   Yes No    Take 500 mg by mouth 2 (Two) Times a Day.    lisinopril (PRINIVIL,ZESTRIL) 10 MG tablet   Yes No    Take 10 mg by mouth Daily.    omeprazole (priLOSEC) 20 MG capsule  Self Yes No    Take 20 mg by mouth Daily.    saccharomyces boulardii (FLORASTOR) 250 MG capsule   No No    Take 1 capsule by mouth 2 (Two)  Times a Day.    sertraline (ZOLOFT) 100 MG tablet   No No    Take 1 tablet by mouth Daily.    tiotropium (SPIRIVA) 18 MCG per inhalation capsule   Yes No    Place 1 capsule into inhaler and inhale Daily.    traZODone (DESYREL) 100 MG tablet   Yes No    Take 100 mg by mouth Every Night.    vancomycin 50 MG/ML solution oral solution   No No    250mg po QID until 1.11.18, then decrease to BID until 1.18.18, then once a day until 1.25.18, then every 3d until 2.1.18             ED Medications:    Medications   sodium chloride 0.9 % flush 10 mL (not administered)   norepinephrine (LEVOPHED) 8,000 mcg in sodium chloride 0.9 % 250 mL (32 mcg/mL) infusion (0.2 mcg/kg/min × 61.2 kg Intravenous Rate/Dose Change 3/6/18 1415)   sodium chloride 0.9 % flush 1-10 mL (not administered)   sodium chloride 0.9 % bolus 1,000 mL (1,000 mL Intravenous New Bag 3/6/18 1431)   Pharmacy Consult (not administered)   magnesium sulfate 2g/50 mL (PREMIX) infusion (2 g Intravenous New Bag 3/6/18 1431)   levoFLOXacin (LEVAQUIN) 500 mg/100 mL D5W (premix) (LEVAQUIN) 500 mg (not administered)   meropenem in sodium chloride 0.9% 50 mL (MERREM) IVPB 500 mg (not administered)   vancomycin 1000 mg in sodium chloride 0.9% 250 mL IVPB (not administered)   sodium bicarbonate 8.4 % 150 mEq in dextrose (D5W) 5 % 1,000 mL infusion (greater than 75 mEq) (not administered)   sodium chloride 0.9 % bolus 1,000 mL (0 mL Intravenous Stopped 3/6/18 1032)   levETIRAcetam in NaCl 0.82% (KEPPRA) IVPB 500 mg (0 mg Intravenous Stopped 3/6/18 0933)   ondansetron (ZOFRAN) injection 4 mg (4 mg Intravenous Given 3/6/18 0903)   sodium chloride 0.9 % bolus 1,000 mL (0 mL Intravenous Stopped 3/6/18 1033)   vancomycin 1250 mg in sodium chloride 0.9% 250 mL IVPB (0 mg Intravenous Stopped 3/6/18 1057)   piperacillin-tazobactam (ZOSYN) 4.5 g in iso-osmotic dextrose 100 mL IVPB (premix) (0 g Intravenous Stopped 3/6/18 1033)   sodium chloride 0.9 % bolus 1,000 mL (0 mL Intravenous  Stopped 3/6/18 1312)   levoFLOXacin (LEVAQUIN) 750 mg/150 mL D5W (premix) (LEVAQUIN) 750 mg (0 mg Intravenous Stopped 3/6/18 1242)       Vital Signs:    Temp:  [97.7 °F (36.5 °C)-97.9 °F (36.6 °C)] 97.7 °F (36.5 °C)  Heart Rate:  [] 89  Resp:  [14-17] 16  BP: ()/() 77/62    Last 3 weights    03/06/18  0834   Weight: 61.2 kg (135 lb)       Body mass index is 23.17 kg/(m^2).    Physical Exam:    General Appearance:  encephalopathic, not in any acute distress.   Head:  Atraumatic and normocephalic, without obvious abnormality.   Eyes:          PERRLA, conjunctivae and sclerae normal, no Icterus. No pallor.    Ears:  Ears appear intact with no abnormalities noted.   Throat: No oral lesions, no thrush, oral mucosa moist.   Neck: Supple, trachea midline, no thyromegaly, no carotid bruit.   Back:   No kyphoscoliosis present. No tenderness to palpation,   range of motion normal.   Lungs:   Chest shape is normal. Breath sounds heard bilaterally equally.  Some crackles BLL. No wheezing. No Pleural rub or bronchial breathing.   Heart:  Normal S1 and S2, no murmur, no gallop, no rub. No JVD.   Abdomen:   Normal bowel sounds, no masses, no organomegaly. Soft,  non-distended, no guarding, no rebound tenderness.   Extremities: Moves all extremities well, no edema, no cyanosis, no clubbing.   Pulses: Pulses palpable and equal bilaterally. Very poor radial pulses   Skin: No bleeding, bruising or rash.   Neurologic: Encephalopathy. No facial asymetry. Unable to fully assess     Laboratory data:    I have reviewed the labs done in the emergency room.      Results from last 7 days  Lab Units 03/06/18  0847   SODIUM mmol/L 132*   POTASSIUM mmol/L 4.5   CHLORIDE mmol/L 100   CO2 mmol/L 9.0*   BUN mg/dL 69*   CREATININE mg/dL 9.30*   CALCIUM mg/dL 8.1*   BILIRUBIN mg/dL 0.9   ALK PHOS U/L 129*   ALT (SGPT) U/L 43   AST (SGOT) U/L 41   GLUCOSE mg/dL 75       Results from last 7 days  Lab Units 03/06/18  0847   WBC  10*3/mm3 23.92*   HEMOGLOBIN g/dL 8.8*   HEMATOCRIT % 27.3*   PLATELETS 10*3/mm3 546*           Results from last 7 days  Lab Units 03/06/18  0847   CK TOTAL U/L 904*   TROPONIN I ng/mL <0.012   CK MB INDEX % 3.9*                   Results from last 7 days  Lab Units 03/06/18  1352   PH, ARTERIAL pH units 7.035*   PO2 ART mm Hg 79.1   PCO2, ARTERIAL mm Hg 33.2*   HCO3 ART mmol/L 8.9*       Results from last 7 days  Lab Units 03/06/18  0847   COLOR UA  Yellow   GLUCOSE UA  Negative   KETONES UA  Negative   LEUKOCYTES UA  Moderate (2+)*   PH, URINE  6.5   BILIRUBIN UA  Negative   UROBILINOGEN UA  0.2 E.U./dL     Pain Management Panel     Pain Management Panel Latest Ref Rng & Units 3/6/2018 1/26/2018    CREATININE UR mg/dL - -    AMPHETAMINES SCREEN, URINE Negative Positive(A) Positive(A)    BARBITURATES SCREEN Negative Negative Negative    BENZODIAZEPINE SCREEN, URINE Negative Positive(A) Positive(A)    BUPRENORPHINE Negative Negative Negative    COCAINE SCREEN, URINE Negative Negative Negative    METHADONE SCREEN, URINE Negative Negative Negative    METHAMPHETAMINE UR Negative Positive(A) Positive(A)              Radiology:    Imaging Results (last 72 hours)     Procedure Component Value Units Date/Time    XR Chest 1 View [[id number]] Collected:  03/06/18 0914     Updated:  03/06/18 0917    Narrative:       PROCEDURE: XR CHEST 1 VW-     HISTORY: Weak/Dizzy/AMS triage protocol     COMPARISON: January 26, 2018.     FINDINGS: The heart is normal in size. The mediastinum is unremarkable.  There are increased interstitial markings with patchy opacities in the  lung bases suspicious for a pneumonia. There is no pneumothorax.  There  are no acute osseous abnormalities.           Impression:       Bilateral airspace disease suspicious for a pneumonia.     Continued followup is recommended.     This report was finalized on 3/6/2018 9:15 AM by Carol Peter M.D..    XR Abdomen KUB [[id number]] Resulted:  03/06/18 1437      Updated:  03/06/18 1423          Active Problems:    Sepsis      Assessment:    Septic Shock requiring pressors  Bilateral Pneumonia  Acute UTI with h/o enterococcus  Infectious and metabolic encephalopathy  Hypotension  RAGHU on CKD 4  Mixed Metabolic acidosis and respiratory acidosis  H/o Heroin use  Recent Meth use per UDS, significant other states this was at least a week ago  UDS with Meth, amphetamines, BZO, and opiates  Hyponatremia  Hypomagnesemia  Hyperphosphatemia  Leukocytosis  Anemia  COPD compensated    Plan:    Admit to ICU.  Nephrology contacted per ER physician.  Agreeable to see in consultation.  We'll start her on sodium bicarbonate infusion to assist with acidemia.  Continue IV fluids as patient tolerates.    Continue levophed until she is able to maintain a map greater than 65.  Arterial line has been placed, appreciate cardiology's assistance. On admission chest x-ray demonstrated evidence of pneumonia.  Requested ER doc to obtain cultures, unclear whether or not he obtained them prior to initiating antibiotic therapy.  However 2 cultures have been obtained and are currently pending.  She has been started on empiric antibiotic therapy including Merrem, vanc, and Levaquin.    She also had evidence of a urinary tract infection.  Urine has been sent for culture.  Prior urine cultures in Jan have demonstrated enterococcus.    Anemia noted. Likely multifactorial. Eval with am labs. Type and screen completed.    She is encephalopathic. May need temporary soft restraints for patient safety to maintain CVC and arterial line. Discussed with ICU staff.    SCDs. H2B given once on adm.    I have spent >40 minutes of Critical care time reviewing records, speaking with family, reviewing labs and diagnostic studies for this patient.      Mirella Mcnally DO  03/06/18  2:38 PM    Dictated utilizing Dragon dictation.

## 2018-03-06 NOTE — PLAN OF CARE
Problem: Patient Care Overview (Adult)  Goal: Plan of Care Review  Outcome: Ongoing (interventions implemented as appropriate)   03/06/18 1535   Coping/Psychosocial Response Interventions   Plan Of Care Reviewed With patient;spouse   Patient Care Overview   Progress progress towards functional goals is fair     Goal: Adult Individualization and Mutuality  Outcome: Ongoing (interventions implemented as appropriate)    Goal: Discharge Needs Assessment  Outcome: Ongoing (interventions implemented as appropriate)      Problem: Fall Risk (Adult)  Goal: Identify Related Risk Factors and Signs and Symptoms  Outcome: Ongoing (interventions implemented as appropriate)    Goal: Absence of Falls  Outcome: Ongoing (interventions implemented as appropriate)      Problem: Anxiety (Adult)  Goal: Identify Related Risk Factors and Signs and Symptoms  Outcome: Ongoing (interventions implemented as appropriate)    Goal: Reduction/Resolution  Outcome: Ongoing (interventions implemented as appropriate)      Problem: Pain, Chronic (Adult)  Goal: Identify Related Risk Factors and Signs and Symptoms  Outcome: Ongoing (interventions implemented as appropriate)    Goal: Acceptable Pain Control/Comfort Level  Outcome: Ongoing (interventions implemented as appropriate)

## 2018-03-06 NOTE — PROGRESS NOTES
"Pharmacokinetic Initial Note - Vancomycin    Charisma Cortez is a 57 y.o. female  162.6 cm (64\") 61.2 kg (135 lb)    Indication for use: sepsis      Results from last 7 days  Lab Units 03/06/18  0847   WBC 10*3/mm3 23.92*   CREATININE mg/dL 9.30*      Estimated Creatinine Clearance: 6.6 mL/min (by C-G formula based on Cr of 9.3).  Temp Readings from Last 1 Encounters:   03/06/18 97.7 °F (36.5 °C) (Oral)       Culture results  Microbiology Results (last 10 days)     ** No results found for the last 240 hours. **          Other Antimicrobials  Levofloxacin 750 mg IV x 1, then 500 mg IV q 48 hr  Meropenem 500 mg IV q 24 hr    Assessment/Plan  Initiated Vancomycin 1250 mg IVPB once, followed by 1000 mg Q72H. Will order Vancomycin random on 03/07/18 with AM labs.  Pharmacy will monitor renal function and adjust dose accordingly.    Kathleen Odell, TREVOR  03/06/18 2:14 PM    "

## 2018-03-06 NOTE — DISCHARGE PLACEMENT REQUEST
"Please review for inpatient auth  Medicaid ID [id number]  NPI  [id number]  Dr Mcnally  [id number]    ICD 10 Code  A41.9, N17.9, J18.9, E87.2    Lexi Person (57 y.o. Female)     Date of Birth Social Security Number Address Home Phone MRN    1960  [address] [place] [id number] [phone] [id number]    Jewish Marital Status          None        Admission Date Admission Type Admitting Provider Attending Provider Department, Room/Bed    3/6/18 Emergency Uli April MADY, DO Uli ,  [facility] INTENSIVE CARE, I    Discharge Date Discharge Disposition Discharge Destination                      Attending Provider: Mirella Mcnally DO     Allergies:  Keflex [Cephalexin], Sulfa Antibiotics    Isolation:  None   Infection:  None   Code Status:  FULL    Ht:  162.6 cm (64\")   Wt:  61.2 kg (135 lb)    Admission Cmt:  None   Principal Problem:  None                Active Insurance as of 3/6/2018     Primary Coverage     Payor Plan Insurance Group Employer/Plan Group    [place]      Pay[place] Plan Address Payor Plan Phone Number Effective From Effective To    [address] [address]  [address]     [place] [address]       Subscriber Name Subscriber Birth Date Member ID       LEXI PERSON 1960 [id number]                 Emergency Contacts      (Rel.) Home Phone Work Phone Mobile Phone    Daniel Person (Spouse) [phone] -- --    AlexanderNeelima agarwal (Mother) [phone] -- --               History & Physical      April MADY Mcnally DO at 3/6/2018  2:30 PM              [facility] HOSPITALIST   HISTORY AND PHYSICAL      Name:  Lexi Person   Age:  57 y.o.  Sex:  female  :  1960  MRN:  [id number]   Visit Number:  [id number]  Admission Date:  3/6/2018  Date Of Service:  18  Primary Care Physician:  ANDREA Zhong    Chief " Complaint:     Encephalopathy    History Of Presenting Illness:      Miss Cortez is a 58 yo WF with past medical history significant for polysubstance abuse recently discharged from [facility] in [place] after being admitted with heroin overdose and septic shock with evidence of acute urinary tract infection and Clostridium difficile.  Today she is presented after her  called EMS when she was difficult to arouse this morning.  Per the ER physician who spoke with EMS they stated that she was initially found to be hypotensive with systolics in the 50s.  They started in IV and initiated dopamine.  She then presented to the ER and additional evaluation included a CBC with leukocytosis and bandemia, and BMP demonstrating severe acidosis with elevated creatinine greater than 9 (baseline Cr 2.3).  A urine drug screen demonstrated evidence of amphetamines, methamphetamines, benzodiazepines and opiates.  Chest x-ray showed bilateral airspace disease suspicious for pneumonia.  The ER physician contacted the hospitalist service for admission during which we recommended that he obtain a central line for pressors for severe hypotension and ER to discuss patient's current status with nephrology for her severe acidemia and association with her renal failure.  The ER physician has stated that he has spoken with nephrology who is agreeable to see her in consultation here, the ER physician states the nephrologist is aware of the patients status and labs.    Her significant other is present with her.  He states that they have lived together greater than 30 years but they are not legally .  The patient's mother is here.  The patient also has a son who is not present.  The patient lives with her significant other who initially denied any illegal substance use, but then when confronted regarding the UDS admitted that they were using drugs approximately a week ago.  They stated that she had a urine drug screen done on  "3/1/2018 for her pain clinic, so it was after this.  He states that she was behaving normally yesterday with only some complaints of cough.  He denies she complained of anything else such as dysuria or increasing shortness of breath.  He states that this morning when he woke up she was difficult to arouse and as a result he called EMS for assistance.    Review Of Systems:     Unable to obtain ROS due to encephalopathy     Past Medical History:    Past Medical History:   Diagnosis Date   • Anxiety    • Arthritis    • Cancer     skin   • Cataracts, bilateral    • Chest pain    • COPD (chronic obstructive pulmonary disease) 3/23/2017   • Depression 3/23/2017   • Diabetes mellitus    • Emphysema lung    • Epilepsy     LAST SEIZURE 2017   • Headache    • High cholesterol    • History of brain shunt    • Hypertension    • Liver disease    • Low back pain    • Lumbosacral disc disease    • BAEZ (nonalcoholic steatohepatitis)    • Neurological disorder    • Osteoporosis    • Pneumonia    • Seizure disorder 3/23/2017   • Wears glasses        Past Surgical history:    Past Surgical History:   Procedure Laterality Date   • APPENDECTOMY     • BRAIN SURGERY     • BREAST BIOPSY Right    •  SECTION  ,   • CHOLECYSTECTOMY     • COLON SURGERY  1970" REMOVED   • COLONOSCOPY     • CYST REMOVAL      brain, R front lobe - [facility]   • DENTAL PROCEDURE     • ENDOSCOPY     • EXCISION MASS TRUNK Left 2017    Procedure: EXCISION LEFT BUTTOCKS MASS;  Surgeon: Jennifer Galindo MD;  Location: [facility];  Service:    • HYSTERECTOMY     • RECONSTRUCTION URETHROPLASTY     • TUBAL ABDOMINAL LIGATION         Social History:    Social History     Social History   • Marital status:      Spouse name: N/A   • Number of children: N/A   • Years of education: N/A     Occupational History   • Not on file.     Social History Main Topics   • Smoking status: Current Every Day Smoker     Packs/day: 1.00     Years: 45.00 "     Types: Cigarettes   • Smokeless tobacco: Never Used   • Alcohol use No   • Drug use: No   • Sexual activity: Defer     Other Topics Concern   • Not on file     Social History Narrative       Family History:    Family History   Problem Relation Age of Onset   • Osteoarthritis Other    • Osteoporosis Other    • Heart disease Other    • Asthma Other    • COPD Other    • Ulcers Other    • Diabetes Other    • Cancer Other    • Hyperlipidemia Other    • Liver disease Other    • No Known Problems Mother    • No Known Problems Father    • No Known Problems Sister    • No Known Problems Brother        Allergies:      Keflex [cephalexin] and Sulfa antibiotics    Home Medications:    Prior to Admission Medications     Prescriptions Last Dose Informant Patient Reported? Taking?    amoxicillin (AMOXIL) 875 MG tablet   No No    Take 1 tablet by mouth Every 12 (Twelve) Hours.    aspirin 81 MG EC tablet   Yes No    Take 81 mg by mouth Daily.    clonazePAM (KlonoPIN) 0.5 MG tablet   No No    Take 1 tablet by mouth Every 12 (Twelve) Hours.    gabapentin (NEURONTIN) 600 MG tablet   No No    Take 0.5 tablets by mouth 3 (Three) Times a Day.    HYDROcodone-acetaminophen (NORCO) 7.5-325 MG per tablet   Yes No    Take 1 tablet by mouth 2 (Two) Times a Day As Needed for Moderate Pain .    levETIRAcetam (KEPPRA) 500 MG tablet   Yes No    Take 500 mg by mouth 2 (Two) Times a Day.    lisinopril (PRINIVIL,ZESTRIL) 10 MG tablet   Yes No    Take 10 mg by mouth Daily.    omeprazole (priLOSEC) 20 MG capsule  Self Yes No    Take 20 mg by mouth Daily.    saccharomyces boulardii (FLORASTOR) 250 MG capsule   No No    Take 1 capsule by mouth 2 (Two) Times a Day.    sertraline (ZOLOFT) 100 MG tablet   No No    Take 1 tablet by mouth Daily.    tiotropium (SPIRIVA) 18 MCG per inhalation capsule   Yes No    Place 1 capsule into inhaler and inhale Daily.    traZODone (DESYREL) 100 MG tablet   Yes No    Take 100 mg by mouth Every Night.    vancomycin 50  MG/ML solution oral solution   No No    250mg po QID until 1.11.18, then decrease to BID until 1.18.18, then once a day until 1.25.18, then every 3d until 2.1.18             ED Medications:    Medications   sodium chloride 0.9 % flush 10 mL (not administered)   norepinephrine (LEVOPHED) 8,000 mcg in sodium chloride 0.9 % 250 mL (32 mcg/mL) infusion (0.2 mcg/kg/min × 61.2 kg Intravenous Rate/Dose Change 3/6/18 1415)   sodium chloride 0.9 % flush 1-10 mL (not administered)   sodium chloride 0.9 % bolus 1,000 mL (1,000 mL Intravenous New Bag 3/6/18 1431)   Pharmacy Consult (not administered)   magnesium sulfate 2g/50 mL (PREMIX) infusion (2 g Intravenous New Bag 3/6/18 1431)   levoFLOXacin (LEVAQUIN) 500 mg/100 mL D5W (premix) (LEVAQUIN) 500 mg (not administered)   meropenem in sodium chloride 0.9% 50 mL (MERREM) IVPB 500 mg (not administered)   vancomycin 1000 mg in sodium chloride 0.9% 250 mL IVPB (not administered)   sodium bicarbonate 8.4 % 150 mEq in dextrose (D5W) 5 % 1,000 mL infusion (greater than 75 mEq) (not administered)   sodium chloride 0.9 % bolus 1,000 mL (0 mL Intravenous Stopped 3/6/18 1032)   levETIRAcetam in NaCl 0.82% (KEPPRA) IVPB 500 mg (0 mg Intravenous Stopped 3/6/18 0933)   ondansetron (ZOFRAN) injection 4 mg (4 mg Intravenous Given 3/6/18 0903)   sodium chloride 0.9 % bolus 1,000 mL (0 mL Intravenous Stopped 3/6/18 1033)   vancomycin 1250 mg in sodium chloride 0.9% 250 mL IVPB (0 mg Intravenous Stopped 3/6/18 1057)   piperacillin-tazobactam (ZOSYN) 4.5 g in iso-osmotic dextrose 100 mL IVPB (premix) (0 g Intravenous Stopped 3/6/18 1033)   sodium chloride 0.9 % bolus 1,000 mL (0 mL Intravenous Stopped 3/6/18 1312)   levoFLOXacin (LEVAQUIN) 750 mg/150 mL D5W (premix) (LEVAQUIN) 750 mg (0 mg Intravenous Stopped 3/6/18 1242)       Vital Signs:    Temp:  [97.7 °F (36.5 °C)-97.9 °F (36.6 °C)] 97.7 °F (36.5 °C)  Heart Rate:  [] 89  Resp:  [14-17] 16  BP: ()/() 77/62    Last 3 weights     03/06/18  0834   Weight: 61.2 kg (135 lb)       Body mass index is 23.17 kg/(m^2).    Physical Exam:    General Appearance:  encephalopathic, not in any acute distress.   Head:  Atraumatic and normocephalic, without obvious abnormality.   Eyes:          PERRLA, conjunctivae and sclerae normal, no Icterus. No pallor.    Ears:  Ears appear intact with no abnormalities noted.   Throat: No oral lesions, no thrush, oral mucosa moist.   Neck: Supple, trachea midline, no thyromegaly, no carotid bruit.   Back:   No kyphoscoliosis present. No tenderness to palpation,   range of motion normal.   Lungs:   Chest shape is normal. Breath sounds heard bilaterally equally.  Some crackles BLL. No wheezing. No Pleural rub or bronchial breathing.   Heart:  Normal S1 and S2, no murmur, no gallop, no rub. No JVD.   Abdomen:   Normal bowel sounds, no masses, no organomegaly. Soft,  non-distended, no guarding, no rebound tenderness.   Extremities: Moves all extremities well, no edema, no cyanosis, no clubbing.   Pulses: Pulses palpable and equal bilaterally. Very poor radial pulses   Skin: No bleeding, bruising or rash.   Neurologic: Encephalopathy. No facial asymetry. Unable to fully assess     Laboratory data:    I have reviewed the labs done in the emergency room.      Results from last 7 days  Lab Units 03/06/18  0847   SODIUM mmol/L 132*   POTASSIUM mmol/L 4.5   CHLORIDE mmol/L 100   CO2 mmol/L 9.0*   BUN mg/dL 69*   CREATININE mg/dL 9.30*   CALCIUM mg/dL 8.1*   BILIRUBIN mg/dL 0.9   ALK PHOS U/L 129*   ALT (SGPT) U/L 43   AST (SGOT) U/L 41   GLUCOSE mg/dL 75       Results from last 7 days  Lab Units 03/06/18  0847   WBC 10*3/mm3 23.92*   HEMOGLOBIN g/dL 8.8*   HEMATOCRIT % 27.3*   PLATELETS 10*3/mm3 546*           Results from last 7 days  Lab Units 03/06/18  0847   CK TOTAL U/L 904*   TROPONIN I ng/mL <0.012   CK MB INDEX % 3.9*                   Results from last 7 days  Lab Units 03/06/18  1352   PH, ARTERIAL pH units 7.035*    PO2 ART mm Hg 79.1   PCO2, ARTERIAL mm Hg 33.2*   HCO3 ART mmol/L 8.9*       Results from last 7 days  Lab Units 03/06/18  0847   COLOR UA  Yellow   GLUCOSE UA  Negative   KETONES UA  Negative   LEUKOCYTES UA  Moderate (2+)*   PH, URINE  6.5   BILIRUBIN UA  Negative   UROBILINOGEN UA  0.2 E.U./dL     Pain Management Panel     Pain Management Panel Latest Ref Rng & Units 3/6/2018 1/26/2018    CREATININE UR mg/dL - -    AMPHETAMINES SCREEN, URINE Negative Positive(A) Positive(A)    BARBITURATES SCREEN Negative Negative Negative    BENZODIAZEPINE SCREEN, URINE Negative Positive(A) Positive(A)    BUPRENORPHINE Negative Negative Negative    COCAINE SCREEN, URINE Negative Negative Negative    METHADONE SCREEN, URINE Negative Negative Negative    METHAMPHETAMINE UR Negative Positive(A) Positive(A)              Radiology:    Imaging Results (last 72 hours)     Procedure Component Value Units Date/Time    XR Chest 1 View [[id number]] Collected:  03/06/18 0914     Updated:  03/06/18 0917    Narrative:       PROCEDURE: XR CHEST 1 VW-     HISTORY: Weak/Dizzy/AMS triage protocol     COMPARISON: January 26, 2018.     FINDINGS: The heart is normal in size. The mediastinum is unremarkable.  There are increased interstitial markings with patchy opacities in the  lung bases suspicious for a pneumonia. There is no pneumothorax.  There  are no acute osseous abnormalities.           Impression:       Bilateral airspace disease suspicious for a pneumonia.     Continued followup is recommended.     This report was finalized on 3/6/2018 9:15 AM by Carol Peter M.D..    XR Abdomen KUB [[id number]] Resulted:  03/06/18 1437     Updated:  03/06/18 1423          Active Problems:    Sepsis      Assessment:    Septic Shock requiring pressors  Bilateral Pneumonia  Acute UTI with h/o enterococcus  Infectious and metabolic encephalopathy  Hypotension  RAGHU on CKD 4  Mixed Metabolic acidosis and respiratory acidosis  H/o Heroin use  Recent Meth  use per UDS, significant other states this was at least a week ago  UDS with Meth, amphetamines, BZO, and opiates  Hyponatremia  Hypomagnesemia  Hyperphosphatemia  Leukocytosis  Anemia  COPD compensated    Plan:    Admit to ICU.  Nephrology contacted per ER physician.  Agreeable to see in consultation.  We'll start her on sodium bicarbonate infusion to assist with acidemia.  Continue IV fluids as patient tolerates.    Continue levophed until she is able to maintain a map greater than 65.  Arterial line has been placed, appreciate cardiology's assistance. On admission chest x-ray demonstrated evidence of pneumonia.  Requested ER doc to obtain cultures, unclear whether or not he obtained them prior to initiating antibiotic therapy.  However 2 cultures have been obtained and are currently pending.  She has been started on empiric antibiotic therapy including Merrem, vanc, and Levaquin.    She also had evidence of a urinary tract infection.  Urine has been sent for culture.  Prior urine cultures in Jan have demonstrated enterococcus.    Anemia noted. Likely multifactorial. Eval with am labs. Type and screen completed.    She is encephalopathic. May need temporary soft restraints for patient safety to maintain CVC and arterial line. Discussed with ICU staff.    SCDs. H2B given once on adm.    I have spent >40 minutes of Critical care time reviewing records, speaking with family, reviewing labs and diagnostic studies for this patient.      Mirella Mcnally DO  03/06/18  2:38 PM    Dictated utilizing Dragon dictation.     Electronically signed by Mirella Mcnally DO at 3/6/2018  3:22 PM           Emergency Department Notes      Johny Hernández MD at 3/6/2018  8:50 AM      Procedure Orders:    1. Critical Care [[id number]] ordered by Johny Hernández MD at 03/06/18 1108     2. Insert Central Line At Bedside [[id number]] ordered by Johny Hernández MD at 03/06/18 1108                Subjective   History of  Present Illness   57-year-old female with a history of seizure disorder with nonadherence to her medications, polysubstance abuse, diabetes brought in by EMS after seizure-like activity.  Per EMS, she was initially somnolent with a blood pressure 50/30.  They gave 600 mL bolus of normal saline under pressure improved to 77/44 disorder on a dopamine drip.  Afterwards, she became alert, oriented to self and place.  On arrival to the ER, her blood pressure was in the 140s over 90s so the dopamine was stopped.  She states that she did use some injection drugs in the last 2 days but denies missing any of her Keppra.  States she does not remember happen today.  She denies any complaints currently.  However, she is somewhat somnolent on arrival.,  Review of her prior records show that she has had similar presentations in the past and the setting of accidental overdose of illicit or prescribed drugs.  Furthermore, she is prescribed trazodone, gabapentin, clonidine.    Review of Systems   Neurological: Positive for seizures.   All other systems reviewed and are negative.      Past Medical History:   Diagnosis Date   • Anxiety    • Arthritis    • Cancer     skin   • Cataracts, bilateral    • Chest pain    • COPD (chronic obstructive pulmonary disease) 3/23/2017   • Depression 3/23/2017   • Diabetes mellitus    • Emphysema lung    • Epilepsy     LAST SEIZURE 2017   • Headache    • High cholesterol    • History of brain shunt    • Hypertension    • Liver disease    • Low back pain    • Lumbosacral disc disease    • BAEZ (nonalcoholic steatohepatitis)    • Neurological disorder    • Osteoporosis    • Pneumonia    • Seizure disorder 3/23/2017   • Wears glasses        Allergies   Allergen Reactions   • Keflex [Cephalexin] Hives   • Sulfa Antibiotics Rash       Past Surgical History:   Procedure Laterality Date   • APPENDECTOMY     • BRAIN SURGERY     • BREAST BIOPSY Right    •  SECTION  ,   •  "CHOLECYSTECTOMY     • COLON SURGERY  1970    2\" REMOVED   • COLONOSCOPY     • CYST REMOVAL  1987    brain, R front lobe - [facility]   • DENTAL PROCEDURE     • ENDOSCOPY     • EXCISION MASS TRUNK Left 8/7/2017    Procedure: EXCISION LEFT BUTTOCKS MASS;  Surgeon: Jennifer Galindo MD;  Location: [facility];  Service:    • HYSTERECTOMY     • RECONSTRUCTION URETHROPLASTY     • TUBAL ABDOMINAL LIGATION         Family History   Problem Relation Age of Onset   • Osteoarthritis Other    • Osteoporosis Other    • Heart disease Other    • Asthma Other    • COPD Other    • Ulcers Other    • Diabetes Other    • Cancer Other    • Hyperlipidemia Other    • Liver disease Other    • No Known Problems Mother    • No Known Problems Father    • No Known Problems Sister    • No Known Problems Brother        Social History     Social History   • Marital status:      Social History Main Topics   • Smoking status: Current Every Day Smoker     Packs/day: 1.00     Years: 45.00     Types: Cigarettes   • Smokeless tobacco: Never Used   • Alcohol use No   • Drug use: No   • Sexual activity: Defer           Objective   Physical Exam   Constitutional: She is oriented to person, place, and time. She appears well-developed and well-nourished. No distress.   HENT:   Head: Normocephalic.   Mouth/Throat: Oropharynx is clear and moist. No oropharyngeal exudate.   Vomitus dried on the outside of the mouth.   Eyes: Pupils are equal, round, and reactive to light. No scleral icterus.   Neck: Neck supple. No tracheal deviation present.   Cardiovascular: Normal rate, regular rhythm, normal heart sounds and intact distal pulses.  Exam reveals no gallop and no friction rub.    No murmur heard.  Pulmonary/Chest: Effort normal and breath sounds normal. No stridor. No respiratory distress. She has no wheezes. She has no rales.   Abdominal: Soft. She exhibits no distension and no mass. There is no tenderness. There is no rebound and no guarding.   Musculoskeletal: " She exhibits no edema or deformity.   Neurological: She is alert and oriented to person, place, and time.   RASS -1   Skin: Skin is warm and dry. She is not diaphoretic. No erythema. No pallor.   Psychiatric: She has a normal mood and affect. Her behavior is normal.   Nursing note and vitals reviewed.      Critical Care  Performed by: CONNIE DILL  Authorized by: CONNIE DILL     Critical care provider statement:     Critical care time (minutes):  60    Critical care was necessary to treat or prevent imminent or life-threatening deterioration of the following conditions:  Cardiac failure, CNS failure or compromise, dehydration, renal failure, sepsis and shock    Critical care was time spent personally by me on the following activities:  Blood draw for specimens, development of treatment plan with patient or surrogate, discussions with consultants, evaluation of patient's response to treatment, examination of patient, obtaining history from patient or surrogate, review of old charts, re-evaluation of patient's condition, ordering and review of laboratory studies and ordering and performing treatments and interventions  Central Line At Bedside  Date/Time: 3/6/2018 11:08 AM  Performed by: CONNIE DILL  Authorized by: CONNIE DILL     Consent:     Consent obtained:  Verbal    Consent given by:  Patient    Risks discussed:  Arterial puncture, bleeding, infection, nerve damage and incorrect placement  Pre-procedure details:     Hand hygiene: Hand hygiene performed prior to insertion      Sterile barrier technique: All elements of maximal sterile technique followed      Skin preparation:  2% chlorhexidine    Skin preparation agent: Skin preparation agent completely dried prior to procedure    Anesthesia (see MAR for exact dosages):     Anesthesia method:  Local infiltration    Local anesthetic:  Lidocaine 1% w/o epi  Procedure details:     Location:  R femoral    Site selection rationale:   Compressible, pt awake    Patient position:  Flat    Procedural supplies:  Triple lumen    Ultrasound guidance: yes      Sterile ultrasound techniques: Sterile gel and sterile probe covers were used      Number of attempts:  1    Successful placement: yes    Post-procedure details:     Post-procedure:  Dressing applied and line sutured    Assessment:  Free fluid flow and blood return through all ports    Patient tolerance of procedure:  Tolerated well, no immediate complications  Comments:      Of note, the femoral line did not easily advance and would not advance past 15cm. All three ports markie back and flowed well and I confirmed venous placement at bedside with US before suturing the line in place.             ED Course  ED Course                  MDM   57-year-old female here after possible seizure activity in the setting of substance abuse.  Initially hypotensive that improved after IV fluids and short run of dopamine.  Now, mildly tachycardic and somnolent but exam otherwise without any specific findings.  High suspicion that she has overdosed on a medication or had a seizure and is postictal or as an occult infection.  Will send broad labs, continue IV fluids, supportive care, and reassess.    9:37 AM labs are remarkable for leukocytosis with a bandemia, marketed acidosis without elevated lactate but with an anion gap of 27, probably substance positive in the urine including benzos and opiates which she is prescribed as well as amphetamines and methamphetamines which she has not, acute kidney injury with a creatinine of 9.3 and a be given of 69, and evidence of a UTI.  Her chest x-ray shows bilateral airspace disease suspicious for pneumonia.  She is getting her second and third liter of IV fluid, ankle mycin and Zosyn, and her blood pressure remains in the 80s over 60s on her leg.  Of note, her blood pressure on her left arm appears to be elevated and on her right arm appears to be much lower.  In her legs,  "the blood pressure is somewhat in between these other 2 readings.  Her heart rate remains in the 80s.  I suspect she is septic and very dehydrated.  She is, however, able to wake up and answer questions at this time.  I discussed with her family states that she has been more fatigued and slightly \"out of it\" over the last 3 days.  They are unaware of any substance abuse.  I attempted to call our hospitalist for admission to the ICU but have not been able to get through to her and we'll try again.    11:06 AM in the interim, the patient's blood pressures have stabilized in the 90s over 70s region.  I discussed her with the hospitalist who agreed to accept her to the ICU if she had 2 blood cultures drawn, nephrology called, and a central line placed.  I did place a right femoral central line and discussed with nephrology.  I relayed to them her labs including her evidence of kidney failure and acidosis with an elevated BUNs as well.  They felt that she did not need emergent dialysis at this time unless she had hyperkalemia but they did agree to see her this afternoon in consult. I ordered her a bed in the ICU.     Final diagnoses:   Sepsis, due to unspecified organism   RAGHU (acute kidney injury)   Metabolic acidosis   Pneumonia of both lungs due to infectious organism, unspecified part of lung            Johny Hernández MD  03/06/18 1111       Electronically signed by Johny Hernández MD at 3/6/2018 11:11 AM      Raya Ham RN at 3/6/2018  8:50 AM          [facility] @ [facility]     Raya Ham RN  03/06/18 0850       Electronically signed by Raya Ham RN at 3/6/2018  8:50 AM      Rose Marie Duran at 3/6/2018 10:16 AM          Paged Dr. Parham, Nephrology, for Dr. Hernández @ [phone].     Contacted Dr. Hernández back @ 1020.  Rose Marie Duran  03/06/18 1019       Rose Marie Duran  03/06/18 1020       Electronically signed by Rose Marie Duran at 3/6/2018 10:20 AM      Rose Marie Duran at 3/6/2018 10:25 AM          Repaged " Dr. Parham, Nephrology, for Dr. Hernández @ [phone].    Contacted Dr. Hernández back @ 1042.     Rose Marie Duran  03/06/18 1026       Rose Marie Duran  03/06/18 1042       Electronically signed by Rose Marie Duran at 3/6/2018 10:42 AM      Raya Ham RN at 3/6/2018 10:33 AM          DR. HERNÁNDEZ @BS TO INSERT CENTRAL LINE AT THIS TIME     Raya Ham RN  03/06/18 1033       Electronically signed by Raya Ham RN at 3/6/2018 10:33 AM      Rose Marie Duran at 3/6/2018 11:07 AM          Eric Marvin, will call w/ bed assignment.      Rose Marie Duran  03/06/18 1107       Electronically signed by Rose Marie Duran at 3/6/2018 11:07 AM      Raya Ham RN at 3/6/2018 12:21 PM          DR. FOFANA @BS     Raya Ham RN  03/06/18 1221       Electronically signed by Raya Ham RN at 3/6/2018 12:21 PM      Raya Ham RN at 3/6/2018 12:50 PM          DR. LEACH @BS     Raya Ham RN  03/06/18 1250       Electronically signed by Raya Ham RN at 3/6/2018 12:50 PM        Vital Signs (last 24 hours)       03/05 0700  -  03/06 0659 03/06 0700  -  03/06 1559   Most Recent    Temp (°F)   97.7 -  97.9     97.7 (36.5)    Heart Rate   86 -  111     100    Resp   14 -  17     16    BP   (!)65/33 -  (!) 168/149     92/56    SpO2 (%)   (!)85 -  97     90          Prior to Admission Medications     Prescriptions Last Dose Informant Patient Reported? Taking?    amoxicillin (AMOXIL) 875 MG tablet   No No    Take 1 tablet by mouth Every 12 (Twelve) Hours.    aspirin 81 MG EC tablet   Yes No    Take 81 mg by mouth Daily.    clonazePAM (KlonoPIN) 0.5 MG tablet   No No    Take 1 tablet by mouth Every 12 (Twelve) Hours.    gabapentin (NEURONTIN) 600 MG tablet   No No    Take 0.5 tablets by mouth 3 (Three) Times a Day.    HYDROcodone-acetaminophen (NORCO) 7.5-325 MG per tablet   Yes No    Take 1 tablet by mouth 2 (Two) Times a Day As Needed for Moderate Pain .    levETIRAcetam (KEPPRA) 500 MG tablet   Yes No    Take 500 mg  by mouth 2 (Two) Times a Day.    lisinopril (PRINIVIL,ZESTRIL) 10 MG tablet   Yes No    Take 10 mg by mouth Daily.    omeprazole (priLOSEC) 20 MG capsule  Self Yes No    Take 20 mg by mouth Daily.    saccharomyces boulardii (FLORASTOR) 250 MG capsule   No No    Take 1 capsule by mouth 2 (Two) Times a Day.    sertraline (ZOLOFT) 100 MG tablet   No No    Take 1 tablet by mouth Daily.    tiotropium (SPIRIVA) 18 MCG per inhalation capsule   Yes No    Place 1 capsule into inhaler and inhale Daily.    traZODone (DESYREL) 100 MG tablet   Yes No    Take 100 mg by mouth Every Night.    vancomycin 50 MG/ML solution oral solution   No No    250mg po QID until 1.11.18, then decrease to BID until 1.18.18, then once a day until 1.25.18, then every 3d until 2.1.18            Lab Results (last 24 hours)     Procedure Component Value Units Date/Time    Blood Gas, Venous [[id number]]  (Abnormal) Collected:  03/06/18 0855    Specimen:  Venous Blood Updated:  03/06/18 0852     Site Arterial: left brachial     pH, Venous 7.059 pH Units      pCO2, Venous 34.2 (L) mm Hg      pO2, Venous 32.9 (L) mm Hg      HCO3, Venous 9.6 (L) mmol/L      Base Excess, Venous -20.7 mmol/L      O2 Saturation, Venous 54.2 %      Hemoglobin, Blood Gas 8.8 (L) g/dL      Barometric Pressure for Blood Gas 731 mmHg      Modality Cannula - Nasal     FIO2 28 %     Urine Culture - Urine, Urine, Clean Catch [[id number]] Collected:  03/06/18 0847    Specimen:  Urine from Urine, Catheter Updated:  03/06/18 0859    Lactic Acid, Plasma [[id number]]  (Normal) Collected:  03/06/18 0847    Specimen:  Blood Updated:  03/06/18 0907     Lactate 1.0 mmol/L     Urinalysis With / Culture If Indicated - Urine, Catheter [[id number]]  (Abnormal) Collected:  03/06/18 0847    Specimen:  Urine from Urine, Catheter Updated:  03/06/18 0912     Color, UA Yellow     Appearance, UA Cloudy (A)     pH, UA 6.5     Specific Gravity, UA 1.020     Glucose, UA Negative     Ketones, UA Negative      Bilirubin, UA Negative     Blood, UA Small (1+) (A)     Protein, UA >=300 mg/dL (3+) (A)     Leuk Esterase, UA Moderate (2+) (A)     Nitrite, UA Negative     Urobilinogen, UA 0.2 E.U./dL    Urinalysis, Microscopic Only - Urine, Clean Catch [[id number]]  (Abnormal) Collected:  03/06/18 0847    Specimen:  Urine from Urine, Catheter Updated:  03/06/18 0912     RBC, UA 3-5 (A) /HPF      WBC, UA 31-50 (A) /HPF      Bacteria, UA 3+ (A) /HPF      Squamous Epithelial Cells, UA 0-2 /HPF      Hyaline Casts, UA None Seen /LPF      Methodology Manual Light Microscopy    Urine Drug Screen - Urine, Clean Catch [[id number]]  (Abnormal) Collected:  03/06/18 0849    Specimen:  Urine from Urine, Clean Catch Updated:  03/06/18 0912     THC, Screen, Urine Negative     Phencyclidine (PCP), Urine Negative     Cocaine Screen, Urine Negative     Methamphetamine, Urine Positive (A)     Opiate Screen Positive (A)     Amphetamine Screen, Urine Positive (A)     Benzodiazepine Screen, Urine Positive (A)     Tricyclic Antidepressants Screen Negative     Methadone Screen, Urine Negative     Barbiturates Screen, Urine Negative     Oxycodone Screen, Urine Negative     Propoxyphene Screen Negative     Buprenorphine, Screen, Urine Negative    Narrative:       Limitations of this procedure include the possibility of false positives due to interfering substances in the urine sample. Clinical data should be correlated with any questionable result. Positive results should be considered Presumptive Positive until results are confirmed with another methodology such as HPLC or GCMS.    Troponin [[id number]]  (Normal) Collected:  03/06/18 0847    Specimen:  Blood Updated:  03/06/18 0920     Troponin I <0.012 ng/mL     Narrative:       Normal Patient Upper Reference Limit (URL) (99th Percentile)=0.03 ng/mL   Non-AMI Illness Reference Limit=0.03-0.11 ng/mL   AMI Confirmation=0.12 ng/mL and above    Magnesium [[id number]]  (Abnormal) Collected:  03/06/18 0847     Specimen:  Blood Updated:  03/06/18 0920     Magnesium 1.5 (L) mg/dL     Comprehensive Metabolic Panel [[id number]]  (Abnormal) Collected:  03/06/18 0847    Specimen:  Blood Updated:  03/06/18 0922     Glucose 75 mg/dL      BUN 69 (H) mg/dL      Creatinine 9.30 (H) mg/dL      Sodium 132 (L) mmol/L      Potassium 4.5 mmol/L      Chloride 100 mmol/L      CO2 9.0 (C) mmol/L      Calcium 8.1 (L) mg/dL      Total Protein 6.7 g/dL      Albumin 3.40 (L) g/dL      ALT (SGPT) 43 U/L      AST (SGOT) 41 U/L      Alkaline Phosphatase 129 (H) U/L      Total Bilirubin 0.9 mg/dL      eGFR Non African Amer 4 (L) mL/min/1.73      Globulin 3.3 gm/dL      A/G Ratio 1.0 g/dL      BUN/Creatinine Ratio 7.4     Anion Gap 27.5 mmol/L     Narrative:       Abnormal estimated GFR should be followed by more specific studies to confirm end stage chronic renal disease. The equation used for calculation may not be accurate for patients less than 19 years old, greater than 70 years old, patients at extremes of weight, malnutrition, or with acute renal dysfunction.    CBC Auto Differential [[id number]]  (Abnormal) Collected:  03/06/18 0847    Specimen:  Blood Updated:  03/06/18 0925     WBC 23.92 (H) 10*3/mm3      RBC 3.13 (L) 10*6/mm3      Hemoglobin 8.8 (L) g/dL      Hematocrit 27.3 (L) %      MCV 87.2 fL      MCH 28.1 pg      MCHC 32.2 g/dL      RDW 14.7 (H) %      RDW-SD 47.4 fl      MPV 9.3 fL      Platelets 546 (H) 10*3/mm3     CBC & Differential [[id number]] Collected:  03/06/18 0847    Specimen:  Blood Updated:  03/06/18 0925    Narrative:       The following orders were created for panel order CBC & Differential.  Procedure                               Abnormality         Status                     ---------                               -----------         ------                     Manual Differential[[id number]]          Abnormal            Final result               Scan Slide[[id number]]                                       Final result                CBC Auto Differential[[id number]]        Abnormal            Final result                 Please view results for these tests on the individual orders.    Scan Slide [[id number]] Collected:  03/06/18 0847    Specimen:  Blood Updated:  03/06/18 0925     Scan Slide --      See Manual Differential Results       Manual Differential [[id number]]  (Abnormal) Collected:  03/06/18 0847    Specimen:  Blood Updated:  03/06/18 0925     Neutrophil % 66.0 %      Lymphocyte % 6.0 (L) %      Monocyte % 8.0 %      Bands %  19.0 (H) %      Metamyelocyte % 1.0 (H) %      Neutrophils Absolute 20.33 (H) 10*3/mm3      Lymphocytes Absolute 1.44 10*3/mm3      Monocytes Absolute 1.91 (H) 10*3/mm3      nRBC 1.0 (H) /100 WBC      RBC Morphology Normal     Toxic Granulation Slight/1+     Platelet Estimate Increased    CK Total & CKMB [[id number]]  (Abnormal) Collected:  03/06/18 0847    Specimen:  Blood Updated:  03/06/18 0948     CKMB 34.90 (C) ng/mL      Creatine Kinase 904 (H) U/L     CK-MB Index [[id number]]  (Abnormal) Collected:  03/06/18 0847    Specimen:  Blood Updated:  03/06/18 0948     CK-MB Index 3.9 (H) %     [place] Draw [[id number]] Collected:  03/06/18 0847    Specimen:  Blood Updated:  03/06/18 1001    Narrative:       The following orders were created for panel order [place] Draw.  Procedure                               Abnormality         Status                     ---------                               -----------         ------                     Light Blue Top[[id number]]                                   Final result               Lavender Top[[id number]]                                     Final result               Gold Top - SST[[id number]]                                                              Green Top (No Gel)[[id number]]                               Final result                 Please view results for these tests on the individual orders.    Light Blue Top [[id number]] Collected:  03/06/18 0847     Specimen:  Blood Updated:  03/06/18 1001     Extra Tube hold for add-on      Auto resulted       Lavender Top [[id number]] Collected:  03/06/18 0847    Specimen:  Blood Updated:  03/06/18 1001     Extra Tube hold for add-on      Auto resulted       Green Top (No Gel) [[id number]] Collected:  03/06/18 0847    Specimen:  Blood Updated:  03/06/18 1001     Extra Tube Hold for add-ons.      Auto resulted.       Blood Culture With DANNY - Blood, [[id number]] Collected:  03/06/18 1033    Specimen:  Blood from Arm, Left Updated:  03/06/18 1039    Blood Culture With DANNY - Blood, [[id number]] Collected:  03/06/18 1021    Specimen:  Blood from Hand, Left Updated:  03/06/18 1040    POC Glucose Once [[id number]]  (Normal) Collected:  03/06/18 0850    Specimen:  Blood Updated:  03/06/18 1236     Glucose 87 mg/dL       Serial Number: [id number]:  [id number]       Phosphorus [[id number]]  (Abnormal) Collected:  03/06/18 0847    Specimen:  Blood Updated:  03/06/18 1308     Phosphorus 9.5 (C) mg/dL     Blood Gas, Arterial With Co-Ox [[id number]]  (Abnormal) Collected:  03/06/18 1352    Specimen:  Arterial Blood Updated:  03/06/18 1353     Site Arterial Line     Willard's Test Positive     pH, Arterial 7.035 (C) pH units      pCO2, Arterial 33.2 (L) mm Hg      pO2, Arterial 79.1 mm Hg      HCO3, Arterial 8.9 (L) mmol/L      Base Excess, Arterial -21.8 mmol/L      O2 Saturation, Arterial 92.9 %      Hemoglobin, Blood Gas 8.8 (L) g/dL      Oxyhemoglobin 91.3 (L) %      Methemoglobin 0.30 %      Carboxyhemoglobin 1.4 %      Modality Cannula - Nasal     FIO2 44 %     Basic Metabolic Panel [[id number]]  (Abnormal) Collected:  03/06/18 1409    Specimen:  Blood Updated:  03/06/18 1439     Glucose 77 mg/dL      BUN 60 (H) mg/dL      Creatinine 8.10 (H) mg/dL      Sodium 134 (L) mmol/L      Potassium 4.4 mmol/L      Chloride 106 mmol/L      CO2 9.0 (C) mmol/L      Calcium 7.4 (L) mg/dL      eGFR Non African Amer 5 (L) mL/min/1.73      BUN/Creatinine  Ratio 7.4     Anion Gap 23.4 mmol/L     Narrative:       Abnormal estimated GFR should be followed by more specific studies to confirm end stage chronic renal disease. The equation used for calculation may not be accurate for patients less than 19 years old, greater than 70 years old, patients at extremes of weight, malnutrition, or with acute renal dysfunction.        Imaging Results (last 24 hours)     Procedure Component Value Units Date/Time    XR Chest 1 View [[id number]] Collected:  03/06/18 0914     Updated:  03/06/18 0917    Narrative:       PROCEDURE: XR CHEST 1 VW-     HISTORY: Weak/Dizzy/AMS triage protocol     COMPARISON: January 26, 2018.     FINDINGS: The heart is normal in size. The mediastinum is unremarkable.  There are increased interstitial markings with patchy opacities in the  lung bases suspicious for a pneumonia. There is no pneumothorax.  There  are no acute osseous abnormalities.           Impression:       Bilateral airspace disease suspicious for a pneumonia.     Continued followup is recommended.     This report was finalized on 3/6/2018 9:15 AM by Carol Peter M.D..    XR Abdomen KUB [[id number]] Collected:  03/06/18 1437     Updated:  03/06/18 1440    Narrative:       PROCEDURE: XR ABDOMEN KUB-     HISTORY: distention; A41.9-Sepsis, unspecified organism; N17.9-Acute  kidney failure, unspecified; E87.2-Acidosis; J18.9-Pneumonia,  unspecified organism     COMPARISON: None.     FINDINGS: An AP view of the abdomen and pelvis demonstrates an  unremarkable bowel gas pattern with no evidence of obstruction.  Scattered stool is present within the colon. Femoral lines are in place.       Impression:       Unremarkable exam.             This report was finalized on 3/6/2018 2:38 PM by Carol Peter M.D..

## 2018-03-06 NOTE — PAYOR COMM NOTE
"Please review for inpatient auth  [id number]  [facility] provider ID [id number]  Dr Ramachandran  [id number]    ICD 10 code:   A41.9, N17.9, J18.9, E87.2    Lexi Person (57 y.o. Female)     Date of Birth Social Security Number Address Home Phone MRN    1960  [address] [place] [address] [phone] [id number]    Mandaen Marital Status          None        Admission Date Admission Type Admitting Provider Attending Provider Department, Room/Bed    3/6/18 Emergency AbrahamLeo , DO Uli ,  [facility] INTENSIVE CARE, I    Discharge Date Discharge Disposition Discharge Destination                      Attending Provider: Mirella Mcnally DO     Allergies:  Keflex [Cephalexin], Sulfa Antibiotics    Isolation:  None   Infection:  None   Code Status:  FULL    Ht:  162.6 cm (64\")   Wt:  61.2 kg (135 lb)    Admission Cmt:  None   Principal Problem:  None                Active Insurance as of 3/6/2018     Primary Coverage     Payor Plan Insurance Group Employer/Plan Group    [place] [id number] [place] [id number] [place]      Payor Plan Address Payor Plan Phone Number Effective From Effective To    [address] [address]  [address]     [place] [address]       Subscriber Name Subscriber Birth Date Member ID       LEXI PERSON 1960 [id number]                 Emergency Contacts      (Rel.) Home Phone Work Phone Mobile Phone    Daniel Person (Spouse) [phone] -- --    TanyaNeelima (Mother) [phone] -- --               History & Physical       DO Uli at 3/6/2018  2:30 PM              [facility] HOSPITALIST   HISTORY AND PHYSICAL      Name:  Lexi Person   Age:  57 y.o.  Sex:  female  :  1960  MRN:  [id number]   Visit Number:  [id number]  Admission Date:  3/6/2018  Date Of Service:  18  Primary Care Physician:  Kailee Beebe, " ANDREA    Chief Complaint:     Encephalopathy    History Of Presenting Illness:      Miss Cortez is a 56 yo WF with past medical history significant for polysubstance abuse recently discharged from [facility] in [place] after being admitted with heroin overdose and septic shock with evidence of acute urinary tract infection and Clostridium difficile.  Today she is presented after her  called EMS when she was difficult to arouse this morning.  Per the ER physician who spoke with EMS they stated that she was initially found to be hypotensive with systolics in the 50s.  They started in IV and initiated dopamine.  She then presented to the ER and additional evaluation included a CBC with leukocytosis and bandemia, and BMP demonstrating severe acidosis with elevated creatinine greater than 9 (baseline Cr 2.3).  A urine drug screen demonstrated evidence of amphetamines, methamphetamines, benzodiazepines and opiates.  Chest x-ray showed bilateral airspace disease suspicious for pneumonia.  The ER physician contacted the hospitalist service for admission during which we recommended that he obtain a central line for pressors for severe hypotension and ER to discuss patient's current status with nephrology for her severe acidemia and association with her renal failure.  The ER physician has stated that he has spoken with nephrology who is agreeable to see her in consultation here, the ER physician states the nephrologist is aware of the patients status and labs.    Her significant other is present with her.  He states that they have lived together greater than 30 years but they are not legally .  The patient's mother is here.  The patient also has a son who is not present.  The patient lives with her significant other who initially denied any illegal substance use, but then when confronted regarding the UDS admitted that they were using drugs approximately a week ago.  They stated that she had a urine drug screen  "done on 3/1/2018 for her pain clinic, so it was after this.  He states that she was behaving normally yesterday with only some complaints of cough.  He denies she complained of anything else such as dysuria or increasing shortness of breath.  He states that this morning when he woke up she was difficult to arouse and as a result he called EMS for assistance.    Review Of Systems:     Unable to obtain ROS due to encephalopathy     Past Medical History:    Past Medical History:   Diagnosis Date   • Anxiety    • Arthritis    • Cancer     skin   • Cataracts, bilateral    • Chest pain    • COPD (chronic obstructive pulmonary disease) 3/23/2017   • Depression 3/23/2017   • Diabetes mellitus    • Emphysema lung    • Epilepsy     LAST SEIZURE 2017   • Headache    • High cholesterol    • History of brain shunt    • Hypertension    • Liver disease    • Low back pain    • Lumbosacral disc disease    • BAEZ (nonalcoholic steatohepatitis)    • Neurological disorder    • Osteoporosis    • Pneumonia    • Seizure disorder 3/23/2017   • Wears glasses        Past Surgical history:    Past Surgical History:   Procedure Laterality Date   • APPENDECTOMY     • BRAIN SURGERY     • BREAST BIOPSY Right    •  SECTION  ,   • CHOLECYSTECTOMY     • COLON SURGERY  1970\" REMOVED   • COLONOSCOPY     • CYST REMOVAL      brain, R front lobe - [facility]   • DENTAL PROCEDURE     • ENDOSCOPY     • EXCISION MASS TRUNK Left 2017    Procedure: EXCISION LEFT BUTTOCKS MASS;  Surgeon: Jennifer Galindo MD;  Location: [facility];  Service:    • HYSTERECTOMY     • RECONSTRUCTION URETHROPLASTY     • TUBAL ABDOMINAL LIGATION         Social History:    Social History     Social History   • Marital status:      Spouse name: N/A   • Number of children: N/A   • Years of education: N/A     Occupational History   • Not on file.     Social History Main Topics   • Smoking status: Current Every Day Smoker     Packs/day: 1.00     Years: " 45.00     Types: Cigarettes   • Smokeless tobacco: Never Used   • Alcohol use No   • Drug use: No   • Sexual activity: Defer     Other Topics Concern   • Not on file     Social History Narrative       Family History:    Family History   Problem Relation Age of Onset   • Osteoarthritis Other    • Osteoporosis Other    • Heart disease Other    • Asthma Other    • COPD Other    • Ulcers Other    • Diabetes Other    • Cancer Other    • Hyperlipidemia Other    • Liver disease Other    • No Known Problems Mother    • No Known Problems Father    • No Known Problems Sister    • No Known Problems Brother        Allergies:      Keflex [cephalexin] and Sulfa antibiotics    Home Medications:    Prior to Admission Medications     Prescriptions Last Dose Informant Patient Reported? Taking?    amoxicillin (AMOXIL) 875 MG tablet   No No    Take 1 tablet by mouth Every 12 (Twelve) Hours.    aspirin 81 MG EC tablet   Yes No    Take 81 mg by mouth Daily.    clonazePAM (KlonoPIN) 0.5 MG tablet   No No    Take 1 tablet by mouth Every 12 (Twelve) Hours.    gabapentin (NEURONTIN) 600 MG tablet   No No    Take 0.5 tablets by mouth 3 (Three) Times a Day.    HYDROcodone-acetaminophen (NORCO) 7.5-325 MG per tablet   Yes No    Take 1 tablet by mouth 2 (Two) Times a Day As Needed for Moderate Pain .    levETIRAcetam (KEPPRA) 500 MG tablet   Yes No    Take 500 mg by mouth 2 (Two) Times a Day.    lisinopril (PRINIVIL,ZESTRIL) 10 MG tablet   Yes No    Take 10 mg by mouth Daily.    omeprazole (priLOSEC) 20 MG capsule  Self Yes No    Take 20 mg by mouth Daily.    saccharomyces boulardii (FLORASTOR) 250 MG capsule   No No    Take 1 capsule by mouth 2 (Two) Times a Day.    sertraline (ZOLOFT) 100 MG tablet   No No    Take 1 tablet by mouth Daily.    tiotropium (SPIRIVA) 18 MCG per inhalation capsule   Yes No    Place 1 capsule into inhaler and inhale Daily.    traZODone (DESYREL) 100 MG tablet   Yes No    Take 100 mg by mouth Every Night.     vancomycin 50 MG/ML solution oral solution   No No    250mg po QID until 1.11.18, then decrease to BID until 1.18.18, then once a day until 1.25.18, then every 3d until 2.1.18             ED Medications:    Medications   sodium chloride 0.9 % flush 10 mL (not administered)   norepinephrine (LEVOPHED) 8,000 mcg in sodium chloride 0.9 % 250 mL (32 mcg/mL) infusion (0.2 mcg/kg/min × 61.2 kg Intravenous Rate/Dose Change 3/6/18 1415)   sodium chloride 0.9 % flush 1-10 mL (not administered)   sodium chloride 0.9 % bolus 1,000 mL (1,000 mL Intravenous New Bag 3/6/18 1431)   Pharmacy Consult (not administered)   magnesium sulfate 2g/50 mL (PREMIX) infusion (2 g Intravenous New Bag 3/6/18 1431)   levoFLOXacin (LEVAQUIN) 500 mg/100 mL D5W (premix) (LEVAQUIN) 500 mg (not administered)   meropenem in sodium chloride 0.9% 50 mL (MERREM) IVPB 500 mg (not administered)   vancomycin 1000 mg in sodium chloride 0.9% 250 mL IVPB (not administered)   sodium bicarbonate 8.4 % 150 mEq in dextrose (D5W) 5 % 1,000 mL infusion (greater than 75 mEq) (not administered)   sodium chloride 0.9 % bolus 1,000 mL (0 mL Intravenous Stopped 3/6/18 1032)   levETIRAcetam in NaCl 0.82% (KEPPRA) IVPB 500 mg (0 mg Intravenous Stopped 3/6/18 0933)   ondansetron (ZOFRAN) injection 4 mg (4 mg Intravenous Given 3/6/18 0903)   sodium chloride 0.9 % bolus 1,000 mL (0 mL Intravenous Stopped 3/6/18 1033)   vancomycin 1250 mg in sodium chloride 0.9% 250 mL IVPB (0 mg Intravenous Stopped 3/6/18 1057)   piperacillin-tazobactam (ZOSYN) 4.5 g in iso-osmotic dextrose 100 mL IVPB (premix) (0 g Intravenous Stopped 3/6/18 1033)   sodium chloride 0.9 % bolus 1,000 mL (0 mL Intravenous Stopped 3/6/18 1312)   levoFLOXacin (LEVAQUIN) 750 mg/150 mL D5W (premix) (LEVAQUIN) 750 mg (0 mg Intravenous Stopped 3/6/18 1242)       Vital Signs:    Temp:  [97.7 °F (36.5 °C)-97.9 °F (36.6 °C)] 97.7 °F (36.5 °C)  Heart Rate:  [] 89  Resp:  [14-17] 16  BP: ()/()  77/62    Last 3 weights    03/06/18  0834   Weight: 61.2 kg (135 lb)       Body mass index is 23.17 kg/(m^2).    Physical Exam:    General Appearance:  encephalopathic, not in any acute distress.   Head:  Atraumatic and normocephalic, without obvious abnormality.   Eyes:          PERRLA, conjunctivae and sclerae normal, no Icterus. No pallor.    Ears:  Ears appear intact with no abnormalities noted.   Throat: No oral lesions, no thrush, oral mucosa moist.   Neck: Supple, trachea midline, no thyromegaly, no carotid bruit.   Back:   No kyphoscoliosis present. No tenderness to palpation,   range of motion normal.   Lungs:   Chest shape is normal. Breath sounds heard bilaterally equally.  Some crackles BLL. No wheezing. No Pleural rub or bronchial breathing.   Heart:  Normal S1 and S2, no murmur, no gallop, no rub. No JVD.   Abdomen:   Normal bowel sounds, no masses, no organomegaly. Soft,  non-distended, no guarding, no rebound tenderness.   Extremities: Moves all extremities well, no edema, no cyanosis, no clubbing.   Pulses: Pulses palpable and equal bilaterally. Very poor radial pulses   Skin: No bleeding, bruising or rash.   Neurologic: Encephalopathy. No facial asymetry. Unable to fully assess     Laboratory data:    I have reviewed the labs done in the emergency room.      Results from last 7 days  Lab Units 03/06/18  0847   SODIUM mmol/L 132*   POTASSIUM mmol/L 4.5   CHLORIDE mmol/L 100   CO2 mmol/L 9.0*   BUN mg/dL 69*   CREATININE mg/dL 9.30*   CALCIUM mg/dL 8.1*   BILIRUBIN mg/dL 0.9   ALK PHOS U/L 129*   ALT (SGPT) U/L 43   AST (SGOT) U/L 41   GLUCOSE mg/dL 75       Results from last 7 days  Lab Units 03/06/18  0847   WBC 10*3/mm3 23.92*   HEMOGLOBIN g/dL 8.8*   HEMATOCRIT % 27.3*   PLATELETS 10*3/mm3 546*           Results from last 7 days  Lab Units 03/06/18  0847   CK TOTAL U/L 904*   TROPONIN I ng/mL <0.012   CK MB INDEX % 3.9*                   Results from last 7 days  Lab Units 03/06/18  1352   PH,  ARTERIAL pH units 7.035*   PO2 ART mm Hg 79.1   PCO2, ARTERIAL mm Hg 33.2*   HCO3 ART mmol/L 8.9*       Results from last 7 days  Lab Units 03/06/18  0847   COLOR UA  Yellow   GLUCOSE UA  Negative   KETONES UA  Negative   LEUKOCYTES UA  Moderate (2+)*   PH, URINE  6.5   BILIRUBIN UA  Negative   UROBILINOGEN UA  0.2 E.U./dL     Pain Management Panel     Pain Management Panel Latest Ref Rng & Units 3/6/2018 1/26/2018    CREATININE UR mg/dL - -    AMPHETAMINES SCREEN, URINE Negative Positive(A) Positive(A)    BARBITURATES SCREEN Negative Negative Negative    BENZODIAZEPINE SCREEN, URINE Negative Positive(A) Positive(A)    BUPRENORPHINE Negative Negative Negative    COCAINE SCREEN, URINE Negative Negative Negative    METHADONE SCREEN, URINE Negative Negative Negative    METHAMPHETAMINE UR Negative Positive(A) Positive(A)              Radiology:    Imaging Results (last 72 hours)     Procedure Component Value Units Date/Time    XR Chest 1 View [[id number]] Collected:  03/06/18 0914     Updated:  03/06/18 0917    Narrative:       PROCEDURE: XR CHEST 1 VW-     HISTORY: Weak/Dizzy/AMS triage protocol     COMPARISON: January 26, 2018.     FINDINGS: The heart is normal in size. The mediastinum is unremarkable.  There are increased interstitial markings with patchy opacities in the  lung bases suspicious for a pneumonia. There is no pneumothorax.  There  are no acute osseous abnormalities.           Impression:       Bilateral airspace disease suspicious for a pneumonia.     Continued followup is recommended.     This report was finalized on 3/6/2018 9:15 AM by Carol Peter M.D..    XR Abdomen KUB [[id number]] Resulted:  03/06/18 1437     Updated:  03/06/18 1423          Active Problems:    Sepsis      Assessment:    Septic Shock requiring pressors  Bilateral Pneumonia  Acute UTI with h/o enterococcus  Infectious and metabolic encephalopathy  Hypotension  RAGHU on CKD 4  Mixed Metabolic acidosis and respiratory  acidosis  H/o Heroin use  Recent Meth use per UDS, significant other states this was at least a week ago  UDS with Meth, amphetamines, BZO, and opiates  Hyponatremia  Hypomagnesemia  Hyperphosphatemia  Leukocytosis  Anemia  COPD compensated    Plan:    Admit to ICU.  Nephrology contacted per ER physician.  Agreeable to see in consultation.  We'll start her on sodium bicarbonate infusion to assist with acidemia.  Continue IV fluids as patient tolerates.    Continue levophed until she is able to maintain a map greater than 65.  Arterial line has been placed, appreciate cardiology's assistance. On admission chest x-ray demonstrated evidence of pneumonia.  Requested ER doc to obtain cultures, unclear whether or not he obtained them prior to initiating antibiotic therapy.  However 2 cultures have been obtained and are currently pending.  She has been started on empiric antibiotic therapy including Merrem, vanc, and Levaquin.    She also had evidence of a urinary tract infection.  Urine has been sent for culture.  Prior urine cultures in Jan have demonstrated enterococcus.    Anemia noted. Likely multifactorial. Eval with am labs. Type and screen completed.    She is encephalopathic. May need temporary soft restraints for patient safety to maintain CVC and arterial line. Discussed with ICU staff.    SCDs. H2B given once on adm.    I have spent >40 minutes of Critical care time reviewing records, speaking with family, reviewing labs and diagnostic studies for this patient.      Mirella Mcnally DO  03/06/18  2:38 PM    Dictated utilizing Dragon dictation.     Electronically signed by Mirella Mcnally DO at 3/6/2018  3:22 PM           Emergency Department Notes      Johny Hernández MD at 3/6/2018  8:50 AM      Procedure Orders:    1. Critical Care [[id number]] ordered by Johny Hernández MD at 03/06/18 1108     2. Insert Central Line At Bedside [[id number]] ordered by Johny Hernández MD at 03/06/18 1108                 Subjective   History of Present Illness   57-year-old female with a history of seizure disorder with nonadherence to her medications, polysubstance abuse, diabetes brought in by EMS after seizure-like activity.  Per EMS, she was initially somnolent with a blood pressure 50/30.  They gave 600 mL bolus of normal saline under pressure improved to 77/44 disorder on a dopamine drip.  Afterwards, she became alert, oriented to self and place.  On arrival to the ER, her blood pressure was in the 140s over 90s so the dopamine was stopped.  She states that she did use some injection drugs in the last 2 days but denies missing any of her Keppra.  States she does not remember happen today.  She denies any complaints currently.  However, she is somewhat somnolent on arrival.,  Review of her prior records show that she has had similar presentations in the past and the setting of accidental overdose of illicit or prescribed drugs.  Furthermore, she is prescribed trazodone, gabapentin, clonidine.    Review of Systems   Neurological: Positive for seizures.   All other systems reviewed and are negative.      Past Medical History:   Diagnosis Date   • Anxiety    • Arthritis    • Cancer     skin   • Cataracts, bilateral    • Chest pain    • COPD (chronic obstructive pulmonary disease) 3/23/2017   • Depression 3/23/2017   • Diabetes mellitus    • Emphysema lung    • Epilepsy     LAST SEIZURE 2/2017   • Headache    • High cholesterol    • History of brain shunt 1983   • Hypertension    • Liver disease    • Low back pain    • Lumbosacral disc disease    • BAEZ (nonalcoholic steatohepatitis)    • Neurological disorder    • Osteoporosis    • Pneumonia    • Seizure disorder 3/23/2017   • Wears glasses        Allergies   Allergen Reactions   • Keflex [Cephalexin] Hives   • Sulfa Antibiotics Rash       Past Surgical History:   Procedure Laterality Date   • APPENDECTOMY  1970   • BRAIN SURGERY     • BREAST BIOPSY Right    •  " SECTION  ,   • CHOLECYSTECTOMY     • COLON SURGERY      2\" REMOVED   • COLONOSCOPY     • CYST REMOVAL      brain, R front lobe - [facility]   • DENTAL PROCEDURE     • ENDOSCOPY     • EXCISION MASS TRUNK Left 2017    Procedure: EXCISION LEFT BUTTOCKS MASS;  Surgeon: Jennifer Galindo MD;  Location: [facility];  Service:    • HYSTERECTOMY     • RECONSTRUCTION URETHROPLASTY     • TUBAL ABDOMINAL LIGATION         Family History   Problem Relation Age of Onset   • Osteoarthritis Other    • Osteoporosis Other    • Heart disease Other    • Asthma Other    • COPD Other    • Ulcers Other    • Diabetes Other    • Cancer Other    • Hyperlipidemia Other    • Liver disease Other    • No Known Problems Mother    • No Known Problems Father    • No Known Problems Sister    • No Known Problems Brother        Social History     Social History   • Marital status:      Social History Main Topics   • Smoking status: Current Every Day Smoker     Packs/day: 1.00     Years: 45.00     Types: Cigarettes   • Smokeless tobacco: Never Used   • Alcohol use No   • Drug use: No   • Sexual activity: Defer           Objective   Physical Exam   Constitutional: She is oriented to person, place, and time. She appears well-developed and well-nourished. No distress.   HENT:   Head: Normocephalic.   Mouth/Throat: Oropharynx is clear and moist. No oropharyngeal exudate.   Vomitus dried on the outside of the mouth.   Eyes: Pupils are equal, round, and reactive to light. No scleral icterus.   Neck: Neck supple. No tracheal deviation present.   Cardiovascular: Normal rate, regular rhythm, normal heart sounds and intact distal pulses.  Exam reveals no gallop and no friction rub.    No murmur heard.  Pulmonary/Chest: Effort normal and breath sounds normal. No stridor. No respiratory distress. She has no wheezes. She has no rales.   Abdominal: Soft. She exhibits no distension and no mass. There is no tenderness. There is no rebound and " no guarding.   Musculoskeletal: She exhibits no edema or deformity.   Neurological: She is alert and oriented to person, place, and time.   RASS -1   Skin: Skin is warm and dry. She is not diaphoretic. No erythema. No pallor.   Psychiatric: She has a normal mood and affect. Her behavior is normal.   Nursing note and vitals reviewed.      Critical Care  Performed by: CONNIE DILL  Authorized by: CONNIE DILL     Critical care provider statement:     Critical care time (minutes):  60    Critical care was necessary to treat or prevent imminent or life-threatening deterioration of the following conditions:  Cardiac failure, CNS failure or compromise, dehydration, renal failure, sepsis and shock    Critical care was time spent personally by me on the following activities:  Blood draw for specimens, development of treatment plan with patient or surrogate, discussions with consultants, evaluation of patient's response to treatment, examination of patient, obtaining history from patient or surrogate, review of old charts, re-evaluation of patient's condition, ordering and review of laboratory studies and ordering and performing treatments and interventions  Central Line At Bedside  Date/Time: 3/6/2018 11:08 AM  Performed by: CONNIE DILL  Authorized by: CONNIE DILL     Consent:     Consent obtained:  Verbal    Consent given by:  Patient    Risks discussed:  Arterial puncture, bleeding, infection, nerve damage and incorrect placement  Pre-procedure details:     Hand hygiene: Hand hygiene performed prior to insertion      Sterile barrier technique: All elements of maximal sterile technique followed      Skin preparation:  2% chlorhexidine    Skin preparation agent: Skin preparation agent completely dried prior to procedure    Anesthesia (see MAR for exact dosages):     Anesthesia method:  Local infiltration    Local anesthetic:  Lidocaine 1% w/o epi  Procedure details:     Location:  R femoral    Site  selection rationale:  Compressible, pt awake    Patient position:  Flat    Procedural supplies:  Triple lumen    Ultrasound guidance: yes      Sterile ultrasound techniques: Sterile gel and sterile probe covers were used      Number of attempts:  1    Successful placement: yes    Post-procedure details:     Post-procedure:  Dressing applied and line sutured    Assessment:  Free fluid flow and blood return through all ports    Patient tolerance of procedure:  Tolerated well, no immediate complications  Comments:      Of note, the femoral line did not easily advance and would not advance past 15cm. All three ports markie back and flowed well and I confirmed venous placement at bedside with US before suturing the line in place.             ED Course  ED Course                  MDM   57-year-old female here after possible seizure activity in the setting of substance abuse.  Initially hypotensive that improved after IV fluids and short run of dopamine.  Now, mildly tachycardic and somnolent but exam otherwise without any specific findings.  High suspicion that she has overdosed on a medication or had a seizure and is postictal or as an occult infection.  Will send broad labs, continue IV fluids, supportive care, and reassess.    9:37 AM labs are remarkable for leukocytosis with a bandemia, marketed acidosis without elevated lactate but with an anion gap of 27, probably substance positive in the urine including benzos and opiates which she is prescribed as well as amphetamines and methamphetamines which she has not, acute kidney injury with a creatinine of 9.3 and a be given of 69, and evidence of a UTI.  Her chest x-ray shows bilateral airspace disease suspicious for pneumonia.  She is getting her second and third liter of IV fluid, ankle mycin and Zosyn, and her blood pressure remains in the 80s over 60s on her leg.  Of note, her blood pressure on her left arm appears to be elevated and on her right arm appears to be much  "lower.  In her legs, the blood pressure is somewhat in between these other 2 readings.  Her heart rate remains in the 80s.  I suspect she is septic and very dehydrated.  She is, however, able to wake up and answer questions at this time.  I discussed with her family states that she has been more fatigued and slightly \"out of it\" over the last 3 days.  They are unaware of any substance abuse.  I attempted to call our hospitalist for admission to the ICU but have not been able to get through to her and we'll try again.    11:06 AM in the interim, the patient's blood pressures have stabilized in the 90s over 70s region.  I discussed her with the hospitalist who agreed to accept her to the ICU if she had 2 blood cultures drawn, nephrology called, and a central line placed.  I did place a right femoral central line and discussed with nephrology.  I relayed to them her labs including her evidence of kidney failure and acidosis with an elevated BUNs as well.  They felt that she did not need emergent dialysis at this time unless she had hyperkalemia but they did agree to see her this afternoon in consult. I ordered her a bed in the ICU.     Final diagnoses:   Sepsis, due to unspecified organism   RAGHU (acute kidney injury)   Metabolic acidosis   Pneumonia of both lungs due to infectious organism, unspecified part of lung            Johny Hernández MD  03/06/18 1111       Electronically signed by Johny Hernández MD at 3/6/2018 11:11 AM      Raya Ham RN at 3/6/2018  8:50 AM          [facility] @ [facility]     Raya Ham RN  03/06/18 0850       Electronically signed by Raya Ham RN at 3/6/2018  8:50 AM      Rose Marie Duran at 3/6/2018 10:16 AM          Paged Dr. Parham, Nephrology, for Dr. Hernández @ [phone].     Contacted Dr. Hernández back @ 1020.  Rose Marie Duran  03/06/18 1019       Rose Marie Duran  03/06/18 1020       Electronically signed by Rose Marie Duran at 3/6/2018 10:20 AM      Rose Marie Duran at 3/6/2018 10:25 " AM          Repaged Dr. Parham, Nephrology, for Dr. Hernández @ 1026.    Contacted Dr. Hernández back @ 1042.     Rose Marie Duran  03/06/18 1026       Rose Marie Duran  03/06/18 1042       Electronically signed by Rose Marie Duran at 3/6/2018 10:42 AM      Raya Ham RN at 3/6/2018 10:33 AM          DR. HERNÁNDEZ @BS TO INSERT CENTRAL LINE AT THIS TIME     Raya Ham RN  03/06/18 1033       Electronically signed by Raya Ham RN at 3/6/2018 10:33 AM      Rose Marie Duran at 3/6/2018 11:07 AM          Eric Marvin, will call w/ bed assignment.      Rose Marie Duran  03/06/18 1107       Electronically signed by Rose Marie Duran at 3/6/2018 11:07 AM      Raya Ham RN at 3/6/2018 12:21 PM          DR. FOFANA @BS     Raya Ham RN  03/06/18 1221       Electronically signed by Raya Ham RN at 3/6/2018 12:21 PM      Raya Ham RN at 3/6/2018 12:50 PM          DR. LEACH @BS     Raya Ham RN  03/06/18 1250       Electronically signed by Raya Ham RN at 3/6/2018 12:50 PM        Vital Signs (last 24 hours)       03/05 0700  -  03/06 0659 03/06 0700  -  03/06 1605   Most Recent    Temp (°F)   97.7 -  97.9     97.7 (36.5)    Heart Rate   86 -  111     100    Resp   14 -  17     16    BP   (!)65/33 -  (!) 168/149     92/56    SpO2 (%)   (!)85 -  97     90          Hospital Medications (all)       Dose Frequency Start End    famotidine (PEPCID) injection 10 mg 10 mg Once 3/6/2018 3/6/2018    Sig - Route: Infuse 1 mL into a venous catheter 1 (One) Time. - Intravenous    levETIRAcetam in NaCl 0.82% (KEPPRA) IVPB 500 mg 500 mg Every 12 Hours Scheduled 3/6/2018 3/6/2018    Sig - Route: Infuse 100 mL into a venous catheter Every 12 (Twelve) Hours. - Intravenous    levoFLOXacin (LEVAQUIN) 500 mg/100 mL D5W (premix) (LEVAQUIN) 500 mg 500 mg Every 48 Hours 3/8/2018 3/14/2018    Sig - Route: Infuse 100 mL into a venous catheter Every Other Day. - Intravenous    levoFLOXacin (LEVAQUIN) 750 mg/150 mL D5W  (premix) (LEVAQUIN) 750 mg 750 mg Once 3/6/2018 3/6/2018    Sig - Route: Infuse 150 mL into a venous catheter 1 (One) Time. - Intravenous    magnesium sulfate 2g/50 mL (PREMIX) infusion 2 g Once 3/6/2018     Sig - Route: Infuse 50 mL into a venous catheter 1 (One) Time. - Intravenous    meropenem in sodium chloride 0.9% 50 mL (MERREM) IVPB 500 mg 500 mg Every 24 Hours 3/6/2018 3/13/2018    Sig - Route: Infuse 500 mg into a venous catheter Daily. - Intravenous    norepinephrine (LEVOPHED) 8,000 mcg in sodium chloride 0.9 % 250 mL (32 mcg/mL) infusion 0.02-0.3 mcg/kg/min × 61.2 kg Titrated 3/6/2018     Sig - Route: Infuse 1.224-18.36 mcg/min into a venous catheter Dose Adjusted By Provider As Needed. - Intravenous    ondansetron (ZOFRAN) injection 4 mg 4 mg Once 3/6/2018 3/6/2018    Sig - Route: Infuse 2 mL into a venous catheter 1 (One) Time. - Intravenous    Pharmacy Consult  Continuous PRN 3/6/2018     Sig - Route: Continuous As Needed for Consult. - Does not apply    piperacillin-tazobactam (ZOSYN) 4.5 g in iso-osmotic dextrose 100 mL IVPB (premix) 4.5 g Once 3/6/2018 3/6/2018    Sig - Route: Infuse 100 mL into a venous catheter 1 (One) Time. - Intravenous    sodium bicarbonate 8.4 % 150 mEq in dextrose (D5W) 5 % 1,000 mL infusion (greater than 75 mEq) 150 mL/hr Continuous 3/6/2018     Sig - Route: Infuse 150 mL/hr into a venous catheter Continuous. - Intravenous    sodium chloride 0.9 % bolus 1,000 mL 1,000 mL Once 3/6/2018 3/6/2018    Sig - Route: Infuse 1,000 mL into a venous catheter 1 (One) Time. - Intravenous    sodium chloride 0.9 % bolus 1,000 mL 1,000 mL Once 3/6/2018 3/6/2018    Sig - Route: Infuse 1,000 mL into a venous catheter 1 (One) Time. - Intravenous    sodium chloride 0.9 % bolus 1,000 mL 1,000 mL Once 3/6/2018 3/6/2018    Sig - Route: Infuse 1,000 mL into a venous catheter 1 (One) Time. - Intravenous    sodium chloride 0.9 % bolus 1,000 mL 1,000 mL Once 3/6/2018 3/6/2018    Sig - Route: Infuse  1,000 mL into a venous catheter 1 (One) Time. - Intravenous    sodium chloride 0.9 % flush 1-10 mL 1-10 mL As Needed 3/6/2018     Sig - Route: Infuse 1-10 mL into a venous catheter As Needed for Line Care. - Intravenous    sodium chloride 0.9 % flush 10 mL 10 mL As Needed 3/6/2018     Sig - Route: Infuse 10 mL into a venous catheter As Needed for Line Care. - Intravenous    Cosign for Ordering: Accepted by Johny Hernández MD on 3/6/2018  8:49 AM    vancomycin 1000 mg in sodium chloride 0.9% 250 mL IVPB 1,000 mg Every 72 Hours Scheduled 3/9/2018 3/15/2018    Sig - Route: Infuse 1,000 mg into a venous catheter Every 72 (Seventy-Two) Hours. - Intravenous    vancomycin 1250 mg in sodium chloride 0.9% 250 mL IVPB 1,250 mg Once 3/6/2018 3/6/2018    Sig - Route: Infuse 1,250 mg into a venous catheter 1 (One) Time. - Intravenous    vancomycin 1250 mg in sodium chloride 0.9% 250 mL IVPB (Discontinued) 1,250 mg Once 3/6/2018 3/6/2018    Sig - Route: Infuse 1,250 mg into a venous catheter 1 (One) Time. - Intravenous    Reason for Discontinue: Duplicate order            Lab Results (last 24 hours)     Procedure Component Value Units Date/Time    Blood Gas, Venous [[id number]]  (Abnormal) Collected:  03/06/18 0855    Specimen:  Venous Blood Updated:  03/06/18 0852     Site Arterial: left brachial     pH, Venous 7.059 pH Units      pCO2, Venous 34.2 (L) mm Hg      pO2, Venous 32.9 (L) mm Hg      HCO3, Venous 9.6 (L) mmol/L      Base Excess, Venous -20.7 mmol/L      O2 Saturation, Venous 54.2 %      Hemoglobin, Blood Gas 8.8 (L) g/dL      Barometric Pressure for Blood Gas 731 mmHg      Modality Cannula - Nasal     FIO2 28 %     Urine Culture - Urine, Urine, Clean Catch [[id number]] Collected:  03/06/18 0847    Specimen:  Urine from Urine, Catheter Updated:  03/06/18 0859    Lactic Acid, Plasma [[id number]]  (Normal) Collected:  03/06/18 0847    Specimen:  Blood Updated:  03/06/18 0907     Lactate 1.0 mmol/L     Urinalysis With /  Culture If Indicated - Urine, Catheter [[id number]]  (Abnormal) Collected:  03/06/18 0847    Specimen:  Urine from Urine, Catheter Updated:  03/06/18 0912     Color, UA Yellow     Appearance, UA Cloudy (A)     pH, UA 6.5     Specific Gravity, UA 1.020     Glucose, UA Negative     Ketones, UA Negative     Bilirubin, UA Negative     Blood, UA Small (1+) (A)     Protein, UA >=300 mg/dL (3+) (A)     Leuk Esterase, UA Moderate (2+) (A)     Nitrite, UA Negative     Urobilinogen, UA 0.2 E.U./dL    Urinalysis, Microscopic Only - Urine, Clean Catch [[id number]]  (Abnormal) Collected:  03/06/18 0847    Specimen:  Urine from Urine, Catheter Updated:  03/06/18 0912     RBC, UA 3-5 (A) /HPF      WBC, UA 31-50 (A) /HPF      Bacteria, UA 3+ (A) /HPF      Squamous Epithelial Cells, UA 0-2 /HPF      Hyaline Casts, UA None Seen /LPF      Methodology Manual Light Microscopy    Urine Drug Screen - Urine, Clean Catch [[id number]]  (Abnormal) Collected:  03/06/18 0849    Specimen:  Urine from Urine, Clean Catch Updated:  03/06/18 0912     THC, Screen, Urine Negative     Phencyclidine (PCP), Urine Negative     Cocaine Screen, Urine Negative     Methamphetamine, Urine Positive (A)     Opiate Screen Positive (A)     Amphetamine Screen, Urine Positive (A)     Benzodiazepine Screen, Urine Positive (A)     Tricyclic Antidepressants Screen Negative     Methadone Screen, Urine Negative     Barbiturates Screen, Urine Negative     Oxycodone Screen, Urine Negative     Propoxyphene Screen Negative     Buprenorphine, Screen, Urine Negative    Narrative:       Limitations of this procedure include the possibility of false positives due to interfering substances in the urine sample. Clinical data should be correlated with any questionable result. Positive results should be considered Presumptive Positive until results are confirmed with another methodology such as HPLC or GCMS.    Troponin [[id number]]  (Normal) Collected:  03/06/18 0847    Specimen:   Blood Updated:  03/06/18 0920     Troponin I <0.012 ng/mL     Narrative:       Normal Patient Upper Reference Limit (URL) (99th Percentile)=0.03 ng/mL   Non-AMI Illness Reference Limit=0.03-0.11 ng/mL   AMI Confirmation=0.12 ng/mL and above    Magnesium [[id number]]  (Abnormal) Collected:  03/06/18 0847    Specimen:  Blood Updated:  03/06/18 0920     Magnesium 1.5 (L) mg/dL     Comprehensive Metabolic Panel [[id number]]  (Abnormal) Collected:  03/06/18 0847    Specimen:  Blood Updated:  03/06/18 0922     Glucose 75 mg/dL      BUN 69 (H) mg/dL      Creatinine 9.30 (H) mg/dL      Sodium 132 (L) mmol/L      Potassium 4.5 mmol/L      Chloride 100 mmol/L      CO2 9.0 (C) mmol/L      Calcium 8.1 (L) mg/dL      Total Protein 6.7 g/dL      Albumin 3.40 (L) g/dL      ALT (SGPT) 43 U/L      AST (SGOT) 41 U/L      Alkaline Phosphatase 129 (H) U/L      Total Bilirubin 0.9 mg/dL      eGFR Non African Amer 4 (L) mL/min/1.73      Globulin 3.3 gm/dL      A/G Ratio 1.0 g/dL      BUN/Creatinine Ratio 7.4     Anion Gap 27.5 mmol/L     Narrative:       Abnormal estimated GFR should be followed by more specific studies to confirm end stage chronic renal disease. The equation used for calculation may not be accurate for patients less than 19 years old, greater than 70 years old, patients at extremes of weight, malnutrition, or with acute renal dysfunction.    CBC Auto Differential [[id number]]  (Abnormal) Collected:  03/06/18 0847    Specimen:  Blood Updated:  03/06/18 0925     WBC 23.92 (H) 10*3/mm3      RBC 3.13 (L) 10*6/mm3      Hemoglobin 8.8 (L) g/dL      Hematocrit 27.3 (L) %      MCV 87.2 fL      MCH 28.1 pg      MCHC 32.2 g/dL      RDW 14.7 (H) %      RDW-SD 47.4 fl      MPV 9.3 fL      Platelets 546 (H) 10*3/mm3     CBC & Differential [[id number]] Collected:  03/06/18 0847    Specimen:  Blood Updated:  03/06/18 0925    Narrative:       The following orders were created for panel order CBC & Differential.  Procedure                                Abnormality         Status                     ---------                               -----------         ------                     Manual Differential[[id number]]          Abnormal            Final result               Scan Slide[[id number]]                                       Final result               CBC Auto Differential[[id number]]        Abnormal            Final result                 Please view results for these tests on the individual orders.    Scan Slide [[id number]] Collected:  03/06/18 0847    Specimen:  Blood Updated:  03/06/18 0925     Scan Slide --      See Manual Differential Results       Manual Differential [[id number]]  (Abnormal) Collected:  03/06/18 0847    Specimen:  Blood Updated:  03/06/18 0925     Neutrophil % 66.0 %      Lymphocyte % 6.0 (L) %      Monocyte % 8.0 %      Bands %  19.0 (H) %      Metamyelocyte % 1.0 (H) %      Neutrophils Absolute 20.33 (H) 10*3/mm3      Lymphocytes Absolute 1.44 10*3/mm3      Monocytes Absolute 1.91 (H) 10*3/mm3      nRBC 1.0 (H) /100 WBC      RBC Morphology Normal     Toxic Granulation Slight/1+     Platelet Estimate Increased    CK Total & CKMB [[id number]]  (Abnormal) Collected:  03/06/18 0847    Specimen:  Blood Updated:  03/06/18 0948     CKMB 34.90 (C) ng/mL      Creatine Kinase 904 (H) U/L     CK-MB Index [[id number]]  (Abnormal) Collected:  03/06/18 0847    Specimen:  Blood Updated:  03/06/18 0948     CK-MB Index 3.9 (H) %     [place] Draw [[id number]] Collected:  03/06/18 0847    Specimen:  Blood Updated:  03/06/18 1001    Narrative:       The following orders were created for panel order [place] Draw.  Procedure                               Abnormality         Status                     ---------                               -----------         ------                     Light Blue Top[[id number]]                                   Final result               Lavender Top[[id number]]                                     Final result                Gold Top - SST[[id number]]                                                              Green Top (No Gel)[[id number]]                               Final result                 Please view results for these tests on the individual orders.    Light Blue Top [[id number]] Collected:  03/06/18 0847    Specimen:  Blood Updated:  03/06/18 1001     Extra Tube hold for add-on      Auto resulted       Lavender Top [[id number]] Collected:  03/06/18 0847    Specimen:  Blood Updated:  03/06/18 1001     Extra Tube hold for add-on      Auto resulted       Green Top (No Gel) [[id number]] Collected:  03/06/18 0847    Specimen:  Blood Updated:  03/06/18 1001     Extra Tube Hold for add-ons.      Auto resulted.       Blood Culture With DANNY - Blood, [[id number]] Collected:  03/06/18 1033    Specimen:  Blood from Arm, Left Updated:  03/06/18 1039    Blood Culture With DANNY - Blood, [[id number]] Collected:  03/06/18 1021    Specimen:  Blood from Hand, Left Updated:  03/06/18 1040    POC Glucose Once [[id number]]  (Normal) Collected:  03/06/18 0850    Specimen:  Blood Updated:  03/06/18 1236     Glucose 87 mg/dL       Serial Number: [id number]:  [id number]       Phosphorus [[id number]]  (Abnormal) Collected:  03/06/18 0847    Specimen:  Blood Updated:  03/06/18 1308     Phosphorus 9.5 (C) mg/dL     Blood Gas, Arterial With Co-Ox [[id number]]  (Abnormal) Collected:  03/06/18 1352    Specimen:  Arterial Blood Updated:  03/06/18 1353     Site Arterial Line     Willard's Test Positive     pH, Arterial 7.035 (C) pH units      pCO2, Arterial 33.2 (L) mm Hg      pO2, Arterial 79.1 mm Hg      HCO3, Arterial 8.9 (L) mmol/L      Base Excess, Arterial -21.8 mmol/L      O2 Saturation, Arterial 92.9 %      Hemoglobin, Blood Gas 8.8 (L) g/dL      Oxyhemoglobin 91.3 (L) %      Methemoglobin 0.30 %      Carboxyhemoglobin 1.4 %      Modality Cannula - Nasal     FIO2 44 %     Basic Metabolic Panel [[id number]]  (Abnormal) Collected:  03/06/18 1409     Specimen:  Blood Updated:  03/06/18 1439     Glucose 77 mg/dL      BUN 60 (H) mg/dL      Creatinine 8.10 (H) mg/dL      Sodium 134 (L) mmol/L      Potassium 4.4 mmol/L      Chloride 106 mmol/L      CO2 9.0 (C) mmol/L      Calcium 7.4 (L) mg/dL      eGFR Non African Amer 5 (L) mL/min/1.73      BUN/Creatinine Ratio 7.4     Anion Gap 23.4 mmol/L     Narrative:       Abnormal estimated GFR should be followed by more specific studies to confirm end stage chronic renal disease. The equation used for calculation may not be accurate for patients less than 19 years old, greater than 70 years old, patients at extremes of weight, malnutrition, or with acute renal dysfunction.        Imaging Results (last 24 hours)     Procedure Component Value Units Date/Time    XR Chest 1 View [[id number]] Collected:  03/06/18 0914     Updated:  03/06/18 0917    Narrative:       PROCEDURE: XR CHEST 1 VW-     HISTORY: Weak/Dizzy/AMS triage protocol     COMPARISON: January 26, 2018.     FINDINGS: The heart is normal in size. The mediastinum is unremarkable.  There are increased interstitial markings with patchy opacities in the  lung bases suspicious for a pneumonia. There is no pneumothorax.  There  are no acute osseous abnormalities.           Impression:       Bilateral airspace disease suspicious for a pneumonia.     Continued followup is recommended.     This report was finalized on 3/6/2018 9:15 AM by Carol Peter M.D..    XR Abdomen KUB [[id number]] Collected:  03/06/18 1437     Updated:  03/06/18 1440    Narrative:       PROCEDURE: XR ABDOMEN KUB-     HISTORY: distention; A41.9-Sepsis, unspecified organism; N17.9-Acute  kidney failure, unspecified; E87.2-Acidosis; J18.9-Pneumonia,  unspecified organism     COMPARISON: None.     FINDINGS: An AP view of the abdomen and pelvis demonstrates an  unremarkable bowel gas pattern with no evidence of obstruction.  Scattered stool is present within the colon. Femoral lines are in place.        Impression:       Unremarkable exam.             This report was finalized on 3/6/2018 2:38 PM by Carol Peter M.D..

## 2018-03-06 NOTE — ED NOTES
Paged Dr. Parham, Nephrology, for Dr. Hernández @ [phone].     Contacted Dr. Hernández back @ 1020.  Rose Marie Duran  03/06/18 1019       Rose Marie Duran  03/06/18 1020

## 2018-03-06 NOTE — ED NOTES
Repaged Dr. Parham, Nephrology, for Dr. Hernández @ 1026.    Contacted Dr. Hernández back @ 1042.     Rose Marie Duran  03/06/18 1026       Rose Marie Duran  03/06/18 104

## 2018-03-06 NOTE — CONSULTS
"  Referring Provider: Mirella Mcnally DO  Reason for Consultation: RAGHU    Subjective     Chief complaint   Chief Complaint   Patient presents with   • Seizures       History of present illness:     56 Y/O WF with PMH of polysubstance abuse, CKD  Recent history of C-Diff colitis who presented with severe sepsis, hypotension and polysubstance abuse. Patient can not provide history and history was taken from the chart. Her significant other is on the bedside. We were consulted to help with her severe RAGHU and acidosis. A urine drug screen demonstrated evidence of amphetamines, methamphetamines, benzodiazepines and opiates.  Chest x-ray showed bilateral airspace disease suspicious for pneumonia.          Past Medical History:   Diagnosis Date   • Anxiety    • Arthritis    • Cancer     skin   • Cataracts, bilateral    • Chest pain    • COPD (chronic obstructive pulmonary disease) 3/23/2017   • Depression 3/23/2017   • Diabetes mellitus    • Emphysema lung    • Epilepsy     LAST SEIZURE 2017   • Headache    • High cholesterol    • History of brain shunt    • Hypertension    • Liver disease    • Low back pain    • Lumbosacral disc disease    • BAEZ (nonalcoholic steatohepatitis)    • Neurological disorder    • Osteoporosis    • Pneumonia    • Seizure disorder 3/23/2017   • Wears glasses      Past Surgical History:   Procedure Laterality Date   • APPENDECTOMY     • BRAIN SURGERY     • BREAST BIOPSY Right    •  SECTION  ,   • CHOLECYSTECTOMY     • COLON SURGERY      2\" REMOVED   • COLONOSCOPY     • CYST REMOVAL      brain, R front lobe - [facility]   • DENTAL PROCEDURE     • ENDOSCOPY     • EXCISION MASS TRUNK Left 2017    Procedure: EXCISION LEFT BUTTOCKS MASS;  Surgeon: Jennifer Galindo MD;  Location: [facility];  Service:    • HYSTERECTOMY     • RECONSTRUCTION URETHROPLASTY     • TUBAL ABDOMINAL LIGATION       Family History   Problem Relation Age of Onset   • Osteoarthritis Other    • " Osteoporosis Other    • Heart disease Other    • Asthma Other    • COPD Other    • Ulcers Other    • Diabetes Other    • Cancer Other    • Hyperlipidemia Other    • Liver disease Other    • No Known Problems Mother    • No Known Problems Father    • No Known Problems Sister    • No Known Problems Brother      Social History   Substance Use Topics   • Smoking status: Current Every Day Smoker     Packs/day: 1.00     Years: 45.00     Types: Cigarettes   • Smokeless tobacco: Never Used   • Alcohol use No     Prescriptions Prior to Admission   Medication Sig Dispense Refill Last Dose   • aspirin 81 MG EC tablet Take 81 mg by mouth Daily.   3/5/2018 at Unknown time   • clonazePAM (KlonoPIN) 0.5 MG tablet Take 1 tablet by mouth Every 12 (Twelve) Hours.   3/5/2018 at Unknown time   • gabapentin (NEURONTIN) 600 MG tablet Take 0.5 tablets by mouth 3 (Three) Times a Day.   3/5/2018 at Unknown time   • HYDROcodone-acetaminophen (NORCO) 7.5-325 MG per tablet Take 1 tablet by mouth 2 (Two) Times a Day As Needed for Moderate Pain .   3/5/2018 at Unknown time   • levETIRAcetam (KEPPRA) 500 MG tablet Take 500 mg by mouth 2 (Two) Times a Day.   3/5/2018 at Unknown time   • omeprazole (priLOSEC) 20 MG capsule Take 20 mg by mouth Daily.   3/5/2018 at Unknown time   • sertraline (ZOLOFT) 100 MG tablet Take 1 tablet by mouth Daily.   3/5/2018 at Unknown time   • tiotropium (SPIRIVA) 18 MCG per inhalation capsule Place 1 capsule into inhaler and inhale Daily.   3/5/2018 at Unknown time   • amoxicillin (AMOXIL) 875 MG tablet Take 1 tablet by mouth Every 12 (Twelve) Hours. 4 tablet 0 More than a month at Unknown time   • lisinopril (PRINIVIL,ZESTRIL) 10 MG tablet Take 10 mg by mouth Daily.  0 More than a month at Unknown time   • saccharomyces boulardii (FLORASTOR) 250 MG capsule Take 1 capsule by mouth 2 (Two) Times a Day. 60 capsule 1 More than a month at Unknown time   • traZODone (DESYREL) 100 MG tablet Take 100 mg by mouth Every Night.  "  More than a month at Unknown time   • vancomycin 50 MG/ML solution oral solution 250mg po QID until 1.11.18, then decrease to BID until 1.18.18, then once a day until 1.25.18, then every 3d until 2.1.18 190 mL 0      Allergies:  Keflex [cephalexin] and Sulfa antibiotics    Review of Systems  Review of systems not obtained due to patient factors.    Objective     Vital Signs  Temp:  [97.7 °F (36.5 °C)-97.9 °F (36.6 °C)] 97.7 °F (36.5 °C)  Heart Rate:  [] 100  Resp:  [14-17] 16  BP: ()/() 92/56  Arterial Line BP: ()/(35-52) 125/47    Flowsheet Rows         First Filed Value    Admission Height  162.6 cm (64\") Documented at 03/06/2018 0834    Admission Weight  61.2 kg (135 lb) Documented at 03/06/2018 0834           I/O this shift:  In: 3100 [I.V.:600; IV Piggyback:2500]  Out: 200 [Emesis/NG output:200]       Intake/Output Summary (Last 24 hours) at 03/06/18 1612  Last data filed at 03/06/18 1242   Gross per 24 hour   Intake             3100 ml   Output              200 ml   Net             2900 ml       Physical Exam:     General Appearance:    Drowsy and laying down in bed   Head:    Normocephalic, without obvious abnormality, atraumatic   Eyes:            Lids and lashes normal, conjunctivae and sclerae normal   Ears:    Ears appear intact with no abnormalities noted   Throat:   No oral lesions, no thrush, oral mucosa dry   Neck:   No adenopathy, supple, trachea midline, no thyromegaly, no     carotid bruit, no JVD   Back:     No kyphosis present, no scoliosis present, no skin lesions,       erythema or scars   Lungs:     Coarse BS B/L    Heart:    Tachy, Regular rhythm and normal rate, normal S1 and S2, no            murmur, no gallop, no rub, no click   Breast Exam:    Deferred   Abdomen:     Normal bowel sounds, no masses, no organomegaly, soft        non-tender, non-distended, no guarding, no rebound                 tenderness   Genitalia:    Deferred   Extremities:   Moves all " extremities well, no edema, no cyanosis, no              redness   Pulses:   Pulses palpable and equal bilaterally   Skin:   No bleeding, bruising or rash   Lymph nodes:   No palpable adenopathy           Results Review:    Results from last 7 days  Lab Units 03/06/18  1409 03/06/18  0847   SODIUM mmol/L 134* 132*   POTASSIUM mmol/L 4.4 4.5   CHLORIDE mmol/L 106 100   CO2 mmol/L 9.0* 9.0*   BUN mg/dL 60* 69*   CREATININE mg/dL 8.10* 9.30*   CALCIUM mg/dL 7.4* 8.1*   BILIRUBIN mg/dL  --  0.9   ALK PHOS U/L  --  129*   ALT (SGPT) U/L  --  43   AST (SGOT) U/L  --  41   GLUCOSE mg/dL 77 75       Estimated Creatinine Clearance: 7.6 mL/min (by C-G formula based on Cr of 8.1).      Results from last 7 days  Lab Units 03/06/18  0847   MAGNESIUM mg/dL 1.5*   PHOSPHORUS mg/dL 9.5*         Results from last 7 days  Lab Units 03/06/18  0847   WBC 10*3/mm3 23.92*   HEMOGLOBIN g/dL 8.8*   PLATELETS 10*3/mm3 546*             Active Medications    [START ON 3/8/2018] levoFLOXacin 500 mg Intravenous Q48H   magnesium sulfate 2 g Intravenous Once   meropenem 500 mg Intravenous Q24H   [START ON 3/9/2018] vancomycin 1,000 mg Intravenous Q72H       norepinephrine 0.02-0.3 mcg/kg/min Last Rate: 0.2 mcg/kg/min (03/06/18 1415)   Pharmacy Consult     sodium bicarbonate drip (greater than 75 mEq/bag) 150 mL/hr Last Rate: 150 mL/hr (03/06/18 1510)       Assessment/Plan   1. RAGHU due to severe sepsis, UTI and volume depletion: Associated with severe acidosis. Will need to continue aggressive IV hydration with IV bicarb drip. Might need hemodialysis if no improvement of her kidney function or her acidosis. Will continue IV ABX for her UTI awaiting urine culture. Will repeat her ABG and BMP and continue to follow her need for dialysis. Will adjust all meds for CR CL< 10 ml/min/m2.    2. Severe AG metabolic acidosis: Might be due to her RAGHU but will send blood work to rule out other etiologies as well.  3. UTI/Urosepsis/Spetic shock: Maintain MAP>  65. Keep MAP on levophed  4. Polysubstance abuse  5. B/L pneumonia  6. Hyponatremia  7. Hypomagnesemia  8. Hyperphosphatemia        Louie Leo MD  03/06/18  4:12 PM

## 2018-03-07 LAB
ALBUMIN SERPL-MCNC: 2.7 G/DL (ref 3.5–5)
ANION GAP SERPL CALCULATED.3IONS-SCNC: 19.8 MMOL/L
ARTERIAL PATENCY WRIST A: POSITIVE
BASE EXCESS BLDA CALC-SCNC: -7 MMOL/L
BDY SITE: ABNORMAL
BUN BLD-MCNC: 56 MG/DL (ref 7–20)
BUN/CREAT SERPL: 11 (ref 7.1–23.5)
CALCIUM SPEC-SCNC: 7.9 MG/DL (ref 8.4–10.2)
CHLORIDE SERPL-SCNC: 105 MMOL/L (ref 98–107)
CO2 SERPL-SCNC: 18 MMOL/L (ref 26–30)
COHGB MFR BLD: 1.1 %
CREAT BLD-MCNC: 5.1 MG/DL (ref 0.6–1.3)
DEPRECATED RDW RBC AUTO: 43.8 FL (ref 37–54)
ERYTHROCYTE [DISTWIDTH] IN BLOOD BY AUTOMATED COUNT: 14.4 % (ref 11.5–14.5)
FERRITIN SERPL-MCNC: 150 NG/ML (ref 11.1–264)
FOLATE SERPL-MCNC: 7.92 NG/ML
GFR SERPL CREATININE-BSD FRML MDRD: 9 ML/MIN/1.73
GLUCOSE BLD-MCNC: 152 MG/DL (ref 74–98)
GLUCOSE BLDC GLUCOMTR-MCNC: 130 MG/DL (ref 70–130)
GLUCOSE BLDC GLUCOMTR-MCNC: 155 MG/DL (ref 70–130)
GLUCOSE BLDC GLUCOMTR-MCNC: 163 MG/DL (ref 70–130)
GLUCOSE BLDC GLUCOMTR-MCNC: 179 MG/DL (ref 70–130)
HCO3 BLDA-SCNC: 19.4 MMOL/L (ref 22–28)
HCT VFR BLD AUTO: 25.6 % (ref 37–47)
HGB BLD-MCNC: 8.7 G/DL (ref 12–16)
HGB BLDA-MCNC: 8.2 G/DL (ref 12–18)
HOROWITZ INDEX BLD+IHG-RTO: 60 %
IRON 24H UR-MRATE: 22 MCG/DL (ref 37–181)
IRON SATN MFR SERPL: 12 % (ref 11–46)
MAGNESIUM SERPL-MCNC: 1.7 MG/DL (ref 1.6–2.3)
MCH RBC QN AUTO: 28.3 PG (ref 27–31)
MCHC RBC AUTO-ENTMCNC: 34 G/DL (ref 30–37)
MCV RBC AUTO: 83.4 FL (ref 81–99)
METHGB BLD QL: 0.7 %
MODALITY: ABNORMAL
OXYHGB MFR BLDV: 93.6 % (ref 94–99)
PCO2 BLDA: 39.2 MM HG (ref 35–45)
PH BLDA: 7.3 PH UNITS (ref 7.3–7.5)
PHOSPHATE SERPL-MCNC: 7.3 MG/DL (ref 2.5–4.5)
PLATELET # BLD AUTO: 547 10*3/MM3 (ref 130–400)
PMV BLD AUTO: 9.2 FL (ref 6–12)
PO2 BLDA: 74.3 MM HG (ref 75–100)
POTASSIUM BLD-SCNC: 2.6 MMOL/L (ref 3.5–5.1)
POTASSIUM BLD-SCNC: 2.8 MMOL/L (ref 3.5–5.1)
POTASSIUM BLD-SCNC: 3.2 MMOL/L (ref 3.5–5.1)
RBC # BLD AUTO: 3.07 10*6/MM3 (ref 4.2–5.4)
RETICS #: 0.04 10*6/MM3 (ref 0.02–0.13)
RETICS/RBC NFR AUTO: 1.31 % (ref 0.5–1.5)
SAO2 % BLDCOA: 95.3 %
SODIUM BLD-SCNC: 140 MMOL/L (ref 137–145)
TIBC SERPL-MCNC: 191 MCG/DL (ref 261–497)
VANCOMYCIN SERPL-MCNC: 15.8 MCG/ML (ref 5–40)
VIT B12 BLD-MCNC: 771 PG/ML (ref 239–931)
WBC NRBC COR # BLD: 21.08 10*3/MM3 (ref 4.8–10.8)

## 2018-03-07 PROCEDURE — 25010000002 POTASSIUM PER 2 MEQ: Performed by: INTERNAL MEDICINE

## 2018-03-07 PROCEDURE — 85045 AUTOMATED RETICULOCYTE COUNT: CPT | Performed by: INTERNAL MEDICINE

## 2018-03-07 PROCEDURE — 25010000002 MEROPENEM IN SODIUM CHLORIDE 0.9% 50 ML 500 MG/50ML RECONSTITUTED SOLUTION: Performed by: INTERNAL MEDICINE

## 2018-03-07 PROCEDURE — 84132 ASSAY OF SERUM POTASSIUM: CPT | Performed by: INTERNAL MEDICINE

## 2018-03-07 PROCEDURE — 25010000002 LEVETIRACETAM IN NACL 0.82% 500 MG/100ML SOLUTION: Performed by: INTERNAL MEDICINE

## 2018-03-07 PROCEDURE — 82805 BLOOD GASES W/O2 SATURATION: CPT

## 2018-03-07 PROCEDURE — 83550 IRON BINDING TEST: CPT | Performed by: INTERNAL MEDICINE

## 2018-03-07 PROCEDURE — 80202 ASSAY OF VANCOMYCIN: CPT | Performed by: INTERNAL MEDICINE

## 2018-03-07 PROCEDURE — 83050 HGB METHEMOGLOBIN QUAN: CPT

## 2018-03-07 PROCEDURE — 87186 SC STD MICRODIL/AGAR DIL: CPT | Performed by: FAMILY MEDICINE

## 2018-03-07 PROCEDURE — 87106 FUNGI IDENTIFICATION YEAST: CPT | Performed by: FAMILY MEDICINE

## 2018-03-07 PROCEDURE — 82607 VITAMIN B-12: CPT | Performed by: INTERNAL MEDICINE

## 2018-03-07 PROCEDURE — 25010000003 POTASSIUM CHLORIDE 10 MEQ/100ML SOLUTION: Performed by: FAMILY MEDICINE

## 2018-03-07 PROCEDURE — 87077 CULTURE AEROBIC IDENTIFY: CPT | Performed by: FAMILY MEDICINE

## 2018-03-07 PROCEDURE — 87205 SMEAR GRAM STAIN: CPT | Performed by: FAMILY MEDICINE

## 2018-03-07 PROCEDURE — 83735 ASSAY OF MAGNESIUM: CPT | Performed by: INTERNAL MEDICINE

## 2018-03-07 PROCEDURE — 82962 GLUCOSE BLOOD TEST: CPT

## 2018-03-07 PROCEDURE — 25010000002 DIAZEPAM PER 5 MG: Performed by: FAMILY MEDICINE

## 2018-03-07 PROCEDURE — 99255 IP/OBS CONSLTJ NEW/EST HI 80: CPT | Performed by: INTERNAL MEDICINE

## 2018-03-07 PROCEDURE — 82728 ASSAY OF FERRITIN: CPT | Performed by: INTERNAL MEDICINE

## 2018-03-07 PROCEDURE — 83540 ASSAY OF IRON: CPT | Performed by: INTERNAL MEDICINE

## 2018-03-07 PROCEDURE — 25010000002 VANCOMYCIN PER 500 MG: Performed by: INTERNAL MEDICINE

## 2018-03-07 PROCEDURE — 87147 CULTURE TYPE IMMUNOLOGIC: CPT | Performed by: FAMILY MEDICINE

## 2018-03-07 PROCEDURE — 82746 ASSAY OF FOLIC ACID SERUM: CPT | Performed by: INTERNAL MEDICINE

## 2018-03-07 PROCEDURE — 94799 UNLISTED PULMONARY SVC/PX: CPT

## 2018-03-07 PROCEDURE — 25010000002 FLUCONAZOLE 100-0.9 MG/50ML-% SOLUTION: Performed by: INTERNAL MEDICINE

## 2018-03-07 PROCEDURE — 85027 COMPLETE CBC AUTOMATED: CPT | Performed by: INTERNAL MEDICINE

## 2018-03-07 PROCEDURE — 82375 ASSAY CARBOXYHB QUANT: CPT

## 2018-03-07 PROCEDURE — 25010000002 MAGNESIUM SULFATE 2 GM/50ML SOLUTION: Performed by: INTERNAL MEDICINE

## 2018-03-07 PROCEDURE — 80069 RENAL FUNCTION PANEL: CPT | Performed by: INTERNAL MEDICINE

## 2018-03-07 PROCEDURE — 99232 SBSQ HOSP IP/OBS MODERATE 35: CPT | Performed by: INTERNAL MEDICINE

## 2018-03-07 PROCEDURE — 87070 CULTURE OTHR SPECIMN AEROBIC: CPT | Performed by: FAMILY MEDICINE

## 2018-03-07 RX ORDER — POTASSIUM CHLORIDE 7.45 MG/ML
10 INJECTION INTRAVENOUS
Status: COMPLETED | OUTPATIENT
Start: 2018-03-07 | End: 2018-03-07

## 2018-03-07 RX ORDER — ACETAMINOPHEN 650 MG/1
650 SUPPOSITORY RECTAL EVERY 6 HOURS PRN
Status: DISCONTINUED | OUTPATIENT
Start: 2018-03-07 | End: 2018-03-12 | Stop reason: HOSPADM

## 2018-03-07 RX ORDER — DIAZEPAM 5 MG/ML
5 INJECTION, SOLUTION INTRAMUSCULAR; INTRAVENOUS EVERY 6 HOURS PRN
Status: DISCONTINUED | OUTPATIENT
Start: 2018-03-07 | End: 2018-03-10

## 2018-03-07 RX ORDER — POTASSIUM CHLORIDE 400 MEQ/1000ML
20 INJECTION, SOLUTION INTRAVENOUS ONCE
Status: COMPLETED | OUTPATIENT
Start: 2018-03-07 | End: 2018-03-07

## 2018-03-07 RX ORDER — FAMOTIDINE 10 MG/ML
20 INJECTION, SOLUTION INTRAVENOUS EVERY 12 HOURS SCHEDULED
Status: DISCONTINUED | OUTPATIENT
Start: 2018-03-07 | End: 2018-03-12 | Stop reason: HOSPADM

## 2018-03-07 RX ORDER — POTASSIUM CHLORIDE 7.45 MG/ML
10 INJECTION INTRAVENOUS EVERY 4 HOURS
Status: COMPLETED | OUTPATIENT
Start: 2018-03-08 | End: 2018-03-08

## 2018-03-07 RX ORDER — POTASSIUM CHLORIDE 400 MEQ/1000ML
20 INJECTION, SOLUTION INTRAVENOUS
Status: COMPLETED | OUTPATIENT
Start: 2018-03-07 | End: 2018-03-07

## 2018-03-07 RX ORDER — WHITE PETROLATUM 450 MG/G
1 STICK TOPICAL AS NEEDED
Status: DISCONTINUED | OUTPATIENT
Start: 2018-03-07 | End: 2018-03-11 | Stop reason: SDUPTHER

## 2018-03-07 RX ORDER — MEROPENEM AND SODIUM CHLORIDE 500 MG/50ML
500 INJECTION, SOLUTION INTRAVENOUS EVERY 12 HOURS
Status: DISCONTINUED | OUTPATIENT
Start: 2018-03-07 | End: 2018-03-08

## 2018-03-07 RX ORDER — POTASSIUM CHLORIDE 7.45 MG/ML
10 INJECTION INTRAVENOUS
Status: DISCONTINUED | OUTPATIENT
Start: 2018-03-07 | End: 2018-03-07 | Stop reason: SDUPTHER

## 2018-03-07 RX ORDER — SODIUM CHLORIDE 9 MG/ML
100 INJECTION, SOLUTION INTRAVENOUS CONTINUOUS
Status: DISCONTINUED | OUTPATIENT
Start: 2018-03-07 | End: 2018-03-08

## 2018-03-07 RX ORDER — MAGNESIUM SULFATE HEPTAHYDRATE 40 MG/ML
2 INJECTION, SOLUTION INTRAVENOUS ONCE
Status: COMPLETED | OUTPATIENT
Start: 2018-03-07 | End: 2018-03-07

## 2018-03-07 RX ADMIN — MEROPENEM AND SODIUM CHLORIDE 500 MG: 500 INJECTION, SOLUTION INTRAVENOUS at 10:12

## 2018-03-07 RX ADMIN — MAGNESIUM SULFATE IN WATER 2 G: 40 INJECTION, SOLUTION INTRAVENOUS at 08:23

## 2018-03-07 RX ADMIN — SODIUM BICARBONATE 100 ML/HR: 84 INJECTION, SOLUTION INTRAVENOUS at 14:19

## 2018-03-07 RX ADMIN — POTASSIUM CHLORIDE 20 MEQ: 400 INJECTION, SOLUTION INTRAVENOUS at 17:12

## 2018-03-07 RX ADMIN — POTASSIUM CHLORIDE 10 MEQ: 10 INJECTION, SOLUTION INTRAVENOUS at 12:40

## 2018-03-07 RX ADMIN — IPRATROPIUM BROMIDE AND ALBUTEROL SULFATE 3 ML: .5; 3 SOLUTION RESPIRATORY (INHALATION) at 13:39

## 2018-03-07 RX ADMIN — LEVETIRACETAM 500 MG: 5 INJECTION INTRAVENOUS at 08:23

## 2018-03-07 RX ADMIN — POTASSIUM CHLORIDE 10 MEQ: 10 INJECTION, SOLUTION INTRAVENOUS at 11:10

## 2018-03-07 RX ADMIN — POTASSIUM CHLORIDE 20 MEQ: 400 INJECTION, SOLUTION INTRAVENOUS at 15:59

## 2018-03-07 RX ADMIN — FLUCONAZOLE 100 MG: 2 INJECTION INTRAVENOUS at 16:29

## 2018-03-07 RX ADMIN — POTASSIUM CHLORIDE 10 MEQ: 10 INJECTION, SOLUTION INTRAVENOUS at 09:24

## 2018-03-07 RX ADMIN — NOREPINEPHRINE BITARTRATE 0.15 MCG/KG/MIN: 1 INJECTION INTRAVENOUS at 14:18

## 2018-03-07 RX ADMIN — FAMOTIDINE 20 MG: 10 INJECTION, SOLUTION INTRAVENOUS at 20:12

## 2018-03-07 RX ADMIN — IPRATROPIUM BROMIDE AND ALBUTEROL SULFATE 3 ML: .5; 3 SOLUTION RESPIRATORY (INHALATION) at 12:13

## 2018-03-07 RX ADMIN — VANCOMYCIN HYDROCHLORIDE 1000 MG: 1 INJECTION, POWDER, LYOPHILIZED, FOR SOLUTION INTRAVENOUS at 11:10

## 2018-03-07 RX ADMIN — SODIUM CHLORIDE 100 ML/HR: 9 INJECTION, SOLUTION INTRAVENOUS at 15:00

## 2018-03-07 RX ADMIN — POTASSIUM CHLORIDE 20 MEQ: 400 INJECTION, SOLUTION INTRAVENOUS at 18:23

## 2018-03-07 RX ADMIN — FAMOTIDINE 20 MG: 10 INJECTION, SOLUTION INTRAVENOUS at 08:32

## 2018-03-07 RX ADMIN — SODIUM BICARBONATE 150 ML/HR: 84 INJECTION, SOLUTION INTRAVENOUS at 06:02

## 2018-03-07 RX ADMIN — IPRATROPIUM BROMIDE AND ALBUTEROL SULFATE 3 ML: .5; 3 SOLUTION RESPIRATORY (INHALATION) at 19:11

## 2018-03-07 RX ADMIN — Medication 1 EACH: at 16:28

## 2018-03-07 RX ADMIN — IPRATROPIUM BROMIDE AND ALBUTEROL SULFATE 3 ML: .5; 3 SOLUTION RESPIRATORY (INHALATION) at 07:24

## 2018-03-07 RX ADMIN — POTASSIUM CHLORIDE 10 MEQ: 10 INJECTION, SOLUTION INTRAVENOUS at 08:22

## 2018-03-07 RX ADMIN — LEVETIRACETAM 500 MG: 5 INJECTION INTRAVENOUS at 20:12

## 2018-03-07 RX ADMIN — NOREPINEPHRINE BITARTRATE 0.25 MCG/KG/MIN: 1 INJECTION INTRAVENOUS at 00:26

## 2018-03-07 RX ADMIN — Medication 5 MG: at 23:12

## 2018-03-07 RX ADMIN — IPRATROPIUM BROMIDE AND ALBUTEROL SULFATE 3 ML: .5; 3 SOLUTION RESPIRATORY (INHALATION) at 00:42

## 2018-03-07 RX ADMIN — MEROPENEM AND SODIUM CHLORIDE 500 MG: 500 INJECTION, SOLUTION INTRAVENOUS at 21:36

## 2018-03-07 RX ADMIN — ACETAMINOPHEN 650 MG: 650 SUPPOSITORY RECTAL at 17:12

## 2018-03-07 NOTE — PROGRESS NOTES
NEPHROLOGY PROGRESS NOTE    PATIENT IDENTIFICATION:   Name:  Charisma Cortez      MRN:  [id number]     57 y.o.  female             Reason for visit:  RAGHU    SUBJECTIVE:   Seen and examined. On pressors. Mom is on bedside. Urine output is improving.     OBJECTIVE:  Vitals:    03/07/18 1330 03/07/18 1339 03/07/18 1345 03/07/18 1400   BP:       BP Location:       Patient Position:       Pulse: 98 101 98 101   Resp:  20     Temp:       TempSrc:       SpO2: 91% 90% 98% (!) 86%   Weight:       Height:               Body mass index is 29.17 kg/(m^2).    Intake/Output Summary (Last 24 hours) at 03/07/18 1455  Last data filed at 03/07/18 1200   Gross per 24 hour   Intake            08465 ml   Output             4850 ml   Net            94526 ml         Exam:  GEN:  No distress, appears stated age  EYES:   Anicteric sclera  ENT:    External ears/nose normal, MM are   NECK:  No adenopathy, JVP   LUNGS: Coarse BS ; not labored  CV:  Normal S1S2, without murmur  ABD:  Non-tender, non-distended, no hepatosplenomegaly, +BS  EXT:  No edema; no cyanosis; clubbing  PSYCH:           Drowsy    Scheduled meds:    famotidine 20 mg Intravenous Q12H   fluconazole 100 mg Intravenous Q24H   ipratropium-albuterol 3 mL Nebulization Q6H - RT   levETIRAcetam 500 mg Intravenous Q12H   [START ON 3/8/2018] levoFLOXacin 500 mg Intravenous Q48H   meropenem 500 mg Intravenous Q12H   potassium chloride 20 mEq Intravenous Once   vancomycin 1,000 mg Intravenous Q72H     IV meds:                        norepinephrine 0.02-0.3 mcg/kg/min Last Rate: 0.15 mcg/kg/min (03/07/18 1418)   Pharmacy Consult     sodium chloride 100 mL/hr        Data Review:      Results from last 7 days  Lab Units 03/07/18  0438 03/06/18  1409 03/06/18  0847   SODIUM mmol/L 140 134* 132*   POTASSIUM mmol/L 2.8* 4.4 4.5   CHLORIDE mmol/L 105 106 100   CO2 mmol/L 18.0* 9.0* 9.0*   BUN mg/dL 56* 60* 69*   CREATININE mg/dL 5.10* 8.10* 9.30*   CALCIUM mg/dL 7.9* 7.4* 8.1*    BILIRUBIN mg/dL  --   --  0.9   ALK PHOS U/L  --   --  129*   ALT (SGPT) U/L  --   --  43   AST (SGOT) U/L  --   --  41   GLUCOSE mg/dL 152* 77 75       Estimated Creatinine Clearance: 10.5 mL/min (by C-G formula based on Cr of 5.1).      Results from last 7 days  Lab Units 03/07/18  0438 03/06/18  0847   MAGNESIUM mg/dL 1.7 1.5*   PHOSPHORUS mg/dL 7.3* 9.5*         Results from last 7 days  Lab Units 03/07/18  0438 03/06/18  0847   WBC 10*3/mm3 21.08* 23.92*   HEMOGLOBIN g/dL 8.7* 8.8*   PLATELETS 10*3/mm3 547* 546*                   ASSESSMENT:   Active Problems:    Sepsis        1. RAGHU due to severe sepsis, UTI and volume depletion: Associated with severe acidosis.      2. Severe AG metabolic acidosis: Improving  3. UTI/Urosepsis/Spetic shock: Maintain MAP> 65. Keep MAP on levophed  4. Polysubstance abuse  5. B/L pneumonia  6. Hyponatremia  7. Hypomagnesemia  8. Hyperphosphatemia        Plan:    Will switch to IVF NS  Replete potassium  Continue pressors to maintain MAP 65  Continue IV ABX  Repeat labs  D/W ICU staff and family  Adjust al meds for Cr CL< 10 ml/min/m2  Surveillance labs    Louie Leo MD  3/7/2018    2:55 PM     35min spent in reviewing records, discussion and examination of the patient and discussion with other members of the patient's medical team.

## 2018-03-07 NOTE — PLAN OF CARE
Problem: Pain, Chronic (Adult)  Goal: Identify Related Risk Factors and Signs and Symptoms  Outcome: Ongoing (interventions implemented as appropriate)   03/07/18 0327   Pain, Chronic   Related Risk Factors (Chronic Pain) knowledge deficit;psychosocial factor   Signs and Symptoms (Chronic Pain) fatigue/weakness;impaired thought process/concentration     Goal: Acceptable Pain Control/Comfort Level  Outcome: Ongoing (interventions implemented as appropriate)

## 2018-03-07 NOTE — PROGRESS NOTES
"Hospitalist Progress Note.    LOS: 1 day    Patient Care Team:  ANDREA Quinones as PCP - General  Jennifer Galindo MD as Consulting Physician (General Surgery)    Chief Complaint:    Chief Complaint   Patient presents with   • Seizures       Subjective   Patient seen and examined this morning.  Events from last night noted.  Pt still encephalopathic. Significant other at bedside.     Review of Systems:    Unable to obtain ROS due to encephalopathy    Objective     Vital Signs  /53  Pulse 90  Temp 98.8 °F (37.1 °C) (Oral)   Resp 16  Ht 149.9 cm (59\")  Wt 65.5 kg (144 lb 7 oz)  SpO2 94%  BMI 29.17 kg/m2      I/O this shift:  In: -   Out: 700 [Urine:700]    Intake/Output Summary (Last 24 hours) at 03/07/18 1143  Last data filed at 03/07/18 0823   Gross per 24 hour   Intake            12063 ml   Output             3300 ml   Net            02086 ml       Physical Exam:    General Appearance:  encephalopathic, not in any acute distress.   Head:  Atraumatic and normocephalic, without obvious abnormality.   Eyes:          PERRLA, conjunctivae and sclerae normal, no Icterus. No pallor.    Ears:  Ears appear intact with no abnormalities noted.   Throat: No oral lesions, no thrush, oral mucosa moist.   Neck: Supple, trachea midline, no thyromegaly, no carotid bruit.   Back:   No kyphoscoliosis present. No tenderness to palpation,   range of motion normal.   Lungs:   Chest shape is normal. Breath sounds heard bilaterally equally.  Increased rhonchi and crackles BLL. No wheezing. No Pleural rub or bronchial breathing.   Heart:  Normal S1 and S2, no murmur, no gallop, no rub. No JVD.   Abdomen:   Normal bowel sounds, no masses, no organomegaly. Soft,  non-distended, no guarding, no rebound tenderness.   Extremities: Moves all extremities well, no edema, no cyanosis, no clubbing.   Pulses: Pulses palpable and equal bilaterally. Very poor radial pulses   Skin: No bleeding, bruising or rash.   Neurologic: " Encephalopathy. No facial asymetry. Unable to fully assess          Results Review:      Results from last 7 days  Lab Units 03/07/18  0438 03/06/18  1409 03/06/18  0847   SODIUM mmol/L 140 134* 132*   POTASSIUM mmol/L 2.8* 4.4 4.5   CHLORIDE mmol/L 105 106 100   CO2 mmol/L 18.0* 9.0* 9.0*   BUN mg/dL 56* 60* 69*   CREATININE mg/dL 5.10* 8.10* 9.30*   CALCIUM mg/dL 7.9* 7.4* 8.1*   BILIRUBIN mg/dL  --   --  0.9   ALK PHOS U/L  --   --  129*   ALT (SGPT) U/L  --   --  43   AST (SGOT) U/L  --   --  41   GLUCOSE mg/dL 152* 77 75       Estimated Creatinine Clearance: 10.5 mL/min (by C-G formula based on Cr of 5.1).      Results from last 7 days  Lab Units 03/07/18  0438 03/06/18  0847   MAGNESIUM mg/dL 1.7 1.5*   PHOSPHORUS mg/dL 7.3* 9.5*               Results from last 7 days  Lab Units 03/07/18  0438 03/06/18  0847   WBC 10*3/mm3 21.08* 23.92*   HEMOGLOBIN g/dL 8.7* 8.8*   PLATELETS 10*3/mm3 547* 546*               Imaging Results (last 24 hours)     Procedure Component Value Units Date/Time    XR Abdomen KUB [[id number]] Collected:  03/06/18 1437     Updated:  03/06/18 1440    Narrative:       PROCEDURE: XR ABDOMEN KUB-     HISTORY: distention; A41.9-Sepsis, unspecified organism; N17.9-Acute  kidney failure, unspecified; E87.2-Acidosis; J18.9-Pneumonia,  unspecified organism     COMPARISON: None.     FINDINGS: An AP view of the abdomen and pelvis demonstrates an  unremarkable bowel gas pattern with no evidence of obstruction.  Scattered stool is present within the colon. Femoral lines are in place.       Impression:       Unremarkable exam.             This report was finalized on 3/6/2018 2:38 PM by Carol Peter M.D..          famotidine 20 mg Intravenous Q12H   fluconazole 100 mg Intravenous Q24H   ipratropium-albuterol 3 mL Nebulization Q6H - RT   levETIRAcetam 500 mg Intravenous Q12H   [START ON 3/8/2018] levoFLOXacin 500 mg Intravenous Q48H   meropenem 500 mg Intravenous Q12H   potassium chloride 10 mEq  Intravenous Q1H   vancomycin 1,000 mg Intravenous Q72H       norepinephrine 0.02-0.3 mcg/kg/min Last Rate: 0.15 mcg/kg/min (03/07/18 0440)   Pharmacy Consult     sodium bicarbonate drip (greater than 75 mEq/bag) 150 mL/hr Last Rate: 150 mL/hr (03/07/18 0602)       Medication Review:   Current Facility-Administered Medications   Medication Dose Route Frequency Provider Last Rate Last Dose   • diazePAM (VALIUM) injection 5 mg  5 mg Intravenous Q6H PRN Sukhwinder Gregg, DO       • famotidine (PEPCID) injection 20 mg  20 mg Intravenous Q12H Sukhwinder TAYLOR Gregg, DO   20 mg at 03/07/18 0832   • fluconazole (DIFLUCAN) in sodium chloride 0.9% IVBP 100 mg  100 mg Intravenous Q24H Louie Leo MD       • ipratropium-albuterol (DUO-NEB) nebulizer solution 3 mL  3 mL Nebulization Q6H - RT April G RosarioLeo, DO   3 mL at 03/07/18 0724   • ipratropium-albuterol (DUO-NEB) nebulizer solution 3 mL  3 mL Nebulization Q4H PRN April G Abraham-Leo, DO       • levETIRAcetam in NaCl 0.82% (KEPPRA) IVPB 500 mg  500 mg Intravenous Q12H April G RosarioLeo, DO   500 mg at 03/07/18 0823   • [START ON 3/8/2018] levoFLOXacin (LEVAQUIN) 500 mg/100 mL D5W (premix) (LEVAQUIN) 500 mg  500 mg Intravenous Q48H April G Abraham-Leo, DO       • LORazepam (ATIVAN) injection 1 mg  1 mg Intravenous Q5 Min PRN April G RosarioLeo, DO   1 mg at 03/06/18 2020   • meropenem in sodium chloride 0.9% 50 mL (MERREM) IVPB 500 mg  500 mg Intravenous Q12H April G RosarioLeo, DO   500 mg at 03/07/18 1012   • norepinephrine (LEVOPHED) 8,000 mcg in sodium chloride 0.9 % 250 mL (32 mcg/mL) infusion  0.02-0.3 mcg/kg/min Intravenous Titrated Johny Hernández MD 17.21 mL/hr at 03/07/18 0440 0.15 mcg/kg/min at 03/07/18 0440   • Pharmacy Consult   Does not apply Continuous PRN Mirella Mcnally DO       • potassium chloride 10 mEq in 100 mL IVPB  10 mEq Intravenous Q1H Sukhwinder Gregg  mL/hr at 03/07/18 1110 10 mEq at 03/07/18 1110    • sodium bicarbonate 8.4 % 150 mEq in dextrose (D5W) 5 % 1,000 mL infusion (greater than 75 mEq)  150 mL/hr Intravenous Continuous April G Rosario-Leo,  mL/hr at 03/07/18 0602 150 mL/hr at 03/07/18 0602   • sodium chloride 0.9 % flush 1-10 mL  1-10 mL Intravenous PRN April G Rosario-Leo, DO       • sodium chloride 0.9 % flush 10 mL  10 mL Intravenous PRN Johny Hernández MD       • vancomycin 1000 mg in sodium chloride 0.9% 250 mL IVPB  1,000 mg Intravenous Q72H April G Rosario-Leo, DO   1,000 mg at 03/07/18 1110       Assessment/Plan   Septic Shock requiring pressors  Probable Aspiration Bilateral Pneumonia  Acute UTI with h/o enterococcus  Infectious and metabolic encephalopathy  Hypotension  RAGHU on CKD 4  Mixed Metabolic acidosis and respiratory acidosis  H/o Heroin use  Recent Meth use per UDS, significant other states this was at least a week ago  UDS with Meth, amphetamines, BZO, and opiates  Hyponatremia  Hypomagnesemia  Hyperphosphatemia  Leukocytosis  Anemia  COPD compensated    Active Problems:    Sepsis    Cont care in ICU.  Nephrology following.  Cont sodium bicarbonate infusion to assist with acidemia. PH improved to 7.3, up from 7.0 at admission. Continue IV fluids as patient tolerates.     Continue levophed until she is able to maintain a map greater than 65.  Arterial line has been placed, appreciate cardiology's assistance. On admission chest x-ray demonstrated evidence of BLL pneumonia--- prob asp.  Requested ER doc to obtain cultures, unclear whether or not he obtained them prior to initiating antibiotic therapy.  However 2 cultures have been obtained and are currently pending.  She has been started on empiric antibiotic therapy including Merrem, vanc, and Levaquin - D2.     She also had evidence of a urinary tract infection.  Urine has been sent for culture.  Prior urine cultures in Jan have demonstrated enterococcus. GNR >100,000CFU per micro today.     Anemia noted. Likely  multifactorial. Eval with am labs. Type and screen completed. Fe def and ACD noted. Will need supplement when able to swallow. Fe sat <20%.     She is encephalopathic. May need temporary soft restraints for patient safety to maintain CVC and arterial line. Discussed with ICU staff to use if needed for pt safety.     SCDs. H2B given once on adm.       Details were discussed with the patient as well as significant other in the room.   I also discussed the details with the nursing staff.    Rest as ordered.    Mirella Mcnally,   03/07/18  11:43 AM    Please note that portions of this note may have been completed with a voice recognition program. Efforts were made to edit the dictations, but occasionally words are mistranscribed.

## 2018-03-07 NOTE — PROGRESS NOTES
"Adult Nutrition  Assessment    Patient Name:  Charisma Cortez  YOB: 1960  MRN: [id number]  Admit Date:  3/6/2018    Assessment Date:  3/7/2018    Comments:  Rec. #1: Advance diet once medically feasible to consistent carbohydrate, cardiac, renal. RD to add nutritional supplements once diet advances. Rec. #2: Consider adding renal MVI. RD to follow pt. Consult RD PRN.           Reason for Assessment       03/07/18 1044    Reason for Assessment    Reason For Assessment/Visit admission assessment    Identified At Risk By Screening Criteria diagnosis;MST SCORE 2+    Cardiac Hypotension    Hematological Other (comment);Anemia   Leukocytosis    Infectious Disease Septic shock;UTI    Metabolic/ICU Hyponatremia;Hypophosphatemia;Hypomagnesemia;Metabolic acidosis    Neurological Metabolic encephalopathy    Pulmonary/Critical Care COPD;Pneumonia    Renal RAGHU;CKD                    Labs/Tests/Procedures/Meds       03/07/18 1319    Labs/Tests/Procedures/Meds    Labs/Tests Review Reviewed    Medication Review Reviewed, pertinent            Physical Findings       03/07/18 1319    Physical Findings/Assessment    Additional Documentation Physical Appearance (Group)    Physical Appearance    Overall Physical Appearance overweight            Estimated/Assessed Needs       03/07/18 1319    Calculation Measurements    Weight Used For Calculations 49.9 kg (109 lb 14.4 oz)    Height Used for Calculations 1.499 m (4' 11\")    Estimated/Assessed Energy Needs    Energy Need Method [place]    Age 57    RMR ([place] Equation) 999.12    Activity Factors ([place] Jeor)  Out of bed, ambulatory  1.3    Estimated Kcal Range  ~3153-4158    Estimated/Assessed Protein Needs    Weight Used for Protein Calculation 49.9 kg (109 lb 14.4 oz)    Protein (gm/kg) 0.6    0.6 Gm Protein (gm) 29.91    Estimated Protein Range ~29.91-37.38    Estimated/Assessed Fluid Needs    Fluid Need Method Other (comment)   output + " 1000 ml            Nutrition Prescription Ordered       03/07/18 1321    Nutrition Prescription PO    Current PO Diet NPO   x 2 days            Evaluation of Received Nutrient/Fluid Intake       03/07/18 1322    PO Evaluation    Number of Days PO Intake Evaluated Insufficient Data   NPO            Electronically signed by:  Paola Peraza RD  03/07/18 1:26 PM

## 2018-03-07 NOTE — CONSULTS
"Date of consultation:   2018    Requested by:   Hospitalist Service.     PCP: ANDREA Zhong    Reason:  Acute respiratory failure.  Septic shock.  Pneumonia    History of Present Illness:  57 y.o. female  with past medical history significant for drug abuse who was admitted to the hospitalist service after she presented to the emergency room with altered mental status.  The patient was found to have multiple agents positive in her toxicology screen.  She was also found to have UTI and was hypotensive.  She was placed on Levophed drip and was transferred to ICU.    The patient's chest x-ray suggested pneumonia and she was requiring oxygen supplementation and pulmonary consultation is requested.    No further history available at this time from the patient as she is very difficult to arouse.  No family members at the bedside.      Review of System: Could not be obtained, as the patient is difficult to arouse.    Past Medical History:  Past Medical History:   Diagnosis Date   • Anxiety    • Arthritis    • C. difficile diarrhea 2018   • Cancer     skin   • Cataracts, bilateral    • Chest pain    • COPD (chronic obstructive pulmonary disease) 3/23/2017   • Depression 3/23/2017   • Diabetes mellitus    • Emphysema lung    • Epilepsy     LAST SEIZURE 2017   • Headache    • High cholesterol    • History of brain shunt    • Hypertension    • Liver disease    • Low back pain    • Lumbosacral disc disease    • BAEZ (nonalcoholic steatohepatitis)    • Neurological disorder    • Osteoporosis    • Pneumonia    • Seizure disorder 3/23/2017   • Wears glasses          Past Surgical History:  Past Surgical History:   Procedure Laterality Date   • APPENDECTOMY     • BRAIN SURGERY     • BREAST BIOPSY Right    •  SECTION  ,   • CHOLECYSTECTOMY     • COLON SURGERY      2\" REMOVED   • COLONOSCOPY     • CYST REMOVAL      brain, R front lobe - [facility]   • DENTAL PROCEDURE     • ENDOSCOPY   " "  • EXCISION MASS TRUNK Left 8/7/2017    Procedure: EXCISION LEFT BUTTOCKS MASS;  Surgeon: Jennifer Galindo MD;  Location: [facility] OR;  Service:    • HYSTERECTOMY     • RECONSTRUCTION URETHROPLASTY     • TUBAL ABDOMINAL LIGATION           Family History:  Family History   Problem Relation Age of Onset   • Osteoarthritis Other    • Osteoporosis Other    • Heart disease Other    • Asthma Other    • COPD Other    • Ulcers Other    • Diabetes Other    • Cancer Other    • Hyperlipidemia Other    • Liver disease Other    • No Known Problems Mother    • No Known Problems Father    • No Known Problems Sister    • No Known Problems Brother          Social History:  Social History     Social History   • Marital status:      Social History Main Topics   • Smoking status: Current Every Day Smoker     Packs/day: 1.00     Years: 45.00     Types: Cigarettes   • Smokeless tobacco: Never Used   • Alcohol use No   • Drug use: No   • Sexual activity: Defer         Physical Exam:  /53  Pulse 91  Temp 98.6 °F (37 °C) (Oral)   Resp 20  Ht 149.9 cm (59\")  Wt 65.5 kg (144 lb 7 oz)  SpO2 95%  BMI 29.17 kg/m2    Constitutional:            Vital signs reviewed                     General: No acute distress noted. Able to communicate appropriately    Head/Face/Eyes:            No significant facial abnormalities seen.            Extra ocular movement was intact.            Pupils appeared equal    ENT:             Oropharynx was dry. No lesions noted.     Neck:             Supple. No JVD noted.              Thyroid gland did not seem to be enlarged    Cardiovascular:              S1 + S2. Regular     Respiratory:            Respiratory effort was adequate              Somewhat Air Entry Bilaterally with crackles heard.    Abdomen:            Soft.            Bowel sounds sluggishly positive            No obvious organomegaly noted     Musculoskeletal/Extremities:             Gait could not be assessed at this time.            "  No clubbing, cyanosis noted in the upper extremities.             No edema noted in the lower extremities bilaterally.             Right femoral CVP line. Left Arterial line in place.      Neurologic/Psychiatric:             Opened eyes to loud verbal.             Arouses to loud verbal stimulus.    Skin:             No rash noted.             Warm and dry      Labs:   Reviewed. Pertinent labs were noted.     Lab Results   Component Value Date    WBC 21.08 (H) 03/07/2018    HGB 8.7 (L) 03/07/2018    HCT 25.6 (L) 03/07/2018    MCV 83.4 03/07/2018     (H) 03/07/2018       Lab Results   Component Value Date    GLUCOSE 152 (H) 03/07/2018    CALCIUM 7.9 (L) 03/07/2018     03/07/2018    K 2.8 (C) 03/07/2018    CO2 18.0 (L) 03/07/2018     03/07/2018    BUN 56 (H) 03/07/2018    CREATININE 5.10 (H) 03/07/2018    EGFRIFNONA 9 (L) 03/07/2018    BCR 11.0 03/07/2018    ANIONGAP 19.8 03/07/2018         ABG:  Lab Results   Component Value Date    PHART 7.085 (C) 03/06/2018    AGW3BNP 36.1 03/06/2018    PO2ART 57.8 (L) 03/06/2018    SO2 97.5 07/01/2014    HGBBG 8.7 (L) 03/06/2018    U2DDTCAP 87.3 03/06/2018    CARBOXYHGB 1.4 03/06/2018           Imaging Study: Images reviewed personally.    Imaging Results (last 72 hours)     Procedure Component Value Units Date/Time    XR Chest 1 View [[id number]] Collected:  03/06/18 0914     Updated:  03/06/18 0917    Narrative:       PROCEDURE: XR CHEST 1 VW-     HISTORY: Weak/Dizzy/AMS triage protocol     COMPARISON: January 26, 2018.     FINDINGS: The heart is normal in size. The mediastinum is unremarkable.  There are increased interstitial markings with patchy opacities in the  lung bases suspicious for a pneumonia. There is no pneumothorax.  There  are no acute osseous abnormalities.           Impression:       Bilateral airspace disease suspicious for a pneumonia.     Continued followup is recommended.     This report was finalized on 3/6/2018 9:15 AM by Carol  CECE Peter.    XR Abdomen KUB [[id number]] Collected:  03/06/18 1437     Updated:  03/06/18 1440    Narrative:       PROCEDURE: XR ABDOMEN KUB-     HISTORY: distention; A41.9-Sepsis, unspecified organism; N17.9-Acute  kidney failure, unspecified; E87.2-Acidosis; J18.9-Pneumonia,  unspecified organism     COMPARISON: None.     FINDINGS: An AP view of the abdomen and pelvis demonstrates an  unremarkable bowel gas pattern with no evidence of obstruction.  Scattered stool is present within the colon. Femoral lines are in place.       Impression:       Unremarkable exam.           This report was finalized on 3/6/2018 2:38 PM by Carol Peter M.D..          Assessment:  1.  Septic shock  2.  UTI  3.?  Aspiration pneumonia  4.  Drug abuse  5.  Acute renal failure  6.  Anemia  7.  Acute respiratory failure  8.  Acute encephalopathy    Discussion/Recommendations:   The patient's chest x-ray did suggest the possibility of pneumonia which I believe could be secondary to aspiration especially given her poor mental status overall and acute encephalopathy, which is secondary to drug abuse.    The patient clearly has septic shock which is likely from UTI especially given her latest culture revealing more than 100,000 colonies of gram-negative bacilli.  Her latest urine culture, in January 2018, revealed Enterococcus faecalis.  She has also tested positive for Clostridium difficile in the recent past, January 4th 2018.    The patient continues to require Levophed and I have advised the nursing staff to maintain a map of more than 60 and systolic blood pressure of more than .    I would recommend continuation of antibiotics for now.    Chest x-ray will be repeated in the morning.    The plan was discussed with the nursing staff.    Recommendations were also discussed with the referring provider.     I would like to thank you for the opportunity to participate in the care of this patient.  We will communicate  changes and recommendations, if and when necessary.      Stacy Martinez MD  03/07/18  7:46 AM    Dictated utilizing Dragon dictation.

## 2018-03-07 NOTE — PLAN OF CARE
Problem: Patient Care Overview (Adult)  Goal: Plan of Care Review  Outcome: Ongoing (interventions implemented as appropriate)   03/07/18 4047   Coping/Psychosocial Response Interventions   Plan Of Care Reviewed With patient;mother;significant other   Patient Care Overview   Progress progress towards functional goals is fair     Goal: Adult Individualization and Mutuality  Outcome: Ongoing (interventions implemented as appropriate)    Goal: Discharge Needs Assessment  Outcome: Ongoing (interventions implemented as appropriate)      Problem: Fall Risk (Adult)  Goal: Identify Related Risk Factors and Signs and Symptoms  Outcome: Ongoing (interventions implemented as appropriate)    Goal: Absence of Falls  Outcome: Ongoing (interventions implemented as appropriate)      Problem: Anxiety (Adult)  Goal: Identify Related Risk Factors and Signs and Symptoms  Outcome: Ongoing (interventions implemented as appropriate)    Goal: Reduction/Resolution  Outcome: Ongoing (interventions implemented as appropriate)      Problem: Pain, Chronic (Adult)  Goal: Identify Related Risk Factors and Signs and Symptoms  Outcome: Ongoing (interventions implemented as appropriate)    Goal: Acceptable Pain Control/Comfort Level  Outcome: Ongoing (interventions implemented as appropriate)

## 2018-03-07 NOTE — DISCHARGE INSTR - OTHER ORDERS
If you have any questions about your recovery, please call the [facility] Nurse Call Center at [phone]. A registered nurse is available 24 hours a day 7 days a week to assist you.

## 2018-03-08 ENCOUNTER — APPOINTMENT (OUTPATIENT)
Dept: GENERAL RADIOLOGY | Facility: HOSPITAL | Age: 58
End: 2018-03-08

## 2018-03-08 LAB
ALBUMIN SERPL-MCNC: 2.6 G/DL (ref 3.5–5)
ANION GAP SERPL CALCULATED.3IONS-SCNC: 11 MMOL/L
ANION GAP SERPL CALCULATED.3IONS-SCNC: 14.2 MMOL/L
ARTERIAL PATENCY WRIST A: POSITIVE
ATMOSPHERIC PRESS: 734 MMHG
BASE EXCESS BLDA CALC-SCNC: -1 MMOL/L
BDY SITE: ABNORMAL
BUN BLD-MCNC: 24 MG/DL (ref 7–20)
BUN BLD-MCNC: 32 MG/DL (ref 7–20)
BUN/CREAT SERPL: 22.9 (ref 7.1–23.5)
BUN/CREAT SERPL: 26.7 (ref 7.1–23.5)
CALCIUM SPEC-SCNC: 8.5 MG/DL (ref 8.4–10.2)
CALCIUM SPEC-SCNC: 8.5 MG/DL (ref 8.4–10.2)
CHLORIDE SERPL-SCNC: 114 MMOL/L (ref 98–107)
CHLORIDE SERPL-SCNC: 117 MMOL/L (ref 98–107)
CO2 SERPL-SCNC: 25 MMOL/L (ref 26–30)
CO2 SERPL-SCNC: 26 MMOL/L (ref 26–30)
CREAT BLD-MCNC: 0.9 MG/DL (ref 0.6–1.3)
CREAT BLD-MCNC: 1.4 MG/DL (ref 0.6–1.3)
DEPRECATED RDW RBC AUTO: 42.4 FL (ref 37–54)
ERYTHROCYTE [DISTWIDTH] IN BLOOD BY AUTOMATED COUNT: 14.3 % (ref 11.5–14.5)
GFR SERPL CREATININE-BSD FRML MDRD: 39 ML/MIN/1.73
GFR SERPL CREATININE-BSD FRML MDRD: 65 ML/MIN/1.73
GLUCOSE BLD-MCNC: 114 MG/DL (ref 74–98)
GLUCOSE BLD-MCNC: 137 MG/DL (ref 74–98)
GLUCOSE BLDC GLUCOMTR-MCNC: 130 MG/DL (ref 70–130)
GLUCOSE BLDC GLUCOMTR-MCNC: 140 MG/DL (ref 70–130)
GLUCOSE BLDC GLUCOMTR-MCNC: 177 MG/DL (ref 70–130)
HCO3 BLDA-SCNC: 24 MMOL/L (ref 22–28)
HCT VFR BLD AUTO: 20.9 % (ref 37–47)
HCT VFR BLD AUTO: 21 % (ref 37–47)
HCT VFR BLD AUTO: 23.2 % (ref 37–47)
HGB BLD-MCNC: 7.3 G/DL (ref 12–16)
HGB BLD-MCNC: 7.3 G/DL (ref 12–16)
HGB BLD-MCNC: 8 G/DL (ref 12–16)
HGB BLDA-MCNC: 7.9 G/DL (ref 12–18)
HOROWITZ INDEX BLD+IHG-RTO: 60 %
MAGNESIUM SERPL-MCNC: 1.5 MG/DL (ref 1.6–2.3)
MCH RBC QN AUTO: 28.6 PG (ref 27–31)
MCHC RBC AUTO-ENTMCNC: 34.9 G/DL (ref 30–37)
MCV RBC AUTO: 82 FL (ref 81–99)
MODALITY: ABNORMAL
PCO2 BLDA: 39.9 MM HG (ref 35–45)
PH BLDA: 7.39 PH UNITS (ref 7.3–7.5)
PHOSPHATE SERPL-MCNC: 4.1 MG/DL (ref 2.5–4.5)
PLATELET # BLD AUTO: 492 10*3/MM3 (ref 130–400)
PMV BLD AUTO: 9.1 FL (ref 6–12)
PO2 BLDA: 61.7 MM HG (ref 75–100)
POTASSIUM BLD-SCNC: 3 MMOL/L (ref 3.5–5.1)
POTASSIUM BLD-SCNC: 3.2 MMOL/L (ref 3.5–5.1)
RBC # BLD AUTO: 2.55 10*6/MM3 (ref 4.2–5.4)
SAO2 % BLDCOA: 91.7 %
SODIUM BLD-SCNC: 148 MMOL/L (ref 137–145)
SODIUM BLD-SCNC: 153 MMOL/L (ref 137–145)
WBC NRBC COR # BLD: 24.08 10*3/MM3 (ref 4.8–10.8)

## 2018-03-08 PROCEDURE — 82805 BLOOD GASES W/O2 SATURATION: CPT

## 2018-03-08 PROCEDURE — 25010000002 MEROPENEM IN SODIUM CHLORIDE 0.9% 50 ML 1 GM/50ML RECONSTITUTED SOLUTION: Performed by: INTERNAL MEDICINE

## 2018-03-08 PROCEDURE — 94799 UNLISTED PULMONARY SVC/PX: CPT

## 2018-03-08 PROCEDURE — 80048 BASIC METABOLIC PNL TOTAL CA: CPT | Performed by: INTERNAL MEDICINE

## 2018-03-08 PROCEDURE — 94660 CPAP INITIATION&MGMT: CPT

## 2018-03-08 PROCEDURE — 80069 RENAL FUNCTION PANEL: CPT | Performed by: INTERNAL MEDICINE

## 2018-03-08 PROCEDURE — 82962 GLUCOSE BLOOD TEST: CPT

## 2018-03-08 PROCEDURE — 25010000002 LEVOFLOXACIN PER 250 MG: Performed by: INTERNAL MEDICINE

## 2018-03-08 PROCEDURE — 85014 HEMATOCRIT: CPT | Performed by: INTERNAL MEDICINE

## 2018-03-08 PROCEDURE — 71045 X-RAY EXAM CHEST 1 VIEW: CPT

## 2018-03-08 PROCEDURE — 25010000002 POTASSIUM PER 2 MEQ: Performed by: INTERNAL MEDICINE

## 2018-03-08 PROCEDURE — 25010000002 LEVETIRACETAM IN NACL 0.82% 500 MG/100ML SOLUTION: Performed by: INTERNAL MEDICINE

## 2018-03-08 PROCEDURE — P9016 RBC LEUKOCYTES REDUCED: HCPCS

## 2018-03-08 PROCEDURE — 25010000002 FUROSEMIDE PER 20 MG: Performed by: INTERNAL MEDICINE

## 2018-03-08 PROCEDURE — 83735 ASSAY OF MAGNESIUM: CPT | Performed by: INTERNAL MEDICINE

## 2018-03-08 PROCEDURE — 25010000003 POTASSIUM CHLORIDE 10 MEQ/100ML SOLUTION: Performed by: INTERNAL MEDICINE

## 2018-03-08 PROCEDURE — 86900 BLOOD TYPING SEROLOGIC ABO: CPT

## 2018-03-08 PROCEDURE — 36430 TRANSFUSION BLD/BLD COMPNT: CPT

## 2018-03-08 PROCEDURE — 85018 HEMOGLOBIN: CPT | Performed by: INTERNAL MEDICINE

## 2018-03-08 PROCEDURE — 85027 COMPLETE CBC AUTOMATED: CPT | Performed by: INTERNAL MEDICINE

## 2018-03-08 PROCEDURE — 99233 SBSQ HOSP IP/OBS HIGH 50: CPT | Performed by: INTERNAL MEDICINE

## 2018-03-08 PROCEDURE — 25010000002 FLUCONAZOLE 100-0.9 MG/50ML-% SOLUTION: Performed by: INTERNAL MEDICINE

## 2018-03-08 RX ORDER — MEROPENEM AND SODIUM CHLORIDE 1 G/50ML
1 INJECTION, SOLUTION INTRAVENOUS EVERY 12 HOURS
Status: DISCONTINUED | OUTPATIENT
Start: 2018-03-08 | End: 2018-03-09

## 2018-03-08 RX ORDER — POTASSIUM CHLORIDE 400 MEQ/1000ML
20 INJECTION, SOLUTION INTRAVENOUS
Status: DISCONTINUED | OUTPATIENT
Start: 2018-03-08 | End: 2018-03-08

## 2018-03-08 RX ORDER — LEVETIRACETAM 500 MG/1
500 TABLET ORAL EVERY 12 HOURS SCHEDULED
Status: DISCONTINUED | OUTPATIENT
Start: 2018-03-08 | End: 2018-03-12 | Stop reason: HOSPADM

## 2018-03-08 RX ORDER — POTASSIUM CHLORIDE 400 MEQ/1000ML
20 INJECTION, SOLUTION INTRAVENOUS
Status: COMPLETED | OUTPATIENT
Start: 2018-03-08 | End: 2018-03-08

## 2018-03-08 RX ORDER — POTASSIUM CHLORIDE 7.45 MG/ML
10 INJECTION INTRAVENOUS EVERY 4 HOURS
Status: DISCONTINUED | OUTPATIENT
Start: 2018-03-08 | End: 2018-03-08 | Stop reason: SDUPTHER

## 2018-03-08 RX ORDER — POTASSIUM CHLORIDE 7.45 MG/ML
10 INJECTION INTRAVENOUS
Status: DISCONTINUED | OUTPATIENT
Start: 2018-03-08 | End: 2018-03-08 | Stop reason: ALTCHOICE

## 2018-03-08 RX ORDER — DEXTROSE MONOHYDRATE 50 MG/ML
75 INJECTION, SOLUTION INTRAVENOUS CONTINUOUS
Status: DISCONTINUED | OUTPATIENT
Start: 2018-03-08 | End: 2018-03-09

## 2018-03-08 RX ORDER — CLONAZEPAM 0.5 MG/1
0.5 TABLET ORAL 2 TIMES DAILY PRN
Status: DISCONTINUED | OUTPATIENT
Start: 2018-03-08 | End: 2018-03-12 | Stop reason: HOSPADM

## 2018-03-08 RX ORDER — ACETAMINOPHEN 325 MG/1
650 TABLET ORAL EVERY 6 HOURS PRN
Status: DISCONTINUED | OUTPATIENT
Start: 2018-03-08 | End: 2018-03-12 | Stop reason: HOSPADM

## 2018-03-08 RX ORDER — NICOTINE 21 MG/24HR
1 PATCH, TRANSDERMAL 24 HOURS TRANSDERMAL EVERY 24 HOURS
Status: DISCONTINUED | OUTPATIENT
Start: 2018-03-08 | End: 2018-03-12 | Stop reason: HOSPADM

## 2018-03-08 RX ORDER — MAGNESIUM SULFATE HEPTAHYDRATE 40 MG/ML
4 INJECTION, SOLUTION INTRAVENOUS ONCE
Status: COMPLETED | OUTPATIENT
Start: 2018-03-08 | End: 2018-03-08

## 2018-03-08 RX ORDER — HYDROCODONE BITARTRATE AND ACETAMINOPHEN 7.5; 325 MG/1; MG/1
1 TABLET ORAL EVERY 8 HOURS PRN
Status: DISCONTINUED | OUTPATIENT
Start: 2018-03-08 | End: 2018-03-08

## 2018-03-08 RX ORDER — FUROSEMIDE 10 MG/ML
40 INJECTION INTRAMUSCULAR; INTRAVENOUS ONCE AS NEEDED
Status: COMPLETED | OUTPATIENT
Start: 2018-03-08 | End: 2018-03-08

## 2018-03-08 RX ORDER — GABAPENTIN 300 MG/1
300 CAPSULE ORAL EVERY 8 HOURS SCHEDULED
Status: DISCONTINUED | OUTPATIENT
Start: 2018-03-08 | End: 2018-03-12 | Stop reason: HOSPADM

## 2018-03-08 RX ADMIN — POTASSIUM CHLORIDE 20 MEQ: 400 INJECTION, SOLUTION INTRAVENOUS at 09:22

## 2018-03-08 RX ADMIN — FAMOTIDINE 20 MG: 10 INJECTION, SOLUTION INTRAVENOUS at 09:23

## 2018-03-08 RX ADMIN — CLONAZEPAM 0.5 MG: 0.5 TABLET ORAL at 21:14

## 2018-03-08 RX ADMIN — FLUCONAZOLE 100 MG: 2 INJECTION INTRAVENOUS at 17:07

## 2018-03-08 RX ADMIN — MEROPENEM AND SODIUM CHLORIDE 1 G: 1 INJECTION, SOLUTION INTRAVENOUS at 09:22

## 2018-03-08 RX ADMIN — CLONAZEPAM 0.5 MG: 0.5 TABLET ORAL at 14:04

## 2018-03-08 RX ADMIN — IPRATROPIUM BROMIDE AND ALBUTEROL SULFATE 3 ML: .5; 3 SOLUTION RESPIRATORY (INHALATION) at 08:24

## 2018-03-08 RX ADMIN — IPRATROPIUM BROMIDE AND ALBUTEROL SULFATE 3 ML: .5; 3 SOLUTION RESPIRATORY (INHALATION) at 19:13

## 2018-03-08 RX ADMIN — POTASSIUM CHLORIDE 20 MEQ: 400 INJECTION, SOLUTION INTRAVENOUS at 11:33

## 2018-03-08 RX ADMIN — GABAPENTIN 300 MG: 300 CAPSULE ORAL at 14:03

## 2018-03-08 RX ADMIN — LEVETIRACETAM 500 MG: 5 INJECTION INTRAVENOUS at 09:22

## 2018-03-08 RX ADMIN — POTASSIUM CHLORIDE 10 MEQ: 10 INJECTION, SOLUTION INTRAVENOUS at 03:27

## 2018-03-08 RX ADMIN — LEVOFLOXACIN 500 MG: 5 INJECTION, SOLUTION INTRAVENOUS at 11:33

## 2018-03-08 RX ADMIN — MAGNESIUM SULFATE IN WATER 4 G: 40 INJECTION, SOLUTION INTRAVENOUS at 09:22

## 2018-03-08 RX ADMIN — SODIUM CHLORIDE 100 ML/HR: 9 INJECTION, SOLUTION INTRAVENOUS at 00:16

## 2018-03-08 RX ADMIN — GABAPENTIN 300 MG: 300 CAPSULE ORAL at 21:14

## 2018-03-08 RX ADMIN — FUROSEMIDE 40 MG: 10 INJECTION, SOLUTION INTRAMUSCULAR; INTRAVENOUS at 17:07

## 2018-03-08 RX ADMIN — POTASSIUM CHLORIDE 10 MEQ: 10 INJECTION, SOLUTION INTRAVENOUS at 00:14

## 2018-03-08 RX ADMIN — ACETAMINOPHEN 650 MG: 325 TABLET, FILM COATED ORAL at 13:07

## 2018-03-08 RX ADMIN — MEROPENEM AND SODIUM CHLORIDE 1 G: 1 INJECTION, SOLUTION INTRAVENOUS at 21:14

## 2018-03-08 RX ADMIN — DEXTROSE MONOHYDRATE 100 ML/HR: 50 INJECTION, SOLUTION INTRAVENOUS at 09:21

## 2018-03-08 RX ADMIN — FAMOTIDINE 20 MG: 10 INJECTION, SOLUTION INTRAVENOUS at 21:14

## 2018-03-08 RX ADMIN — LEVETIRACETAM 500 MG: 500 TABLET, FILM COATED ORAL at 21:14

## 2018-03-08 RX ADMIN — LEVETIRACETAM 500 MG: 500 TABLET, FILM COATED ORAL at 14:03

## 2018-03-08 RX ADMIN — IPRATROPIUM BROMIDE AND ALBUTEROL SULFATE 3 ML: .5; 3 SOLUTION RESPIRATORY (INHALATION) at 01:04

## 2018-03-08 RX ADMIN — NICOTINE 1 PATCH: 21 PATCH TRANSDERMAL at 20:02

## 2018-03-08 RX ADMIN — IPRATROPIUM BROMIDE AND ALBUTEROL SULFATE 3 ML: .5; 3 SOLUTION RESPIRATORY (INHALATION) at 12:30

## 2018-03-08 NOTE — PLAN OF CARE
Problem: Patient Care Overview (Adult)  Goal: Plan of Care Review   03/08/18 0558   Coping/Psychosocial Response Interventions   Plan Of Care Reviewed With patient;significant other   Patient Care Overview   Progress no change   Outcome Evaluation   Outcome Summary/Follow up Plan bp better and able to wean down levophed, but has required more o2 and lungs sound s are not improved.

## 2018-03-08 NOTE — PLAN OF CARE
Problem: Anxiety (Adult)  Goal: Identify Related Risk Factors and Signs and Symptoms  Outcome: Ongoing (interventions implemented as appropriate)   03/08/18 0552   Anxiety   Related Risk Factors (Anxiety) cognitive status   Signs and Symptoms (Anxiety) agitation;apprehension/being worried       Problem: Pain, Chronic (Adult)  Goal: Identify Related Risk Factors and Signs and Symptoms  Outcome: Ongoing (interventions implemented as appropriate)

## 2018-03-08 NOTE — PROGRESS NOTES
"  CC: Acute hypoxic respiratory failure. Pneumonia. Shock.    S: More awake and alert today.  Events noted overnight.  Currently on nonrebreather mask with oxygen saturation 90% or so.    O:Vital signs reviewed. O2Sat: 94 % on 80% NRB. Femoral CVP Line. Day # 2.   /72  Pulse 101  Temp 98.7 °F (37.1 °C)  Resp 23  Ht 149.9 cm (59\")  Wt 63.5 kg (140 lb)  SpO2 94%  BMI 28.28 kg/m2      I & Os reviewed.   Intake/Output       03/07/18 0700 - 03/08/18 0659    Intake (ml) 6356    Output (ml) 5475    Net (ml) 881    Last Weight 63.5 kg (140 lb)          General: No acute distress noted.  Eyes: PERRL. EOM Sluggish   Neck: Supple. +ve JVD   Cardiovascular: S1 + S2. Mildly tachycardic.   Respiratory: No respiratory distress noted. No wheezing heard. Basal crackles noted  Abdomen: Soft. Bowel sounds positive   Extremities: Trace to 1+ edema noted.  Neurologic: AAOx3. Was able to follow simple commands.   Skin: Appeared without any overt rashes    Labs: Reviewed.       Results from last 7 days  Lab Units 03/08/18  0435 03/07/18  2236 03/07/18  1510 03/07/18  0438 03/06/18  1409 03/06/18  0847   SODIUM mmol/L 153*  --   --  140 134* 132*   POTASSIUM mmol/L 3.2* 3.2* 2.6* 2.8* 4.4 4.5   CHLORIDE mmol/L 117*  --   --  105 106 100   CO2 mmol/L 25.0*  --   --  18.0* 9.0* 9.0*   BUN mg/dL 32*  --   --  56* 60* 69*   CREATININE mg/dL 1.40*  --   --  5.10* 8.10* 9.30*   CALCIUM mg/dL 8.5  --   --  7.9* 7.4* 8.1*   BILIRUBIN mg/dL  --   --   --   --   --  0.9   ALK PHOS U/L  --   --   --   --   --  129*   ALT (SGPT) U/L  --   --   --   --   --  43   AST (SGOT) U/L  --   --   --   --   --  41   GLUCOSE mg/dL 137*  --   --  152* 77 75         Results from last 7 days  Lab Units 03/08/18 0435 03/07/18 0438 03/06/18  0847   MAGNESIUM mg/dL 1.5* 1.7 1.5*   PHOSPHORUS mg/dL 4.1 7.3* 9.5*           Results from last 7 days  Lab Units 03/08/18  0749 03/08/18 0435 03/07/18 0438 03/06/18  0847   WBC 10*3/mm3  --  24.08* 21.08* " 23.92*   HEMOGLOBIN g/dL 7.3* 7.3* 8.7* 8.8*   PLATELETS 10*3/mm3  --  492* 547* 546*                 dextrose 100 mL/hr    norepinephrine 0.02-0.3 mcg/kg/min Last Rate: 0.02 mcg/kg/min (03/08/18 0837)   Pharmacy Consult     Pharmacy Consult       Blood Culture   Date Value Ref Range Status   03/06/2018 No growth at 24 hours  Preliminary        Urine Culture   Date Value Ref Range Status   03/06/2018 >100,000 CFU/mL Gram Negative Bacilli (A)  Preliminary     Comment:     Identification and KARI to follow.           CXRay: reviewed.   Imaging Results (last 24 hours)     Procedure Component Value Units Date/Time    XR Chest 1 View [[id number]] Collected:  03/08/18 0830     Updated:  03/08/18 0833    Narrative:       PROCEDURE: XR CHEST 1 VW-     HISTORY: PNA; A41.9-Sepsis, unspecified organism; N17.9-Acute kidney  failure, unspecified; E87.2-Acidosis; J18.9-Pneumonia, unspecified  organism     COMPARISON: March 6, 2018.     FINDINGS: The heart is normal in size. The mediastinum is unremarkable.  There is diffuse bilateral airspace disease which is improved somewhat  in the lung bases compared to the prior exam. There is no pneumothorax.   There are no acute osseous abnormalities.           Impression:       Slight interval improvement in the patients diffuse  bilateral airspace disease.     Continued followup is recommended.     This report was finalized on 3/8/2018 8:30 AM by Carol Peter M.D..          ABG:   Lab Results   Component Value Date    PHART 7.388 03/08/2018    ODR2KAZ 39.9 03/08/2018    PO2ART 61.7 (L) 03/08/2018    HGBBG 7.9 (L) 03/08/2018    E8GTFCZK 91.7 03/08/2018    CARBOXYHGB 1.1 03/07/2018         Assessment & Recommendations/Plan:   1.  Septic shock  - Continues to improve overall.  - Is now on a very low dose of Levophed  - We were asked the nursing staff to keep the map more than 60 and systolic blood pressure more than 95.      2.  UTI  - Gram-negative bacilli in the urine.    -  Identification pending.       3.?  Aspiration pneumonia  - If the chest x-ray continues to improve, then I would suggest de-escalating antibiotics, at least from a pneumonia standpoint.      4.  Drug abuse    5.  Acute renal failure  - Significant improvement overall.      6.  Anemia  - Agree with transfusion as it will definitely help with oxygenation status as well.    7.  Acute respiratory failure  - Continue to require significant oxygen supplementation     8.  Acute encephalopathy  - Definite improvement overall     9.  Hypernatremia   - Currently on D5 water.      Since the patient has gotten about 6.4 L IV fluid, we may consider diuretic tomorrow, if she continues to require significant oxygen and if her chest x-ray continues to show bilateral interstitial opacities.    I will discourage against be starting pain medicines for now patient given precarious respiratory status.    Her condition remains critical and prognosis remains fair to guarded.    I have discussed the findings and my recommendations with the patient's family member at the bedside.    We have reviewed patient's current orders and changes, if any, have been suggested to primary care team. Plan was also discussed with nursing staff, as necessary.       Stacy Martinez MD  03/08/18  8:38 AM    Dictated utilizing Dragon dictation.

## 2018-03-08 NOTE — PROGRESS NOTES
NEPHROLOGY PROGRESS NOTE    PATIENT IDENTIFICATION:   Name:  Charisma Cortez      MRN:  [id number]     57 y.o.  female             Reason for visit:  RAGHU    SUBJECTIVE:   Seen and examined.  The pressors.  The mom is on bedside.  Discussed with ICU staff.  Drowsy.     OBJECTIVE:  Vitals:    03/08/18 1415 03/08/18 1430 03/08/18 1435 03/08/18 1445   BP:   124/62    BP Location:       Patient Position:       Pulse: 93 96 94 92   Resp:   18    Temp:   98.1 °F (36.7 °C)    TempSrc:   Oral    SpO2: 93% 94% 95% 95%   Weight:       Height:               Body mass index is 28.28 kg/(m^2).    Intake/Output Summary (Last 24 hours) at 03/08/18 1502  Last data filed at 03/08/18 1400   Gross per 24 hour   Intake             7778 ml   Output             3225 ml   Net             4553 ml         Exam:  GEN:  No distress, appears stated age  EYES:   Anicteric sclera  ENT:    External ears/nose normal, MM are   NECK:  No adenopathy, JVP   LUNGS: Coarse BS ; not labored  CV:  Normal S1S2, without murmur  ABD:  Non-tender, non-distended, no hepatosplenomegaly, +BS  EXT:  No edema; no cyanosis; clubbing  PSYCH:           Drowsy    Scheduled meds:      famotidine 20 mg Intravenous Q12H   fluconazole 100 mg Intravenous Q24H   gabapentin 300 mg Oral Q8H   ipratropium-albuterol 3 mL Nebulization Q6H - RT   levETIRAcetam 500 mg Oral Q12H   levoFLOXacin 500 mg Intravenous Q48H   meropenem 1 g Intravenous Q12H   IVPB builder 1,000 mg Intravenous Q36H     IV meds:                          dextrose 75 mL/hr Last Rate: 50 mL/hr (03/08/18 1206)   norepinephrine 0.02-0.3 mcg/kg/min Last Rate: 0.02 mcg/kg/min (03/08/18 0837)   Pharmacy Consult     Pharmacy Consult         Data Review:      Results from last 7 days  Lab Units 03/08/18  0435 03/07/18  2236 03/07/18  1510 03/07/18  0438 03/06/18  1409 03/06/18  0847   SODIUM mmol/L 153*  --   --  140 134* 132*   POTASSIUM mmol/L 3.2* 3.2* 2.6* 2.8* 4.4 4.5   CHLORIDE mmol/L 117*  --   --  105  106 100   CO2 mmol/L 25.0*  --   --  18.0* 9.0* 9.0*   BUN mg/dL 32*  --   --  56* 60* 69*   CREATININE mg/dL 1.40*  --   --  5.10* 8.10* 9.30*   CALCIUM mg/dL 8.5  --   --  7.9* 7.4* 8.1*   BILIRUBIN mg/dL  --   --   --   --   --  0.9   ALK PHOS U/L  --   --   --   --   --  129*   ALT (SGPT) U/L  --   --   --   --   --  43   AST (SGOT) U/L  --   --   --   --   --  41   GLUCOSE mg/dL 137*  --   --  152* 77 75       Estimated Creatinine Clearance: 37.8 mL/min (by C-G formula based on Cr of 1.4).      Results from last 7 days  Lab Units 03/08/18  0435 03/07/18  0438 03/06/18  0847   MAGNESIUM mg/dL 1.5* 1.7 1.5*   PHOSPHORUS mg/dL 4.1 7.3* 9.5*         Results from last 7 days  Lab Units 03/08/18  0749 03/08/18  0435 03/07/18  0438 03/06/18  0847   WBC 10*3/mm3  --  24.08* 21.08* 23.92*   HEMOGLOBIN g/dL 7.3* 7.3* 8.7* 8.8*   PLATELETS 10*3/mm3  --  492* 547* 546*                   ASSESSMENT:   Active Problems:    Sepsis        1. RAGHU due to severe sepsis, UTI and volume depletion: Associated with severe acidosis.      2. Severe AG metabolic acidosis: Improving  3. UTI/Urosepsis/Spetic shock: Maintain MAP> 65. Keep MAP on levophed  4. Polysubstance abuse  5. B/L pneumonia  6. Hyponatremia  7. Hypomagnesemia  8. Hyperphosphatemia        Plan:    Switch IV fluids to D5 due to hypernatremia  Replete potassium and recheck.  Continue pressors to maintain MAP 65  Continue IV ABX  D/W ICU staff and family  Adjust al meds for Cr CL< 10 ml/min/m2  Surveillance labs    Louie Leo MD  3/8/2018    3:02 PM     35min spent in reviewing records, discussion and examination of the patient and discussion with other members of the patient's medical team.

## 2018-03-08 NOTE — PROGRESS NOTES
"Hospitalist Progress Note.    LOS: 2 days    Patient Care Team:  ANDREA Quinones as PCP - General  Jennifer Galindo MD as Consulting Physician (General Surgery)    Chief Complaint:    Chief Complaint   Patient presents with   • Seizures       Subjective   Patient seen and examined this morning.  Events from last night noted.  Significant other at bedside. Pt on NRB this am. Awake and alert. Denies CP. SOA present this am.      Objective     Vital Signs  /72  Pulse 101  Temp 98.7 °F (37.1 °C)  Resp 23  Ht 149.9 cm (59\")  Wt 63.5 kg (140 lb)  SpO2 94%  BMI 28.28 kg/m2           Intake/Output Summary (Last 24 hours) at 03/08/18 1149  Last data filed at 03/08/18 0600   Gross per 24 hour   Intake             6356 ml   Output             3925 ml   Net             2431 ml       Physical Exam:    General Appearance:  Awake and alert, not in any acute distress.   Head:  Atraumatic and normocephalic, without obvious abnormality.   Eyes:          PERRLA, conjunctivae and sclerae normal, no Icterus. No pallor.    Ears:  Ears appear intact with no abnormalities noted.   Throat: No oral lesions, no thrush, oral mucosa moist.   Neck: Supple, trachea midline, no thyromegaly, no carotid bruit.   Back:   No kyphoscoliosis present. No tenderness to palpation,   range of motion normal.   Lungs:   Chest shape is normal. Breath sounds heard bilaterally equally.  Increased rhonchi and crackles BLL-- a little worse today. No wheezing. No Pleural rub or bronchial breathing.   Heart:  Normal S1 and S2, no murmur, no gallop, no rub. No JVD.   Abdomen:   Normal bowel sounds, no masses, no organomegaly. Soft,  non-distended, no guarding, no rebound tenderness.   Extremities: Moves all extremities well, no edema, no cyanosis, no clubbing.   Pulses: Pulses palpable and equal bilaterally. Very poor radial pulses   Skin: No bleeding, bruising or rash.   Neurologic: Awake and alert          Results Review:      Results from last 7 " days  Lab Units 03/08/18 0435 03/07/18  2236 03/07/18  1510 03/07/18  0438 03/06/18  1409 03/06/18  0847   SODIUM mmol/L 153*  --   --  140 134* 132*   POTASSIUM mmol/L 3.2* 3.2* 2.6* 2.8* 4.4 4.5   CHLORIDE mmol/L 117*  --   --  105 106 100   CO2 mmol/L 25.0*  --   --  18.0* 9.0* 9.0*   BUN mg/dL 32*  --   --  56* 60* 69*   CREATININE mg/dL 1.40*  --   --  5.10* 8.10* 9.30*   CALCIUM mg/dL 8.5  --   --  7.9* 7.4* 8.1*   BILIRUBIN mg/dL  --   --   --   --   --  0.9   ALK PHOS U/L  --   --   --   --   --  129*   ALT (SGPT) U/L  --   --   --   --   --  43   AST (SGOT) U/L  --   --   --   --   --  41   GLUCOSE mg/dL 137*  --   --  152* 77 75       Estimated Creatinine Clearance: 37.8 mL/min (by C-G formula based on Cr of 1.4).      Results from last 7 days  Lab Units 03/08/18  0435 03/07/18  0438 03/06/18  0847   MAGNESIUM mg/dL 1.5* 1.7 1.5*   PHOSPHORUS mg/dL 4.1 7.3* 9.5*               Results from last 7 days  Lab Units 03/08/18  0749 03/08/18  0435 03/07/18  0438 03/06/18  0847   WBC 10*3/mm3  --  24.08* 21.08* 23.92*   HEMOGLOBIN g/dL 7.3* 7.3* 8.7* 8.8*   PLATELETS 10*3/mm3  --  492* 547* 546*               Imaging Results (last 24 hours)     Procedure Component Value Units Date/Time    XR Chest 1 View [[id number]] Collected:  03/08/18 0830     Updated:  03/08/18 0833    Narrative:       PROCEDURE: XR CHEST 1 VW-     HISTORY: PNA; A41.9-Sepsis, unspecified organism; N17.9-Acute kidney  failure, unspecified; E87.2-Acidosis; J18.9-Pneumonia, unspecified  organism     COMPARISON: March 6, 2018.     FINDINGS: The heart is normal in size. The mediastinum is unremarkable.  There is diffuse bilateral airspace disease which is improved somewhat  in the lung bases compared to the prior exam. There is no pneumothorax.   There are no acute osseous abnormalities.           Impression:       Slight interval improvement in the patients diffuse  bilateral airspace disease.     Continued followup is recommended.     This report  was finalized on 3/8/2018 8:30 AM by Carol Peter M.D..          famotidine 20 mg Intravenous Q12H   fluconazole 100 mg Intravenous Q24H   gabapentin 300 mg Oral Q8H   ipratropium-albuterol 3 mL Nebulization Q6H - RT   levETIRAcetam 500 mg Oral Q12H   levoFLOXacin 500 mg Intravenous Q48H   magnesium sulfate 4 g Intravenous Once   meropenem 1 g Intravenous Q12H   potassium chloride 20 mEq Intravenous Q2H   IVPB builder 1,000 mg Intravenous Q36H       dextrose 50 mL/hr Last Rate: 100 mL/hr (03/08/18 0921)   norepinephrine 0.02-0.3 mcg/kg/min Last Rate: 0.02 mcg/kg/min (03/08/18 0837)   Pharmacy Consult     Pharmacy Consult         Medication Review:   Current Facility-Administered Medications   Medication Dose Route Frequency Provider Last Rate Last Dose   • acetaminophen (TYLENOL) suppository 650 mg  650 mg Rectal Q6H PRN April G Rosario-Leo, DO   650 mg at 03/07/18 1712   • CHAPSTICK 1 each  1 each Apply externally PRN April G Rosario-Leo, DO   1 each at 03/07/18 1628   • clonazePAM (KlonoPIN) tablet 0.5 mg  0.5 mg Oral BID PRN April G RosarioLeo, DO       • dextrose (D5W) 5 % infusion  50 mL/hr Intravenous Continuous [facility],  mL/hr at 03/08/18 0921 100 mL/hr at 03/08/18 0921   • diazePAM (VALIUM) injection 5 mg  5 mg Intravenous Q6H PRN Sukhwinder TAYLOR Gregg, DO   5 mg at 03/07/18 2312   • famotidine (PEPCID) injection 20 mg  20 mg Intravenous Q12H Sukhwinder K Alex, DO   20 mg at 03/08/18 0923   • fluconazole (DIFLUCAN) in sodium chloride 0.9% IVBP 100 mg  100 mg Intravenous Q24H Louie Leo MD   100 mg at 03/07/18 1629   • gabapentin (NEURONTIN) capsule 300 mg  300 mg Oral Q8H [facility], DO       • ipratropium-albuterol (DUO-NEB) nebulizer solution 3 mL  3 mL Nebulization Q6H - RT April G Uli, DO   3 mL at 03/08/18 0824   • ipratropium-albuterol (DUO-NEB) nebulizer solution 3 mL  3 mL Nebulization Q4H PRN April G Uli, DO   3 mL at  03/07/18 1339   • levETIRAcetam (KEPPRA) tablet 500 mg  500 mg Oral Q12H April G Lenkad, DO       • levoFLOXacin (LEVAQUIN) 500 mg/100 mL D5W (premix) (LEVAQUIN) 500 mg  500 mg Intravenous Q48H April G Lenkad, DO   500 mg at 03/08/18 1133   • LORazepam (ATIVAN) injection 1 mg  1 mg Intravenous Q5 Min PRN April G Lenkad, DO   1 mg at 03/06/18 2020   • magnesium sulfate 4g/100mL (PREMIX) infusion  4 g Intravenous Once April G Uli, DO   4 g at 03/08/18 0922   • meropenem in sodium chloride 0.9% 50 mL (MERREM) IVPB 1 g  1 g Intravenous Q12H April G Uli, DO   1 g at 03/08/18 0922   • norepinephrine (LEVOPHED) 8,000 mcg in sodium chloride 0.9 % 250 mL (32 mcg/mL) infusion  0.02-0.3 mcg/kg/min Intravenous Titrated Johny Hernández MD 2.3 mL/hr at 03/08/18 0837 0.02 mcg/kg/min at 03/08/18 0837   • Pharmacy Consult   Does not apply Continuous PRN April G Lenkad, DO       • Pharmacy Consult   Does not apply Continuous PRN April G Rosario-Leo, DO       • potassium chloride 20 mEq in 50 mL IVPB  20 mEq Intravenous Q2H April G Lenkad, DO 25 mL/hr at 03/08/18 1133 20 mEq at 03/08/18 1133   • sodium chloride 0.9 % flush 1-10 mL  1-10 mL Intravenous PRN April G Rosario-Leo, DO       • sodium chloride 0.9 % flush 10 mL  10 mL Intravenous PRN Johny Hernández MD       • vancomycin (VANCOCIN) 1,000 mg in dextrose (D5W) 5 % 250 mL IVPB  1,000 mg Intravenous Q36H April G Abraham-Leo, DO           Assessment/Plan   Septic Shock requiring pressors  Probable Aspiration Bilateral Pneumonia  Acute respiratory failure with hypoxemia   Acute UTI with h/o enterococcus  Infectious and metabolic encephalopathy  Hypotension  RAGHU on CKD 4  Mixed Metabolic acidosis and respiratory acidosis  H/o Heroin use  Recent Meth use per UDS, significant other states this was at least a week ago  UDS with Meth, amphetamines, BZO, and  opiates  Hyponatremia  Hypomagnesemia  Hyperphosphatemia  Leukocytosis  Anemia  COPD compensated    Active Problems:    Sepsis    Cont care in ICU.  Nephrology following.  On D5W for Na. PH improved to 7.3, up from 7.0 at admission. Decrease IV fluids as patient tolerates. BMP @ 1400.     Wean levophed until she is able to maintain a map greater than 65.  Arterial line still in place, appreciate cardiology's assistance. On admission chest x-ray demonstrated evidence of BLL pneumonia--- prob asp.  Requested ER doc to obtain cultures, unclear whether or not he obtained them prior to initiating antibiotic therapy. Sputum cultures with staph aureus. However 2 blood cultures have been obtained and are currently NEG.  She has been started on empiric antibiotic therapy including Merrem, vanc, and Levaquin - D3. Awaiting final results of urine and sputum and will narrow abx.     She also had evidence of a urinary tract infection.  Urine has been sent for culture.  Prior urine cultures in Jan have demonstrated enterococcus. GNR >100,000CFU per micro.     Anemia noted. Likely multifactorial. Eval with am labs. Type and screen completed. Fe def and ACD noted. Will need supplement when able to swallow. Fe sat <20%. Transfuse 1 unit pRBC today. Lasix after blood.      Will keep CVC and arterial line for one more day until off levophed for 24 hrs, blood has been transfused, and able to tolerate po well not requiring so many IV meds/replacements.      SCDs. H2B given once on adm.    I have spent 35 min speaking with patient and family, reviewing labs, orders, discussing plan with staff in patient care.    Details were discussed with the patient as well as significant other in the room.   I also discussed the details with the nursing staff.    Rest as ordered.    Mirella Mcnally DO  03/08/18  11:49 AM    Please note that portions of this note may have been completed with a voice recognition program. Efforts were made to edit  the dictations, but occasionally words are mistranscribed.

## 2018-03-08 NOTE — PROGRESS NOTES
Discharge Planning Assessment   Fer     Patient Name: Charisma Cortez  MRN: [id number]  Today's Date: 3/8/2018    Admit Date: 3/6/2018          Discharge Needs Assessment       03/08/18 1322    Living Environment    Provides Primary Care For no one, unable/limited ability to care for self    Primary Care Provided By spouse/significant other;parent(s)    Caregiving Concerns Lives with common law , mother is next of kin. Does have children.    Unique Family Situation Substance use.     Quality Of Family Relationships other (see comments)    Discharge Needs Assessment    Concerns To Be Addressed discharge planning concerns    Readmission Within The Last 30 Days no previous admission in last 30 days    Equipment Needed After Discharge none    Discharge Facility/Level Of Care Needs home with home health    Discharge Planning Comments Wants to discharge home with home health.              Discharge Plan       03/08/18 1324    Case Management/Social Work Plan    Plan HUMBERTO spoke to patient who hopes to have home health at discharge. She is disabled. Lives with common law . Mother is next of kin. Has children. States she makes own decisions. No o2  at home. Has walker. Mother takes to MD. No issues getting medications. Discussed providing substance abuse resources when more medically stable. No other needs voiced.     Final Note    Final Note Following for social and discharge planning.         Discharge Placement     No information found        Expected Discharge Date and Time     Expected Discharge Date Expected Discharge Time    Mar 12, 2018               Demographic Summary       03/08/18 1321    Referral Information    Admission Type inpatient    Arrived From admitted as an inpatient    Referral Source admission list    Reason For Consult discharge planning    Record Reviewed medical record    Primary Care Physician Information    Name Kailee Beebe            Functional Status       03/08/18  1321    Employment/Financial    Source Of Income disability            Psychosocial     None            Abuse/Neglect     None            Legal     None            Substance Abuse     None            Patient Forms     None          Cassandra Bryant

## 2018-03-09 ENCOUNTER — APPOINTMENT (OUTPATIENT)
Dept: GENERAL RADIOLOGY | Facility: HOSPITAL | Age: 58
End: 2018-03-09

## 2018-03-09 LAB
ABO + RH BLD: NORMAL
AMPHET+METHAMPHET UR QL: NEGATIVE
AMPHETAMINES UR QL: NEGATIVE
ANION GAP SERPL CALCULATED.3IONS-SCNC: 13.6 MMOL/L
ARTERIAL PATENCY WRIST A: ABNORMAL
ATMOSPHERIC PRESS: 735 MMHG
BACTERIA SPEC AEROBE CULT: ABNORMAL
BACTERIA SPEC AEROBE CULT: ABNORMAL
BARBITURATES UR QL SCN: NEGATIVE
BASE EXCESS BLDA CALC-SCNC: 1.3 MMOL/L
BDY SITE: ABNORMAL
BENZODIAZ UR QL SCN: POSITIVE
BH BB BLOOD EXPIRATION DATE: NORMAL
BH BB BLOOD TYPE BARCODE: 6200
BH BB DISPENSE STATUS: NORMAL
BH BB PRODUCT CODE: NORMAL
BH BB UNIT NUMBER: NORMAL
BUN BLD-MCNC: 17 MG/DL (ref 7–20)
BUN/CREAT SERPL: 24.3 (ref 7.1–23.5)
BUPRENORPHINE SERPL-MCNC: NEGATIVE NG/ML
CALCIUM SPEC-SCNC: 8.4 MG/DL (ref 8.4–10.2)
CANNABINOIDS SERPL QL: NEGATIVE
CHLORIDE SERPL-SCNC: 104 MMOL/L (ref 98–107)
CO2 SERPL-SCNC: 27 MMOL/L (ref 26–30)
COCAINE UR QL: NEGATIVE
CREAT BLD-MCNC: 0.7 MG/DL (ref 0.6–1.3)
CROSSMATCH INTERPRETATION: NORMAL
DEPRECATED RDW RBC AUTO: 42.5 FL (ref 37–54)
ERYTHROCYTE [DISTWIDTH] IN BLOOD BY AUTOMATED COUNT: 14.4 % (ref 11.5–14.5)
GFR SERPL CREATININE-BSD FRML MDRD: 87 ML/MIN/1.73
GLUCOSE BLD-MCNC: 115 MG/DL (ref 74–98)
GLUCOSE BLDC GLUCOMTR-MCNC: 110 MG/DL (ref 70–130)
GLUCOSE BLDC GLUCOMTR-MCNC: 118 MG/DL (ref 70–130)
GLUCOSE BLDC GLUCOMTR-MCNC: 123 MG/DL (ref 70–130)
GLUCOSE BLDC GLUCOMTR-MCNC: 142 MG/DL (ref 70–130)
GLUCOSE BLDC GLUCOMTR-MCNC: 143 MG/DL (ref 70–130)
HCO3 BLDA-SCNC: 25.6 MMOL/L (ref 22–28)
HCT VFR BLD AUTO: 24.7 % (ref 37–47)
HGB BLD-MCNC: 8.6 G/DL (ref 12–16)
HGB BLDA-MCNC: 8.6 G/DL (ref 12–18)
HOROWITZ INDEX BLD+IHG-RTO: 100 %
MAGNESIUM SERPL-MCNC: 1 MG/DL (ref 1.6–2.3)
MCH RBC QN AUTO: 28.5 PG (ref 27–31)
MCHC RBC AUTO-ENTMCNC: 34.8 G/DL (ref 30–37)
MCV RBC AUTO: 81.8 FL (ref 81–99)
METHADONE UR QL SCN: NEGATIVE
MODALITY: ABNORMAL
OPIATES UR QL: POSITIVE
OXYCODONE UR QL SCN: NEGATIVE
PCO2 BLDA: 38.7 MM HG (ref 35–45)
PCP UR QL SCN: NEGATIVE
PH BLDA: 7.43 PH UNITS (ref 7.3–7.5)
PLATELET # BLD AUTO: 421 10*3/MM3 (ref 130–400)
PMV BLD AUTO: 8.7 FL (ref 6–12)
PO2 BLDA: 71.8 MM HG (ref 75–100)
POTASSIUM BLD-SCNC: 2.6 MMOL/L (ref 3.5–5.1)
PROPOXYPH UR QL: NEGATIVE
RBC # BLD AUTO: 3.02 10*6/MM3 (ref 4.2–5.4)
SAO2 % BLDCOA: 95.2 %
SODIUM BLD-SCNC: 142 MMOL/L (ref 137–145)
TRICYCLICS UR QL SCN: NEGATIVE
UNIT  ABO: NORMAL
UNIT  RH: NORMAL
WBC NRBC COR # BLD: 27.01 10*3/MM3 (ref 4.8–10.8)

## 2018-03-09 PROCEDURE — 94640 AIRWAY INHALATION TREATMENT: CPT

## 2018-03-09 PROCEDURE — 71045 X-RAY EXAM CHEST 1 VIEW: CPT

## 2018-03-09 PROCEDURE — 94799 UNLISTED PULMONARY SVC/PX: CPT

## 2018-03-09 PROCEDURE — 25010000002 FUROSEMIDE PER 20 MG: Performed by: INTERNAL MEDICINE

## 2018-03-09 PROCEDURE — 83735 ASSAY OF MAGNESIUM: CPT | Performed by: INTERNAL MEDICINE

## 2018-03-09 PROCEDURE — 80048 BASIC METABOLIC PNL TOTAL CA: CPT | Performed by: INTERNAL MEDICINE

## 2018-03-09 PROCEDURE — 82805 BLOOD GASES W/O2 SATURATION: CPT

## 2018-03-09 PROCEDURE — 25010000002 MEROPENEM IN SODIUM CHLORIDE 0.9% 50 ML 1 GM/50ML RECONSTITUTED SOLUTION: Performed by: INTERNAL MEDICINE

## 2018-03-09 PROCEDURE — 25010000002 VANCOMYCIN PER 500 MG: Performed by: INTERNAL MEDICINE

## 2018-03-09 PROCEDURE — 99233 SBSQ HOSP IP/OBS HIGH 50: CPT | Performed by: INTERNAL MEDICINE

## 2018-03-09 PROCEDURE — 82962 GLUCOSE BLOOD TEST: CPT

## 2018-03-09 PROCEDURE — 80306 DRUG TEST PRSMV INSTRMNT: CPT | Performed by: INTERNAL MEDICINE

## 2018-03-09 PROCEDURE — 85027 COMPLETE CBC AUTOMATED: CPT | Performed by: INTERNAL MEDICINE

## 2018-03-09 PROCEDURE — 25010000002 POTASSIUM CHLORIDE PER 2 MEQ OF POTASSIUM: Performed by: INTERNAL MEDICINE

## 2018-03-09 RX ORDER — POTASSIUM CHLORIDE 750 MG/1
40 CAPSULE, EXTENDED RELEASE ORAL ONCE
Status: COMPLETED | OUTPATIENT
Start: 2018-03-09 | End: 2018-03-09

## 2018-03-09 RX ORDER — POTASSIUM CHLORIDE 7.45 MG/ML
10 INJECTION INTRAVENOUS
Status: DISCONTINUED | OUTPATIENT
Start: 2018-03-09 | End: 2018-03-09 | Stop reason: SDUPTHER

## 2018-03-09 RX ORDER — WHITE PETROLATUM 450 MG/G
1 STICK TOPICAL AS NEEDED
Status: DISCONTINUED | OUTPATIENT
Start: 2018-03-09 | End: 2018-03-12 | Stop reason: HOSPADM

## 2018-03-09 RX ORDER — POTASSIUM CHLORIDE 1.5 G/1.77G
40 POWDER, FOR SOLUTION ORAL 2 TIMES DAILY
Status: COMPLETED | OUTPATIENT
Start: 2018-03-09 | End: 2018-03-09

## 2018-03-09 RX ORDER — BUMETANIDE 0.25 MG/ML
1 INJECTION INTRAMUSCULAR; INTRAVENOUS EVERY 12 HOURS
Status: DISCONTINUED | OUTPATIENT
Start: 2018-03-09 | End: 2018-03-10

## 2018-03-09 RX ORDER — MAGNESIUM SULFATE HEPTAHYDRATE 40 MG/ML
4 INJECTION, SOLUTION INTRAVENOUS ONCE
Status: COMPLETED | OUTPATIENT
Start: 2018-03-09 | End: 2018-03-09

## 2018-03-09 RX ORDER — FUROSEMIDE 10 MG/ML
40 INJECTION INTRAMUSCULAR; INTRAVENOUS ONCE
Status: COMPLETED | OUTPATIENT
Start: 2018-03-09 | End: 2018-03-09

## 2018-03-09 RX ORDER — MEROPENEM AND SODIUM CHLORIDE 1 G/50ML
1 INJECTION, SOLUTION INTRAVENOUS EVERY 8 HOURS
Status: DISCONTINUED | OUTPATIENT
Start: 2018-03-09 | End: 2018-03-12 | Stop reason: HOSPADM

## 2018-03-09 RX ORDER — CHLOROTHIAZIDE SODIUM 500 MG/1
250 INJECTION INTRAVENOUS ONCE
Status: DISCONTINUED | OUTPATIENT
Start: 2018-03-09 | End: 2018-03-09

## 2018-03-09 RX ORDER — DEXTROSE MONOHYDRATE 50 MG/ML
35 INJECTION, SOLUTION INTRAVENOUS CONTINUOUS
Status: DISCONTINUED | OUTPATIENT
Start: 2018-03-09 | End: 2018-03-11

## 2018-03-09 RX ADMIN — FUROSEMIDE 40 MG: 10 INJECTION, SOLUTION INTRAMUSCULAR; INTRAVENOUS at 09:54

## 2018-03-09 RX ADMIN — GABAPENTIN 300 MG: 300 CAPSULE ORAL at 21:59

## 2018-03-09 RX ADMIN — POTASSIUM CHLORIDE 40 MEQ: 1.5 POWDER, FOR SOLUTION ORAL at 20:34

## 2018-03-09 RX ADMIN — LEVETIRACETAM 500 MG: 500 TABLET, FILM COATED ORAL at 20:35

## 2018-03-09 RX ADMIN — IPRATROPIUM BROMIDE AND ALBUTEROL SULFATE 3 ML: .5; 3 SOLUTION RESPIRATORY (INHALATION) at 13:29

## 2018-03-09 RX ADMIN — POTASSIUM CHLORIDE: 2 INJECTION, SOLUTION, CONCENTRATE INTRAVENOUS at 09:29

## 2018-03-09 RX ADMIN — DEXTROSE MONOHYDRATE 75 ML/HR: 50 INJECTION, SOLUTION INTRAVENOUS at 01:10

## 2018-03-09 RX ADMIN — IPRATROPIUM BROMIDE AND ALBUTEROL SULFATE 3 ML: .5; 3 SOLUTION RESPIRATORY (INHALATION) at 18:37

## 2018-03-09 RX ADMIN — MEROPENEM AND SODIUM CHLORIDE 1 G: 1 INJECTION, SOLUTION INTRAVENOUS at 20:34

## 2018-03-09 RX ADMIN — FAMOTIDINE 20 MG: 10 INJECTION, SOLUTION INTRAVENOUS at 08:54

## 2018-03-09 RX ADMIN — Medication 1 EACH: at 05:18

## 2018-03-09 RX ADMIN — MAGNESIUM SULFATE IN WATER 4 G: 40 INJECTION, SOLUTION INTRAVENOUS at 09:29

## 2018-03-09 RX ADMIN — NICOTINE 1 PATCH: 21 PATCH TRANSDERMAL at 18:41

## 2018-03-09 RX ADMIN — IPRATROPIUM BROMIDE AND ALBUTEROL SULFATE 3 ML: .5; 3 SOLUTION RESPIRATORY (INHALATION) at 07:00

## 2018-03-09 RX ADMIN — VANCOMYCIN HYDROCHLORIDE 1000 MG: 1 INJECTION, POWDER, LYOPHILIZED, FOR SOLUTION INTRAVENOUS at 00:05

## 2018-03-09 RX ADMIN — POTASSIUM CHLORIDE: 2 INJECTION, SOLUTION, CONCENTRATE INTRAVENOUS at 11:03

## 2018-03-09 RX ADMIN — MEROPENEM AND SODIUM CHLORIDE 1 G: 1 INJECTION, SOLUTION INTRAVENOUS at 11:30

## 2018-03-09 RX ADMIN — POTASSIUM CHLORIDE 40 MEQ: 1.5 POWDER, FOR SOLUTION ORAL at 09:53

## 2018-03-09 RX ADMIN — POTASSIUM CHLORIDE 40 MEQ: 750 CAPSULE, EXTENDED RELEASE ORAL at 08:53

## 2018-03-09 RX ADMIN — GABAPENTIN 300 MG: 300 CAPSULE ORAL at 15:22

## 2018-03-09 RX ADMIN — LEVETIRACETAM 500 MG: 500 TABLET, FILM COATED ORAL at 08:53

## 2018-03-09 RX ADMIN — CLONAZEPAM 0.5 MG: 0.5 TABLET ORAL at 23:46

## 2018-03-09 RX ADMIN — ACETAMINOPHEN 650 MG: 325 TABLET, FILM COATED ORAL at 23:46

## 2018-03-09 RX ADMIN — DEXTROSE MONOHYDRATE 35 ML/HR: 50 INJECTION, SOLUTION INTRAVENOUS at 15:24

## 2018-03-09 RX ADMIN — IPRATROPIUM BROMIDE AND ALBUTEROL SULFATE 3 ML: .5; 3 SOLUTION RESPIRATORY (INHALATION) at 00:09

## 2018-03-09 RX ADMIN — BUMETANIDE 1 MG: 0.25 INJECTION INTRAMUSCULAR; INTRAVENOUS at 15:22

## 2018-03-09 RX ADMIN — GABAPENTIN 300 MG: 300 CAPSULE ORAL at 05:18

## 2018-03-09 RX ADMIN — FAMOTIDINE 20 MG: 10 INJECTION, SOLUTION INTRAVENOUS at 20:34

## 2018-03-09 RX ADMIN — POTASSIUM CHLORIDE: 2 INJECTION, SOLUTION, CONCENTRATE INTRAVENOUS at 13:26

## 2018-03-09 RX ADMIN — POTASSIUM CHLORIDE: 2 INJECTION, SOLUTION, CONCENTRATE INTRAVENOUS at 12:12

## 2018-03-09 NOTE — PROGRESS NOTES
"  CC: Acute hypoxic respiratory failure. Pneumonia. Shock.    S: More awake and alert today.  Events noted overnight.  Currently on nonrebreather mask with oxygen saturation 90% or so.    O:Vital signs reviewed. O2Sat: 92% on 80% NRB.    /82  Pulse 86  Temp 98.3 °F (36.8 °C) (Axillary)   Resp 24  Ht 149.9 cm (59\")  Wt 62.2 kg (137 lb 1.6 oz)  SpO2 96%  BMI 27.69 kg/m2      I & Os reviewed.   Intake/Output       03/08/18 0700 - 03/09/18 0659    Intake (ml) 7172    Output (ml) 2625    Net (ml) 4547    Last Weight 62.2 kg (137 lb 1.6 oz)          General: No acute distress noted.  Eyes: PERRL. EOM Sluggish   Neck: Supple. +ve JVD   Cardiovascular: S1 + S2. Regular.   Respiratory: Mild respiratory distress noted. No wheezing heard. Bilateral crackles noted  Abdomen: Soft. Bowel sounds positive   Extremities: 1+ edema noted.  Neurologic: AAOx3. Was able to follow simple commands.   Skin: Appeared without any overt rashes    Labs: Reviewed.       Results from last 7 days  Lab Units 03/09/18  0724 03/08/18  1628 03/08/18  0435  03/06/18  0847   SODIUM mmol/L 142 148* 153*  < > 132*   POTASSIUM mmol/L 2.6* 3.0* 3.2*  < > 4.5   CHLORIDE mmol/L 104 114* 117*  < > 100   CO2 mmol/L 27.0 26.0 25.0*  < > 9.0*   BUN mg/dL 17 24* 32*  < > 69*   CREATININE mg/dL 0.70 0.90 1.40*  < > 9.30*   CALCIUM mg/dL 8.4 8.5 8.5  < > 8.1*   BILIRUBIN mg/dL  --   --   --   --  0.9   ALK PHOS U/L  --   --   --   --  129*   ALT (SGPT) U/L  --   --   --   --  43   AST (SGOT) U/L  --   --   --   --  41   GLUCOSE mg/dL 115* 114* 137*  < > 75   < > = values in this interval not displayed.      Results from last 7 days  Lab Units 03/09/18  0724 03/08/18  0435 03/07/18  0438 03/06/18  0847   MAGNESIUM mg/dL 1.0* 1.5* 1.7 1.5*   PHOSPHORUS mg/dL  --  4.1 7.3* 9.5*           Results from last 7 days  Lab Units 03/09/18  0724 03/08/18  1620 03/08/18  0749 03/08/18  0435 03/07/18  0438 03/06/18  0847   WBC 10*3/mm3 27.01*  --   --  24.08* 21.08* " 23.92*   HEMOGLOBIN g/dL 8.6* 8.0* 7.3* 7.3* 8.7* 8.8*   PLATELETS 10*3/mm3 421*  --   --  492* 547* 546*                 dextrose 75 mL/hr Last Rate: 75 mL/hr (03/09/18 0110)   norepinephrine 0.02-0.3 mcg/kg/min Last Rate: 0.02 mcg/kg/min (03/08/18 0837)   Pharmacy Consult     Pharmacy Consult       Blood Culture   Date Value Ref Range Status   03/06/2018 No growth at 2 days  Preliminary               Urine Culture   Date Value Ref Range Status   03/06/2018 >100,000 CFU/mL Klebsiella oxytoca (A)  Final     Comment:     Identification and KARI to follow.             CXRay: reviewed.   Imaging Results (last 24 hours)     Procedure Component Value Units Date/Time    XR Chest 1 View [[id number]] Collected:  03/09/18 0828     Updated:  03/09/18 0831    Narrative:       PROCEDURE: XR CHEST 1 VW-     HISTORY: PNA; A41.9-Sepsis, unspecified organism; N17.9-Acute kidney  failure, unspecified; E87.2-Acidosis; J18.9-Pneumonia, unspecified  organism     COMPARISON: 3/8/2018.     FINDINGS: The heart is normal in size. The mediastinum is unremarkable.  There has been interval worsening in the patients diffuse bilateral  airspace disease. There is no pneumothorax.  There are no acute osseous  abnormalities.           Impression:       Interval worsening the patient's diffuse bilateral airspace  disease.     Continued followup is recommended.     This report was finalized on 3/9/2018 8:29 AM by Carol Peter M.D..          ABG:   Lab Results   Component Value Date    PHART 7.429 03/09/2018    PII4OUH 38.7 03/09/2018    PO2ART 71.8 (L) 03/09/2018    HGBBG 8.6 (L) 03/09/2018    O2ARQIFC 95.2 03/09/2018    CARBOXYHGB 1.1 03/07/2018         Assessment & Recommendations/Plan:   1.  Septic shock  - Resolved.    2.  UTI  - Klebsiella in the urine.      3.?  Aspiration pneumonia  - If the chest x-ray continues to improve, then I would suggest de-escalating antibiotics, at least from a pneumonia standpoint.      4.  Drug abuse    5.   Acute renal failure  - Resolved       6.  Anemia  - Agree with transfusion as it will definitely help with oxygenation status as well.    7.  Acute respiratory failure  - Continue to require significant oxygen supplementation  - The patient has received significant amount of IV fluids and continues to require high amounts of oxygen with worsening chest x-ray, I will discontinue IV fluids and order low-dose diuretic in the form of Lasix.       8.  Acute encephalopathy  - Definite improvement overall     9.  Fluid overload.  - At least 4.5 L positive overall.       Since the patient continues to require significant amounts of oxygen, currently at 80% FiO2, I will continue to discourage against starting pain medicines for now.    Her condition remains critical and prognosis remains fair to guarded.    I have discussed the findings and my recommendations with the patient's family member at the bedside.    We have updated the admitting attending and nursing staff, as appropriate, on the patient's current status and plan. I will be going off shift tonight and will be unavailable.     Stacy Martinez MD  03/09/18  9:09 AM    Dictated utilizing Dragon dictation.    Addendum:  MSSA was seen in the sputum.  I will discontinue vancomycin.    Stacy Martinez MD  03/09/18  9:18 AM

## 2018-03-09 NOTE — PROGRESS NOTES
NEPHROLOGY PROGRESS NOTE    PATIENT IDENTIFICATION:   Name:  Charisma Cortez      MRN:  [id number]     57 y.o.  female             Reason for visit:  RAGHU    SUBJECTIVE:   Seen and examined.  The pressors.  The mom is on bedside.  Discussed with ICU staff. Drowsy. Remains off pressors. Very good urine output.    OBJECTIVE:  Vitals:    03/09/18 0600 03/09/18 0630 03/09/18 0700 03/09/18 1329   BP:       BP Location:       Patient Position:       Pulse: 87 86 86 86   Resp:   24 18   Temp:       TempSrc:       SpO2: 92% 93% 96% 93%   Weight:       Height:               Body mass index is 27.69 kg/(m^2).    Intake/Output Summary (Last 24 hours) at 03/09/18 1336  Last data filed at 03/09/18 0600   Gross per 24 hour   Intake             6822 ml   Output             2625 ml   Net             4197 ml         Exam:  GEN:  No distress, appears stated age  EYES:   Anicteric sclera  ENT:    External ears/nose normal, MM are   NECK:  No adenopathy, JVP   LUNGS: Coarse BS ; not labored  CV:  Normal S1S2, without murmur  ABD:  Non-tender, non-distended, no hepatosplenomegaly, +BS  EXT:  No edema; no cyanosis; clubbing  PSYCH:           Drowsy    Scheduled meds:      bumetanide 1 mg Intravenous Q12H   chlorothiazide 250 mg Intravenous Once   famotidine 20 mg Intravenous Q12H   gabapentin 300 mg Oral Q8H   ipratropium-albuterol 3 mL Nebulization Q6H - RT   levETIRAcetam 500 mg Oral Q12H   meropenem 1 g Intravenous Q8H   nicotine 1 patch Transdermal Q24H   potassium chloride 40 mEq Oral BID   [DISCONTINUED] IVPB builder  Intravenous Q1H     IV meds:                          Pharmacy Consult    Pharmacy Consult        Data Review:      Results from last 7 days  Lab Units 03/09/18  0724 03/08/18  1628 03/08/18  0435  03/06/18  0847   SODIUM mmol/L 142 148* 153*  < > 132*   POTASSIUM mmol/L 2.6* 3.0* 3.2*  < > 4.5   CHLORIDE mmol/L 104 114* 117*  < > 100   CO2 mmol/L 27.0 26.0 25.0*  < > 9.0*   BUN mg/dL 17 24* 32*  < > 69*    CREATININE mg/dL 0.70 0.90 1.40*  < > 9.30*   CALCIUM mg/dL 8.4 8.5 8.5  < > 8.1*   BILIRUBIN mg/dL  --   --   --   --  0.9   ALK PHOS U/L  --   --   --   --  129*   ALT (SGPT) U/L  --   --   --   --  43   AST (SGOT) U/L  --   --   --   --  41   GLUCOSE mg/dL 115* 114* 137*  < > 75   < > = values in this interval not displayed.    Estimated Creatinine Clearance: 74.8 mL/min (by C-G formula based on Cr of 0.7).      Results from last 7 days  Lab Units 03/09/18  0724 03/08/18  0435 03/07/18 0438 03/06/18  0847   MAGNESIUM mg/dL 1.0* 1.5* 1.7 1.5*   PHOSPHORUS mg/dL  --  4.1 7.3* 9.5*         Results from last 7 days  Lab Units 03/09/18  0724 03/08/18  1620 03/08/18  0749 03/08/18  0435 03/07/18  0438 03/06/18  0847   WBC 10*3/mm3 27.01*  --   --  24.08* 21.08* 23.92*   HEMOGLOBIN g/dL 8.6* 8.0* 7.3* 7.3* 8.7* 8.8*   PLATELETS 10*3/mm3 421*  --   --  492* 547* 546*                   ASSESSMENT:   Active Problems:    Sepsis        1. RAGHU due to severe sepsis, UTI and volume depletion: Associated with severe acidosis.      2. Severe AG metabolic acidosis: Improving  3. UTI/Urosepsis/Spetic shock: Maintain MAP> 65. Keep MAP on levophed  4. Polysubstance abuse  5. B/L pneumonia  6. Hyponatremia  7. Hypomagnesemia  8. Hyperphosphatemia        Plan:    Unfortunately patient has been taken narcotic while she is in ICU and she appears to be drowsy.  Cut down D5 to 35 Cc/HR and  start her on Bumex IV 1 mg twice a day  Replete potassium and recheck.  Maintain MAP 65  Continue IV ABX  D/W ICU staff and family  Adjust al meds for Cr CL< 10 ml/min/m2  Surveillance labs    Yaser Al-Solaiman, MD  3/9/2018    1:36 PM     35min spent in reviewing records, discussion and examination of the patient and discussion with other members of the patient's medical team.

## 2018-03-09 NOTE — PLAN OF CARE
Problem: NPPV/CPAP (Adult)  Goal: Signs and Symptoms of Listed Potential Problems Will be Absent or Manageable (NPPV/CPAP)  Outcome: Ongoing (interventions implemented as appropriate)   03/09/18 1636   NPPV/CPAP   Problems Assessed (NPPV/CPAP) all   Problems Present (NPPV/CPAP) none

## 2018-03-09 NOTE — PLAN OF CARE
"Problem: Patient Care Overview (Adult)  Goal: Plan of Care Review   03/09/18 0243   Coping/Psychosocial Response Interventions   Plan Of Care Reviewed With patient;spouse   Patient Care Overview   Progress progress toward functional goals as expected   Outcome Evaluation   Outcome Summary/Follow up Plan off levo, oxygenation poor, needs bipap or 100% NRB at 15 L to maintain O2 at 90 - 95%     Goal: Adult Individualization and Mutuality  Outcome: Ongoing (interventions implemented as appropriate)   03/09/18 0243   Individualization   Patient Specific Preferences Sprite zero   Patient Specific Goals \"to go home and to get the Dr to give me back my pain pills and my trazadone\"   Patient Specific Interventions encourage use of bipap     Goal: Discharge Needs Assessment  Outcome: Ongoing (interventions implemented as appropriate)   03/09/18 0243   Discharge Needs Assessment   Concerns To Be Addressed compliance issue concerns;substance/tobacco abuse/use concerns   Readmission Within The Last 30 Days no previous admission in last 30 days   Equipment Needed After Discharge (unknown at this time, probably respiratory supplies)   Discharge Facility/Level Of Care Needs home with home health   Current Discharge Risk chronically ill;dependent with mobility/activities of daily living;substance abuse   Current Health   Anticipated Changes Related to Illness inability to care for self   Self-Care   Equipment Currently Used at Home walker, rolling   Living Environment   Transportation Available none       Problem: Fall Risk (Adult)  Goal: Identify Related Risk Factors and Signs and Symptoms   03/09/18 0243   Fall Risk   Fall Risk: Related Risk Factors confusion/agitation;depression/anxiety;fatigue/slow reaction;gait/mobility problems;polypharmacy;sleep pattern alteration;environment unfamiliar     Goal: Absence of Falls  Outcome: Ongoing (interventions implemented as appropriate)   03/09/18 0243   Fall Risk (Adult)   Absence of " Falls making progress toward outcome  (no falls this shift)     Goal: Identify Related Risk Factors and Signs and Symptoms   03/09/18 0243   Fall Risk   Fall Risk: Related Risk Factors confusion/agitation;depression/anxiety;fatigue/slow reaction;gait/mobility problems;polypharmacy;sleep pattern alteration;environment unfamiliar     Goal: Absence of Falls  Outcome: Ongoing (interventions implemented as appropriate)   03/09/18 0243   Fall Risk (Adult)   Absence of Falls making progress toward outcome  (no falls this shift)       Problem: Anxiety (Adult)  Goal: Identify Related Risk Factors and Signs and Symptoms  Outcome: Ongoing (interventions implemented as appropriate)   03/09/18 0243   Anxiety   Related Risk Factors (Anxiety) cognitive status;health status change;knowledge deficit;loss of control;prognosis/treatment plan;substance use/abuse   Signs and Symptoms (Anxiety) concentration decreased;confusion/forgetfulness;sleep disturbance     Goal: Reduction/Resolution  Outcome: Ongoing (interventions implemented as appropriate)   03/09/18 0243   Anxiety (Adult)   Reduction/Resolution making progress toward outcome       Problem: Pain, Chronic (Adult)  Goal: Identify Related Risk Factors and Signs and Symptoms  Outcome: Ongoing (interventions implemented as appropriate)   03/08/18 0552 03/09/18 0243   Pain, Chronic   Related Risk Factors (Chronic Pain) --  coping ineffective;knowledge deficit   Signs and Symptoms (Chronic Pain) altered time perception;BADLs/IADLs reluctance/inability to perform;sleep pattern change;fatigue/weakness;impaired thought process/concentration;pacing/restlessness --      Goal: Acceptable Pain Control/Comfort Level  Outcome: Ongoing (interventions implemented as appropriate)   03/09/18 0243   Pain, Chronic (Adult)   Acceptable Pain Control/Comfort Level making progress toward outcome  (denies at this time)       Problem: Depression (Adult,Obstetrics,Pediatric)  Goal: Identify Related Risk  Factors and Signs and Symptoms  Outcome: Ongoing (interventions implemented as appropriate)   03/09/18 0243   Depression   Related Risk Factors (Depression) history of depression;medical comorbidity;medication effects;prognosis/treatment plan;psychosocial factor;sleep disturbance;substance use/abuse   Signs and Symptoms (Depression) fatigue/loss of energy;poor concentration/indecisiveness;self-care, diminished;sleep disturbance;substance use/abuse     Goal: Establish/Maintain Self-Care Routine  Outcome: Ongoing (interventions implemented as appropriate)   03/09/18 0243   Depression (Adult,Obstetrics,Pediatric)   Establish/Maintain Self-Care Routine making progress toward outcome     Goal: Improved/Stable Mood  Outcome: Ongoing (interventions implemented as appropriate)   03/09/18 0243   Depression (Adult,Obstetrics,Pediatric)   Improved/Stable Mood making progress toward outcome       Problem: Pain, Acute (Adult)  Goal: Identify Related Risk Factors and Signs and Symptoms  Outcome: Ongoing (interventions implemented as appropriate)   03/09/18 0243   Pain, Acute   Related Risk Factors (Acute Pain) infection;knowledge deficit;patient perception;persistent pain;procedure/treatment   Signs and Symptoms (Acute Pain) BADLs/IADLs reluctance/inability to perform;fatigue/weakness  (denies pain at present)     Goal: Acceptable Pain Control/Comfort Level  Outcome: Ongoing (interventions implemented as appropriate)   03/09/18 0243   Pain, Acute (Adult)   Acceptable Pain Control/Comfort Level unable to achieve outcome  (pt states has chronic pain yet denies pain when asked )

## 2018-03-09 NOTE — PROGRESS NOTES
"Hospitalist Progress Note.    LOS: 3 days    Patient Care Team:  ANDREA Quinones as PCP - General  Jennifer Galindo MD as Consulting Physician (General Surgery)    Chief Complaint:    Chief Complaint   Patient presents with   • Seizures       Subjective   Patient seen and examined this morning.  Events from last night noted. Mom at bedside. Pt on NRB this am. Awake and alert. Denies CP. SOA present this am.    The nurse this morning was helping the patient in the bed and found a bottle of Norco in her pillowcase.  The patient admitted that it was her boyfriend who brought it to her.  She admitted to taking pills secretively in her room.  She is aware that this is potentially life-threatening situation, and we will remove the narcotics from her room and give them to security so they may lock them up until she is discharged.  We informed her that she only permitted to have her mother visit at this time.    Objective     Vital Signs  /80  Pulse 93  Temp 98.3 °F (36.8 °C) (Axillary)   Resp 18  Ht 149.9 cm (59\")  Wt 62.2 kg (137 lb 1.6 oz)  SpO2 93%  BMI 27.69 kg/m2           Intake/Output Summary (Last 24 hours) at 03/09/18 1544  Last data filed at 03/09/18 0600   Gross per 24 hour   Intake             5750 ml   Output             2625 ml   Net             3125 ml       Physical Exam:    General Appearance:  Awake and alert, not in any acute distress.   Head:  Atraumatic and normocephalic, without obvious abnormality.   Eyes:          PERRLA, conjunctivae and sclerae normal, no Icterus. No pallor.    Ears:  Ears appear intact with no abnormalities noted.   Throat: No oral lesions, no thrush, oral mucosa moist.   Neck: Supple, trachea midline, no thyromegaly, no carotid bruit.   Back:   No kyphoscoliosis present. No tenderness to palpation,   range of motion normal.   Lungs:   Chest shape is normal. Breath sounds heard bilaterally equally.  Increased rhonchi and crackles BLL-- the same today. No " wheezing. No Pleural rub or bronchial breathing.   Heart:  Normal S1 and S2, no murmur, no gallop, no rub. No JVD.   Abdomen:   Normal bowel sounds, no masses, no organomegaly. Soft,  non-distended, no guarding, no rebound tenderness.   Extremities: Moves all extremities well, no edema, no cyanosis, no clubbing.   Pulses: Pulses palpable and equal bilaterally. Very poor radial pulses   Skin: No bleeding, bruising or rash.   Neurologic: Awake and alert          Results Review:      Results from last 7 days  Lab Units 03/09/18  0724 03/08/18  1628 03/08/18  0435 03/06/18  0847   SODIUM mmol/L 142 148* 153*  < > 132*   POTASSIUM mmol/L 2.6* 3.0* 3.2*  < > 4.5   CHLORIDE mmol/L 104 114* 117*  < > 100   CO2 mmol/L 27.0 26.0 25.0*  < > 9.0*   BUN mg/dL 17 24* 32*  < > 69*   CREATININE mg/dL 0.70 0.90 1.40*  < > 9.30*   CALCIUM mg/dL 8.4 8.5 8.5  < > 8.1*   BILIRUBIN mg/dL  --   --   --   --  0.9   ALK PHOS U/L  --   --   --   --  129*   ALT (SGPT) U/L  --   --   --   --  43   AST (SGOT) U/L  --   --   --   --  41   GLUCOSE mg/dL 115* 114* 137*  < > 75   < > = values in this interval not displayed.    Estimated Creatinine Clearance: 74.8 mL/min (by C-G formula based on Cr of 0.7).      Results from last 7 days  Lab Units 03/09/18  0724 03/08/18  0435 03/07/18 0438 03/06/18  0847   MAGNESIUM mg/dL 1.0* 1.5* 1.7 1.5*   PHOSPHORUS mg/dL  --  4.1 7.3* 9.5*               Results from last 7 days  Lab Units 03/09/18  0724 03/08/18  1620 03/08/18  0749 03/08/18  0435 03/07/18  0438 03/06/18  0847   WBC 10*3/mm3 27.01*  --   --  24.08* 21.08* 23.92*   HEMOGLOBIN g/dL 8.6* 8.0* 7.3* 7.3* 8.7* 8.8*   PLATELETS 10*3/mm3 421*  --   --  492* 547* 546*               Imaging Results (last 24 hours)     Procedure Component Value Units Date/Time    XR Chest 1 View [[id number]] Collected:  03/09/18 0828     Updated:  03/09/18 0831    Narrative:       PROCEDURE: XR CHEST 1 VW-     HISTORY: PNA; A41.9-Sepsis, unspecified organism;  N17.9-Acute kidney  failure, unspecified; E87.2-Acidosis; J18.9-Pneumonia, unspecified  organism     COMPARISON: 3/8/2018.     FINDINGS: The heart is normal in size. The mediastinum is unremarkable.  There has been interval worsening in the patients diffuse bilateral  airspace disease. There is no pneumothorax.  There are no acute osseous  abnormalities.           Impression:       Interval worsening the patient's diffuse bilateral airspace  disease.     Continued followup is recommended.     This report was finalized on 3/9/2018 8:29 AM by Carol Peter M.D..          bumetanide 1 mg Intravenous Q12H   famotidine 20 mg Intravenous Q12H   gabapentin 300 mg Oral Q8H   ipratropium-albuterol 3 mL Nebulization Q6H - RT   levETIRAcetam 500 mg Oral Q12H   meropenem 1 g Intravenous Q8H   nicotine 1 patch Transdermal Q24H   potassium chloride 40 mEq Oral BID       dextrose 35 mL/hr Last Rate: 35 mL/hr (03/09/18 1524)   Pharmacy Consult     Pharmacy Consult         Medication Review:   Current Facility-Administered Medications   Medication Dose Route Frequency Provider Last Rate Last Dose   • acetaminophen (TYLENOL) suppository 650 mg  650 mg Rectal Q6H PRN April G Rosario-Leo, DO   650 mg at 03/07/18 1712   • acetaminophen (TYLENOL) tablet 650 mg  650 mg Oral Q6H PRN April G Rosario-Leo, DO   650 mg at 03/08/18 1307   • bumetanide (BUMEX) injection 1 mg  1 mg Intravenous Q12H Louie Leo MD   1 mg at 03/09/18 1522   • CHAPSTICK 1 each  1 each Apply externally PRN April G Rsoario-Leo, DO   1 each at 03/07/18 1628   • CHAPSTICK 1 each  1 each Apply externally PRN Shade Mead MD   1 each at 03/09/18 0518   • clonazePAM (KlonoPIN) tablet 0.5 mg  0.5 mg Oral BID PRN April G Rosario-Leo, DO   0.5 mg at 03/08/18 2114   • dextrose (D5W) 5 % infusion  35 mL/hr Intravenous Continuous oLuie Leo MD 35 mL/hr at 03/09/18 1524 35 mL/hr at 03/09/18 1524   • diazePAM (VALIUM) injection 5 mg  5 mg  Intravenous Q6H PRN Sukhwinder K Mary[place], DO   5 mg at 03/07/18 2312   • famotidine (PEPCID) injection 20 mg  20 mg Intravenous Q12H Sukhwinder K [place], DO   20 mg at 03/09/18 0854   • gabapentin (NEURONTIN) capsule 300 mg  300 mg Oral Q8H April G RosarioLeathad, DO   300 mg at 03/09/18 1522   • ipratropium-albuterol (DUO-NEB) nebulizer solution 3 mL  3 mL Nebulization Q6H - RT [facility]   3 mL at 03/09/18 1329   • ipratropium-albuterol (DUO-NEB) nebulizer solution 3 mL  3 mL Nebulization Q4H PRN [facility]   3 mL at 03/07/18 1339   • levETIRAcetam (KEPPRA) tablet 500 mg  500 mg Oral Q12H April G Rosario-Leo, DO   500 mg at 03/09/18 0853   • LORazepam (ATIVAN) injection 1 mg  1 mg Intravenous Q5 Min PRN [facility]   1 mg at 03/06/18 2020   • meropenem in sodium chloride 0.9% 50 mL (MERREM) IVPB 1 g  1 g Intravenous Q8H Stacy Martinez MD       • nicotine (NICODERM CQ) 21 MG/24HR patch 1 patch  1 patch Transdermal Q24H Shade Mead MD   1 patch at 03/08/18 2002   • Pharmacy Consult   Does not apply Continuous PRN April G Rosario-Leo, DO       • Pharmacy Consult   Does not apply Continuous PRN April G Rosario-Leo, DO       • potassium chloride (KLOR-CON) packet 40 mEq  40 mEq Oral BID Stacy Martinez MD   40 mEq at 03/09/18 0953   • sodium chloride 0.9 % flush 1-10 mL  1-10 mL Intravenous PRN April G Rosario-Leo, DO       • sodium chloride 0.9 % flush 10 mL  10 mL Intravenous PRN Johny Hernández MD           Assessment/Plan   Septic Shock requiring pressors  Probable Aspiration Bilateral Pneumonia  Acute respiratory failure with hypoxemia   Acute UTI with h/o enterococcus  Infectious and metabolic encephalopathy  Hypotension  RAGHU on CKD 4  Mixed Metabolic acidosis and respiratory acidosis  H/o Heroin use  Recent Meth use per UDS, significant other states this was at least a week ago  UDS with Meth, amphetamines, BZO, and  opiates  Hyponatremia  Hypomagnesemia  Hyperphosphatemia  Leukocytosis  Anemia  COPD compensated    Active Problems:    Sepsis    Patient was abusing narcotics in her ICU room. No visitors except mother. Boyfriend no longer permitted since he brought them to her.     Also, I contacted her pain clinic who stated they have discharged her from their office since she failed her UDS with hydrocodone and oxycodone, although only prescribed hydrocodone.    Cont care in ICU.  Nephrology following. Renal function WNL. Cr >9 on admission.     Off levophed since yesterday.  Arterial line removed today. On admission chest x-ray demonstrated evidence of BLL pneumonia--- prob asp.  Sputum cultures with staph aureus. However 2 blood cultures have been obtained and are currently NEG.  She has been started on empiric antibiotic therapy including Merrem, vanc, and Levaquin - D4. Awaiting final results of urine and sputum and will narrow abx.     She also had evidence of a urinary tract infection.  Urine has been sent for culture.  Prior urine cultures in Jan have demonstrated enterococcus. GNR >100,000CFU per micro.     Anemia noted. Likely multifactorial. Eval with am labs. Type and screen completed. Fe def and ACD noted. Will need supplement when able to swallow. Fe sat <20%. Transfuse 1 unit pRBC yesterday with subsequent Lasix.      CVC d/c yesterday.      SCDs. H2B given once on adm.    I have spent 35 min speaking with patient and family, reviewing labs, orders, discussing plan with staff in patient care. I have informed her that we are here to help her get better. She is not permitted to take medication without our knowledge for her best interest.     Details were discussed with the patient as well as mother in the room.   I also discussed the details with the nursing staff.    Rest as ordered.    Mirella Mcnally DO  03/09/18  3:44 PM    Please note that portions of this note may have been completed with a voice  recognition program. Efforts were made to edit the dictations, but occasionally words are mistranscribed.

## 2018-03-09 NOTE — PLAN OF CARE
Problem: Patient Care Overview (Adult)  Goal: Plan of Care Review  Outcome: Ongoing (interventions implemented as appropriate)   03/08/18 1600   Coping/Psychosocial Response Interventions   Plan Of Care Reviewed With patient;significant other   Patient Care Overview   Progress progress toward functional goals as expected     Goal: Adult Individualization and Mutuality  Outcome: Ongoing (interventions implemented as appropriate)    Goal: Discharge Needs Assessment  Outcome: Ongoing (interventions implemented as appropriate)      Problem: Fall Risk (Adult)  Goal: Identify Related Risk Factors and Signs and Symptoms  Outcome: Ongoing (interventions implemented as appropriate)    Goal: Absence of Falls  Outcome: Ongoing (interventions implemented as appropriate)    Goal: Identify Related Risk Factors and Signs and Symptoms  Outcome: Ongoing (interventions implemented as appropriate)    Goal: Absence of Falls  Outcome: Ongoing (interventions implemented as appropriate)      Problem: Anxiety (Adult)  Goal: Identify Related Risk Factors and Signs and Symptoms  Outcome: Ongoing (interventions implemented as appropriate)    Goal: Reduction/Resolution  Outcome: Ongoing (interventions implemented as appropriate)      Problem: Pain, Acute (Adult)  Goal: Identify Related Risk Factors and Signs and Symptoms  Outcome: Ongoing (interventions implemented as appropriate)    Goal: Acceptable Pain Control/Comfort Level  Outcome: Ongoing (interventions implemented as appropriate)

## 2018-03-10 LAB
ANION GAP SERPL CALCULATED.3IONS-SCNC: 13.2 MMOL/L
ARTERIAL PATENCY WRIST A: ABNORMAL
ATMOSPHERIC PRESS: 732 MMHG
BACTERIA SPEC RESP CULT: ABNORMAL
BASE EXCESS BLDA CALC-SCNC: 3.7 MMOL/L
BDY SITE: ABNORMAL
BUN BLD-MCNC: 13 MG/DL (ref 7–20)
BUN/CREAT SERPL: 21.7 (ref 7.1–23.5)
C DIFF GDH STL QL: NEGATIVE
CALCIUM SPEC-SCNC: 8.8 MG/DL (ref 8.4–10.2)
CHLORIDE SERPL-SCNC: 103 MMOL/L (ref 98–107)
CO2 SERPL-SCNC: 29 MMOL/L (ref 26–30)
CREAT BLD-MCNC: 0.6 MG/DL (ref 0.6–1.3)
DEPRECATED RDW RBC AUTO: 43.1 FL (ref 37–54)
ERYTHROCYTE [DISTWIDTH] IN BLOOD BY AUTOMATED COUNT: 14.4 % (ref 11.5–14.5)
GFR SERPL CREATININE-BSD FRML MDRD: 104 ML/MIN/1.73
GLUCOSE BLD-MCNC: 99 MG/DL (ref 74–98)
GLUCOSE BLDC GLUCOMTR-MCNC: 103 MG/DL (ref 70–130)
GLUCOSE BLDC GLUCOMTR-MCNC: 130 MG/DL (ref 70–130)
GLUCOSE BLDC GLUCOMTR-MCNC: 98 MG/DL (ref 70–130)
GLUCOSE BLDC GLUCOMTR-MCNC: 98 MG/DL (ref 70–130)
GRAM STN SPEC: ABNORMAL
HCO3 BLDA-SCNC: 27.3 MMOL/L (ref 22–28)
HCT VFR BLD AUTO: 26 % (ref 37–47)
HGB BLD-MCNC: 9 G/DL (ref 12–16)
HGB BLDA-MCNC: 9.7 G/DL (ref 12–18)
HOROWITZ INDEX BLD+IHG-RTO: 100 %
MAGNESIUM SERPL-MCNC: 1.4 MG/DL (ref 1.6–2.3)
MCH RBC QN AUTO: 28.6 PG (ref 27–31)
MCHC RBC AUTO-ENTMCNC: 34.6 G/DL (ref 30–37)
MCV RBC AUTO: 82.5 FL (ref 81–99)
MODALITY: ABNORMAL
PCO2 BLDA: 37.3 MM HG (ref 35–45)
PH BLDA: 7.47 PH UNITS (ref 7.3–7.5)
PLATELET # BLD AUTO: 425 10*3/MM3 (ref 130–400)
PMV BLD AUTO: 9.1 FL (ref 6–12)
PO2 BLDA: 82.9 MM HG (ref 75–100)
POTASSIUM BLD-SCNC: 3.2 MMOL/L (ref 3.5–5.1)
POTASSIUM BLD-SCNC: 4 MMOL/L (ref 3.5–5.1)
RBC # BLD AUTO: 3.15 10*6/MM3 (ref 4.2–5.4)
SAO2 % BLDCOA: 96.9 %
SODIUM BLD-SCNC: 142 MMOL/L (ref 137–145)
WBC NRBC COR # BLD: 20.22 10*3/MM3 (ref 4.8–10.8)

## 2018-03-10 PROCEDURE — 85027 COMPLETE CBC AUTOMATED: CPT | Performed by: INTERNAL MEDICINE

## 2018-03-10 PROCEDURE — 82805 BLOOD GASES W/O2 SATURATION: CPT

## 2018-03-10 PROCEDURE — 82962 GLUCOSE BLOOD TEST: CPT

## 2018-03-10 PROCEDURE — 25010000002 DIAZEPAM PER 5 MG: Performed by: FAMILY MEDICINE

## 2018-03-10 PROCEDURE — 94799 UNLISTED PULMONARY SVC/PX: CPT

## 2018-03-10 PROCEDURE — 25010000002 MAGNESIUM SULFATE 2 GM/50ML SOLUTION: Performed by: INTERNAL MEDICINE

## 2018-03-10 PROCEDURE — 25010000002 MEROPENEM IN SODIUM CHLORIDE 0.9% 50 ML 1 GM/50ML RECONSTITUTED SOLUTION: Performed by: INTERNAL MEDICINE

## 2018-03-10 PROCEDURE — 99232 SBSQ HOSP IP/OBS MODERATE 35: CPT | Performed by: FAMILY MEDICINE

## 2018-03-10 PROCEDURE — 87449 NOS EACH ORGANISM AG IA: CPT | Performed by: FAMILY MEDICINE

## 2018-03-10 PROCEDURE — 94660 CPAP INITIATION&MGMT: CPT

## 2018-03-10 PROCEDURE — 87324 CLOSTRIDIUM AG IA: CPT | Performed by: FAMILY MEDICINE

## 2018-03-10 PROCEDURE — 80048 BASIC METABOLIC PNL TOTAL CA: CPT | Performed by: INTERNAL MEDICINE

## 2018-03-10 PROCEDURE — 36600 WITHDRAWAL OF ARTERIAL BLOOD: CPT

## 2018-03-10 PROCEDURE — 83036 HEMOGLOBIN GLYCOSYLATED A1C: CPT | Performed by: FAMILY MEDICINE

## 2018-03-10 PROCEDURE — 83735 ASSAY OF MAGNESIUM: CPT | Performed by: INTERNAL MEDICINE

## 2018-03-10 PROCEDURE — 84132 ASSAY OF SERUM POTASSIUM: CPT | Performed by: INTERNAL MEDICINE

## 2018-03-10 RX ORDER — POTASSIUM CHLORIDE 750 MG/1
40 CAPSULE, EXTENDED RELEASE ORAL ONCE
Status: COMPLETED | OUTPATIENT
Start: 2018-03-10 | End: 2018-03-10

## 2018-03-10 RX ORDER — POTASSIUM CHLORIDE 20 MEQ/1
40 TABLET, EXTENDED RELEASE ORAL ONCE
Status: COMPLETED | OUTPATIENT
Start: 2018-03-10 | End: 2018-03-10

## 2018-03-10 RX ORDER — BUMETANIDE 0.25 MG/ML
1 INJECTION INTRAMUSCULAR; INTRAVENOUS EVERY 8 HOURS
Status: DISCONTINUED | OUTPATIENT
Start: 2018-03-10 | End: 2018-03-11

## 2018-03-10 RX ORDER — MAGNESIUM SULFATE HEPTAHYDRATE 40 MG/ML
2 INJECTION, SOLUTION INTRAVENOUS ONCE
Status: COMPLETED | OUTPATIENT
Start: 2018-03-10 | End: 2018-03-10

## 2018-03-10 RX ADMIN — CLONAZEPAM 0.5 MG: 0.5 TABLET ORAL at 12:06

## 2018-03-10 RX ADMIN — IPRATROPIUM BROMIDE AND ALBUTEROL SULFATE 3 ML: .5; 3 SOLUTION RESPIRATORY (INHALATION) at 13:12

## 2018-03-10 RX ADMIN — POTASSIUM CHLORIDE 40 MEQ: 20 TABLET, EXTENDED RELEASE ORAL at 13:37

## 2018-03-10 RX ADMIN — CLONAZEPAM 0.5 MG: 0.5 TABLET ORAL at 21:24

## 2018-03-10 RX ADMIN — IPRATROPIUM BROMIDE AND ALBUTEROL SULFATE 3 ML: .5; 3 SOLUTION RESPIRATORY (INHALATION) at 06:31

## 2018-03-10 RX ADMIN — NICOTINE 1 PATCH: 21 PATCH TRANSDERMAL at 18:32

## 2018-03-10 RX ADMIN — MAGNESIUM SULFATE IN WATER 2 G: 40 INJECTION, SOLUTION INTRAVENOUS at 13:38

## 2018-03-10 RX ADMIN — ACETAMINOPHEN 650 MG: 325 TABLET, FILM COATED ORAL at 20:25

## 2018-03-10 RX ADMIN — GABAPENTIN 300 MG: 300 CAPSULE ORAL at 15:26

## 2018-03-10 RX ADMIN — POTASSIUM CHLORIDE 40 MEQ: 750 CAPSULE, EXTENDED RELEASE ORAL at 08:36

## 2018-03-10 RX ADMIN — MEROPENEM AND SODIUM CHLORIDE 1 G: 1 INJECTION, SOLUTION INTRAVENOUS at 12:07

## 2018-03-10 RX ADMIN — IPRATROPIUM BROMIDE AND ALBUTEROL SULFATE 3 ML: .5; 3 SOLUTION RESPIRATORY (INHALATION) at 00:11

## 2018-03-10 RX ADMIN — LEVETIRACETAM 500 MG: 500 TABLET, FILM COATED ORAL at 08:37

## 2018-03-10 RX ADMIN — MEROPENEM AND SODIUM CHLORIDE 1 G: 1 INJECTION, SOLUTION INTRAVENOUS at 04:57

## 2018-03-10 RX ADMIN — LEVETIRACETAM 500 MG: 500 TABLET, FILM COATED ORAL at 20:25

## 2018-03-10 RX ADMIN — FAMOTIDINE 20 MG: 10 INJECTION, SOLUTION INTRAVENOUS at 08:37

## 2018-03-10 RX ADMIN — GABAPENTIN 300 MG: 300 CAPSULE ORAL at 21:24

## 2018-03-10 RX ADMIN — FAMOTIDINE 20 MG: 10 INJECTION, SOLUTION INTRAVENOUS at 20:26

## 2018-03-10 RX ADMIN — Medication 400 MG: at 20:25

## 2018-03-10 RX ADMIN — IPRATROPIUM BROMIDE AND ALBUTEROL SULFATE 3 ML: .5; 3 SOLUTION RESPIRATORY (INHALATION) at 18:59

## 2018-03-10 RX ADMIN — Medication 400 MG: at 08:37

## 2018-03-10 RX ADMIN — BUMETANIDE 1 MG: 0.25 INJECTION INTRAMUSCULAR; INTRAVENOUS at 01:28

## 2018-03-10 RX ADMIN — BUMETANIDE 1 MG: 0.25 INJECTION INTRAMUSCULAR; INTRAVENOUS at 13:37

## 2018-03-10 RX ADMIN — MEROPENEM AND SODIUM CHLORIDE 1 G: 1 INJECTION, SOLUTION INTRAVENOUS at 20:24

## 2018-03-10 RX ADMIN — Medication 5 MG: at 01:29

## 2018-03-10 RX ADMIN — BUMETANIDE 1 MG: 0.25 INJECTION INTRAMUSCULAR; INTRAVENOUS at 20:26

## 2018-03-10 RX ADMIN — GABAPENTIN 300 MG: 300 CAPSULE ORAL at 05:47

## 2018-03-10 NOTE — PLAN OF CARE
Problem: NPPV/CPAP (Adult)  Goal: Signs and Symptoms of Listed Potential Problems Will be Absent or Manageable (NPPV/CPAP)  Outcome: Resolved for upgrade, new template will be applied Date Met: 03/09/18

## 2018-03-10 NOTE — PLAN OF CARE
Problem: Patient Care Overview  Goal: Plan of Care Review  Outcome: Ongoing (interventions implemented as appropriate)   03/10/18 1509   Coping/Psychosocial   Plan of Care Reviewed With patient   Plan of Care Review   Progress no change       Problem: NPPV/CPAP (Adult)  Goal: Signs and Symptoms of Listed Potential Problems Will be Absent, Minimized or Managed (NPPV/CPAP)  Outcome: Ongoing (interventions implemented as appropriate)   03/10/18 1509   Goal/Outcome Evaluation   Problems Assessed (NPPV/CPAP) hypoxia/hypoxemia   Problems Present (NPPV/CPAP) hypoxia/hypoxemia

## 2018-03-10 NOTE — PROGRESS NOTES
LOS: 4 days   Patient Care Team:  ANDREA Quinones as PCP - General  Jennifer Galindo MD as Consulting Physician (General Surgery)    Chief Complaint:  Renal issues    Subjective     Interval History:     Patient Complaints: Chart reviewed.  No new issues overnight  Chart reviewed  Refusing Bipap, on high flow O2  Good urine output  No hematuria  No N/V/D reported      bumetanide 1 mg Intravenous Q8H   famotidine 20 mg Intravenous Q12H   gabapentin 300 mg Oral Q8H   insulin aspart 0-7 Units Subcutaneous 4x Daily AC & at Bedtime   ipratropium-albuterol 3 mL Nebulization Q6H - RT   levETIRAcetam 500 mg Oral Q12H   magnesium oxide 400 mg Oral BID   magnesium sulfate 2 g Intravenous Once   meropenem 1 g Intravenous Q8H   nicotine 1 patch Transdermal Q24H   potassium chloride 40 mEq Oral Once       dextrose 35 mL/hr Last Rate: 35 mL/hr (03/09/18 1524)   Pharmacy Consult     Pharmacy Consult         Review of Systems:   General : No pain, no chills  HEENT: No headache, no nosebleed, no sore throat, no blurry vision  Chest : no chest pain, no palpitations  Respiratory: +SOA  GI:  No N/V/D, no abdominal pain  Skin : no rashes, no pruritus  Musculoskeletal : no flank pain, no joint effusions  Neuro : weak  Endocrine: no heat/cold intolerance, no weight loss  Genitourinary: no dysuria or hematuria    Objective     Vital Signs  Temp:  [97.7 °F (36.5 °C)-98.8 °F (37.1 °C)] 98 °F (36.7 °C)  Heart Rate:  [] 102  Resp:  [11-28] 28  BP: ()/(58-93) 88/60    Intake/Output Summary (Last 24 hours) at 03/10/18 1221  Last data filed at 03/10/18 0900   Gross per 24 hour   Intake             2542 ml   Output             2302 ml   Net              240 ml           Physical Exam:     General Appearance:    Alert,  SOA   Head:    Normocephalic, without obvious abnormality, atraumatic   Ears:    Ears appear intact , no drainage   Throat:     oral mucosa moist   Neck:   No adenopathy, supple,  no thyromegaly,  no JVD   Back:      No CVA tenderness to palpation   Lungs:     B wheezes and rhonchi ,respirations labored    Heart:    Regular rate and rhythm , normal S1 and S2, no            murmur, no gallop, no rub   Abdomen:     Normal bowel sounds, no masses, soft non-tender   Extremities:    No lower extremity edema, no cyanosis   Skin:   Dry, No  bruising or rash                Results Review:      Results from last 7 days  Lab Units 03/10/18  0425 03/09/18  0724 03/08/18  1628   SODIUM mmol/L 142 142 148*   POTASSIUM mmol/L 3.2* 2.6* 3.0*   CHLORIDE mmol/L 103 104 114*   CO2 mmol/L 29.0 27.0 26.0   BUN mg/dL 13 17 24*   CREATININE mg/dL 0.60 0.70 0.90   GLUCOSE mg/dL 99* 115* 114*   CALCIUM mg/dL 8.8 8.4 8.5       Results from last 7 days  Lab Units 03/10/18  0425 03/09/18  0724 03/08/18  1620  03/08/18  0435   WBC 10*3/mm3 20.22* 27.01*  --   --  24.08*   HEMOGLOBIN g/dL 9.0* 8.6* 8.0*  < > 7.3*   HEMATOCRIT % 26.0* 24.7* 23.2*  < > 20.9*   PLATELETS 10*3/mm3 425* 421*  --   --  492*   < > = values in this interval not displayed.           Assessment/Plan      1. RAGHU due to severe sepsis, UTI and volume depletion: Associated with severe acidosis.  Better daily, can increase diuretics.    2. Hypokalemia- replace with another dose and recheck at 6 PM    3. Low magnesium- replace IV     4.  UTI/Urosepsis/ shock:  Now off Levophed  5. B/L pneumonia- increase Bumex with resp distress  6. Hyponatremia         Active Problems:    Sepsis          Plan:  Replace K and mg  Increase Bumex  Am labs    Mother aware of Bipap non-compliance and possible intubation need

## 2018-03-10 NOTE — PROGRESS NOTES
[facility]    Name:  Charisma Cortez   Age:  57 y.o.  Sex:  female  :  1960  MRN:  [id number]   Visit Number:  [id number]  Admission Date:  3/6/2018  Date Of Service:  03/10/18  Primary Care Physician:  ANDREA Zhong     LOS: 4 days :      Chief Complaint:  Shortness of breath. Follow up pneumonia, respiratory failure.      Subjective / Interval History: The patient was seen earlier this morning. She was sitting up in bed, wearing nonrebreather at 100%. She has been able to eat small amounts. She has increased anxiety, shortness of breath. She refuses to try the bipap despite high risk intubation. She denies chest pain, nausea, vomiting, edema. She has had bowel movement. Joyner catheter in place. No acute events occurred overnight. Reviewed ABG.       Review of Systems:     General ROS: Patient denies any fevers, chills or loss of consciousness. Positive anxiety  Ophthalmic ROS: Denies any diplopia or transient loss of vision.  ENT ROS: Denies sore throat, nasal congestion or ear pain.   Respiratory ROS: Denies cough but POSITIVE shortness of breath.  Cardiovascular ROS: Denies chest pain or palpitations. No history of exertional chest pain.   Gastrointestinal ROS: Denies nausea and vomiting. Denies any abdominal pain. No diarrhea.  Genito-Urinary ROS: Denies dysuria or hematuria.  Musculoskeletal ROS: Denies chronic back pain. No muscle pain. No calf pain.  Neurological ROS: Denies any focal weakness. No loss of consciousness. Denies any numbness. Denies neck pain.   Dermatological ROS: Denies any redness or pruritis.    Vital Signs:    Temp:  [97.7 °F (36.5 °C)-99 °F (37.2 °C)] 99 °F (37.2 °C)  Heart Rate:  [] 101  Resp:  [11-28] 28  BP: ()/(58-93) 108/72    Intake and output:    I/O last 3 completed shifts:  In: 4764 [P.O.:1164; I.V.:2250; Other:1350]  Out: 6176 [Urine:6175; Stool:1]  I/O this shift:  In: 240 [P.O.:240]  Out: 1001  [Urine:1000; Stool:1]    Physical Examination:    General Appearance:    Alert and cooperative, not in any acute distress.   Head:    Atraumatic and normocephalic, without obvious abnormality.   Eyes:            PERRLA, conjunctivae and sclerae normal, no Icterus. No pallor. Extra-occular movements are within normal limits.   Throat:   No oral lesions, no thrush, oral mucosa moist.   Neck:   Supple, trachea midline, no thyromegaly, no carotid bruit.   Lungs:      Breath sounds heard bilaterally equally witb bilateral rhonchi and crackles without wheezing. No Pleural rub or bronchial breathing.    Heart:    Normal S1 and S2, no murmur, no gallop   Abdomen:     Normal bowel sounds, no masses, no organomegaly. Soft        non-tender, non-distended, no guarding, no rebound                tenderness   Extremities:   Moves all extremities well, no edema, no cyanosis, no             clubbing   Skin:   No bleeding, bruising or rash.   Neurologic:   Cranial nerves 2 - 12 grossly intact, sensation intact, Motor power is normal and equal bilaterally.   Laboratory results:    Lab Results (last 24 hours)     Procedure Component Value Units Date/Time    POC Glucose Once [[id number]]  (Normal) Collected:  03/10/18 1542    Specimen:  Blood Updated:  03/10/18 1550     Glucose 98 mg/dL      Comment: Serial Number: [id number]:  [id number]       Clostridium Difficile Toxin - Stool, Per Rectum [[id number]] Collected:  03/10/18 1428    Specimen:  Stool from Per Rectum Updated:  03/10/18 1457    Narrative:       The following orders were created for panel order Clostridium Difficile Toxin - Stool, Per Rectum.  Procedure                               Abnormality         Status                     ---------                               -----------         ------                     Clostridium Difficile EI...[[id number]]                      In process                   Please view results for these tests on the individual orders.     Clostridium Difficile EIA - Stool, Per Rectum [[id number]] Collected:  03/10/18 1428    Specimen:  Stool from Per Rectum Updated:  03/10/18 1457    Blood Gas, Arterial [[id number]]  (Abnormal) Collected:  03/10/18 1359    Specimen:  Arterial Blood Updated:  03/10/18 1359     Site Arterial: right brachial     Willard's Test N/A     pH, Arterial 7.473 pH units      pCO2, Arterial 37.3 mm Hg      pO2, Arterial 82.9 mm Hg      HCO3, Arterial 27.3 mmol/L      Base Excess, Arterial 3.7 mmol/L      O2 Saturation, Arterial 96.9 %      Hemoglobin, Blood Gas 9.7 (L) g/dL      Barometric Pressure for Blood Gas 732 mmHg      Modality Mask - Nonbreather     FIO2 100 %     POC Glucose Once [[id number]]  (Normal) Collected:  03/10/18 1103    Specimen:  Blood Updated:  03/10/18 1110     Glucose 98 mg/dL      Comment: Serial Number: [id number]:  [id number]       Blood Culture With DANNY - Blood, [[id number]]  (Normal) Collected:  03/06/18 1033    Specimen:  Blood from Arm, Left Updated:  03/10/18 1046     Blood Culture No growth at 4 days    Blood Culture With DANNY - Blood, [[id number]]  (Normal) Collected:  03/06/18 1021    Specimen:  Blood from Hand, Left Updated:  03/10/18 1046     Blood Culture No growth at 4 days    Hemoglobin A1c [[id number]] Collected:  03/10/18 0425    Specimen:  Blood Updated:  03/10/18 1020    Respiratory Culture - Sputum, ET Suction [[id number]]  (Abnormal)  (Susceptibility) Collected:  03/07/18 0227    Specimen:  Sputum from ET Suction Updated:  03/10/18 0716     Respiratory Culture --      Moderate growth (3+) Staphylococcus aureus (A)     Comment:             Candida albicans (A)     Gram Stain Result Less than 10 Epithelial cells per low power field      Greater than 20 WBCs per low power field      Moderate (3+) Gram positive cocci in pairs      Rare (1+) Gram negative bacilli    Susceptibility      Staphylococcus aureus     KARI     Amoxicillin + Clavulanate <=4/2 ug/ml Susceptible     Ampicillin + Sulbactam  <=8/4 ug/ml Susceptible     Cefazolin <=4 ug/ml Susceptible     Ciprofloxacin <=1 ug/ml Susceptible     Clindamycin <=0.5 ug/ml Susceptible     Erythromycin <=0.5 ug/ml Susceptible     Gentamicin <=4 ug/ml Susceptible     Levofloxacin <=1 ug/ml Susceptible  [1]      Linezolid 4 ug/ml Susceptible     Moxifloxacin <=0.5 ug/ml Susceptible     Oxacillin <=0.25 ug/ml Susceptible     Penicillin G <=0.03 ug/ml Susceptible     Quinupristin + Dalfopristin <=1 ug/ml Susceptible     Rifampin <=1 ug/ml Susceptible     Tetracycline <=4 ug/ml Susceptible     Trimethoprim + Sulfamethoxazole <=0.5/9.5 ug/ml Susceptible     Vancomycin 2 ug/ml Susceptible            [1]   Staphylococcus species may develop resistance during prolonged therapy with quinolones.  Isolates that are initially susceptible may become resistant within three to four days after initiation of therapy. Testing of repeat isolates may be warranted.                 POC Glucose Once [[id number]]  (Normal) Collected:  03/10/18 0628    Specimen:  Blood Updated:  03/10/18 0640     Glucose 130 mg/dL      Comment: Serial Number: [id number]:  [id number]       Magnesium [[id number]]  (Abnormal) Collected:  03/10/18 0425    Specimen:  Blood Updated:  03/10/18 0600     Magnesium 1.4 (L) mg/dL     Basic Metabolic Panel [[id number]]  (Abnormal) Collected:  03/10/18 0425    Specimen:  Blood Updated:  03/10/18 0550     Glucose 99 (H) mg/dL      BUN 13 mg/dL      Creatinine 0.60 mg/dL      Sodium 142 mmol/L      Potassium 3.2 (L) mmol/L      Chloride 103 mmol/L      CO2 29.0 mmol/L      Calcium 8.8 mg/dL      eGFR Non African Amer 104 mL/min/1.73      BUN/Creatinine Ratio 21.7     Anion Gap 13.2 mmol/L     Narrative:       Abnormal estimated GFR should be followed by more specific studies to confirm end stage chronic renal disease. The equation used for calculation may not be accurate for patients less than 19 years old, greater than 70 years old, patients at extremes of weight,  malnutrition, or with acute renal dysfunction.    CBC (No Diff) [[id number]]  (Abnormal) Collected:  03/10/18 0425    Specimen:  Blood Updated:  03/10/18 0523     WBC 20.22 (H) 10*3/mm3      RBC 3.15 (L) 10*6/mm3      Hemoglobin 9.0 (L) g/dL      Hematocrit 26.0 (L) %      MCV 82.5 fL      MCH 28.6 pg      MCHC 34.6 g/dL      RDW 14.4 %      RDW-SD 43.1 fl      MPV 9.1 fL      Platelets 425 (H) 10*3/mm3     POC Glucose Once [[id number]]  (Abnormal) Collected:  03/09/18 2121    Specimen:  Blood Updated:  03/09/18 2125     Glucose 142 (H) mg/dL      Comment: Serial Number: [id number]:  [id number]       POC Glucose Once [[id number]]  (Normal) Collected:  03/09/18 1709    Specimen:  Blood Updated:  03/09/18 2125     Glucose 118 mg/dL      Comment: Serial Number: [id number]:  [id number]             I have reviewed the patient's laboratory results.    Radiology results:    Imaging Results (last 24 hours)     ** No results found for the last 24 hours. **          I have reviewed the patient's radiology reports.    Medication Review:     I have reviewed the patients active and prn medications.     Assessment:    Active Problems:    Sepsis  Septic Shock requiring vasopressor therapy weaned, stable  Probable Aspiration Bilateral Pneumonia, secondary to suspected drug related sedation prior to admission with suspected ARDS, with MSSA sputum   Acute respiratory failure with hypoxemia severe, requiring Nonrebreather 100%  Acute UTI with Klebsiella   Acute  metabolic and toxic medication related encephalopathy, improved  Fluid overload  RAGHU on CKD 4  Mixed Metabolic acidosis and respiratory acidosis  H/o Heroin use  Recent Meth use per UDS, significant other states this was at least a week ago  UDS with Meth, amphetamines, BZO, and opiates  Hyponatremia  Hypomagnesemia, treated  Hyperphosphatemia  Anemia multifactorial, post transfusion  COPD compensated  Noncompliant with Bipap as requested  Noncompliant with  medications    Plan:  Avoid further opiate therapy as possible, with severe hypoxemia and she had been taking her own supply in secret, up until yesterday.     Nephrology and I have discussed the case. Renal function improved. Bumex was increased and potassium and magnesium replacement adjusted. Continue ICU care. Continue oral intake as possible. Dr Martinez also saw patient last week and agreed patient with critical condition and fair to guarded prognosis. Patient absolutely will NOT use bipap at all. She was anxious requiring clonazepam as ordered. Discussed with nursing to limit sedating medications as possible. Expect soon patient will have drug seeking behavior with long history of multisubstance drug abuse. She is too high risk for intubation currently, to provide sedating medications currently. Continue gabapentin as ordered.    Pepcid. Full code. SCD.       Medication risks and benefits were discussed in detail. Patient reported satisfaction with care delivered and treatment plan.     Meagan Mclean DO  03/10/18  4:18 PM

## 2018-03-10 NOTE — PLAN OF CARE
Problem: NPPV/CPAP (Adult)  Intervention: Monitor and Manage Anxiety Related to NPPV/CPAP   03/10/18 0456   Coping Strategies   Trust Relationship/Rapport care explained;choices provided     Intervention: Prevent Aspiration During Therapy   03/10/18 0456   Retired CPM F14 SGRP INV POSITIONING   Retired CPM F14 ROW INV HEAD OF BED (HOB) POSITION HOB elevated       Goal: Signs and Symptoms of Listed Potential Problems Will be Absent or Manageable (NPPV/CPAP)  Outcome: Ongoing (interventions implemented as appropriate)   03/10/18 0456   NPPV/CPAP   Problems Assessed (NPPV/CPAP) hypoxia/hypoxemia;dry mucous membranes;situational response;skin breakdown   Problems Present (NPPV/CPAP) none

## 2018-03-11 ENCOUNTER — APPOINTMENT (OUTPATIENT)
Dept: GENERAL RADIOLOGY | Facility: HOSPITAL | Age: 58
End: 2018-03-11

## 2018-03-11 LAB
ANION GAP SERPL CALCULATED.3IONS-SCNC: 12.2 MMOL/L
ARTERIAL PATENCY WRIST A: POSITIVE
ATMOSPHERIC PRESS: 733 MMHG
BACTERIA SPEC AEROBE CULT: NORMAL
BACTERIA SPEC AEROBE CULT: NORMAL
BASE EXCESS BLDA CALC-SCNC: 5.2 MMOL/L
BASOPHILS # BLD AUTO: 0.07 10*3/MM3 (ref 0–0.2)
BASOPHILS NFR BLD AUTO: 0.4 % (ref 0–2.5)
BDY SITE: ABNORMAL
BUN BLD-MCNC: 16 MG/DL (ref 7–20)
BUN/CREAT SERPL: 26.7 (ref 7.1–23.5)
CALCIUM SPEC-SCNC: 9.1 MG/DL (ref 8.4–10.2)
CHLORIDE SERPL-SCNC: 105 MMOL/L (ref 98–107)
CO2 SERPL-SCNC: 29 MMOL/L (ref 26–30)
CREAT BLD-MCNC: 0.6 MG/DL (ref 0.6–1.3)
DEPRECATED RDW RBC AUTO: 44.6 FL (ref 37–54)
EOSINOPHIL # BLD AUTO: 0.42 10*3/MM3 (ref 0–0.7)
EOSINOPHIL NFR BLD AUTO: 2.2 % (ref 0–7)
ERYTHROCYTE [DISTWIDTH] IN BLOOD BY AUTOMATED COUNT: 14.4 % (ref 11.5–14.5)
GFR SERPL CREATININE-BSD FRML MDRD: 104 ML/MIN/1.73
GLUCOSE BLD-MCNC: 92 MG/DL (ref 74–98)
GLUCOSE BLDC GLUCOMTR-MCNC: 100 MG/DL (ref 70–130)
GLUCOSE BLDC GLUCOMTR-MCNC: 158 MG/DL (ref 70–130)
GLUCOSE BLDC GLUCOMTR-MCNC: 92 MG/DL (ref 70–130)
GLUCOSE BLDC GLUCOMTR-MCNC: 94 MG/DL (ref 70–130)
HCO3 BLDA-SCNC: 28.8 MMOL/L (ref 22–28)
HCT VFR BLD AUTO: 27.2 % (ref 37–47)
HGB BLD-MCNC: 9 G/DL (ref 12–16)
HGB BLDA-MCNC: 9.5 G/DL (ref 12–18)
HOROWITZ INDEX BLD+IHG-RTO: 100 %
IMM GRANULOCYTES # BLD: 0.42 10*3/MM3 (ref 0–0.06)
IMM GRANULOCYTES NFR BLD: 2.2 % (ref 0–0.6)
LYMPHOCYTES # BLD AUTO: 3.92 10*3/MM3 (ref 0.6–3.4)
LYMPHOCYTES NFR BLD AUTO: 20.4 % (ref 10–50)
MAGNESIUM SERPL-MCNC: 1.5 MG/DL (ref 1.6–2.3)
MCH RBC QN AUTO: 28 PG (ref 27–31)
MCHC RBC AUTO-ENTMCNC: 33.1 G/DL (ref 30–37)
MCV RBC AUTO: 84.5 FL (ref 81–99)
MODALITY: ABNORMAL
MONOCYTES # BLD AUTO: 0.86 10*3/MM3 (ref 0–0.9)
MONOCYTES NFR BLD AUTO: 4.5 % (ref 0–12)
NEUTROPHILS # BLD AUTO: 13.55 10*3/MM3 (ref 2–6.9)
NEUTROPHILS NFR BLD AUTO: 70.3 % (ref 37–80)
NRBC BLD MANUAL-RTO: 0 /100 WBC (ref 0–0)
PCO2 BLDA: 38.7 MM HG (ref 35–45)
PH BLDA: 7.48 PH UNITS (ref 7.3–7.5)
PLATELET # BLD AUTO: 441 10*3/MM3 (ref 130–400)
PMV BLD AUTO: 9.2 FL (ref 6–12)
PO2 BLDA: 68.4 MM HG (ref 75–100)
POTASSIUM BLD-SCNC: 4.2 MMOL/L (ref 3.5–5.1)
RBC # BLD AUTO: 3.22 10*6/MM3 (ref 4.2–5.4)
SAO2 % BLDCOA: 94.5 %
SODIUM BLD-SCNC: 142 MMOL/L (ref 137–145)
WBC NRBC COR # BLD: 19.24 10*3/MM3 (ref 4.8–10.8)

## 2018-03-11 PROCEDURE — 94799 UNLISTED PULMONARY SVC/PX: CPT

## 2018-03-11 PROCEDURE — 82962 GLUCOSE BLOOD TEST: CPT

## 2018-03-11 PROCEDURE — 25010000002 MEROPENEM IN SODIUM CHLORIDE 0.9% 50 ML 1 GM/50ML RECONSTITUTED SOLUTION: Performed by: INTERNAL MEDICINE

## 2018-03-11 PROCEDURE — 63710000001 INSULIN ASPART PER 5 UNITS: Performed by: FAMILY MEDICINE

## 2018-03-11 PROCEDURE — 85025 COMPLETE CBC W/AUTO DIFF WBC: CPT | Performed by: FAMILY MEDICINE

## 2018-03-11 PROCEDURE — 80048 BASIC METABOLIC PNL TOTAL CA: CPT | Performed by: INTERNAL MEDICINE

## 2018-03-11 PROCEDURE — 82805 BLOOD GASES W/O2 SATURATION: CPT

## 2018-03-11 PROCEDURE — 99232 SBSQ HOSP IP/OBS MODERATE 35: CPT | Performed by: FAMILY MEDICINE

## 2018-03-11 PROCEDURE — 25010000002 METHYLPREDNISOLONE PER 40 MG: Performed by: INTERNAL MEDICINE

## 2018-03-11 PROCEDURE — 71045 X-RAY EXAM CHEST 1 VIEW: CPT

## 2018-03-11 PROCEDURE — 99233 SBSQ HOSP IP/OBS HIGH 50: CPT | Performed by: INTERNAL MEDICINE

## 2018-03-11 PROCEDURE — 83735 ASSAY OF MAGNESIUM: CPT | Performed by: INTERNAL MEDICINE

## 2018-03-11 PROCEDURE — 36600 WITHDRAWAL OF ARTERIAL BLOOD: CPT

## 2018-03-11 RX ORDER — DIPHENHYDRAMINE HYDROCHLORIDE AND LIDOCAINE HYDROCHLORIDE AND ALUMINUM HYDROXIDE AND MAGNESIUM HYDRO
KIT EVERY 6 HOURS
Status: DISCONTINUED | OUTPATIENT
Start: 2018-03-11 | End: 2018-03-11 | Stop reason: SDUPTHER

## 2018-03-11 RX ORDER — METHYLPREDNISOLONE SODIUM SUCCINATE 40 MG/ML
40 INJECTION, POWDER, LYOPHILIZED, FOR SOLUTION INTRAMUSCULAR; INTRAVENOUS EVERY 6 HOURS
Status: DISCONTINUED | OUTPATIENT
Start: 2018-03-11 | End: 2018-03-12

## 2018-03-11 RX ORDER — BUDESONIDE 0.5 MG/2ML
0.5 INHALANT ORAL
Status: DISCONTINUED | OUTPATIENT
Start: 2018-03-11 | End: 2018-03-12 | Stop reason: HOSPADM

## 2018-03-11 RX ORDER — MAGNESIUM SULFATE HEPTAHYDRATE 40 MG/ML
2 INJECTION, SOLUTION INTRAVENOUS ONCE
Status: DISCONTINUED | OUTPATIENT
Start: 2018-03-11 | End: 2018-03-11

## 2018-03-11 RX ORDER — BUMETANIDE 1 MG/1
2 TABLET ORAL 3 TIMES DAILY
Status: DISCONTINUED | OUTPATIENT
Start: 2018-03-11 | End: 2018-03-12 | Stop reason: HOSPADM

## 2018-03-11 RX ORDER — DIPHENHYDRAMINE HYDROCHLORIDE AND LIDOCAINE HYDROCHLORIDE AND ALUMINUM HYDROXIDE AND MAGNESIUM HYDRO
KIT EVERY 6 HOURS
Status: DISCONTINUED | OUTPATIENT
Start: 2018-03-12 | End: 2018-03-12 | Stop reason: HOSPADM

## 2018-03-11 RX ORDER — CHLORHEXIDINE GLUCONATE 0.12 MG/ML
15 RINSE ORAL EVERY 12 HOURS SCHEDULED
Status: DISCONTINUED | OUTPATIENT
Start: 2018-03-11 | End: 2018-03-11

## 2018-03-11 RX ADMIN — METHYLPREDNISOLONE SODIUM SUCCINATE 40 MG: 40 INJECTION, POWDER, FOR SOLUTION INTRAMUSCULAR; INTRAVENOUS at 17:24

## 2018-03-11 RX ADMIN — CLONAZEPAM 0.5 MG: 0.5 TABLET ORAL at 13:11

## 2018-03-11 RX ADMIN — DIPHENHYDRAMINE HYDROCHLORIDE AND LIDOCAINE HYDROCHLORIDE AND ALUMINUM HYDROXIDE AND MAGNESIUM HYDRO: KIT at 21:08

## 2018-03-11 RX ADMIN — BUMETANIDE 2 MG: 1 TABLET ORAL at 14:45

## 2018-03-11 RX ADMIN — DIPHENHYDRAMINE HYDROCHLORIDE AND LIDOCAINE HYDROCHLORIDE AND ALUMINUM HYDROXIDE AND MAGNESIUM HYDRO: KIT at 17:24

## 2018-03-11 RX ADMIN — FAMOTIDINE 20 MG: 10 INJECTION, SOLUTION INTRAVENOUS at 21:07

## 2018-03-11 RX ADMIN — Medication 400 MG: at 09:18

## 2018-03-11 RX ADMIN — BUDESONIDE 0.5 MG: 0.5 INHALANT RESPIRATORY (INHALATION) at 19:08

## 2018-03-11 RX ADMIN — ACETAMINOPHEN 650 MG: 325 TABLET, FILM COATED ORAL at 21:30

## 2018-03-11 RX ADMIN — FAMOTIDINE 20 MG: 10 INJECTION, SOLUTION INTRAVENOUS at 09:18

## 2018-03-11 RX ADMIN — Medication 10 ML: at 21:17

## 2018-03-11 RX ADMIN — LEVETIRACETAM 500 MG: 500 TABLET, FILM COATED ORAL at 21:07

## 2018-03-11 RX ADMIN — GABAPENTIN 300 MG: 300 CAPSULE ORAL at 14:46

## 2018-03-11 RX ADMIN — GABAPENTIN 300 MG: 300 CAPSULE ORAL at 05:08

## 2018-03-11 RX ADMIN — NICOTINE 1 PATCH: 21 PATCH TRANSDERMAL at 19:33

## 2018-03-11 RX ADMIN — Medication 400 MG: at 21:07

## 2018-03-11 RX ADMIN — CLONAZEPAM 0.5 MG: 0.5 TABLET ORAL at 22:01

## 2018-03-11 RX ADMIN — BUMETANIDE 2 MG: 1 TABLET ORAL at 21:08

## 2018-03-11 RX ADMIN — METHYLPREDNISOLONE SODIUM SUCCINATE 40 MG: 40 INJECTION, POWDER, FOR SOLUTION INTRAMUSCULAR; INTRAVENOUS at 21:07

## 2018-03-11 RX ADMIN — MEROPENEM AND SODIUM CHLORIDE 1 G: 1 INJECTION, SOLUTION INTRAVENOUS at 04:43

## 2018-03-11 RX ADMIN — ACETAMINOPHEN 650 MG: 325 TABLET, FILM COATED ORAL at 06:11

## 2018-03-11 RX ADMIN — LEVETIRACETAM 500 MG: 500 TABLET, FILM COATED ORAL at 09:18

## 2018-03-11 RX ADMIN — MEROPENEM AND SODIUM CHLORIDE 1 G: 1 INJECTION, SOLUTION INTRAVENOUS at 17:25

## 2018-03-11 RX ADMIN — INSULIN ASPART 2 UNITS: 100 INJECTION, SOLUTION INTRAVENOUS; SUBCUTANEOUS at 21:28

## 2018-03-11 RX ADMIN — IPRATROPIUM BROMIDE AND ALBUTEROL SULFATE 3 ML: .5; 3 SOLUTION RESPIRATORY (INHALATION) at 07:28

## 2018-03-11 RX ADMIN — IPRATROPIUM BROMIDE AND ALBUTEROL SULFATE 3 ML: .5; 3 SOLUTION RESPIRATORY (INHALATION) at 12:12

## 2018-03-11 RX ADMIN — IPRATROPIUM BROMIDE AND ALBUTEROL SULFATE 3 ML: .5; 3 SOLUTION RESPIRATORY (INHALATION) at 19:08

## 2018-03-11 RX ADMIN — IPRATROPIUM BROMIDE AND ALBUTEROL SULFATE 3 ML: .5; 3 SOLUTION RESPIRATORY (INHALATION) at 00:59

## 2018-03-11 RX ADMIN — GABAPENTIN 300 MG: 300 CAPSULE ORAL at 21:07

## 2018-03-11 RX ADMIN — BUMETANIDE 1 MG: 0.25 INJECTION INTRAMUSCULAR; INTRAVENOUS at 05:07

## 2018-03-11 NOTE — PROGRESS NOTES
LOS: 5 days   Patient Care Team:  ANDREA Quinones as PCP - General  Jennifer Galindo MD as Consulting Physician (General Surgery)    Chief Complaint:  Renal issues    Subjective     Interval History:     Patient Complaints: Chart reviewed.  No new issues overnight  Still refusing Bipap  + pain-med seeking behavior  Lost IV access    Good urine output  No hematuria  No N/V/D reported      bumetanide 2 mg Oral TID   famotidine 20 mg Intravenous Q12H   First Mouthwash (Magic Mouthwash)  Swish & Spit Q6H   gabapentin 300 mg Oral Q8H   insulin aspart 0-7 Units Subcutaneous 4x Daily AC & at Bedtime   ipratropium-albuterol 3 mL Nebulization Q6H - RT   levETIRAcetam 500 mg Oral Q12H   magnesium oxide 400 mg Oral BID   meropenem 1 g Intravenous Q8H   nicotine 1 patch Transdermal Q24H       Pharmacy Consult    Pharmacy Consult        Review of Systems:   General : + pain  HEENT: No headache, no nosebleed, no sore throat, no blurry vision  Chest : no chest pain, no palpitations  Respiratory: SOA better  GI:  No N/V/D, no abdominal pain  Skin : no rashes, no pruritus  Musculoskeletal : no flank pain, no joint effusions  Neuro : weak  Endocrine: no heat/cold intolerance, no weight loss  Genitourinary: no dysuria or hematuria    Objective     Vital Signs  Temp:  [97.7 °F (36.5 °C)-99 °F (37.2 °C)] 98.6 °F (37 °C)  Heart Rate:  [] 96  Resp:  [18-32] 18  BP: ()/(56-77) 94/56    Intake/Output Summary (Last 24 hours) at 03/11/18 1434  Last data filed at 03/11/18 1200   Gross per 24 hour   Intake             2426 ml   Output             3001 ml   Net             -575 ml           Physical Exam:     General Appearance:    Alert, calm after Klonopin   Head:    Normocephalic, without obvious abnormality, atraumatic   Ears:    Ears appear intact , no drainage   Throat:     oral mucosa moist   Neck:   No adenopathy, supple,  no thyromegaly,  no JVD   Back:     No CVA tenderness to palpation   Lungs:     B rhonchi  ,respirations unlabored but tachypneic    Heart:    Regular rate and rhythm , normal S1 and S2, no            murmur, no gallop, no rub   Abdomen:     Normal bowel sounds, no masses, soft non-tender   Extremities:    No lower extremity edema, no cyanosis   Skin:   Dry, No  bruising or rash                Results Review:      Results from last 7 days  Lab Units 03/11/18  0416 03/10/18  1807 03/10/18  0425 03/09/18  0724   SODIUM mmol/L 142  --  142 142   POTASSIUM mmol/L 4.2 4.0 3.2* 2.6*   CHLORIDE mmol/L 105  --  103 104   CO2 mmol/L 29.0  --  29.0 27.0   BUN mg/dL 16  --  13 17   CREATININE mg/dL 0.60  --  0.60 0.70   GLUCOSE mg/dL 92  --  99* 115*   CALCIUM mg/dL 9.1  --  8.8 8.4       Results from last 7 days  Lab Units 03/11/18  0416 03/10/18  0425 03/09/18  0724   WBC 10*3/mm3 19.24* 20.22* 27.01*   HEMOGLOBIN g/dL 9.0* 9.0* 8.6*   HEMATOCRIT % 27.2* 26.0* 24.7*   PLATELETS 10*3/mm3 441* 425* 421*              Assessment/Plan      1. RAGHU due to severe sepsis, UTI and volume depletion: Associated with severe acidosis.  Resolved, can cont diuretics.    2. Hypokalemia- replaced and better    3. Low magnesium- replace po now (no IV access)      4.  UTI/Urosepsis/ shock:  Now off Levophed  5. B/L pneumonia- cont Bumex with resp distress and Cxr changes  6. Hyponatremia         Active Problems:    Sepsis          Plan:  Cont Bumex , change to po (no IV access)  Am labs    Mother aware of Bipap non-compliance and possible intubation need

## 2018-03-11 NOTE — PROGRESS NOTES
"  CC: Acute hypoxic respiratory failure. Pneumonia. Shock.    S: More awake and alert today.  Events noted overnight.  Currently on high flow oxygen.  Is asking for pain medications.    O:Vital signs reviewed. O2Sat: 92% on 100% FiO2.    BP 94/56   Pulse 96   Temp 98.6 °F (37 °C) (Oral)   Resp 18   Ht 149.9 cm (59\")   Wt 56.4 kg (124 lb 4.8 oz)   SpO2 100%   BMI 25.11 kg/m²       I & Os reviewed.   Intake/Output       03/10/18 0700 - 03/11/18 0659 03/11/18 0700 - 03/12/18 0659    Intake (ml) 2306 360    Output (ml) 2503 500    Net (ml) -197 -140    Last Weight  56.4 kg (124 lb 4.8 oz)  --          General: No acute distress noted.  Eyes: PERRL. EOM Sluggish   Neck: Supple. +ve JVD   Cardiovascular: S1 + S2. Regular.   Respiratory: Mild respiratory distress noted. No wheezing heard. Bilateral crackles noted.  Abdomen: Soft. Bowel sounds positive   Extremities: Trace edema noted.  Neurologic: AAOx3. Was able to follow simple commands.   Skin: Appeared without any overt rashes    Labs: Reviewed.       Results from last 7 days  Lab Units 03/11/18  0416 03/10/18  1807 03/10/18  0425 03/09/18  0724  03/06/18  0847   SODIUM mmol/L 142  --  142 142  < > 132*   POTASSIUM mmol/L 4.2 4.0 3.2* 2.6*  < > 4.5   CHLORIDE mmol/L 105  --  103 104  < > 100   CO2 mmol/L 29.0  --  29.0 27.0  < > 9.0*   BUN mg/dL 16  --  13 17  < > 69*   CREATININE mg/dL 0.60  --  0.60 0.70  < > 9.30*   CALCIUM mg/dL 9.1  --  8.8 8.4  < > 8.1*   BILIRUBIN mg/dL  --   --   --   --   --  0.9   ALK PHOS U/L  --   --   --   --   --  129*   ALT (SGPT) U/L  --   --   --   --   --  43   AST (SGOT) U/L  --   --   --   --   --  41   GLUCOSE mg/dL 92  --  99* 115*  < > 75   < > = values in this interval not displayed.      Results from last 7 days  Lab Units 03/11/18  0416 03/10/18  0425 03/09/18  0724 03/08/18  0435 03/07/18  0438 03/06/18  0847   MAGNESIUM mg/dL 1.5* 1.4* 1.0* 1.5* 1.7 1.5*   PHOSPHORUS mg/dL  --   --   --  4.1 7.3* 9.5* "           Results from last 7 days  Lab Units 03/11/18  0416 03/10/18  0425 03/09/18  0724 03/08/18  1620 03/08/18  0749 03/08/18  0435 03/07/18  0438   WBC 10*3/mm3 19.24* 20.22* 27.01*  --   --  24.08* 21.08*   HEMOGLOBIN g/dL 9.0* 9.0* 8.6* 8.0* 7.3* 7.3* 8.7*   PLATELETS 10*3/mm3 441* 425* 421*  --   --  492* 547*                 Pharmacy Consult    Pharmacy Consult      Blood Culture   Date Value Ref Range Status   03/06/2018 No growth at 2 days  Preliminary               Urine Culture   Date Value Ref Range Status   03/06/2018 >100,000 CFU/mL Klebsiella oxytoca (A)  Final     Comment:     Identification and KARI to follow.             CXRay: reviewed.   Imaging Results (last 24 hours)     Procedure Component Value Units Date/Time    XR Chest 1 View [[id number]] Collected:  03/11/18 1055     Updated:  03/11/18 1125    Narrative:       PORTABLE CHEST     INDICATION: Acute respiratory failure.     FINDINGS: Single view, compared with 03/09/2018. EKG leads overlie the  chest. Heart size is normal. No pneumothorax. Worsened interstitial  opacities with some mixed interstitial and airspace infiltrates may  represent pneumonia or edema. The findings are overall very similar to  previous with exception to slight worsening at the left base.       Impression:       Slight worsening opacification at the left base. No other  significant change. Continued follow-up recommended.     This report was finalized on 3/11/2018 11:22 AM by Ismael Adkins MD.          ABG:   Lab Results   Component Value Date    PHART 7.480 03/11/2018    STA3DQZ 38.7 03/11/2018    PO2ART 68.4 (L) 03/11/2018    HGBBG 9.5 (L) 03/11/2018    I8ALXYGL 94.5 03/11/2018    CARBOXYHGB 1.1 03/07/2018         Assessment & Recommendations/Plan:   1.  Septic shock  - Resolved.    2.  UTI  - Klebsiella in the urine.    - On appropriate antibiotics    3.?  Aspiration pneumonia  - MSSA was seen in the sputum.    - We will recommend to continue meropenem  - Due to the  possibility likelihood of aspiration pneumonitis/ARDS, I will order Solu-Medrol    4.  Drug abuse    5.  Acute renal failure  - Resolved       6.  Anemia  - Status post transfusion    7.  Acute respiratory failure  - Continues to require significant oxygen supplementation    8.  Acute's  encephalopathy  - Definite improvement overall  - We'll recommend continue to hold any medications      9.  Fluid overload.  - Has had good diuresis over the past 48 hours.      Her condition remains critical and prognosis remains fair to guarded.    Discussed with the patient's mother at the bedside.    I told the patient that she will definitely benefit from trial of BiPAP.  I will decreased the pressures to 14/8.    I have discussed the findings and my recommendations with the patient's family member at the bedside.      Stacy Martinez MD  03/11/18  2:38 PM    Dictated utilizing Dragon dictation.

## 2018-03-11 NOTE — PLAN OF CARE
Problem: NPPV/CPAP (Adult)  Goal: Signs and Symptoms of Listed Potential Problems Will be Absent, Minimized or Managed (NPPV/CPAP)  Outcome: Ongoing (interventions implemented as appropriate)   03/11/18 9082   Goal/Outcome Evaluation   Problems Assessed (NPPV/CPAP) all   Problems Present (NPPV/CPAP) none

## 2018-03-11 NOTE — PROGRESS NOTES
[facility]    Name:  Charisma Cortez   Age:  57 y.o.  Sex:  female  :  1960  MRN:  [id number]   Visit Number:  [id number]  Admission Date:  3/6/2018  Date Of Service:  18  Primary Care Physician:  ANDREA Zhong     LOS: 5 days :      Chief Complaint:  Shortness of breath. Follow up pneumonia, respiratory failure.      Subjective / Interval History:     Patient seen twice today. She was sitting up in bed on 100% high flow oxygen. She is eating and drinking more. She has persistent shortness of breath but still wanting to be discharged. I notified her she is critically ill with possibly needing ventilator management. She is agreeable if needed.     She denies chest pain, abdominal pain, nausea,and vomiting. She continued to seek lortab for chronic pain, but appears in no distress what so ever. She is improved after getting low dose benzo for anxiety.       Review of Systems:     General ROS: Patient denies any fevers, chills or loss of consciousness. Positive anxiety  Ophthalmic ROS: Denies any diplopia or transient loss of vision.  ENT ROS: Denies sore throat, nasal congestion or ear pain.   Respiratory ROS: Denies cough but POSITIVE shortness of breath.  Cardiovascular ROS: Denies chest pain or palpitations. No history of exertional chest pain.   Gastrointestinal ROS: Denies nausea and vomiting. Denies any abdominal pain. No diarrhea.  Genito-Urinary ROS: Denies dysuria or hematuria.  Musculoskeletal ROS: Denies chronic back pain. No muscle pain. No calf pain.  Neurological ROS: Denies any focal weakness. No loss of consciousness. Denies any numbness. Denies neck pain.   Dermatological ROS: Denies any redness or pruritis.    Vital Signs:    Temp:  [97.7 °F (36.5 °C)-99 °F (37.2 °C)] 98.6 °F (37 °C)  Heart Rate:  [] 96  Resp:  [18-32] 18  BP: ()/(56-77) 94/56    Intake and output:    I/O last 3 completed shifts:  In: 0139  [P.O.:1062; I.V.:1604]  Out: 3003 [Urine:2700; Stool:303]  No intake/output data recorded.    Physical Examination:    General Appearance:    Alert and cooperative, not in any acute distress.   Head:    Atraumatic and normocephalic, without obvious abnormality.   Eyes:            PERRLA, conjunctivae and sclerae normal, no Icterus. No pallor. Extra-occular movements are within normal limits.   Throat:   No oral lesions, no thrush, oral mucosa moist.   Neck:   Supple, trachea midline, no thyromegaly, no carotid bruit.   Lungs:      Breath sounds heard bilaterally equally witb bilateral rhonchi and crackles without wheezing. No Pleural rub or bronchial breathing.    Heart:    Normal S1 and S2, no murmur, no gallop   Abdomen:     Normal bowel sounds, no masses, no organomegaly. Soft        non-tender, non-distended, no guarding, no rebound                tenderness   Extremities:   Moves all extremities well, no edema, no cyanosis, no             clubbing   Skin:   No bleeding, bruising or rash.   Neurologic:   Cranial nerves 2 - 12 grossly intact, sensation intact, Motor power is normal and equal bilaterally.   Laboratory results:    Lab Results (last 24 hours)     Procedure Component Value Units Date/Time    POC Glucose Once [[id number]]  (Normal) Collected:  03/11/18 1518    Specimen:  Blood Updated:  03/11/18 1525     Glucose 100 mg/dL      Comment: Serial Number: [id number]:  [id number]       Blood Culture With DANNY - Blood, [[id number]]  (Normal) Collected:  03/06/18 1033    Specimen:  Blood from Arm, Left Updated:  03/11/18 1146     Blood Culture No growth at 5 days    Blood Culture With DANNY - Blood, [[id number]]  (Normal) Collected:  03/06/18 1021    Specimen:  Blood from Hand, Left Updated:  03/11/18 1146     Blood Culture No growth at 5 days    POC Glucose Once [[id number]]  (Normal) Collected:  03/11/18 1033    Specimen:  Blood Updated:  03/11/18 1035     Glucose 94 mg/dL      Comment: Serial Number:  [id number]:  [id number]       POC Glucose Once [[id number]]  (Normal) Collected:  03/11/18 0432    Specimen:  Blood Updated:  03/11/18 0550     Glucose 92 mg/dL      Comment: Serial Number: [id number]:  [id number]       Blood Gas, Arterial [[id number]]  (Abnormal) Collected:  03/11/18 0441    Specimen:  Arterial Blood Updated:  03/11/18 0542     Site Arterial: left brachial     Willard's Test Positive     pH, Arterial 7.480 pH units      pCO2, Arterial 38.7 mm Hg      pO2, Arterial 68.4 (L) mm Hg      HCO3, Arterial 28.8 (H) mmol/L      Base Excess, Arterial 5.2 mmol/L      O2 Saturation, Arterial 94.5 %      Hemoglobin, Blood Gas 9.5 (L) g/dL      Barometric Pressure for Blood Gas 733 mmHg      Modality Mask - Nonbreather     FIO2 100 %     Basic Metabolic Panel [[id number]]  (Abnormal) Collected:  03/11/18 0416    Specimen:  Blood Updated:  03/11/18 0526     Glucose 92 mg/dL      BUN 16 mg/dL      Creatinine 0.60 mg/dL      Sodium 142 mmol/L      Potassium 4.2 mmol/L      Chloride 105 mmol/L      CO2 29.0 mmol/L      Calcium 9.1 mg/dL      eGFR Non African Amer 104 mL/min/1.73      BUN/Creatinine Ratio 26.7 (H)     Anion Gap 12.2 mmol/L     Narrative:       Abnormal estimated GFR should be followed by more specific studies to confirm end stage chronic renal disease. The equation used for calculation may not be accurate for patients less than 19 years old, greater than 70 years old, patients at extremes of weight, malnutrition, or with acute renal dysfunction.    Magnesium [[id number]]  (Abnormal) Collected:  03/11/18 0416    Specimen:  Blood Updated:  03/11/18 0526     Magnesium 1.5 (L) mg/dL     CBC & Differential [[id number]] Collected:  03/11/18 0416    Specimen:  Blood Updated:  03/11/18 0514    Narrative:       The following orders were created for panel order CBC & Differential.  Procedure                               Abnormality         Status                     ---------                                -----------         ------                     CBC Auto Differential[[id number]]        Abnormal            Final result                 Please view results for these tests on the individual orders.    CBC Auto Differential [[id number]]  (Abnormal) Collected:  03/11/18 0416    Specimen:  Blood Updated:  03/11/18 0514     WBC 19.24 (H) 10*3/mm3      RBC 3.22 (L) 10*6/mm3      Hemoglobin 9.0 (L) g/dL      Hematocrit 27.2 (L) %      MCV 84.5 fL      MCH 28.0 pg      MCHC 33.1 g/dL      RDW 14.4 %      RDW-SD 44.6 fl      MPV 9.2 fL      Platelets 441 (H) 10*3/mm3      Neutrophil % 70.3 %      Lymphocyte % 20.4 %      Monocyte % 4.5 %      Eosinophil % 2.2 %      Basophil % 0.4 %      Immature Grans % 2.2 (H) %      Neutrophils, Absolute 13.55 (H) 10*3/mm3      Lymphocytes, Absolute 3.92 (H) 10*3/mm3      Monocytes, Absolute 0.86 10*3/mm3      Eosinophils, Absolute 0.42 10*3/mm3      Basophils, Absolute 0.07 10*3/mm3      Immature Grans, Absolute 0.42 (H) 10*3/mm3      nRBC 0.0 /100 WBC     POC Glucose Once [[id number]]  (Normal) Collected:  03/10/18 2040    Specimen:  Blood Updated:  03/10/18 2045     Glucose 103 mg/dL      Comment: Serial Number: [id number]:  [id number]       Potassium [[id number]]  (Normal) Collected:  03/10/18 1807    Specimen:  Blood Updated:  03/10/18 1827     Potassium 4.0 mmol/L           I have reviewed the patient's laboratory results.    Radiology results:    Imaging Results (last 24 hours)     Procedure Component Value Units Date/Time    XR Chest 1 View [[id number]] Collected:  03/11/18 1055     Updated:  03/11/18 1125    Narrative:       PORTABLE CHEST     INDICATION: Acute respiratory failure.     FINDINGS: Single view, compared with 03/09/2018. EKG leads overlie the  chest. Heart size is normal. No pneumothorax. Worsened interstitial  opacities with some mixed interstitial and airspace infiltrates may  represent pneumonia or edema. The findings are overall very similar to  previous with  exception to slight worsening at the left base.       Impression:       Slight worsening opacification at the left base. No other  significant change. Continued follow-up recommended.     This report was finalized on 3/11/2018 11:22 AM by Ismael Adkins MD.          I have reviewed the patient's radiology reports.    Medication Review:     I have reviewed the patients active and prn medications.     Assessment:    Active Problems:    Sepsis  Septic Shock requiring vasopressor therapy weaned, stable   Aspiration Bilateral Pneumonia, secondary to suspected drug related sedation prior to admission with suspected ARDS, with MSSA sputum   Acute respiratory failure with hypoxemia severe, with ARDS and pneumonia  Acute UTI with Klebsiella   Acute  metabolic and toxic medication related encephalopathy, improved  Fluid overload, improving  RAGHU on CKD 4  Mixed Metabolic acidosis and respiratory acidosis  H/o Heroin use  Recent Meth use per UDS, significant other states this was at least a week ago  UDS with Meth, amphetamines, BZO, and opiates  Hyponatremia  Hypomagnesemia, treated  Hyperphosphatemia  Anemia multifactorial, post transfusion  COPD compensated  Noncompliant with Bipap as requested  Noncompliant with medications    Plan:  She has continued persistent severe hypoxemia. Bumex continued. Nephrology and pulmonology following. BP stable with current medications. She has anxiety medication as needed. She actually agreed to try bipap today, with high risk intubation due to severe respiratory failure. Avoid still any opiate therapy with severe respiratory failure. Reassurance provided. Mother at bedside was supportive. Continue merrem. Solumedrol ordered. Daily labs and titrate medications accordingly. Patient finally now agrees to try bipap as tolerated.    Avoid further opiate therapy as possible, with severe hypoxemia and she had been taking her own supply in secret, up until yesterday.     Very poor prognosis noted.  Condition remains critical.  Pepcid. Full code. SCD.     Medication risks and benefits were discussed in detail. Patient reported satisfaction with care delivered and treatment plan.     Meagan Mclean DO  03/11/18  7:17 PM

## 2018-03-12 ENCOUNTER — APPOINTMENT (OUTPATIENT)
Dept: GENERAL RADIOLOGY | Facility: HOSPITAL | Age: 58
End: 2018-03-12

## 2018-03-12 VITALS
BODY MASS INDEX: 24.6 KG/M2 | DIASTOLIC BLOOD PRESSURE: 59 MMHG | HEART RATE: 86 BPM | HEIGHT: 59 IN | OXYGEN SATURATION: 97 % | TEMPERATURE: 97.9 F | WEIGHT: 122 LBS | SYSTOLIC BLOOD PRESSURE: 92 MMHG | RESPIRATION RATE: 36 BRPM

## 2018-03-12 LAB
ANION GAP SERPL CALCULATED.3IONS-SCNC: 16 MMOL/L
ARTERIAL PATENCY WRIST A: ABNORMAL
ATMOSPHERIC PRESS: 732 MMHG
BASE EXCESS BLDA CALC-SCNC: 1.1 MMOL/L
BDY SITE: ABNORMAL
BILIRUB UR QL STRIP: NEGATIVE
BUN BLD-MCNC: 27 MG/DL (ref 7–20)
BUN/CREAT SERPL: 38.6 (ref 7.1–23.5)
CALCIUM SPEC-SCNC: 9 MG/DL (ref 8.4–10.2)
CHLORIDE SERPL-SCNC: 103 MMOL/L (ref 98–107)
CLARITY UR: CLEAR
CO2 SERPL-SCNC: 28 MMOL/L (ref 26–30)
COLOR UR: YELLOW
CREAT BLD-MCNC: 0.7 MG/DL (ref 0.6–1.3)
CREAT UR-MCNC: 7.6 MG/DL
GFR SERPL CREATININE-BSD FRML MDRD: 87 ML/MIN/1.73
GLUCOSE BLD-MCNC: 147 MG/DL (ref 74–98)
GLUCOSE BLDC GLUCOMTR-MCNC: 141 MG/DL (ref 70–130)
GLUCOSE BLDC GLUCOMTR-MCNC: 143 MG/DL (ref 70–130)
GLUCOSE BLDC GLUCOMTR-MCNC: 295 MG/DL (ref 70–130)
GLUCOSE BLDC GLUCOMTR-MCNC: 312 MG/DL (ref 70–130)
GLUCOSE UR STRIP-MCNC: NEGATIVE MG/DL
HBA1C MFR BLD: 5.4 % (ref 3–6)
HCO3 BLDA-SCNC: 25 MMOL/L (ref 22–28)
HGB BLDA-MCNC: 9.6 G/DL (ref 12–18)
HGB UR QL STRIP.AUTO: NEGATIVE
HOROWITZ INDEX BLD+IHG-RTO: 100 %
KETONES UR QL STRIP: NEGATIVE
LEUKOCYTE ESTERASE UR QL STRIP.AUTO: NEGATIVE
MAGNESIUM SERPL-MCNC: 1.5 MG/DL (ref 1.6–2.3)
MODALITY: ABNORMAL
NITRITE UR QL STRIP: NEGATIVE
PCO2 BLDA: 35.6 MM HG (ref 35–45)
PH BLDA: 7.46 PH UNITS (ref 7.3–7.5)
PH UR STRIP.AUTO: 8.5 [PH] (ref 5–8)
PO2 BLDA: 94.3 MM HG (ref 75–100)
POTASSIUM BLD-SCNC: 4 MMOL/L (ref 3.5–5.1)
PROT UR QL STRIP: NEGATIVE
PROT UR-MCNC: 19 MG/DL (ref 0–11.9)
PROT/CREAT UR: 2500 MG/G CREA
SAO2 % BLDCOA: 97.8 %
SODIUM BLD-SCNC: 143 MMOL/L (ref 137–145)
SP GR UR STRIP: 1.01 (ref 1–1.03)
UROBILINOGEN UR QL STRIP: ABNORMAL

## 2018-03-12 PROCEDURE — 25010000002 MEROPENEM IN SODIUM CHLORIDE 0.9% 50 ML 1 GM/50ML RECONSTITUTED SOLUTION: Performed by: INTERNAL MEDICINE

## 2018-03-12 PROCEDURE — 94660 CPAP INITIATION&MGMT: CPT

## 2018-03-12 PROCEDURE — 82962 GLUCOSE BLOOD TEST: CPT

## 2018-03-12 PROCEDURE — 94799 UNLISTED PULMONARY SVC/PX: CPT

## 2018-03-12 PROCEDURE — 97165 OT EVAL LOW COMPLEX 30 MIN: CPT

## 2018-03-12 PROCEDURE — 81003 URINALYSIS AUTO W/O SCOPE: CPT | Performed by: INTERNAL MEDICINE

## 2018-03-12 PROCEDURE — 25010000002 MAGNESIUM SULFATE 2 GM/50ML SOLUTION: Performed by: FAMILY MEDICINE

## 2018-03-12 PROCEDURE — 71045 X-RAY EXAM CHEST 1 VIEW: CPT

## 2018-03-12 PROCEDURE — 84156 ASSAY OF PROTEIN URINE: CPT | Performed by: INTERNAL MEDICINE

## 2018-03-12 PROCEDURE — 25010000002 ACETAZOLAMIDE PER 500 MG: Performed by: INTERNAL MEDICINE

## 2018-03-12 PROCEDURE — 82805 BLOOD GASES W/O2 SATURATION: CPT

## 2018-03-12 PROCEDURE — 63710000001 INSULIN ASPART PER 5 UNITS: Performed by: FAMILY MEDICINE

## 2018-03-12 PROCEDURE — 82570 ASSAY OF URINE CREATININE: CPT | Performed by: INTERNAL MEDICINE

## 2018-03-12 PROCEDURE — 36600 WITHDRAWAL OF ARTERIAL BLOOD: CPT

## 2018-03-12 PROCEDURE — 25010000002 METHYLPREDNISOLONE PER 40 MG: Performed by: INTERNAL MEDICINE

## 2018-03-12 PROCEDURE — 80048 BASIC METABOLIC PNL TOTAL CA: CPT | Performed by: INTERNAL MEDICINE

## 2018-03-12 PROCEDURE — 83735 ASSAY OF MAGNESIUM: CPT | Performed by: INTERNAL MEDICINE

## 2018-03-12 PROCEDURE — 97162 PT EVAL MOD COMPLEX 30 MIN: CPT

## 2018-03-12 PROCEDURE — 99233 SBSQ HOSP IP/OBS HIGH 50: CPT | Performed by: INTERNAL MEDICINE

## 2018-03-12 PROCEDURE — 99239 HOSP IP/OBS DSCHRG MGMT >30: CPT | Performed by: FAMILY MEDICINE

## 2018-03-12 RX ORDER — FUROSEMIDE 10 MG/ML
40 INJECTION INTRAMUSCULAR; INTRAVENOUS ONCE
Status: DISCONTINUED | OUTPATIENT
Start: 2018-03-12 | End: 2018-03-12

## 2018-03-12 RX ORDER — METHYLPREDNISOLONE SODIUM SUCCINATE 40 MG/ML
40 INJECTION, POWDER, LYOPHILIZED, FOR SOLUTION INTRAMUSCULAR; INTRAVENOUS EVERY 8 HOURS
Status: DISCONTINUED | OUTPATIENT
Start: 2018-03-12 | End: 2018-03-12 | Stop reason: HOSPADM

## 2018-03-12 RX ORDER — HYDROCODONE BITARTRATE AND ACETAMINOPHEN 5; 325 MG/1; MG/1
1 TABLET ORAL EVERY 8 HOURS PRN
Status: DISCONTINUED | OUTPATIENT
Start: 2018-03-12 | End: 2018-03-12

## 2018-03-12 RX ORDER — ACETAZOLAMIDE 500 MG/5ML
250 INJECTION, POWDER, LYOPHILIZED, FOR SOLUTION INTRAVENOUS ONCE
Status: COMPLETED | OUTPATIENT
Start: 2018-03-12 | End: 2018-03-12

## 2018-03-12 RX ORDER — SPIRONOLACTONE 25 MG/1
25 TABLET ORAL DAILY
Status: DISCONTINUED | OUTPATIENT
Start: 2018-03-12 | End: 2018-03-12 | Stop reason: HOSPADM

## 2018-03-12 RX ORDER — MAGNESIUM SULFATE HEPTAHYDRATE 40 MG/ML
2 INJECTION, SOLUTION INTRAVENOUS ONCE
Status: COMPLETED | OUTPATIENT
Start: 2018-03-12 | End: 2018-03-12

## 2018-03-12 RX ORDER — MENTHOL AND METHYL SALICYLATE 10; 30 G/100G; G/100G
CREAM TOPICAL 4 TIMES DAILY PRN
Status: DISCONTINUED | OUTPATIENT
Start: 2018-03-12 | End: 2018-03-12 | Stop reason: HOSPADM

## 2018-03-12 RX ORDER — HYDROCODONE BITARTRATE AND ACETAMINOPHEN 10; 325 MG/1; MG/1
1 TABLET ORAL EVERY 8 HOURS PRN
Status: DISCONTINUED | OUTPATIENT
Start: 2018-03-12 | End: 2018-03-12 | Stop reason: HOSPADM

## 2018-03-12 RX ADMIN — GABAPENTIN 300 MG: 300 CAPSULE ORAL at 14:01

## 2018-03-12 RX ADMIN — HYDROCODONE BITARTRATE AND ACETAMINOPHEN 1 TABLET: 5; 325 TABLET ORAL at 10:58

## 2018-03-12 RX ADMIN — SPIRONOLACTONE 25 MG: 25 TABLET, FILM COATED ORAL at 11:29

## 2018-03-12 RX ADMIN — MEROPENEM AND SODIUM CHLORIDE 1 G: 1 INJECTION, SOLUTION INTRAVENOUS at 10:16

## 2018-03-12 RX ADMIN — CLONAZEPAM 0.5 MG: 0.5 TABLET ORAL at 09:05

## 2018-03-12 RX ADMIN — BUDESONIDE 0.5 MG: 0.5 INHALANT RESPIRATORY (INHALATION) at 06:55

## 2018-03-12 RX ADMIN — LEVETIRACETAM 500 MG: 500 TABLET, FILM COATED ORAL at 09:05

## 2018-03-12 RX ADMIN — IPRATROPIUM BROMIDE AND ALBUTEROL SULFATE 3 ML: .5; 3 SOLUTION RESPIRATORY (INHALATION) at 01:10

## 2018-03-12 RX ADMIN — Medication 400 MG: at 09:05

## 2018-03-12 RX ADMIN — Medication 10 ML: at 02:07

## 2018-03-12 RX ADMIN — DIPHENHYDRAMINE HYDROCHLORIDE AND LIDOCAINE HYDROCHLORIDE AND ALUMINUM HYDROXIDE AND MAGNESIUM HYDRO: KIT at 06:32

## 2018-03-12 RX ADMIN — MAGNESIUM SULFATE IN WATER 2 G: 40 INJECTION, SOLUTION INTRAVENOUS at 08:40

## 2018-03-12 RX ADMIN — INSULIN ASPART 4 UNITS: 100 INJECTION, SOLUTION INTRAVENOUS; SUBCUTANEOUS at 06:31

## 2018-03-12 RX ADMIN — METHYLPREDNISOLONE SODIUM SUCCINATE 40 MG: 40 INJECTION, POWDER, FOR SOLUTION INTRAMUSCULAR; INTRAVENOUS at 03:02

## 2018-03-12 RX ADMIN — METHYLPREDNISOLONE SODIUM SUCCINATE 40 MG: 40 INJECTION, POWDER, FOR SOLUTION INTRAMUSCULAR; INTRAVENOUS at 10:16

## 2018-03-12 RX ADMIN — IPRATROPIUM BROMIDE AND ALBUTEROL SULFATE 3 ML: .5; 3 SOLUTION RESPIRATORY (INHALATION) at 13:33

## 2018-03-12 RX ADMIN — HYDROCODONE BITARTRATE AND ACETAMINOPHEN 1 TABLET: 10; 325 TABLET ORAL at 14:06

## 2018-03-12 RX ADMIN — IPRATROPIUM BROMIDE AND ALBUTEROL SULFATE 3 ML: .5; 3 SOLUTION RESPIRATORY (INHALATION) at 06:55

## 2018-03-12 RX ADMIN — FAMOTIDINE 20 MG: 10 INJECTION, SOLUTION INTRAVENOUS at 09:05

## 2018-03-12 RX ADMIN — ACETAZOLAMIDE 250 MG: 500 INJECTION, POWDER, LYOPHILIZED, FOR SOLUTION INTRAVENOUS at 11:25

## 2018-03-12 RX ADMIN — BUMETANIDE 2 MG: 1 TABLET ORAL at 09:05

## 2018-03-12 RX ADMIN — DIPHENHYDRAMINE HYDROCHLORIDE AND LIDOCAINE HYDROCHLORIDE AND ALUMINUM HYDROXIDE AND MAGNESIUM HYDRO: KIT at 11:29

## 2018-03-12 RX ADMIN — MEROPENEM AND SODIUM CHLORIDE 1 G: 1 INJECTION, SOLUTION INTRAVENOUS at 02:04

## 2018-03-12 RX ADMIN — BUMETANIDE 2 MG: 1 TABLET ORAL at 15:56

## 2018-03-12 NOTE — PLAN OF CARE
Problem: Patient Care Overview  Goal: Plan of Care Review  Outcome: Ongoing (interventions implemented as appropriate)   03/12/18 2236   Coping/Psychosocial   Plan of Care Reviewed With patient   OTHER   Outcome Summary Patient participates well in PT evaluation and demonstrates decreased strength and activity tolerance while maintaining her oxygen saturation level at or above 90%. She is expected to benefit from additional PT services while hospitalized and upon discharge to inpatient rehab setting.

## 2018-03-12 NOTE — PROGRESS NOTES
[facility]    Name:  Charisma Cortez   Age:  57 y.o.  Sex:  female  :  1960  MRN:  [id number]   Visit Number:  [id number]  Admission Date:  3/6/2018  Date Of Service:  18  Primary Care Physician:  ANDREA Zhong     LOS: 6 days :      Chief Complaint:  Back pain wanting her lortab.  Follow up pneumonia and respiratory failure.      Subjective / Interval History:   Patient seen multiple times today. She was sitting up in bed on 100% high flow oxygen. She has been starting to wear bipap some since yesterday. Reviewed labs and ABG and CXR with Dr Martinez.     Patient prescribed lortab chronically by pain management. Her blood pressure has remained low normal and requiring max high flow oxygen, but despite this she has continued to request her medication. Previously Dr Martinez had recommended no opiate therapy with prior sedation. Since patient clinically improved and starting to wear bipap, he allowed her to have lortab today. First he have okay for 5mg tid but later patient was demanding to sign out AMA. Discussed with patient and Dr Martinez and he agreed to allow her to have 10 mg and I adjusted her dose. She is also continued on anxiety medications. She is more anxious without her mom here today and I suspect her mom is having transportation difficulty due to the large amount of snow we received outside overnight.     Patient has improved shortness of breath. She has severe bilateral low back aching and spasms she reports is her chronic back pain for which she is prescribed Lortab. She denies nausea, vomiting, abdominal pain. She has been restricted to bed chair by pulmonary. She is eating and drinking more now.    Review of Systems:     General ROS: Patient denies any fevers, chills or loss of consciousness. Increased anxiety today without her mom here  Ophthalmic ROS: Denies any diplopia or transient loss of vision.  ENT ROS: Denies sore  throat, nasal congestion or ear pain.   Respiratory ROS: Improved cough, with slow but improving shortness of breath.  Cardiovascular ROS: Denies chest pain or palpitations. No history of exertional chest pain.   Gastrointestinal ROS: Denies nausea and vomiting. Denies any abdominal pain. No diarrhea.  Genito-Urinary ROS: Denies dysuria or hematuria.  Musculoskeletal ROS: Positive bilateral aching chronic back pain. No muscle pain. No calf pain.  Neurological ROS: Denies any focal weakness. No loss of consciousness. Denies any numbness. Denies neck pain.   Dermatological ROS: Denies any redness or pruritis.    Vital Signs:    Temp:  [98.1 °F (36.7 °C)-98.9 °F (37.2 °C)] 98.1 °F (36.7 °C)  Heart Rate:  [] 96  Resp:  [24-40] 28  BP: ()/(42-88) 81/54  FiO2 (%):  [70 %-80 %] 70 %    Intake and output:    I/O last 3 completed shifts:  In: 1875 [P.O.:1380; I.V.:495]  Out: 2920 [Urine:2420; Emesis/NG output:200; Stool:300]  I/O this shift:  In: 420 [P.O.:420]  Out: -     Physical Examination:    General Appearance:    Alert and cooperative, not in any acute distress. Anxious.    Head:    Atraumatic and normocephalic, without obvious abnormality.   Eyes:            PERRLA, conjunctivae and sclerae normal, no Icterus. No pallor. Extra-occular movements are within normal limits.   Throat:   No oral lesions, positive thrush, oral mucosa moist.   Neck:   Supple, trachea midline, no thyromegaly   Lungs:      Bilateral crackles improved. No rhonchi or wheeze.    Heart:    Normal S1 and S2, no murmur, no gallop   Abdomen:     Normal bowel sounds, no masses, no organomegaly. Soft        non-tender, non-distended, no guarding, no rebound                tenderness   Extremities:   Moves all extremities well, trace edema, no cyanosis, no             clubbing   Skin:   No bleeding, bruising or rash.   Neurologic:   sensation intact, Motor power is normal and equal bilaterally.   Laboratory results:    Lab Results (last 24  hours)     Procedure Component Value Units Date/Time    Protein / Creatinine Ratio, Urine - Urine, Clean Catch [[id number]]  (Abnormal) Collected:  03/12/18 1138    Specimen:  Urine from Urine, Catheter Updated:  03/12/18 1206     Protein/Creatinine Ratio, Urine 2,500.0 mg/G Crea      Creatinine, Urine 7.6 mg/dL      Total Protein, Urine 19.0 (H) mg/dL     Urinalysis With / Microscopic If Indicated - Urine, Clean Catch [[id number]]  (Abnormal) Collected:  03/12/18 1138    Specimen:  Urine from Urine, Catheter Updated:  03/12/18 1149     Color, UA Yellow     Appearance, UA Clear     pH, UA 8.5 (H)     Specific Gravity, UA 1.015     Glucose, UA Negative     Ketones, UA Negative     Bilirubin, UA Negative     Blood, UA Negative     Protein, UA Negative     Leuk Esterase, UA Negative     Nitrite, UA Negative     Urobilinogen, UA 0.2 E.U./dL    Narrative:       Urine microscopic not indicated.    POC Glucose Once [[id number]]  (Abnormal) Collected:  03/12/18 1044    Specimen:  Blood Updated:  03/12/18 1055     Glucose 143 (H) mg/dL      Comment: Serial Number: [id number]:  [id number]       POC Glucose Once [[id number]]  (Abnormal) Collected:  03/12/18 0617    Specimen:  Blood Updated:  03/12/18 0620     Glucose 295 (H) mg/dL      Comment: Serial Number: [id number]:  [id number]       POC Glucose Once [[id number]]  (Abnormal) Collected:  03/12/18 0614    Specimen:  Blood Updated:  03/12/18 0620     Glucose 312 (H) mg/dL      Comment: Serial Number: [id number]:  [id number]       Blood Gas, Arterial [[id number]]  (Abnormal) Collected:  03/12/18 0446    Specimen:  Arterial Blood Updated:  03/12/18 0547     Site Arterial: left brachial     Willard's Test N/A     pH, Arterial 7.455 pH units      pCO2, Arterial 35.6 mm Hg      pO2, Arterial 94.3 mm Hg      HCO3, Arterial 25.0 mmol/L      Base Excess, Arterial 1.1 mmol/L      O2 Saturation, Arterial 97.8 %      Hemoglobin, Blood Gas 9.6 (L) g/dL      Barometric Pressure for  Blood Gas 732 mmHg      Modality Cannula - High Flow     FIO2 100 %     Basic Metabolic Panel [[id number]]  (Abnormal) Collected:  03/12/18 0426    Specimen:  Blood Updated:  03/12/18 0532     Glucose 147 (H) mg/dL      BUN 27 (H) mg/dL      Creatinine 0.70 mg/dL      Sodium 143 mmol/L      Potassium 4.0 mmol/L      Chloride 103 mmol/L      CO2 28.0 mmol/L      Calcium 9.0 mg/dL      eGFR Non African Amer 87 mL/min/1.73      BUN/Creatinine Ratio 38.6 (H)     Anion Gap 16.0 mmol/L     Narrative:       Abnormal estimated GFR should be followed by more specific studies to confirm end stage chronic renal disease. The equation used for calculation may not be accurate for patients less than 19 years old, greater than 70 years old, patients at extremes of weight, malnutrition, or with acute renal dysfunction.    Magnesium [[id number]]  (Abnormal) Collected:  03/12/18 0426    Specimen:  Blood Updated:  03/12/18 0528     Magnesium 1.5 (L) mg/dL     Hemoglobin A1c [[id number]]  (Normal) Collected:  03/10/18 0425    Specimen:  Blood Updated:  03/12/18 0050     Hemoglobin A1C 5.4 %     Narrative:       Expected HgbA1C results:     3% to 6% HgbA1C      HgbA1C                 Estimated Average Glucose     5%                              97 mg/dl   6%                             126 mg/dl   7%                             154 mg/dl   8%                             183 mg/dl   9%                             212 mg/dl  >10%                           >240 mg/dl      POC Glucose Once [[id number]]  (Abnormal) Collected:  03/11/18 2015    Specimen:  Blood Updated:  03/11/18 2025     Glucose 158 (H) mg/dL      Comment: Serial Number: [id number]:  [id number]             I have reviewed the patient's laboratory results.    Radiology results:    Imaging Results (last 24 hours)     Procedure Component Value Units Date/Time    XR Chest 1 View [[id number]] Collected:  03/12/18 1041     Updated:  03/12/18 1049    Narrative:       XR CHEST 1 VW-      HISTORY: Pneumonia; A41.9-Sepsis, unspecified organism; N17.9-Acute  kidney failure, unspecified; E87.2-Acidosis; J18.9-Pneumonia,  unspecified organism     COMPARISON: One day earlier.     FINDINGS:    The lungs fields are unchanged. There is no pneumothorax.          The cardiac silhouette is stable.             Impression:       No significant change.            This report was finalized on 3/12/2018 10:47 AM by Yvon Morrison MD.          I have reviewed the patient's radiology reports.    Medication Review:     I have reviewed the patients active and prn medications.     Assessment:    Septic Shock requiring vasopressor therapy weaned, stable off vasopressor currently  Aspiration Bilateral Pneumonia, suspected ARDS, with MSSA sputum   Acute respiratory failure with hypoxemia severe, with ARDS and pneumonia, requiring 100% FiO2   Acute UTI with Klebsiella   Acute metabolic and toxic medication related encephalopathy, improved  Fluid overload, improving  RAGHU on CKD 4  Mixed Metabolic acidosis and respiratory acidosis  H/o Heroin use remote  Chronic opiate and benzodiazapine dependent chronic back pain, prescribed  Recent methamphetamine use  Hyponatremia  Hypomagnesemia, treated  Hyperphosphatemia  Anemia multifactorial, post transfusion  COPD chronic      Plan:  Continue ICU care, while receiving 100% FiO2 with intermittent hypoxia with little movement. She has agreed to stay to continue her care as long as she is receiving her Lortab. She is also continued on anxiety medication with Clonazepam. Solumedrol and Merrem are continued. Continue Duonebs, Budesonide nebs. Magnesium replaced. Icy hot to back. PT consulted. Dr Martinez requested she remain in chair. Discontinue matute catheter.     Very poor prognosis noted. Condition remains critical.  Pepcid. Full code. SCD.     Medication risks and benefits were discussed in detail. Patient reported satisfaction with care delivered and treatment plan.     Meagan HILLMAN  DO [place]  03/12/18  4:06 PM

## 2018-03-12 NOTE — NURSING NOTE
"Patient has threatened to leave AMA several times today. Patient just stated to RN that she was going to leave.  RN and Dr Mclean have discussed with patient numerous times today the seriousness of her condition and the dangers of leaving AMA, even potential death.  Patient continues to state to RN that she is leaving and \"doesn't Care\" about the dangers associated with leaving the hospital at this time.  [facility] and Dr Martinez notified of patient's desire to leave AMA.  Patient currently on her cell phone trying to find someone to come and take her home.    "

## 2018-03-12 NOTE — THERAPY EVALUATION
Acute Care - Occupational Therapy Initial Evaluation  [facility]     Patient Name: Charisma Cortez  : 1960  MRN: [id number]  Today's Date: 3/12/2018  Onset of Illness/Injury or Date of Surgery: 18  Date of Referral to OT: 18  Referring Physician: Dr. Mclean    Admit Date: 3/6/2018       ICD-10-CM ICD-9-CM   1. Sepsis, due to unspecified organism A41.9 038.9     995.91   2. RAGHU (acute kidney injury) N17.9 584.9   3. Metabolic acidosis E87.2 276.2   4. Pneumonia of both lungs due to infectious organism, unspecified part of lung J18.9 483.8   5. Impaired mobility and ADLs Z74.09 799.89     Patient Active Problem List   Diagnosis   • Chronic back pain   • DDD (degenerative disc disease), lumbar   • DDD (degenerative disc disease), cervical   • Arachnoid cyst   • Mild Degenerative lumbar spinal stenosis   • RAGHU (acute kidney injury)   • Polysubstance abuse   • Tobacco use disorder   • Type 2 diabetes mellitus   • Seizure disorder   • HTN, currently hypotense   • Hypokalemia   • UTI (urinary tract infection)   • COPD (chronic obstructive pulmonary disease)   • Depression   • Leukocytosis   • Thrombocytosis   • Sepsis     Past Medical History:   Diagnosis Date   • Anxiety    • Arthritis    • C. difficile diarrhea 2018   • Cancer     skin   • Cataracts, bilateral    • Chest pain    • COPD (chronic obstructive pulmonary disease) 3/23/2017   • Depression 3/23/2017   • Diabetes mellitus    • Emphysema lung    • Epilepsy     LAST SEIZURE 2017   • Headache    • High cholesterol    • History of brain shunt    • Hypertension    • Liver disease    • Low back pain    • Lumbosacral disc disease    • BAEZ (nonalcoholic steatohepatitis)    • Neurological disorder    • Osteoporosis    • Pneumonia    • Seizure disorder 3/23/2017   • Wears glasses      Past Surgical History:   Procedure Laterality Date   • APPENDECTOMY     • BRAIN SURGERY     • BREAST BIOPSY Right    •  SECTION  ,  "  • CHOLECYSTECTOMY     • COLON SURGERY  1970    2\" REMOVED   • COLONOSCOPY     • CYST REMOVAL  1987    brain, R front lobe - [facility]   • DENTAL PROCEDURE     • ENDOSCOPY     • EXCISION MASS TRUNK Left 8/7/2017    Procedure: EXCISION LEFT BUTTOCKS MASS;  Surgeon: Jennifer Galindo MD;  Location: [facility] OR;  Service:    • HYSTERECTOMY     • RECONSTRUCTION URETHROPLASTY     • TUBAL ABDOMINAL LIGATION            OT ASSESSMENT FLOWSHEET (last 72 hours)      Occupational Therapy Evaluation     Row Name 03/12/18 1220                   OT Evaluation Time/Intention    Subjective Information complains of;dyspnea;pain  -SD        Document Type evaluation  -SD        Mode of Treatment co-treatment;physical therapy  -SD        Patient Effort adequate  -SD        Symptoms Noted During/After Treatment shortness of breath  -SD           General Information    Patient Profile Reviewed? yes  -SD        Onset of Illness/Injury or Date of Surgery 03/06/18  -SD        Referring Physician Dr. Mclean  -SD        Patient Observations alert;cooperative;agree to therapy  -SD        Patient/Family Observations No family present  -SD        General Observations of Patient Supine with matute catheter intact, on High flow Ox 100%  -SD        Prior Level of Function independent:;community mobility;ADL's  -SD        Equipment Currently Used at Home shower chair;rollator  -SD        Pertinent History of Current Functional Problem Sepsis  -SD        Existing Precautions/Restrictions oxygen therapy device and L/min;fall   Epilepsy  -SD        Risks Reviewed patient:;increased discomfort;change in vital signs  -SD        Benefits Reviewed patient:;improve function;increase independence;increase strength;increase balance  -SD        Barriers to Rehab none identified  -SD           Relationship/Environment    Primary Source of Support/Comfort other (see comments)   Ex-  -SD        Name of Support/Comfort Primary Source Daniel Cortez  -SD        Lives " With other (see comments)   Ex-  -SD           Resource/Environmental Concerns    Current Living Arrangements home/apartment/condo  -SD        Resource/Environmental Concerns none  -SD           Stairs Within Home, Primary    Stairs, Within Home, Primary 14  -SD        Number of Stairs, Within Home, Primary --   14  -SD        Surface of Stairs, Within Home, Primary carpeting  -SD        Stair Railings, Within Home, Primary railing on left side (ascending)  -SD        Landing, Stairs, Within Home, Primary railings present  -SD           Cognitive Assessment/Intervention- PT/OT    Orientation Status (Cognition) oriented x 4  -SD        Follows Commands (Cognition) WNL  -SD           Bed Mobility Assessment/Treatment    Bed Mobility Assessment/Treatment rolling left;rolling right;scooting/bridging;supine-sit;sit-supine  -SD        Rolling Left [place] (Bed Mobility) contact guard  -SD        Rolling Right [place] (Bed Mobility) contact guard  -SD        Scooting/Bridging [place] (Bed Mobility) contact guard  -SD        Supine-Sit [place] (Bed Mobility) contact guard  -SD        Sit-Supine [place] (Bed Mobility) contact guard  -SD        Bed Mobility, Safety Issues decreased use of arms for pushing/pulling;decreased use of legs for bridging/pushing;impaired trunk control for bed mobility  -SD        Comment (Bed Mobility) Patient able to sit unsupported in bed x 10 min  -SD           Functional Mobility    Functional Mobility- Comment NT  -SD           Transfer Assessment/Treatment    Comment (Transfers) NT  -SD           ADL Assessment/Intervention    BADL Assessment/Intervention upper body dressing;lower body dressing  -SD           Upper Body Dressing Assessment/Training    Upper Body Dressing [place] Level don;contact guard assist  -SD           Lower Body Dressing Assessment/Training    Lower Body Dressing [place] Level don;socks;contact guard assist  -SD           General  ROM    GENERAL ROM COMMENTS WNL  -SD           General Assessment (Manual Muscle Testing)    General Manual Muscle Testing (MMT) Assessment upper extremity strength deficits identified;lower extremity strength deficits identified  -SD        Comment, General Manual Muscle Testing (MMT) Assessment 3+/5  -SD           Pain Assessment    Additional Documentation Pain Scale: Numbers Pre/Post-Treatment (Group)  -SD           Pain Scale: Numbers Pre/Post-Treatment    Pain Scale: Numbers, Pretreatment 7/10  -SD        Pain Scale: Numbers, Post-Treatment 7/10  -SD        Pain Location - Orientation medial  -SD        Pain Location back  -SD        Pain Intervention(s) Repositioned  -SD           Wound 03/10/18 0800 Left ear laceration    Wound - Properties Group Date first assessed: 03/10/18  -CH Time first assessed: 0800  -CH Present On Admission : no  -CH Side: Left  -CH, ear  Location: ear  -CH Type: laceration  -CH       Clinical Impression (OT)    Date of Referral to OT 03/12/18  -SD        OT Diagnosis ADL decline  -SD        Patient/Family Goals Statement (OT Eval) Return home  -SD        Criteria for Skilled Therapeutic Interventions Met (OT Eval) yes  -SD        Rehab Potential (OT Eval) good, to achieve stated therapy goals  -SD        Therapy Frequency (OT Eval) --   3-5 times/wk  -SD        Anticipated Discharge Disposition (OT) inpatient rehab facility;home with home health  -SD           Vital Signs    Pre SpO2 (%) 96   high flow  -SD        O2 Delivery Pre Treatment hi-flow  -SD        Intra SpO2 (%) 92  -SD        O2 Delivery Intra Treatment supplemental O2  -SD        Post SpO2 (%) 94  -SD        O2 Delivery Post Treatment supplemental O2  -SD        Pre Patient Position Supine  -SD        Intra Patient Position Sitting  -SD        Post Patient Position Supine  -SD           Planned OT Interventions    Planned Therapy Interventions (OT Eval) activity tolerance training;adaptive equipment training;BADL  retraining;strengthening exercise;transfer/mobility retraining  -SD           OT Goals    Transfer Goal Selection (OT) transfer, OT goal 1  -SD        Bathing Goal Selection (OT) bathing, OT goal 1  -SD        Dressing Goal Selection (OT) dressing, OT goal 1  -SD        Toileting Goal Selection (OT) toileting, OT goal 1  -SD        Strength Goal Selection (OT) strength, OT goal 1  -SD        Additional Documentation Strength Goal Selection (OT) (Row)  -SD           Transfer Goal 1 (OT)    Activity/Assistive Device (Transfer Goal 1, OT) sit-to-stand/stand-to-sit;walker, rolling  -SD        [place] Level/Cues Needed (Transfer Goal 1, OT) conditional independence  -SD        Time Frame (Transfer Goal 1, OT) 2 weeks  -SD        Progress/Outcome (Transfer Goal 1, OT) goal ongoing  -SD           Bathing Goal 1 (OT)    Activity/Assistive Device (Bathing Goal 1, OT) upper body bathing;lower body bathing  -SD        [place] Level/Cues Needed (Bathing Goal 1, OT) set-up required  -SD        Time Frame (Bathing Goal 1, OT) 2 weeks  -SD        Progress/Outcomes (Bathing Goal 1, OT) goal ongoing  -SD           Dressing Goal 1 (OT)    Activity/Assistive Device (Dressing Goal 1, OT) lower body dressing;reacher;sock-aid  -SD        [place]/Cues Needed (Dressing Goal 1, OT) conditional independence  -SD        Time Frame (Dressing Goal 1, OT) 2 weeks  -SD        Progress/Outcome (Dressing Goal 1, OT) goal ongoing  -SD           Toileting Goal 1 (OT)    Activity/Device (Toileting Goal 1, OT) commode, bedside without drop arms  -SD        [place] Level/Cues Needed (Toileting Goal 1, OT) conditional independence  -SD        Time Frame (Toileting Goal 1, OT) 2 weeks  -SD        Progress/Outcome (Toileting Goal 1, OT) goal ongoing  -SD           Strength Goal 1 (OT)    Strength Goal 1 (OT) Pt to perform UB ther ex using theraband for resistance  -SD        Time Frame (Strength Goal 1, OT) 2 weeks  -SD         Progress/Outcome (Strength Goal 1, OT) goal ongoing  -SD           Living Environment    Home Accessibility stairs within home  -SD          User Key  (r) = Recorded By, (t) = Taken By, (c) = Cosigned By    Initials Name Effective Dates    FLORES Ding RN 11/07/16 -     SD Blessing Gray, OT 03/07/18 -            Occupational Therapy Education     Title: PT OT SLP Therapies (Active)     Topic: Occupational Therapy (Active)     Point: ADL training (Done)     Description: Instruct learner(s) on proper safety adaptation and remediation techniques during self care or transfers.   Instruct in proper use of assistive devices.   Learning Progress Summary     Learner Status Readiness Method Response Comment Documented by    Patient Done Acceptance E VU Benefit of OT and OT POC SD 03/12/18 1323                      User Key     Initials Effective Dates Name Provider Type Discipline    SD 03/07/18 -  Blessing Gray, OT Occupational Therapist OT                  OT Recommendation and Plan  Anticipated Discharge Disposition (OT): inpatient rehab facility, home with home health  Planned Therapy Interventions (OT Eval): activity tolerance training, adaptive equipment training, BADL retraining, strengthening exercise, transfer/mobility retraining  Therapy Frequency (OT Eval):  (3-5 times/wk)             Outcome Measures     Row Name 03/12/18 1220             How much help from another is currently needed...    Putting on and taking off regular lower body clothing? 3  -SD      Bathing (including washing, rinsing, and drying) 3  -SD      Toileting (which includes using toilet bed pan or urinal) 3  -SD      Putting on and taking off regular upper body clothing 3  -SD      Taking care of personal grooming (such as brushing teeth) 3  -SD      Eating meals 4  -SD      Score 19  -SD         Functional Assessment    Outcome Measure Options AM-PAC 6 Clicks Daily Activity (OT)  -SD        User Key  (r) = Recorded By, (t) = Taken By,  (c) = Cosigned By    Initials Name Provider Type    JENNIFER Gray, OT Occupational Therapist          Time Calculation:   OT Start Time: 1220    Therapy Charges for Today     Code Description Service Date Service Provider Modifiers Qty    [id number]  OT EVAL LOW COMPLEXITY 4 3/12/2018 Blessing Gray OT GO 1               Blessing Gray OT  3/12/2018

## 2018-03-12 NOTE — PLAN OF CARE
Problem: NPPV/CPAP (Adult)  Intervention: Monitor and Manage Anxiety Related to NPPV/CPAP   03/12/18 0256   Interventions   Trust Relationship/Rapport care explained;choices provided;questions answered;reassurance provided     Intervention: Prevent Aspiration During Therapy   03/12/18 0256   Positioning   Head of Bed (HOB) HOB elevated     Intervention: Optimize Tolerance of Noninvasive Ventilation   03/12/18 0256   Respiratory Interventions   Airway/Ventilation Management airway patency maintained;pulmonary hygiene promoted     Intervention: Prevent/Minimize Device Friction/Shearing and Pressure Points   03/12/18 0256   Respiratory Interventions   NPPV/CPAP Maintenance mask adjusted;other (see comments);mask secure  (proper mask fit)       Goal: Signs and Symptoms of Listed Potential Problems Will be Absent, Minimized or Managed (NPPV/CPAP)  Outcome: Ongoing (interventions implemented as appropriate)   03/12/18 0256   Goal/Outcome Evaluation   Problems Assessed (NPPV/CPAP) hypoxia/hypoxemia;situational response;skin breakdown;dry mucous membranes   Problems Present (NPPV/CPAP) hypoxia/hypoxemia

## 2018-03-12 NOTE — PLAN OF CARE
Problem: Patient Care Overview  Goal: Plan of Care Review   03/12/18 9176   Coping/Psychosocial   Plan of Care Reviewed With patient   Plan of Care Review   Progress no change   OTHER   Outcome Summary OT eval completed. Patient presents deficits in strength, endurance, balance, mobility and ADL performance. Patient is expected to benefit from OT services to improve overall functional performance and mobility prior to DC.

## 2018-03-12 NOTE — PLAN OF CARE
Problem: NPPV/CPAP (Adult)  Goal: Signs and Symptoms of Listed Potential Problems Will be Absent or Manageable (NPPV/CPAP)  Outcome: Ongoing (interventions implemented as appropriate)   03/12/18 1402   NPPV/CPAP   Problems Assessed (NPPV/CPAP) skin breakdown;dry mucous membranes   Problems Present (NPPV/CPAP) none

## 2018-03-12 NOTE — PROGRESS NOTES
"CC: Acute hypoxic respiratory failure. Pneumonia. Shock.    S: More awake and alert today.  Currently on high flow oxygen.      O:Vital signs reviewed. O2Sat: 94% on 80-90% FiO2.    BP 93/60   Pulse 92   Temp 98.5 °F (36.9 °C) (Oral)   Resp (!) 30   Ht 149.9 cm (59\")   Wt 55.3 kg (122 lb)   SpO2 94%   BMI 24.64 kg/m²       I & Os reviewed.   Intake/Output       03/11/18 0700 - 03/12/18 0659 03/12/18 0700 - 03/13/18 0659    Intake (ml) 1266 210    Output (ml) 2020 800    Net (ml) -754 -590    Last Weight  55.3 kg (122 lb)  --          General: No acute distress noted.  Eyes: PERRL. EOM Sluggish.   Neck: Supple. +ve JVD.   Cardiovascular: S1 + S2. Regular.   Respiratory: Mild respiratory distress noted. No wheezing heard. Bilateral crackles noted.  Abdomen: Soft. Bowel sounds positive.   Extremities: Trace edema noted.  Neurologic: AAOx3. Was able to follow simple commands.   Skin: Appeared without any overt rashes.    Labs: Reviewed.       Results from last 7 days  Lab Units 03/12/18  0426 03/11/18  0416 03/10/18  1807 03/10/18  0425  03/06/18  0847   SODIUM mmol/L 143 142  --  142  < > 132*   POTASSIUM mmol/L 4.0 4.2 4.0 3.2*  < > 4.5   CHLORIDE mmol/L 103 105  --  103  < > 100   CO2 mmol/L 28.0 29.0  --  29.0  < > 9.0*   BUN mg/dL 27* 16  --  13  < > 69*   CREATININE mg/dL 0.70 0.60  --  0.60  < > 9.30*   CALCIUM mg/dL 9.0 9.1  --  8.8  < > 8.1*   BILIRUBIN mg/dL  --   --   --   --   --  0.9   ALK PHOS U/L  --   --   --   --   --  129*   ALT (SGPT) U/L  --   --   --   --   --  43   AST (SGOT) U/L  --   --   --   --   --  41   GLUCOSE mg/dL 147* 92  --  99*  < > 75   < > = values in this interval not displayed.      Results from last 7 days  Lab Units 03/12/18  0426 03/11/18  0416 03/10/18  0425  03/08/18  0435 03/07/18  0438 03/06/18  0847   MAGNESIUM mg/dL 1.5* 1.5* 1.4*  < > 1.5* 1.7 1.5*   PHOSPHORUS mg/dL  --   --   --   --  4.1 7.3* 9.5*   < > = values in this interval not displayed.        Results from " last 7 days  Lab Units 03/11/18  0416 03/10/18  0425 03/09/18  0724 03/08/18  1620 03/08/18  0749 03/08/18  0435 03/07/18  0438   WBC 10*3/mm3 19.24* 20.22* 27.01*  --   --  24.08* 21.08*   HEMOGLOBIN g/dL 9.0* 9.0* 8.6* 8.0* 7.3* 7.3* 8.7*   PLATELETS 10*3/mm3 441* 425* 421*  --   --  492* 547*           Pharmacy Consult    Pharmacy Consult      Blood Culture   Date Value Ref Range Status   03/06/2018 No growth at 2 days  Preliminary             Urine Culture   Date Value Ref Range Status   03/06/2018 >100,000 CFU/mL Klebsiella oxytoca (A)  Final     Comment:     Identification and KARI to follow.           CXRay: reviewed.   Imaging Results (last 24 hours)     Procedure Component Value Units Date/Time    XR Chest 1 View [[id number]] Collected:  03/12/18 1041     Updated:  03/12/18 1041    Narrative:       XR CHEST 1 VW-     HISTORY: Pneumonia; A41.9-Sepsis, unspecified organism; N17.9-Acute  kidney failure, unspecified; E87.2-Acidosis; J18.9-Pneumonia,  unspecified organism     COMPARISON: One day earlier.     FINDINGS:    The lungs fields are unchanged. There is no pneumothorax.          The cardiac silhouette is stable.             Impression:       No significant change.           XR Chest 1 View [[id number]] Collected:  03/11/18 1055     Updated:  03/11/18 1125    Narrative:       PORTABLE CHEST     INDICATION: Acute respiratory failure.     FINDINGS: Single view, compared with 03/09/2018. EKG leads overlie the  chest. Heart size is normal. No pneumothorax. Worsened interstitial  opacities with some mixed interstitial and airspace infiltrates may  represent pneumonia or edema. The findings are overall very similar to  previous with exception to slight worsening at the left base.       Impression:       Slight worsening opacification at the left base. No other  significant change. Continued follow-up recommended.     This report was finalized on 3/11/2018 11:22 AM by Ismael Adkins MD.          ABG:   Lab Results    Component Value Date    PHART 7.455 03/12/2018    PKM1UTK 35.6 03/12/2018    PO2ART 94.3 03/12/2018    HGBBG 9.6 (L) 03/12/2018    C5KBAZVC 97.8 03/12/2018    CARBOXYHGB 1.1 03/07/2018         Assessment & Recommendations/Plan:   1.  Septic shock  - Resolved.    2.  UTI  - Klebsiella in the urine.    - On appropriate antibiotics    3.?  Aspiration pneumonia  - MSSA was seen in the sputum.    - We will recommend to continue meropenem  - Due to the possibility likelihood of aspiration pneumonitis/ARDS, I will continue Solu-Medrol .   - Will adjust the frequency to every 8 hourly.    4.  Drug abuse    5.  Acute renal failure  - Resolved       6.  Anemia  - Status post transfusion    7.  Acute respiratory failure  - Continues to require significant oxygen supplementation  - PF ratio continues to be less than 200.      8.  Acute's  encephalopathy  - Definite improvement overall    9.  Fluid overload.  - Has had good diuresis over the past 48 hours.      We will start low-dose Lortab for when necessary use.    Her condition remains critical and prognosis remains fair to guarded.    She was able to tolerate the BiPAP last night at a lower pressure of 12/8.  I would recommend to continue the same.    I have discussed the findings and my recommendations with the patient.      Stacy Martinez MD  03/12/18  10:47 AM    Dictated utilizing Dragon dictation.

## 2018-03-12 NOTE — PROGRESS NOTES
Continued Stay Note   [facility]     Patient Name: Charisma Cortez  MRN: [id number]  Today's Date: 3/12/2018    Admit Date: 3/6/2018          Discharge Plan     Row Name 03/12/18 1103       Plan    Plan Following to assist with discharge planning as needed.               Discharge Codes    No documentation.       Expected Discharge Date and Time     Expected Discharge Date Expected Discharge Time    Mar 12, 2018             Cassandra Bryant

## 2018-03-12 NOTE — PLAN OF CARE
Problem: Skin Injury Risk (Adult)  Intervention: Prevent/Minimize Shear/Friction Injuries   03/12/18 1400 03/12/18 1500   Positioning   Positioning/Transfer Devices --  pillows;in use   Skin Interventions   Pressure Reduction Devices heel offloading device utilized;positioning supports utilized;pressure-redistributing mattress utilized;specialty bed utilized --        Goal: Skin Health and Integrity  Outcome: Ongoing (interventions implemented as appropriate)

## 2018-03-12 NOTE — PLAN OF CARE
Problem: Patient Care Overview  Goal: Plan of Care Review   03/12/18 0309   Coping/Psychosocial   Plan of Care Reviewed With patient   Plan of Care Review   Progress no change   OTHER   Outcome Summary pt requiring high amt 02.     Goal: Interprofessional Rounds/Family Conf  Outcome: Ongoing (interventions implemented as appropriate)      Problem: Pain, Chronic (Adult)  Goal: Identify Related Risk Factors and Signs and Symptoms  Outcome: Ongoing (interventions implemented as appropriate)    Goal: Acceptable Pain/Comfort Level and Functional Ability  Outcome: Ongoing (interventions implemented as appropriate)      Problem: NPPV/CPAP (Adult)  Goal: Signs and Symptoms of Listed Potential Problems Will be Absent, Minimized or Managed (NPPV/CPAP)  Outcome: Ongoing (interventions implemented as appropriate)

## 2018-03-12 NOTE — PLAN OF CARE
Problem: Depression (Adult,Obstetrics,Pediatric)  Goal: Improved/Stable Mood  Outcome: Ongoing (interventions implemented as appropriate)      Problem: NPPV/CPAP (Adult)  Intervention: Optimize Tolerance of Noninvasive Ventilation   03/12/18 0256   Respiratory Interventions   Airway/Ventilation Management airway patency maintained;pulmonary hygiene promoted

## 2018-03-12 NOTE — NURSING NOTE
This RN was in another patient's room, when coming back to nurses station, RN noticed patient sitting up in chair.  RN entered room and PCT said that patient got up and out of bed on her own.  RN had told her within the past 15 minutes that her orders were to not get OOB until her oxygen percentage on the hi flow as 70% or less, patient at 100% currently.  This order was clarified with Dr Martinez earlier this morning.  Patient rfeuses to get back in bed.  Dr Martinez called but was unable to reach him.  Will continue to monitor.

## 2018-03-12 NOTE — PROGRESS NOTES
"Nephrology Progress Note.    LOS: 6 days    Patient Care Team:  ANDREA Quinones as PCP - General  Jennifer Galindo MD as Consulting Physician (General Surgery)    Chief Complaint:    Chief Complaint   Patient presents with   • Seizures       Subjective     Follow up for RAGHU and related issues.    Interval History:     Patient Complaints: none    Patient seen and examined this morning.  Events from last night noted.  Patient denies having any fevers chills.  No nausea or vomiting no abdominal pain.  Denies any chest pain, She does have shortness of breath as well as cough denies any sputum production.  There is no significant edema.   Patient also denies having new onset weakness of numbness of either extremity.    History taken from: patient    I have reviewed labs/imaging/records from this hospitalization, including ER staff and admitting/attending physicians H/P's and progress notes to establish a comprehensive understanding of this patient's clinical hospital course, as well as to establish plan of care appropriately.     Review of Systems:    The pertinent  ROS was done and it is noted above, rest  was negative.    Objective     Vital Signs  BP 93/61   Pulse 85   Temp 98.2 °F (36.8 °C) (Oral)   Resp 24   Ht 149.9 cm (59\")   Wt 55.3 kg (122 lb)   SpO2 96%   BMI 24.64 kg/m²       No intake/output data recorded.    Intake/Output Summary (Last 24 hours) at 03/12/18 1004  Last data filed at 03/12/18 0521   Gross per 24 hour   Intake             1026 ml   Output             1520 ml   Net             -494 ml       Physical Exam:    General Appearance: alert, oriented x 3, no acute distress,   HEENT: Oral mucosa dry, extra occular movements intact. Sclera clear.  Skin: Warm and dry  Neck: supple, no JVD, trachea midline  Lungs:Chest shape is normal. Breath sounds heard bilaterally with scattered wheezing.   Heart: RRR, normal S1 and S2, no S3, no rub, peripheral pulses weak but palpable.  Abdomen: soft, " non-tender,  present bowel sounds to auscultation  : no palpable bladder.  Extremities: no edema, cyanosis or clubbing.   Neuro: normal speech and mental status, grossly non focal.     Results Review:      Results from last 7 days  Lab Units 03/12/18  0426 03/11/18  0416 03/10/18  1807 03/10/18  0425  03/06/18  0847   SODIUM mmol/L 143 142  --  142  < > 132*   POTASSIUM mmol/L 4.0 4.2 4.0 3.2*  < > 4.5   CHLORIDE mmol/L 103 105  --  103  < > 100   CO2 mmol/L 28.0 29.0  --  29.0  < > 9.0*   BUN mg/dL 27* 16  --  13  < > 69*   CREATININE mg/dL 0.70 0.60  --  0.60  < > 9.30*   CALCIUM mg/dL 9.0 9.1  --  8.8  < > 8.1*   BILIRUBIN mg/dL  --   --   --   --   --  0.9   ALK PHOS U/L  --   --   --   --   --  129*   ALT (SGPT) U/L  --   --   --   --   --  43   AST (SGOT) U/L  --   --   --   --   --  41   GLUCOSE mg/dL 147* 92  --  99*  < > 75   < > = values in this interval not displayed.    Estimated Creatinine Clearance: 70.8 mL/min (by C-G formula based on SCr of 0.7 mg/dL).      Results from last 7 days  Lab Units 03/12/18  0426 03/11/18  0416 03/10/18  0425  03/08/18  0435 03/07/18  0438 03/06/18  0847   MAGNESIUM mg/dL 1.5* 1.5* 1.4*  < > 1.5* 1.7 1.5*   PHOSPHORUS mg/dL  --   --   --   --  4.1 7.3* 9.5*   < > = values in this interval not displayed.            Results from last 7 days  Lab Units 03/11/18  0416 03/10/18  0425 03/09/18  0724 03/08/18  1620 03/08/18  0749 03/08/18  0435 03/07/18  0438   WBC 10*3/mm3 19.24* 20.22* 27.01*  --   --  24.08* 21.08*   HEMOGLOBIN g/dL 9.0* 9.0* 8.6* 8.0* 7.3* 7.3* 8.7*   PLATELETS 10*3/mm3 441* 425* 421*  --   --  492* 547*               Imaging Results (last 24 hours)     Procedure Component Value Units Date/Time    XR Chest 1 View [[id number]] Updated:  03/12/18 0538    XR Chest 1 View [[id number]] Collected:  03/11/18 1055     Updated:  03/11/18 1125    Narrative:       PORTABLE CHEST     INDICATION: Acute respiratory failure.     FINDINGS: Single view, compared with  03/09/2018. EKG leads overlie the  chest. Heart size is normal. No pneumothorax. Worsened interstitial  opacities with some mixed interstitial and airspace infiltrates may  represent pneumonia or edema. The findings are overall very similar to  previous with exception to slight worsening at the left base.       Impression:       Slight worsening opacification at the left base. No other  significant change. Continued follow-up recommended.     This report was finalized on 3/11/2018 11:22 AM by Ismael Adkins MD.          budesonide 0.5 mg Nebulization BID - RT   bumetanide 2 mg Oral TID   famotidine 20 mg Intravenous Q12H   First Mouthwash (Magic Mouthwash)  Swish & Spit Q6H   gabapentin 300 mg Oral Q8H   insulin aspart 0-7 Units Subcutaneous 4x Daily AC & at Bedtime   ipratropium-albuterol 3 mL Nebulization Q6H - RT   levETIRAcetam 500 mg Oral Q12H   magnesium oxide 400 mg Oral BID   meropenem 1 g Intravenous Q8H   methylPREDNISolone sodium succinate 40 mg Intravenous Q6H   nicotine 1 patch Transdermal Q24H       Pharmacy Consult    Pharmacy Consult        Medication Review:   Current Facility-Administered Medications   Medication Dose Route Frequency Provider Last Rate Last Dose   • acetaminophen (TYLENOL) suppository 650 mg  650 mg Rectal Q6H PRN April G Rosario-Leo, DO   650 mg at 03/07/18 1712   • acetaminophen (TYLENOL) tablet 650 mg  650 mg Oral Q6H PRN April G Rosario-Leo, DO   650 mg at 03/11/18 2130   • budesonide (PULMICORT) nebulizer solution 0.5 mg  0.5 mg Nebulization BID - RT Stacy Martinez MD   0.5 mg at 03/12/18 0655   • bumetanide (BUMEX) tablet 2 mg  2 mg Oral TID Julito Parekh MD   2 mg at 03/12/18 0905   • CHAPSTICK 1 each  1 each Apply externally PRN Shade Mead MD   1 each at 03/09/18 0518   • clonazePAM (KlonoPIN) tablet 0.5 mg  0.5 mg Oral BID PRN April G Rosario-Leo, DO   0.5 mg at 03/12/18 0905   • famotidine (PEPCID) injection 20 mg  20 mg Intravenous Q12H Sukhwinder K  Marysol, DO   20 mg at 03/12/18 0905   • First Mouthwash (Magic Mouthwash)   Swish & Spit Q6H Meagan Mclean, DO       • gabapentin (NEURONTIN) capsule 300 mg  300 mg Oral Q8H April G Uli, DO   300 mg at 03/11/18 2107   • glucagon (human recombinant) (GLUCAGEN DIAGNOSTIC) injection 1 mg  1 mg Subcutaneous PRN Meagan Mclean, DO       • insulin aspart (novoLOG) injection 0-7 Units  0-7 Units Subcutaneous 4x Daily AC & at Bedtime Meagan G Caridad, DO   4 Units at 03/12/18 0631   • ipratropium-albuterol (DUO-NEB) nebulizer solution 3 mL  3 mL Nebulization Q6H - RT April MADY Uli, DO   3 mL at 03/12/18 0655   • ipratropium-albuterol (DUO-NEB) nebulizer solution 3 mL  3 mL Nebulization Q4H PRN April G Uli, DO   3 mL at 03/07/18 1339   • levETIRAcetam (KEPPRA) tablet 500 mg  500 mg Oral Q12H April G Uli, DO   500 mg at 03/12/18 0905   • LORazepam (ATIVAN) injection 1 mg  1 mg Intravenous Q5 Min PRN April G Uli, DO   1 mg at 03/06/18 2020   • magnesium oxide (MAGOX) tablet 400 mg  400 mg Oral BID Meagan EARL Caridad, DO   400 mg at 03/12/18 0905   • meropenem in sodium chloride 0.9% 50 mL (MERREM) IVPB 1 g  1 g Intravenous Q8H Stacy Martinez MD   1 g at 03/12/18 0204   • methylPREDNISolone sodium succinate (SOLU-Medrol) injection 40 mg  40 mg Intravenous Q6H Kumarmatthew Martinez MD   40 mg at 03/12/18 0302   • nicotine (NICODERM CQ) 21 MG/24HR patch 1 patch  1 patch Transdermal Q24H Shade Mead MD   1 patch at 03/11/18 1933   • Pharmacy Consult   Does not apply Continuous PRN April MADY Mcnally, DO       • Pharmacy Consult   Does not apply Continuous PRN Mirella Mcnally DO       • sodium chloride 0.9 % flush 1-10 mL  1-10 mL Intravenous PRN Mirella Mcnally DO   10 mL at 03/12/18 0207   • sodium chloride 0.9 % flush 10 mL  10 mL Intravenous PRN Johny Hernández MD   10 mL at 03/11/18 2117       Assessment/Plan     1. Acute kidney injury: It appears  to have resolved.  Check urine for protein may have diabetic nephropathy.  2. Hypokalemia: Resolved.  3. Hypomagnesemia: She did receive some IV now on oral magnesium will need to few months of oral magnesium before it'll go back to normal.  Try to keep her off PPIs.  4. Hyponatremia:  It is back in the 140s continue to follow labs.  5. Septic shock: Blood pressure much better improved with aggressive treatment.  6. Bilateral pneumonia: It is being treated gradual improvement noted.        · Details were discussed with the patient no family in the room.    · Further recommendations will depend on clinical course of the patient during the current hospitalization.    · I also discussed the details with the nursing staff.  · Rest as ordered.    Boo Barahona MD  03/12/18  10:04 AM    Dictated utilizing Dragon dictation.

## 2018-03-13 NOTE — DISCHARGE SUMMARY
[facility] HOSPITALIST   LEFT AGAINST MEDICAL ADVICE DISCHARGE SUMMARY      Name:  Charisma Cortez   Age:  57 y.o.  Sex:  female  :  1960  MRN:  [id number]   Visit Number:  [id number]  Primary Care Physician:  ANDREA Zhong  Date of Discharge:  3-12-18  Admission Date:  3/6/2018    Discharge Diagnosis:   Septic Shock requiring vasopressor therapy weaned, improved   Aspiration Bilateral Pneumonia, suspected ARDS, with MSSA sputum, stable   Acute respiratory failure with hypoxemia severe, Persistent requiring HIGH FLOW 100% FiO2, with ARDS and pneumonia  Acute UTI with Klebsiella, treated  Acute metabolic and toxic medication related encephalopathy, improved  Fluid overload, improving  RAGHU on CKD 4  Mixed Metabolic acidosis and respiratory acidosis  H/o Heroin use remote  Chronic opiate and benzodiazapine dependent chronic back pain, prescribed  Recent methamphetamine use  Hyponatremia  Hypomagnesemia, treated  Hyperphosphatemia  Anemia multifactorial, post transfusion  COPD chronic  Drug seeking behavior      History of Present Illness/Hospital Course:  The patient is a 57-year-old  lady with past medical history of polysubstance abuse, who had been recently discharged from [facility] after a heroin overdose with septic shock and an acute urinary tract infection with C. difficile.  The patient presented to the emergency room via EMS.  She was hypotensive with systolic blood pressure in the 50s and was started on dopamine as well as IV normal saline.  She had severe acidosis with creatinine greater than 9.  Her urine drug screen was positive for amphetamine, methamphetamine, benzodiazepine, and opiates.  A chest x-ray showed bilateral airspace disease suspicious for pneumonia.  The patient was admitted to the ICU to the hospitalist service.    She was continued on broad-spectrum antibiotic including meropenem, vancomycin, and Levaquin.  She was requiring BiPAP  but was noncompliant with this.  She did agree to wear high flow oxygen.  Dr. Martinez was consulted for pulmonary service.  The patient continued to have drug-seeking behavior.  At one time, she admitted to taking 4 Lortab 7.5 mg tabs from her own hidden bottle stashed in her pillowcase.  After that event, her medications were removed from her room and stored in a locked storage facility in the hospital by security.  The patient's medication and possibly other drug substance was brought into the hospital by her boyfriend/ex-.  He was no longer allowed to visit her in the hospital.  The patient's mother remained at the bedside and visit her almost daily.  She was very supportive and tried to insist that the patient stop drug-seeking.    The patient was diuresed and seen by nephrology with improvement of her creatinine.  Her blood pressure has stabilized.  Unfortunately, with persistent severe pneumonia with hypoxemia, she continued to require 100% high flow oxygen and was ordered on bed rest by pulmonary.  The patient continued to ambulate in the room to chair AGAINST MEDICAL ADVICE.  Occasionally she would take off her oxygen AGAINST MEDICAL ADVICE.  Quickly, her oxygen would drop to the 60s but the patient, with encouragement, agreed to continue her current treatment plan.    The patient has chronic polysubstance abuse.  She is prescribed chronic Lortab by pain management however.  She continued to have drug-seeking behavior.  With persistent back pain, on bed rest, Dr. Martinez did agree to let her have low dose Lortab.  The patient continued to threaten to sign out and leave and after discussing this with Dr. Martinez, he did agree to increase her dose while bedridden, on chronic Lortab, requiring higher doses normally.  He did okay her to have 10 mg of Lortab 3 times daily.  She was continued on twice-daily clonazepam as well.  The patient continued to seek additional medications on a routine basis.  She often  "times was demanding and had threatened to sign herself out AGAINST MEDICAL ADVICE in order to get her \"own medications\".    Today, the patient is stable but still continuing high flow oxygen.  I have seen her more than 10 times today. I have spoken with Dr Martinez on the phone numerous times today about her as well. Despite numerous attempts to persuade her to stay, she signed out AGAINST MEDICAL ADVICE. She appears to be leaving to get excess drugs at this time, with history of heroin and history of methamphetamine. Her mother reported yesterday that her daughter would likely try to leave, with drug seeking behavior. Mother reported she would not take her home since she was not improved. The patient called many relatives until she found a cousin that agreed to come and pick her up and take her home. She reportedly was wearing regular nasal cannula  Oxygen, via portable tank, that previously belonged to her mother. The patient's belongings were provided and patient not allowed to take her own medications until she had left the building. Patient reported that she would go home, take \"my own medications\", and agreed to call EMS for expected further repeat respiratory distress and respiratory failure. The patient reported understanding that she was at very high risk for sudden death and she still signed herself out of the hospital and left AMA. She was alert, oriented x 3 and was fully able to make her own decisions and paper signed.       Consults:     Consults     Date and Time Order Name Status Description    3/6/2018 1246 Inpatient Consult to Pulmonology Completed     3/6/2018 1246 Inpatient Consult to Nephrology Completed           Procedures Performed: none             Vital Signs:    Temp:  [97.9 °F (36.6 °C)-98.5 °F (36.9 °C)] 97.9 °F (36.6 °C)  Heart Rate:  [77-96] 86  Resp:  [24-36] 36  BP: ()/(52-66) 92/59    Physical Exam:  General Appearance:    Alert and uncooperative, not in any acute distress. "   Head:    Atraumatic and normocephalic, without obvious abnormality.   Eyes:            PERRLA, conjunctivae and sclerae normal, no Icterus. No pallor. Extra-occular movements are within normal limits.   Throat:   No oral lesions, no thrush, oral mucosa moist.   Neck:   Supple, trachea midline, no thyromegaly   Lungs:      Breath sounds heard bilaterally equally witb bilateral rhonchi and crackles without wheezing. No Pleural rub or bronchial breathing.    Heart:    Normal S1 and S2, no murmur, no gallop   Abdomen:     Normal bowel sounds, no masses, no organomegaly. Soft        non-tender, non-distended, no guarding, no rebound                tenderness   Extremities:   Moves all extremities well, no edema, no cyanosis,clubbing   Skin:   No bleeding, bruising or rash.   Neurologic:   Cranial nerves 2 - 12 grossly intact, sensation intact, Motor power is normal and equal bilaterally.           Pertinent Lab Results:     Lab Results (all)     Procedure Component Value Units Date/Time    POC Glucose Once [[id number]]  (Abnormal) Collected:  03/12/18 1558    Specimen:  Blood Updated:  03/12/18 1610     Glucose 141 (H) mg/dL      Comment: Serial Number: [id number]:  [id number]       Protein / Creatinine Ratio, Urine - Urine, Clean Catch [[id number]]  (Abnormal) Collected:  03/12/18 1138    Specimen:  Urine from Urine, Catheter Updated:  03/12/18 1206     Protein/Creatinine Ratio, Urine 2,500.0 mg/G Crea      Creatinine, Urine 7.6 mg/dL      Total Protein, Urine 19.0 (H) mg/dL     Urinalysis With / Microscopic If Indicated - Urine, Clean Catch [[id number]]  (Abnormal) Collected:  03/12/18 1138    Specimen:  Urine from Urine, Catheter Updated:  03/12/18 1149     Color, UA Yellow     Appearance, UA Clear     pH, UA 8.5 (H)     Specific Gravity, UA 1.015     Glucose, UA Negative     Ketones, UA Negative     Bilirubin, UA Negative     Blood, UA Negative     Protein, UA Negative     Leuk Esterase, UA Negative     Nitrite,  UA Negative     Urobilinogen, UA 0.2 E.U./dL    Narrative:       Urine microscopic not indicated.    POC Glucose Once [[id number]]  (Abnormal) Collected:  03/12/18 1044    Specimen:  Blood Updated:  03/12/18 1055     Glucose 143 (H) mg/dL      Comment: Serial Number: [id number]:  [id number]       POC Glucose Once [[id number]]  (Abnormal) Collected:  03/12/18 0617    Specimen:  Blood Updated:  03/12/18 0620     Glucose 295 (H) mg/dL      Comment: Serial Number: [id number]:  [id number]       POC Glucose Once [[id number]]  (Abnormal) Collected:  03/12/18 0614    Specimen:  Blood Updated:  03/12/18 0620     Glucose 312 (H) mg/dL      Comment: Serial Number: [id number]:  [id number]       Blood Gas, Arterial [[id number]]  (Abnormal) Collected:  03/12/18 0446    Specimen:  Arterial Blood Updated:  03/12/18 0547     Site Arterial: left brachial     Willard's Test N/A     pH, Arterial 7.455 pH units      pCO2, Arterial 35.6 mm Hg      pO2, Arterial 94.3 mm Hg      HCO3, Arterial 25.0 mmol/L      Base Excess, Arterial 1.1 mmol/L      O2 Saturation, Arterial 97.8 %      Hemoglobin, Blood Gas 9.6 (L) g/dL      Barometric Pressure for Blood Gas 732 mmHg      Modality Cannula - High Flow     FIO2 100 %     Basic Metabolic Panel [[id number]]  (Abnormal) Collected:  03/12/18 0426    Specimen:  Blood Updated:  03/12/18 0532     Glucose 147 (H) mg/dL      BUN 27 (H) mg/dL      Creatinine 0.70 mg/dL      Sodium 143 mmol/L      Potassium 4.0 mmol/L      Chloride 103 mmol/L      CO2 28.0 mmol/L      Calcium 9.0 mg/dL      eGFR Non African Amer 87 mL/min/1.73      BUN/Creatinine Ratio 38.6 (H)     Anion Gap 16.0 mmol/L     Narrative:       Abnormal estimated GFR should be followed by more specific studies to confirm end stage chronic renal disease. The equation used for calculation may not be accurate for patients less than 19 years old, greater than 70 years old, patients at extremes of weight, malnutrition, or with acute renal  dysfunction.    Magnesium [[id number]]  (Abnormal) Collected:  03/12/18 0426    Specimen:  Blood Updated:  03/12/18 0528     Magnesium 1.5 (L) mg/dL     Hemoglobin A1c [[id number]]  (Normal) Collected:  03/10/18 0425    Specimen:  Blood Updated:  03/12/18 0050     Hemoglobin A1C 5.4 %     Narrative:       Expected HgbA1C results:     3% to 6% HgbA1C      HgbA1C                 Estimated Average Glucose     5%                              97 mg/dl   6%                             126 mg/dl   7%                             154 mg/dl   8%                             183 mg/dl   9%                             212 mg/dl  >10%                           >240 mg/dl      POC Glucose Once [[id number]]  (Abnormal) Collected:  03/11/18 2015    Specimen:  Blood Updated:  03/11/18 2025     Glucose 158 (H) mg/dL      Comment: Serial Number: [id number]:  [id number]       POC Glucose Once [[id number]]  (Normal) Collected:  03/11/18 1518    Specimen:  Blood Updated:  03/11/18 1525     Glucose 100 mg/dL      Comment: Serial Number: [id number]:  [id number]       Blood Culture With ADNNY - Blood, [[id number]]  (Normal) Collected:  03/06/18 1033    Specimen:  Blood from Arm, Left Updated:  03/11/18 1146     Blood Culture No growth at 5 days    Blood Culture With DANNY - Blood, [[id number]]  (Normal) Collected:  03/06/18 1021    Specimen:  Blood from Hand, Left Updated:  03/11/18 1146     Blood Culture No growth at 5 days    POC Glucose Once [[id number]]  (Normal) Collected:  03/11/18 1033    Specimen:  Blood Updated:  03/11/18 1035     Glucose 94 mg/dL      Comment: Serial Number: [id number]:  [id number]       POC Glucose Once [[id number]]  (Normal) Collected:  03/11/18 0432    Specimen:  Blood Updated:  03/11/18 0550     Glucose 92 mg/dL      Comment: Serial Number: [id number]:  [id number]       Blood Gas, Arterial [[id number]]  (Abnormal) Collected:  03/11/18 0441    Specimen:  Arterial Blood Updated:  03/11/18 0542     Site  Arterial: left brachial     Willard's Test Positive     pH, Arterial 7.480 pH units      pCO2, Arterial 38.7 mm Hg      pO2, Arterial 68.4 (L) mm Hg      HCO3, Arterial 28.8 (H) mmol/L      Base Excess, Arterial 5.2 mmol/L      O2 Saturation, Arterial 94.5 %      Hemoglobin, Blood Gas 9.5 (L) g/dL      Barometric Pressure for Blood Gas 733 mmHg      Modality Mask - Nonbreather     FIO2 100 %     Basic Metabolic Panel [[id number]]  (Abnormal) Collected:  03/11/18 0416    Specimen:  Blood Updated:  03/11/18 0526     Glucose 92 mg/dL      BUN 16 mg/dL      Creatinine 0.60 mg/dL      Sodium 142 mmol/L      Potassium 4.2 mmol/L      Chloride 105 mmol/L      CO2 29.0 mmol/L      Calcium 9.1 mg/dL      eGFR Non African Amer 104 mL/min/1.73      BUN/Creatinine Ratio 26.7 (H)     Anion Gap 12.2 mmol/L     Narrative:       Abnormal estimated GFR should be followed by more specific studies to confirm end stage chronic renal disease. The equation used for calculation may not be accurate for patients less than 19 years old, greater than 70 years old, patients at extremes of weight, malnutrition, or with acute renal dysfunction.    Magnesium [[id number]]  (Abnormal) Collected:  03/11/18 0416    Specimen:  Blood Updated:  03/11/18 0526     Magnesium 1.5 (L) mg/dL     CBC & Differential [[id number]] Collected:  03/11/18 0416    Specimen:  Blood Updated:  03/11/18 0514    Narrative:       The following orders were created for panel order CBC & Differential.  Procedure                               Abnormality         Status                     ---------                               -----------         ------                     CBC Auto Differential[[id number]]        Abnormal            Final result                 Please view results for these tests on the individual orders.    CBC Auto Differential [[id number]]  (Abnormal) Collected:  03/11/18 0416    Specimen:  Blood Updated:  03/11/18 0514     WBC 19.24 (H) 10*3/mm3      RBC 3.22 (L)  10*6/mm3      Hemoglobin 9.0 (L) g/dL      Hematocrit 27.2 (L) %      MCV 84.5 fL      MCH 28.0 pg      MCHC 33.1 g/dL      RDW 14.4 %      RDW-SD 44.6 fl      MPV 9.2 fL      Platelets 441 (H) 10*3/mm3      Neutrophil % 70.3 %      Lymphocyte % 20.4 %      Monocyte % 4.5 %      Eosinophil % 2.2 %      Basophil % 0.4 %      Immature Grans % 2.2 (H) %      Neutrophils, Absolute 13.55 (H) 10*3/mm3      Lymphocytes, Absolute 3.92 (H) 10*3/mm3      Monocytes, Absolute 0.86 10*3/mm3      Eosinophils, Absolute 0.42 10*3/mm3      Basophils, Absolute 0.07 10*3/mm3      Immature Grans, Absolute 0.42 (H) 10*3/mm3      nRBC 0.0 /100 WBC     POC Glucose Once [[id number]]  (Normal) Collected:  03/10/18 2040    Specimen:  Blood Updated:  03/10/18 2045     Glucose 103 mg/dL      Comment: Serial Number: [id number]:  [id number]       Potassium [[id number]]  (Normal) Collected:  03/10/18 1807    Specimen:  Blood Updated:  03/10/18 1827     Potassium 4.0 mmol/L     Clostridium Difficile Toxin - Stool, Per Rectum [[id number]] Collected:  03/10/18 1428    Specimen:  Stool from Per Rectum Updated:  03/10/18 1622    Narrative:       The following orders were created for panel order Clostridium Difficile Toxin - Stool, Per Rectum.  Procedure                               Abnormality         Status                     ---------                               -----------         ------                     Clostridium Difficile EI...[[id number]]  Normal              Final result                 Please view results for these tests on the individual orders.    Clostridium Difficile EIA - Stool, Per Rectum [[id number]]  (Normal) Collected:  03/10/18 1428    Specimen:  Stool from Per Rectum Updated:  03/10/18 1622     C Diff GDH / Toxin Negative    POC Glucose Once [[id number]]  (Normal) Collected:  03/10/18 1542    Specimen:  Blood Updated:  03/10/18 1550     Glucose 98 mg/dL      Comment: Serial Number: [id number]:  [id number]       Blood Gas,  Arterial [[id number]]  (Abnormal) Collected:  03/10/18 1359    Specimen:  Arterial Blood Updated:  03/10/18 1359     Site Arterial: right brachial     Willard's Test N/A     pH, Arterial 7.473 pH units      pCO2, Arterial 37.3 mm Hg      pO2, Arterial 82.9 mm Hg      HCO3, Arterial 27.3 mmol/L      Base Excess, Arterial 3.7 mmol/L      O2 Saturation, Arterial 96.9 %      Hemoglobin, Blood Gas 9.7 (L) g/dL      Barometric Pressure for Blood Gas 732 mmHg      Modality Mask - Nonbreather     FIO2 100 %     POC Glucose Once [[id number]]  (Normal) Collected:  03/10/18 1103    Specimen:  Blood Updated:  03/10/18 1110     Glucose 98 mg/dL      Comment: Serial Number: [id number]:  [id number]       Respiratory Culture - Sputum, ET Suction [[id number]]  (Abnormal)  (Susceptibility) Collected:  03/07/18 0227    Specimen:  Sputum from ET Suction Updated:  03/10/18 0716     Respiratory Culture --      Moderate growth (3+) Staphylococcus aureus (A)     Comment:             Candida albicans (A)     Gram Stain Result Less than 10 Epithelial cells per low power field      Greater than 20 WBCs per low power field      Moderate (3+) Gram positive cocci in pairs      Rare (1+) Gram negative bacilli    Susceptibility      Staphylococcus aureus     KARI     Amoxicillin + Clavulanate <=4/2 ug/ml Susceptible     Ampicillin + Sulbactam <=8/4 ug/ml Susceptible     Cefazolin <=4 ug/ml Susceptible     Ciprofloxacin <=1 ug/ml Susceptible     Clindamycin <=0.5 ug/ml Susceptible     Erythromycin <=0.5 ug/ml Susceptible     Gentamicin <=4 ug/ml Susceptible     Levofloxacin <=1 ug/ml Susceptible  [1]      Linezolid 4 ug/ml Susceptible     Moxifloxacin <=0.5 ug/ml Susceptible     Oxacillin <=0.25 ug/ml Susceptible     Penicillin G <=0.03 ug/ml Susceptible     Quinupristin + Dalfopristin <=1 ug/ml Susceptible     Rifampin <=1 ug/ml Susceptible     Tetracycline <=4 ug/ml Susceptible     Trimethoprim + Sulfamethoxazole <=0.5/9.5 ug/ml Susceptible      Vancomycin 2 ug/ml Susceptible            [1]   Staphylococcus species may develop resistance during prolonged therapy with quinolones.  Isolates that are initially susceptible may become resistant within three to four days after initiation of therapy. Testing of repeat isolates may be warranted.                 POC Glucose Once [[id number]]  (Normal) Collected:  03/10/18 0628    Specimen:  Blood Updated:  03/10/18 0640     Glucose 130 mg/dL      Comment: Serial Number: [id number]:  [id number]       Magnesium [[id number]]  (Abnormal) Collected:  03/10/18 0425    Specimen:  Blood Updated:  03/10/18 0600     Magnesium 1.4 (L) mg/dL     Basic Metabolic Panel [[id number]]  (Abnormal) Collected:  03/10/18 0425    Specimen:  Blood Updated:  03/10/18 0550     Glucose 99 (H) mg/dL      BUN 13 mg/dL      Creatinine 0.60 mg/dL      Sodium 142 mmol/L      Potassium 3.2 (L) mmol/L      Chloride 103 mmol/L      CO2 29.0 mmol/L      Calcium 8.8 mg/dL      eGFR Non African Amer 104 mL/min/1.73      BUN/Creatinine Ratio 21.7     Anion Gap 13.2 mmol/L     Narrative:       Abnormal estimated GFR should be followed by more specific studies to confirm end stage chronic renal disease. The equation used for calculation may not be accurate for patients less than 19 years old, greater than 70 years old, patients at extremes of weight, malnutrition, or with acute renal dysfunction.    CBC (No Diff) [[id number]]  (Abnormal) Collected:  03/10/18 0425    Specimen:  Blood Updated:  03/10/18 0523     WBC 20.22 (H) 10*3/mm3      RBC 3.15 (L) 10*6/mm3      Hemoglobin 9.0 (L) g/dL      Hematocrit 26.0 (L) %      MCV 82.5 fL      MCH 28.6 pg      MCHC 34.6 g/dL      RDW 14.4 %      RDW-SD 43.1 fl      MPV 9.1 fL      Platelets 425 (H) 10*3/mm3     POC Glucose Once [[id number]]  (Abnormal) Collected:  03/09/18 2121    Specimen:  Blood Updated:  03/09/18 2125     Glucose 142 (H) mg/dL      Comment: Serial Number: [id number]:  [id number]       POC  Glucose Once [[id number]]  (Normal) Collected:  03/09/18 1709    Specimen:  Blood Updated:  03/09/18 2125     Glucose 118 mg/dL      Comment: Serial Number: [id number]:  [id number]       POC Glucose Once [[id number]]  (Normal) Collected:  03/09/18 1123    Specimen:  Blood Updated:  03/09/18 1126     Glucose 123 mg/dL      Comment: Serial Number: [id number]:  [id number]       Urine Drug Screen - Urine, Clean Catch [[id number]]  (Abnormal) Collected:  03/09/18 1009    Specimen:  Urine from Urine, Clean Catch Updated:  03/09/18 1031     THC, Screen, Urine Negative     Phencyclidine (PCP), Urine Negative     Cocaine Screen, Urine Negative     Methamphetamine, Urine Negative     Opiate Screen Positive (A)     Amphetamine Screen, Urine Negative     Benzodiazepine Screen, Urine Positive (A)     Tricyclic Antidepressants Screen Negative     Methadone Screen, Urine Negative     Barbiturates Screen, Urine Negative     Oxycodone Screen, Urine Negative     Propoxyphene Screen Negative     Buprenorphine, Screen, Urine Negative    Narrative:       Limitations of this procedure include the possibility of false positives due to interfering substances in the urine sample. Clinical data should be correlated with any questionable result. Positive results should be considered Presumptive Positive until results are confirmed with another methodology such as HPLC or GCMS.    Urine Culture - Urine, Urine, Clean Catch [[id number]]  (Abnormal)  (Susceptibility) Collected:  03/06/18 0847    Specimen:  Urine from Urine, Catheter Updated:  03/09/18 0803     Urine Culture --      >100,000 CFU/mL Klebsiella oxytoca (A)     Comment: Identification and KARI to follow.         Susceptibility      Klebsiella oxytoca     KARI     Amikacin <=16 ug/ml Susceptible     Ampicillin >16 ug/ml Resistant     Ampicillin + Sulbactam 16/8 ug/ml Intermediate     Aztreonam <=4 ug/ml Susceptible     Cefazolin >16 ug/ml Resistant     Cefepime <=4 ug/ml Susceptible      Cefotaxime <=2 ug/ml Susceptible     Cefoxitin <=8 ug/ml Susceptible     Ceftazidime <=1 ug/ml Susceptible     Ceftriaxone <=8 ug/ml Susceptible     Cefuroxime sodium 16 ug/ml Intermediate     Ciprofloxacin <=1 ug/ml Susceptible     Doripenem <=0.5 ug/ml Susceptible     Ertapenem <=1 ug/ml Susceptible     Gentamicin <=4 ug/ml Susceptible     Levofloxacin <=2 ug/ml Susceptible     Nitrofurantoin <=32 ug/ml Susceptible     Piperacillin + Tazobactam <=16 ug/ml Susceptible     Tetracycline <=4 ug/ml Susceptible     Tigecycline <=1 ug/ml Susceptible     Tobramycin <=4 ug/ml Susceptible     Trimethoprim + Sulfamethoxazole <=2/38 ug/ml Susceptible                    Magnesium [[id number]]  (Abnormal) Collected:  03/09/18 0724    Specimen:  Blood Updated:  03/09/18 0803     Magnesium 1.0 (C) mg/dL     Basic Metabolic Panel [[id number]]  (Abnormal) Collected:  03/09/18 0724    Specimen:  Blood Updated:  03/09/18 0758     Glucose 115 (H) mg/dL      BUN 17 mg/dL      Creatinine 0.70 mg/dL      Sodium 142 mmol/L      Potassium 2.6 (C) mmol/L      Chloride 104 mmol/L      CO2 27.0 mmol/L      Calcium 8.4 mg/dL      eGFR Non African Amer 87 mL/min/1.73      BUN/Creatinine Ratio 24.3 (H)     Anion Gap 13.6 mmol/L     Narrative:       Abnormal estimated GFR should be followed by more specific studies to confirm end stage chronic renal disease. The equation used for calculation may not be accurate for patients less than 19 years old, greater than 70 years old, patients at extremes of weight, malnutrition, or with acute renal dysfunction.    CBC (No Diff) [[id number]]  (Abnormal) Collected:  03/09/18 0724    Specimen:  Blood Updated:  03/09/18 0751     WBC 27.01 (C) 10*3/mm3      RBC 3.02 (L) 10*6/mm3      Hemoglobin 8.6 (L) g/dL      Hematocrit 24.7 (L) %      MCV 81.8 fL      MCH 28.5 pg      MCHC 34.8 g/dL      RDW 14.4 %      RDW-SD 42.5 fl      MPV 8.7 fL      Platelets 421 (H) 10*3/mm3     POC Glucose Once [[id number]]   (Normal) Collected:  03/09/18 0621    Specimen:  Blood Updated:  03/09/18 0630     Glucose 110 mg/dL      Comment: Serial Number: [id number]:  [id number]       Blood Gas, Arterial [[id number]]  (Abnormal) Collected:  03/09/18 0506    Specimen:  Arterial Blood Updated:  03/09/18 0507     Site Arterial Line     Willard's Test N/A     pH, Arterial 7.429 pH units      pCO2, Arterial 38.7 mm Hg      pO2, Arterial 71.8 (L) mm Hg      HCO3, Arterial 25.6 mmol/L      Base Excess, Arterial 1.3 mmol/L      O2 Saturation, Arterial 95.2 %      Hemoglobin, Blood Gas 8.6 (L) g/dL      Barometric Pressure for Blood Gas 735 mmHg      Modality Mask - Nonbreather     FIO2 100 %     POC Glucose Once [[id number]]  (Abnormal) Collected:  03/08/18 2354    Specimen:  Blood Updated:  03/09/18 0000     Glucose 143 (H) mg/dL      Comment: Serial Number: [id number]:  [id number]       Basic Metabolic Panel [[id number]]  (Abnormal) Collected:  03/08/18 1628    Specimen:  Blood Updated:  03/08/18 1720     Glucose 114 (H) mg/dL      BUN 24 (H) mg/dL      Creatinine 0.90 mg/dL      Sodium 148 (H) mmol/L      Potassium 3.0 (L) mmol/L      Chloride 114 (H) mmol/L      CO2 26.0 mmol/L      Calcium 8.5 mg/dL      eGFR Non African Amer 65 mL/min/1.73      BUN/Creatinine Ratio 26.7 (H)     Anion Gap 11.0 mmol/L     Narrative:       Abnormal estimated GFR should be followed by more specific studies to confirm end stage chronic renal disease. The equation used for calculation may not be accurate for patients less than 19 years old, greater than 70 years old, patients at extremes of weight, malnutrition, or with acute renal dysfunction.    Hemoglobin & Hematocrit, Blood [[id number]]  (Abnormal) Collected:  03/08/18 1620    Specimen:  Blood Updated:  03/08/18 1635     Hemoglobin 8.0 (L) g/dL      Hematocrit 23.2 (C) %     POC Glucose Once [[id number]]  (Abnormal) Collected:  03/08/18 1224    Specimen:  Blood Updated:  03/08/18 1230     Glucose 177 (H)  mg/dL      Comment: Serial Number: [id number]:  [id number]       Blood Gas, Arterial [[id number]]  (Abnormal) Collected:  03/08/18 0843    Specimen:  Arterial Blood Updated:  03/08/18 0844     Site Arterial Line     Willard's Test Positive     pH, Arterial 7.388 pH units      pCO2, Arterial 39.9 mm Hg      pO2, Arterial 61.7 (L) mm Hg      HCO3, Arterial 24.0 mmol/L      Base Excess, Arterial -1.0 mmol/L      O2 Saturation, Arterial 91.7 %      Hemoglobin, Blood Gas 7.9 (L) g/dL      Barometric Pressure for Blood Gas 734 mmHg      Modality Mask - Nonbreather     FIO2 60 %     Hemoglobin & Hematocrit, Blood [[id number]]  (Abnormal) Collected:  03/08/18 0749    Specimen:  Blood Updated:  03/08/18 0804     Hemoglobin 7.3 (C) g/dL      Hematocrit 21.0 (C) %     Magnesium [[id number]]  (Abnormal) Collected:  03/08/18 0435    Specimen:  Blood Updated:  03/08/18 0741     Magnesium 1.5 (L) mg/dL     POC Glucose Once [[id number]]  (Normal) Collected:  03/08/18 0603    Specimen:  Blood Updated:  03/08/18 0605     Glucose 130 mg/dL      Comment: Serial Number: [id number]:  [id number]       Renal Function Panel [[id number]]  (Abnormal) Collected:  03/08/18 0435    Specimen:  Blood Updated:  03/08/18 0549     Glucose 137 (H) mg/dL      BUN 32 (H) mg/dL      Creatinine 1.40 (H) mg/dL      Sodium 153 (C) mmol/L      Potassium 3.2 (L) mmol/L      Chloride 117 (H) mmol/L      CO2 25.0 (L) mmol/L      Calcium 8.5 mg/dL      Albumin 2.60 (L) g/dL      Phosphorus 4.1 mg/dL      Anion Gap 14.2 mmol/L      BUN/Creatinine Ratio 22.9     eGFR Non African Amer 39 (L) mL/min/1.73     Narrative:       Abnormal estimated GFR should be followed by more specific studies to confirm end stage chronic renal disease. The equation used for calculation may not be accurate for patients less than 19 years old, greater than 70 years old, patients at extremes of weight, malnutrition, or with acute renal dysfunction.    CBC (No Diff) [[id number]]   (Abnormal) Collected:  03/08/18 0435    Specimen:  Blood Updated:  03/08/18 0456     WBC 24.08 (H) 10*3/mm3      RBC 2.55 (L) 10*6/mm3      Hemoglobin 7.3 (C) g/dL      Hematocrit 20.9 (C) %      MCV 82.0 fL      MCH 28.6 pg      MCHC 34.9 g/dL      RDW 14.3 %      RDW-SD 42.4 fl      MPV 9.1 fL      Platelets 492 (H) 10*3/mm3     POC Glucose Once [[id number]]  (Abnormal) Collected:  03/08/18 0010    Specimen:  Blood Updated:  03/08/18 0020     Glucose 140 (H) mg/dL      Comment: Serial Number: [id number]:  [id number]       Potassium [[id number]]  (Abnormal) Collected:  03/07/18 2236    Specimen:  Blood Updated:  03/07/18 2249     Potassium 3.2 (L) mmol/L     POC Glucose Once [[id number]]  (Normal) Collected:  03/07/18 1735    Specimen:  Blood Updated:  03/07/18 1740     Glucose 130 mg/dL      Comment: Serial Number: [id number]:  [id number]       Potassium [[id number]]  (Abnormal) Collected:  03/07/18 1510    Specimen:  Blood Updated:  03/07/18 1557     Potassium 2.6 (C) mmol/L     Blood Gas, Arterial With Co-Ox [[id number]]  (Abnormal) Collected:  03/07/18 0746    Specimen:  Arterial Blood Updated:  03/07/18 1511     Site Arterial Line     Willard's Test Positive     pH, Arterial 7.303 pH units      pCO2, Arterial 39.2 mm Hg      pO2, Arterial 74.3 (L) mm Hg      HCO3, Arterial 19.4 (L) mmol/L      Base Excess, Arterial -7.0 mmol/L      O2 Saturation, Arterial 95.3 %      Hemoglobin, Blood Gas 8.2 (L) g/dL      Oxyhemoglobin 93.6 (L) %      Methemoglobin 0.70 %      Carboxyhemoglobin 1.1 %      Modality Cannula - High Flow     FIO2 60 %     POC Glucose Once [[id number]]  (Abnormal) Collected:  03/07/18 1144    Specimen:  Blood Updated:  03/07/18 1151     Glucose 155 (H) mg/dL      Comment: Serial Number: [id number]:  [id number]       POC Glucose Once [[id number]]  (Abnormal) Collected:  03/07/18 0645    Specimen:  Blood Updated:  03/07/18 0650     Glucose 179 (H) mg/dL      Comment: Serial Number:  [id number]:  [id number]       Vitamin B12 [[id number]]  (Normal) Collected:  03/07/18 0438    Specimen:  Blood Updated:  03/07/18 0631     Vitamin B-12 771 pg/mL     Folate [[id number]]  (Normal) Collected:  03/07/18 0438    Specimen:  Blood Updated:  03/07/18 0631     Folate 7.92 ng/mL     Ferritin [[id number]]  (Normal) Collected:  03/07/18 0438    Specimen:  Blood Updated:  03/07/18 0618     Ferritin 150.00 ng/mL     Iron Profile [[id number]]  (Abnormal) Collected:  03/07/18 0438    Specimen:  Blood Updated:  03/07/18 0618     Iron 22 (L) mcg/dL      TIBC 191 (L) mcg/dL      Iron Saturation 12 %     Renal Function Panel [[id number]]  (Abnormal) Collected:  03/07/18 0438    Specimen:  Blood Updated:  03/07/18 0556     Glucose 152 (H) mg/dL      BUN 56 (H) mg/dL      Creatinine 5.10 (H) mg/dL      Sodium 140 mmol/L      Potassium 2.8 (C) mmol/L      Chloride 105 mmol/L      CO2 18.0 (L) mmol/L      Calcium 7.9 (L) mg/dL      Albumin 2.70 (L) g/dL      Phosphorus 7.3 (C) mg/dL      Anion Gap 19.8 mmol/L      BUN/Creatinine Ratio 11.0     eGFR Non African Amer 9 (L) mL/min/1.73     Narrative:       Abnormal estimated GFR should be followed by more specific studies to confirm end stage chronic renal disease. The equation used for calculation may not be accurate for patients less than 19 years old, greater than 70 years old, patients at extremes of weight, malnutrition, or with acute renal dysfunction.    Vancomycin, Random [[id number]]  (Normal) Collected:  03/07/18 0438    Specimen:  Blood Updated:  03/07/18 0552     Vancomycin Random 15.80 mcg/mL     Magnesium [[id number]]  (Normal) Collected:  03/07/18 0438    Specimen:  Blood Updated:  03/07/18 0544     Magnesium 1.7 mg/dL     CBC (No Diff) [[id number]]  (Abnormal) Collected:  03/07/18 0438    Specimen:  Blood Updated:  03/07/18 0506     WBC 21.08 (H) 10*3/mm3      RBC 3.07 (L) 10*6/mm3      Hemoglobin 8.7 (L) g/dL      Hematocrit 25.6 (L) %      MCV 83.4 fL      MCH  28.3 pg      MCHC 34.0 g/dL      RDW 14.4 %      RDW-SD 43.8 fl      MPV 9.2 fL      Platelets 547 (H) 10*3/mm3     Reticulocytes [[id number]]  (Normal) Collected:  03/07/18 0438    Specimen:  Blood Updated:  03/07/18 0448     Reticulocyte % 1.31 %      Reticulocyte Absolute 0.0405 10*6/mm3     POC Glucose Once [[id number]]  (Abnormal) Collected:  03/07/18 0001    Specimen:  Blood Updated:  03/07/18 0010     Glucose 163 (H) mg/dL      Comment: Serial Number: [id number]:  [id number]       Blood Gas, Arterial [[id number]]  (Abnormal) Collected:  03/06/18 2002    Specimen:  Arterial Blood Updated:  03/06/18 2006     Site Arterial Line     Willard's Test N/A     pH, Arterial 7.085 (C) pH units      pCO2, Arterial 36.1 mm Hg      pO2, Arterial 57.8 (L) mm Hg      HCO3, Arterial 10.8 (L) mmol/L      Base Excess, Arterial -19.1 mmol/L      O2 Saturation, Arterial 87.3 %      Hemoglobin, Blood Gas 8.7 (L) g/dL      Barometric Pressure for Blood Gas 726 mmHg      Modality Cannula - Nasal     FIO2 40 %     POC Glucose Once [[id number]]  (Normal) Collected:  03/06/18 1757    Specimen:  Blood Updated:  03/06/18 1802     Glucose 122 mg/dL      Comment: Serial Number: [id number]:  [id number]       Basic Metabolic Panel [[id number]]  (Abnormal) Collected:  03/06/18 1409    Specimen:  Blood Updated:  03/06/18 1439     Glucose 77 mg/dL      BUN 60 (H) mg/dL      Creatinine 8.10 (H) mg/dL      Sodium 134 (L) mmol/L      Potassium 4.4 mmol/L      Chloride 106 mmol/L      CO2 9.0 (C) mmol/L      Calcium 7.4 (L) mg/dL      eGFR Non African Amer 5 (L) mL/min/1.73      BUN/Creatinine Ratio 7.4     Anion Gap 23.4 mmol/L     Narrative:       Abnormal estimated GFR should be followed by more specific studies to confirm end stage chronic renal disease. The equation used for calculation may not be accurate for patients less than 19 years old, greater than 70 years old, patients at extremes of weight, malnutrition, or with acute renal  dysfunction.    Blood Gas, Arterial With Co-Ox [[id number]]  (Abnormal) Collected:  03/06/18 1352    Specimen:  Arterial Blood Updated:  03/06/18 1353     Site Arterial Line     Willard's Test Positive     pH, Arterial 7.035 (C) pH units      pCO2, Arterial 33.2 (L) mm Hg      pO2, Arterial 79.1 mm Hg      HCO3, Arterial 8.9 (L) mmol/L      Base Excess, Arterial -21.8 mmol/L      O2 Saturation, Arterial 92.9 %      Hemoglobin, Blood Gas 8.8 (L) g/dL      Oxyhemoglobin 91.3 (L) %      Methemoglobin 0.30 %      Carboxyhemoglobin 1.4 %      Modality Cannula - Nasal     FIO2 44 %     Phosphorus [[id number]]  (Abnormal) Collected:  03/06/18 0847    Specimen:  Blood Updated:  03/06/18 1308     Phosphorus 9.5 (C) mg/dL     POC Glucose Once [[id number]]  (Normal) Collected:  03/06/18 0850    Specimen:  Blood Updated:  03/06/18 1236     Glucose 87 mg/dL      Comment: Serial Number: [id number]:  [id number]       [facility] [[id number]] Collected:  03/06/18 0847    Specimen:  Blood Updated:  03/06/18 1001    Narrative:       The following orders were created for panel order [facility].  Procedure                               Abnormality         Status                     ---------                               -----------         ------                     Light Blue Top[[id number]]                                   Final result               Lavender Top[[id number]]                                     Final result               Gold Top - SST[[id number]]                                                              Green Top (No Gel)[[id number]]                               Final result                 Please view results for these tests on the individual orders.    Light Blue Top [[id number]] Collected:  03/06/18 0847    Specimen:  Blood Updated:  03/06/18 1001     Extra Tube hold for add-on     Comment: Auto resulted       Lavender Top [[id number]] Collected:  03/06/18 0847    Specimen:  Blood Updated:  03/06/18 1001     Extra  Tube hold for add-on     Comment: Auto resulted       Green Top (No Gel) [[id number]] Collected:  03/06/18 0847    Specimen:  Blood Updated:  03/06/18 1001     Extra Tube Hold for add-ons.     Comment: Auto resulted.       CK-MB Index [[id number]]  (Abnormal) Collected:  03/06/18 0847    Specimen:  Blood Updated:  03/06/18 0948     CK-MB Index 3.9 (H) %     CK Total & CKMB [[id number]]  (Abnormal) Collected:  03/06/18 0847    Specimen:  Blood Updated:  03/06/18 0948     CKMB 34.90 (C) ng/mL      Creatine Kinase 904 (H) U/L     CBC & Differential [[id number]] Collected:  03/06/18 0847    Specimen:  Blood Updated:  03/06/18 0925    Narrative:       The following orders were created for panel order CBC & Differential.  Procedure                               Abnormality         Status                     ---------                               -----------         ------                     Manual Differential[[id number]]          Abnormal            Final result               Scan Slide[[id number]]                                       Final result               CBC Auto Differential[[id number]]        Abnormal            Final result                 Please view results for these tests on the individual orders.    Scan Slide [[id number]] Collected:  03/06/18 0847    Specimen:  Blood Updated:  03/06/18 0925     Scan Slide --     Comment: See Manual Differential Results       Manual Differential [[id number]]  (Abnormal) Collected:  03/06/18 0847    Specimen:  Blood Updated:  03/06/18 0925     Neutrophil % 66.0 %      Lymphocyte % 6.0 (L) %      Monocyte % 8.0 %      Bands %  19.0 (H) %      Metamyelocyte % 1.0 (H) %      Neutrophils Absolute 20.33 (H) 10*3/mm3      Lymphocytes Absolute 1.44 10*3/mm3      Monocytes Absolute 1.91 (H) 10*3/mm3      nRBC 1.0 (H) /100 WBC      RBC Morphology Normal     Toxic Granulation Slight/1+     Platelet Estimate Increased    CBC Auto Differential [[id number]]  (Abnormal) Collected:  03/06/18 0847     Specimen:  Blood Updated:  03/06/18 0925     WBC 23.92 (H) 10*3/mm3      RBC 3.13 (L) 10*6/mm3      Hemoglobin 8.8 (L) g/dL      Hematocrit 27.3 (L) %      MCV 87.2 fL      MCH 28.1 pg      MCHC 32.2 g/dL      RDW 14.7 (H) %      RDW-SD 47.4 fl      MPV 9.3 fL      Platelets 546 (H) 10*3/mm3     Comprehensive Metabolic Panel [[id number]]  (Abnormal) Collected:  03/06/18 0847    Specimen:  Blood Updated:  03/06/18 0922     Glucose 75 mg/dL      BUN 69 (H) mg/dL      Creatinine 9.30 (H) mg/dL      Sodium 132 (L) mmol/L      Potassium 4.5 mmol/L      Chloride 100 mmol/L      CO2 9.0 (C) mmol/L      Calcium 8.1 (L) mg/dL      Total Protein 6.7 g/dL      Albumin 3.40 (L) g/dL      ALT (SGPT) 43 U/L      AST (SGOT) 41 U/L      Alkaline Phosphatase 129 (H) U/L      Total Bilirubin 0.9 mg/dL      eGFR Non African Amer 4 (L) mL/min/1.73      Globulin 3.3 gm/dL      A/G Ratio 1.0 g/dL      BUN/Creatinine Ratio 7.4     Anion Gap 27.5 mmol/L     Narrative:       Abnormal estimated GFR should be followed by more specific studies to confirm end stage chronic renal disease. The equation used for calculation may not be accurate for patients less than 19 years old, greater than 70 years old, patients at extremes of weight, malnutrition, or with acute renal dysfunction.    Troponin [[id number]]  (Normal) Collected:  03/06/18 0847    Specimen:  Blood Updated:  03/06/18 0920     Troponin I <0.012 ng/mL     Narrative:       Normal Patient Upper Reference Limit (URL) (99th Percentile)=0.03 ng/mL   Non-AMI Illness Reference Limit=0.03-0.11 ng/mL   AMI Confirmation=0.12 ng/mL and above    Magnesium [[id number]]  (Abnormal) Collected:  03/06/18 0847    Specimen:  Blood Updated:  03/06/18 0920     Magnesium 1.5 (L) mg/dL     Urine Drug Screen - Urine, Clean Catch [[id number]]  (Abnormal) Collected:  03/06/18 0849    Specimen:  Urine from Urine, Clean Catch Updated:  03/06/18 0912     THC, Screen, Urine Negative     Phencyclidine (PCP), Urine  Negative     Cocaine Screen, Urine Negative     Methamphetamine, Urine Positive (A)     Opiate Screen Positive (A)     Amphetamine Screen, Urine Positive (A)     Benzodiazepine Screen, Urine Positive (A)     Tricyclic Antidepressants Screen Negative     Methadone Screen, Urine Negative     Barbiturates Screen, Urine Negative     Oxycodone Screen, Urine Negative     Propoxyphene Screen Negative     Buprenorphine, Screen, Urine Negative    Narrative:       Limitations of this procedure include the possibility of false positives due to interfering substances in the urine sample. Clinical data should be correlated with any questionable result. Positive results should be considered Presumptive Positive until results are confirmed with another methodology such as HPLC or GCMS.    Urinalysis, Microscopic Only - Urine, Clean Catch [[id number]]  (Abnormal) Collected:  03/06/18 0847    Specimen:  Urine from Urine, Catheter Updated:  03/06/18 0912     RBC, UA 3-5 (A) /HPF      WBC, UA 31-50 (A) /HPF      Bacteria, UA 3+ (A) /HPF      Squamous Epithelial Cells, UA 0-2 /HPF      Hyaline Casts, UA None Seen /LPF      Methodology Manual Light Microscopy    Urinalysis With / Culture If Indicated - Urine, Catheter [[id number]]  (Abnormal) Collected:  03/06/18 0847    Specimen:  Urine from Urine, Catheter Updated:  03/06/18 0912     Color, UA Yellow     Appearance, UA Cloudy (A)     pH, UA 6.5     Specific Gravity, UA 1.020     Glucose, UA Negative     Ketones, UA Negative     Bilirubin, UA Negative     Blood, UA Small (1+) (A)     Protein, UA >=300 mg/dL (3+) (A)     Leuk Esterase, UA Moderate (2+) (A)     Nitrite, UA Negative     Urobilinogen, UA 0.2 E.U./dL    Lactic Acid, Plasma [[id number]]  (Normal) Collected:  03/06/18 0847    Specimen:  Blood Updated:  03/06/18 0907     Lactate 1.0 mmol/L     Blood Gas, Venous [[id number]]  (Abnormal) Collected:  03/06/18 0855    Specimen:  Venous Blood Updated:  03/06/18 0852     Site Arterial:  left brachial     pH, Venous 7.059 pH Units      pCO2, Venous 34.2 (L) mm Hg      pO2, Venous 32.9 (L) mm Hg      HCO3, Venous 9.6 (L) mmol/L      Base Excess, Venous -20.7 mmol/L      O2 Saturation, Venous 54.2 %      Hemoglobin, Blood Gas 8.8 (L) g/dL      Barometric Pressure for Blood Gas 731 mmHg      Modality Cannula - Nasal     FIO2 28 %           Pertinent Radiology Results:    Imaging Results (all)     Procedure Component Value Units Date/Time    XR Chest 1 View [[id number]] Collected:  03/12/18 1041     Updated:  03/12/18 1049    Narrative:       XR CHEST 1 VW-     HISTORY: Pneumonia; A41.9-Sepsis, unspecified organism; N17.9-Acute  kidney failure, unspecified; E87.2-Acidosis; J18.9-Pneumonia,  unspecified organism     COMPARISON: One day earlier.     FINDINGS:    The lungs fields are unchanged. There is no pneumothorax.          The cardiac silhouette is stable.             Impression:       No significant change.            This report was finalized on 3/12/2018 10:47 AM by Yvon Morrison MD.    XR Chest 1 View [[id number]] Collected:  03/11/18 1055     Updated:  03/11/18 1125    Narrative:       PORTABLE CHEST     INDICATION: Acute respiratory failure.     FINDINGS: Single view, compared with 03/09/2018. EKG leads overlie the  chest. Heart size is normal. No pneumothorax. Worsened interstitial  opacities with some mixed interstitial and airspace infiltrates may  represent pneumonia or edema. The findings are overall very similar to  previous with exception to slight worsening at the left base.       Impression:       Slight worsening opacification at the left base. No other  significant change. Continued follow-up recommended.     This report was finalized on 3/11/2018 11:22 AM by Ismael Adkins MD.    XR Chest 1 View [[id number]] Collected:  03/09/18 0828     Updated:  03/09/18 0831    Narrative:       PROCEDURE: XR CHEST 1 VW-     HISTORY: PNA; A41.9-Sepsis, unspecified organism; N17.9-Acute kidney  failure,  unspecified; E87.2-Acidosis; J18.9-Pneumonia, unspecified  organism     COMPARISON: 3/8/2018.     FINDINGS: The heart is normal in size. The mediastinum is unremarkable.  There has been interval worsening in the patients diffuse bilateral  airspace disease. There is no pneumothorax.  There are no acute osseous  abnormalities.           Impression:       Interval worsening the patient's diffuse bilateral airspace  disease.     Continued followup is recommended.     This report was finalized on 3/9/2018 8:29 AM by Carol Peter M.D..    XR Chest 1 View [[id number]] Collected:  03/08/18 0830     Updated:  03/08/18 0833    Narrative:       PROCEDURE: XR CHEST 1 VW-     HISTORY: PNA; A41.9-Sepsis, unspecified organism; N17.9-Acute kidney  failure, unspecified; E87.2-Acidosis; J18.9-Pneumonia, unspecified  organism     COMPARISON: March 6, 2018.     FINDINGS: The heart is normal in size. The mediastinum is unremarkable.  There is diffuse bilateral airspace disease which is improved somewhat  in the lung bases compared to the prior exam. There is no pneumothorax.   There are no acute osseous abnormalities.           Impression:       Slight interval improvement in the patients diffuse  bilateral airspace disease.     Continued followup is recommended.     This report was finalized on 3/8/2018 8:30 AM by Carol Peter M.D..    XR Abdomen KUB [[id number]] Collected:  03/06/18 1437     Updated:  03/06/18 1440    Narrative:       PROCEDURE: XR ABDOMEN KUB-     HISTORY: distention; A41.9-Sepsis, unspecified organism; N17.9-Acute  kidney failure, unspecified; E87.2-Acidosis; J18.9-Pneumonia,  unspecified organism     COMPARISON: None.     FINDINGS: An AP view of the abdomen and pelvis demonstrates an  unremarkable bowel gas pattern with no evidence of obstruction.  Scattered stool is present within the colon. Femoral lines are in place.       Impression:       Unremarkable exam.             This report was finalized on  3/6/2018 2:38 PM by Carol Peter M.D..    XR Chest 1 View [[id number]] Collected:  03/06/18 0914     Updated:  03/06/18 0917    Narrative:       PROCEDURE: XR CHEST 1 VW-     HISTORY: Weak/Dizzy/AMS triage protocol     COMPARISON: January 26, 2018.     FINDINGS: The heart is normal in size. The mediastinum is unremarkable.  There are increased interstitial markings with patchy opacities in the  lung bases suspicious for a pneumonia. There is no pneumothorax.  There  are no acute osseous abnormalities.           Impression:       Bilateral airspace disease suspicious for a pneumonia.     Continued followup is recommended.     This report was finalized on 3/6/2018 9:15 AM by Carol Peter M.D..          Condition on Discharge:  Stable        Code status during the hospital stay:  Full code      Discharge Disposition:    Home or Self Care    Discharge Medication: None. Left AMA    Discharge Diet: None        Activity at Discharge: Bedrest      Follow-up Appointments:    No future appointments.      Test Results Pending at Discharge: None            Meagan Mclean DO  03/13/18  7:49 AM      Medication risks and benefits were discussed in great detail. The patient reported being satisfied with the current treatment plan and the care delivered while hospitalized.     Time:  I spent a year.  minutes preparing discharge counseling and teaching.

## 2018-03-14 NOTE — THERAPY EVALUATION
Acute Care - Physical Therapy Initial Evaluation   [place]     Patient Name: Charisma Cortez  : 1960  MRN: [id number]  Today's Date: 3/13/2018   Onset of Illness/Injury or Date of Surgery: 18  Date of Referral to PT: 18  Referring Physician: Dr. Mclean      Admit Date: 3/6/2018    Visit Dx:     ICD-10-CM ICD-9-CM   1. Sepsis, due to unspecified organism A41.9 038.9     995.91   2. RAGHU (acute kidney injury) N17.9 584.9   3. Metabolic acidosis E87.2 276.2   4. Pneumonia of both lungs due to infectious organism, unspecified part of lung J18.9 483.8   5. Impaired mobility and ADLs Z74.09 799.89     Patient Active Problem List   Diagnosis   • Chronic back pain   • DDD (degenerative disc disease), lumbar   • DDD (degenerative disc disease), cervical   • Arachnoid cyst   • Mild Degenerative lumbar spinal stenosis   • RAGHU (acute kidney injury)   • Polysubstance abuse   • Tobacco use disorder   • Type 2 diabetes mellitus   • Seizure disorder   • HTN, currently hypotense   • Hypokalemia   • UTI (urinary tract infection)   • COPD (chronic obstructive pulmonary disease)   • Depression   • Leukocytosis   • Thrombocytosis   • Sepsis     Past Medical History:   Diagnosis Date   • Anxiety    • Arthritis    • C. difficile diarrhea 2018   • Cancer     skin   • Cataracts, bilateral    • Chest pain    • COPD (chronic obstructive pulmonary disease) 3/23/2017   • Depression 3/23/2017   • Diabetes mellitus    • Emphysema lung    • Epilepsy     LAST SEIZURE 2017   • Headache    • High cholesterol    • History of brain shunt    • Hypertension    • Liver disease    • Low back pain    • Lumbosacral disc disease    • BAEZ (nonalcoholic steatohepatitis)    • Neurological disorder    • Osteoporosis    • Pneumonia    • Seizure disorder 3/23/2017   • Wears glasses      Past Surgical History:   Procedure Laterality Date   • APPENDECTOMY     • BRAIN SURGERY     • BREAST BIOPSY Right    •  SECTION   "1984,1986   • CHOLECYSTECTOMY     • COLON SURGERY  1970 2\" REMOVED   • COLONOSCOPY     • CYST REMOVAL  1987    brain, R front lobe - [facility]   • DENTAL PROCEDURE     • ENDOSCOPY     • EXCISION MASS TRUNK Left 8/7/2017    Procedure: EXCISION LEFT BUTTOCKS MASS;  Surgeon: Jennifer Galindo MD;  Location: [facility];  Service:    • HYSTERECTOMY     • RECONSTRUCTION URETHROPLASTY     • TUBAL ABDOMINAL LIGATION          PT ASSESSMENT (last 72 hours)      Physical Therapy Evaluation     Row Name 03/12/18 1214          PT Evaluation Time/Intention    Subjective Information complains of;dyspnea;pain  -JR     Document Type evaluation  -JR     Mode of Treatment co-treatment  -JR     Patient Effort adequate  -JR     Symptoms Noted During/After Treatment shortness of breath  -JR     Row Name 03/12/18 1214          General Information    Patient Profile Reviewed? yes  -JR     Onset of Illness/Injury or Date of Surgery 03/06/18  -JR     Referring Physician Meagan Mclean, DO  -JR     Patient Observations alert;cooperative;agree to therapy  -JR     Patient/Family Observations No family present  -JR     General Observations of Patient Supine with matute cath, Hi-isaura oxygen at 28LPM at 100%   -JR     Prior Level of Function independent:;community mobility  -JR     Equipment Currently Used at Home rollator;shower chair  -JR     Pertinent History of Current Functional Problem sepsis, epilepsy  -JR     Existing Precautions/Restrictions oxygen therapy device and L/min   Epilepsy  -JR     Risks Reviewed patient:;increased discomfort  -JR     Benefits Reviewed patient:;improve function  -JR     Barriers to Rehab none identified  -JR     Row Name 03/12/18 1214          Relationship/Environment    Primary Source of Support/Comfort --   Ex-  -JR     Name of Support/Comfort Primary Source Daniel Cortez  -     Row Name 03/12/18 1214          Resource/Environmental Concerns    Current Living Arrangements home/apartment/condo  -JR     Row Name " 03/12/18 1214          Stairs Within Home, Primary    Stairs, Within Home, Primary 14 Stairs to bedrooms and bathroom  -JR     Number of Stairs, Within Home, Primary --   14  -JR     Surface of Stairs, Within Home, Primary carpeting  -JR     Stair Railings, Within Home, Primary railing on left side (ascending)  -JR     Landing, Stairs, Within Home, Primary railings present  -     Row Name 03/12/18 1214          Cognitive Assessment/Intervention- PT/OT    Orientation Status (Cognition) oriented x 4  -JR     Follows Commands (Cognition) WNL  -     Row Name 03/12/18 1214          Bed Mobility Assessment/Treatment    Bed Mobility Assessment/Treatment rolling right;rolling left;scooting/bridging  -JR     Rolling Left [place] (Bed Mobility) contact guard  -JR     Rolling Right [place] (Bed Mobility) contact guard  -JR     Scooting/Bridging [place] (Bed Mobility) contact guard  -JR     Supine-Sit [place] (Bed Mobility) contact guard  -JR     Sit-Supine [place] (Bed Mobility) contact guard  -JR     Bed Mobility, Safety Issues decreased use of legs for bridging/pushing;impaired trunk control for bed mobility;decreased use of arms for pushing/pulling  -JR     Comment (Bed Mobility) Sits unsupported in bed x 10 minutes  -     Row Name 03/12/18 1214          Transfer Assessment/Treatment    Comment (Transfers) NT  -     Row Name 03/12/18 1214          Gait/Stairs Assessment/Training    Comment (Gait/Stairs) NT  -     Row Name 03/12/18 1214          General ROM    GENERAL ROM COMMENTS BLE= WFL  -     Row Name 03/12/18 1214          General Assessment (Manual Muscle Testing)    Comment, General Manual Muscle Testing (MMT) Assessment BLE=3+/5  -     Row Name 03/12/18 1214          Pain Assessment    Additional Documentation Pain Scale: Numbers Pre/Post-Treatment (Group)  -     Row Name 03/12/18 1214          Pain Scale: Numbers Pre/Post-Treatment    Pain Scale: Numbers, Pretreatment 7/10   -JR     Pain Scale: Numbers, Post-Treatment 7/10  -JR     Pain Location back  -JR     Pain Intervention(s) Repositioned;Medication (See MAR)  -JR     Row Name             Wound 03/10/18 0800 Left ear laceration    Wound - Properties Group Date first assessed: 03/10/18  -CH Time first assessed: 0800  -CH Present On Admission : no  -CH Side: Left  -CH, ear  Location: ear  -CH Type: laceration  -CH    Row Name 03/12/18 1214          Coping    Observed Emotional State anxious  -JR     Row Name 03/12/18 1214          Plan of Care Review    Plan of Care Reviewed With patient  -JR     Row Name 03/12/18 1214          Physical Therapy Clinical Impression    Date of Referral to PT 03/12/18  -JR     PT Diagnosis (PT Clinical Impression) weakness, poor activity tolerance  -JR     Patient/Family Goals Statement (PT Clinical Impression) Patient wants to go home  -JR     Criteria for Skilled Interventions Met (PT Clinical Impression) yes;treatment indicated  -JR     Pathology/Pathophysiology Noted (Describe Specifically for Each System) pulmonary;musculoskeletal;cardiovascular  -JR     Impairments Found (describe specific impairments) aerobic capacity/endurance;gait, locomotion, and balance;motor function;muscle performance  -JR     Rehab Potential (PT Clinical Summary) good, to achieve stated therapy goals  -JR     Care Plan Review (PT) patient/other agree to care plan;care plan/treatment goals reviewed;risks/benefits reviewed  -JR     Row Name 03/12/18 1214          Vital Signs    Pre SpO2 (%) 96  -JR     O2 Delivery Pre Treatment hi-flow  -JR     Intra SpO2 (%) 92  -JR     O2 Delivery Intra Treatment supplemental O2   hi isaura 28LPM at 100%  -JR     Post SpO2 (%) 94  -JR     O2 Delivery Post Treatment supplemental O2   hi-isaura 28LPM at 100%  -JR     Pre Patient Position Supine  -JR     Intra Patient Position Sitting  -JR     Post Patient Position Supine  -JR     Row Name 03/12/18 1214          Physical Therapy Goals    Bed  Mobility Goal Selection (PT) bed mobility, PT goal 1  -JR     Transfer Goal Selection (PT) transfer, PT goal 1  -JR     Gait Training Goal Selection (PT) gait training, PT goal 1  -JR     Row Name 03/12/18 1214          Bed Mobility Goal 1 (PT)    Activity/Assistive Device (Bed Mobility Goal 1, PT) bed mobility activities, all  -JR     [place] Level/Cues Needed (Bed Mobility Goal 1, PT) supervision required  -JR     Time Frame (Bed Mobility Goal 1, PT) 2 weeks  -JR     Row Name 03/12/18 1214          Transfer Goal 1 (PT)    Activity/Assistive Device (Transfer Goal 1, PT) sit-to-stand/stand-to-sit  -JR     [place] Level/Cues Needed (Transfer Goal 1, PT) supervision required  -JR     Time Frame (Transfer Goal 1, PT) 2 weeks  -JR     Row Name 03/12/18 1214          Gait Training Goal 1 (PT)    Activity/Assistive Device (Gait Training Goal 1, PT) gait (walking locomotion);walker, rolling  -JR     [place] Level (Gait Training Goal 1, PT) supervision required  -JR     Distance (Gait Goal 1, PT) 100  -JR     Time Frame (Gait Training Goal 1, PT) 2 weeks  -JR     Row Name 03/12/18 1214          Positioning and Restraints    Pre-Treatment Position in bed  -JR     Post Treatment Position bed  -JR     In Bed supine;call light within reach;encouraged to call for assist  -JR     Row Name 03/12/18 1214          Living Environment    Home Accessibility stairs within home  -JR       User Key  (r) = Recorded By, (t) = Taken By, (c) = Cosigned By    Initials Name Provider Type    JR Meagan Corona PT Physical Therapist    FLORES Ding RN Registered Nurse              PT Recommendation and Plan  Planned Therapy Interventions (PT Eval): balance training, strengthening, bed mobility training, transfer training, gait training, patient/family education  Therapy Frequency (PT Clinical Impression): daily  Plan of Care Reviewed With: patient  Outcome Summary: Patient participates well in PT evaluation and demonstrates  decreased strength and activity tolerance while maintaining her oxygen saturation level at or above 90%.  She is expected to benefit from additional PT services while hospitalized and upon discharge to inpatient rehab setting.    Plan of Care Reviewed With: patient          Outcome Measures     Row Name 03/12/18 1220 03/12/18 1214          How much help from another person do you currently need...    Turning from your back to your side while in flat bed without using bedrails?  -- 3  -JR     Moving from lying on back to sitting on the side of a flat bed without bedrails?  -- 3  -JR     Moving to and from a bed to a chair (including a wheelchair)?  -- 3  -JR     Standing up from a chair using your arms (e.g., wheelchair, bedside chair)?  -- 3  -JR     Climbing 3-5 steps with a railing?  -- 3  -JR     To walk in hospital room?  -- 3  -JR     AM-PAC 6 Clicks Score  -- 18  -JR        How much help from another is currently needed...    Putting on and taking off regular lower body clothing? 3  -SD  --     Bathing (including washing, rinsing, and drying) 3  -SD  --     Toileting (which includes using toilet bed pan or urinal) 3  -SD  --     Putting on and taking off regular upper body clothing 3  -SD  --     Taking care of personal grooming (such as brushing teeth) 3  -SD  --     Eating meals 4  -SD  --     Score 19  -SD  --        Functional Assessment    Outcome Measure Options AM-PAC 6 Clicks Daily Activity (OT)  -SD AM-PAC 6 Clicks Basic Mobility (PT)  -       User Key  (r) = Recorded By, (t) = Taken By, (c) = Cosigned By    Initials Name Provider Type    JR Meagan Corona, PT Physical Therapist    JENNIFER Gray OT Occupational Therapist           Time Calculation:         PT Charges     Row Name 03/13/18 2002             Time Calculation    Start Time 1214  -JR      PT Received On 03/13/18  -      PT Goal Re-Cert Due Date 03/23/18  -        User Key  (r) = Recorded By, (t) = Taken By, (c) = Cosigned By     Initials Name Provider Type    JR Meagan Corona, PT Physical Therapist          Therapy Charges for Today     Code Description Service Date Service Provider Modifiers Qty    70842105783 HC PT EVAL MOD COMPLEXITY 4 3/12/2018 Meagan Corona, PT GP 1          PT G-Codes  Outcome Measure Options: AM-PAC 6 Clicks Daily Activity (OT)      Meagan Corona, PT  3/13/2018

## 2018-03-14 NOTE — PROGRESS NOTES
Continued Stay Note   [place]     Patient Name: Charisma Cortez  MRN: [id number]  Today's Date: 3/14/2018    Admit Date: 3/6/2018          Discharge Plan    No documentation.             Discharge Codes     Row Name 03/14/18 1327       Discharge Codes    Discharge Codes 07  Left against medical advice or discontinued care        Expected Discharge Date and Time     Expected Discharge Date Expected Discharge Time    Mar 15, 2018             Cassandra Bryant

## 2018-06-04 ENCOUNTER — TRANSCRIBE ORDERS (OUTPATIENT)
Dept: MAMMOGRAPHY | Facility: HOSPITAL | Age: 58
End: 2018-06-04

## 2018-06-04 DIAGNOSIS — Z12.39 BREAST CANCER SCREENING: Primary | ICD-10-CM

## 2018-08-06 ENCOUNTER — OFFICE VISIT (OUTPATIENT)
Dept: INTERNAL MEDICINE | Facility: CLINIC | Age: 58
End: 2018-08-06

## 2018-08-06 VITALS
TEMPERATURE: 97.3 F | DIASTOLIC BLOOD PRESSURE: 70 MMHG | HEART RATE: 112 BPM | HEIGHT: 59 IN | WEIGHT: 119 LBS | SYSTOLIC BLOOD PRESSURE: 120 MMHG | BODY MASS INDEX: 23.99 KG/M2 | OXYGEN SATURATION: 100 %

## 2018-08-06 DIAGNOSIS — I10 ESSENTIAL HYPERTENSION: ICD-10-CM

## 2018-08-06 DIAGNOSIS — G40.909 SEIZURE DISORDER (HCC): ICD-10-CM

## 2018-08-06 DIAGNOSIS — Z79.4 TYPE 2 DIABETES MELLITUS WITH DIABETIC POLYNEUROPATHY, WITH LONG-TERM CURRENT USE OF INSULIN (HCC): ICD-10-CM

## 2018-08-06 DIAGNOSIS — F32.9 REACTIVE DEPRESSION: Primary | ICD-10-CM

## 2018-08-06 DIAGNOSIS — E11.9 TYPE 2 DIABETES MELLITUS WITHOUT COMPLICATION, WITHOUT LONG-TERM CURRENT USE OF INSULIN (HCC): ICD-10-CM

## 2018-08-06 DIAGNOSIS — E11.42 TYPE 2 DIABETES MELLITUS WITH DIABETIC POLYNEUROPATHY, WITH LONG-TERM CURRENT USE OF INSULIN (HCC): ICD-10-CM

## 2018-08-06 DIAGNOSIS — J41.0 SIMPLE CHRONIC BRONCHITIS (HCC): ICD-10-CM

## 2018-08-06 PROBLEM — A41.9 SEPSIS (HCC): Status: RESOLVED | Noted: 2018-03-06 | Resolved: 2018-08-06

## 2018-08-06 PROBLEM — D75.839 THROMBOCYTOSIS: Status: RESOLVED | Noted: 2017-03-23 | Resolved: 2018-08-06

## 2018-08-06 PROBLEM — N39.0 UTI (URINARY TRACT INFECTION): Status: RESOLVED | Noted: 2017-03-23 | Resolved: 2018-08-06

## 2018-08-06 PROBLEM — N17.9 AKI (ACUTE KIDNEY INJURY) (HCC): Status: RESOLVED | Noted: 2017-03-23 | Resolved: 2018-08-06

## 2018-08-06 PROBLEM — D72.829 LEUKOCYTOSIS: Status: RESOLVED | Noted: 2017-03-23 | Resolved: 2018-08-06

## 2018-08-06 PROBLEM — F19.10 POLYSUBSTANCE ABUSE (HCC): Status: RESOLVED | Noted: 2017-03-23 | Resolved: 2018-08-06

## 2018-08-06 PROBLEM — E87.6 HYPOKALEMIA: Status: RESOLVED | Noted: 2017-03-23 | Resolved: 2018-08-06

## 2018-08-06 PROCEDURE — 99406 BEHAV CHNG SMOKING 3-10 MIN: CPT | Performed by: INTERNAL MEDICINE

## 2018-08-06 PROCEDURE — 99214 OFFICE O/P EST MOD 30 MIN: CPT | Performed by: INTERNAL MEDICINE

## 2018-08-06 NOTE — PROGRESS NOTES
"Subjective  Charisma Cortez is a 57 y.o. female    HPI 57-year-old female with anxiety with depression has reflux esophagitis seizure disorder type II diabetes on metformin continues to smoke has poor compliance with her diet coming in to establish care here she denies chest pain or shortness of breath smokes about half pack per day no recent seizure activity    The following portions of the patient's history were reviewed and updated as appropriate: allergies, current medications, past family history, past medical history, past social history, past surgical history, and problem list.     Review of Systems   Constitutional: Negative.  Negative for activity change, appetite change, fatigue and fever.   HENT: Negative for congestion, ear discharge, ear pain and trouble swallowing.    Eyes: Negative for photophobia and visual disturbance.   Respiratory: Positive for cough. Negative for shortness of breath.    Cardiovascular: Negative for chest pain and palpitations.   Gastrointestinal: Negative for abdominal distention, abdominal pain, constipation, diarrhea, nausea and vomiting.   Endocrine: Negative.    Genitourinary: Negative for dysuria, hematuria and urgency.   Musculoskeletal: Positive for arthralgias. Negative for back pain, joint swelling and myalgias.   Skin: Negative for color change and rash.   Allergic/Immunologic: Negative.    Neurological: Negative for dizziness, weakness, light-headedness and headaches.   Hematological: Negative for adenopathy. Does not bruise/bleed easily.   Psychiatric/Behavioral: Positive for sleep disturbance. Negative for agitation, confusion and dysphoric mood. The patient is not nervous/anxious.        Visit Vitals  /70   Pulse 112   Temp 97.3 °F (36.3 °C)   Ht 149.9 cm (59\")   Wt 54 kg (119 lb)   SpO2 100%   BMI 24.04 kg/m²       Objective  Physical Exam   Constitutional: She is oriented to person, place, and time. She appears well-developed and well-nourished. No " distress.   HENT:   Nose: Nose normal.   Mouth/Throat: Oropharynx is clear and moist.   Eyes: Conjunctivae and EOM are normal. No scleral icterus.   Neck: No tracheal deviation present. No thyromegaly present.   Cardiovascular: Normal rate and regular rhythm.  Exam reveals no friction rub.    No murmur heard.  Pulmonary/Chest: No respiratory distress. She has no wheezes. She has no rales.   Abdominal: Soft. She exhibits no distension and no mass. There is no tenderness. There is no guarding.   Musculoskeletal: Normal range of motion. She exhibits deformity.   Lymphadenopathy:     She has no cervical adenopathy.   Neurological: She is alert and oriented to person, place, and time. She has normal reflexes. No cranial nerve deficit. Coordination normal.   Skin: Skin is warm and dry. No rash noted. No erythema.   Psychiatric: Her behavior is normal. Judgment and thought content normal.       Diagnoses and all orders for this visit:    Reactive depression stable counseled about sleep hygiene continue with sertraline and trazodone    Simple chronic bronchitis (CMS/[facility]). Counseled about smoking cessation discussed nicotine replacement therapy. She is currently willing to try nicotine gum. Time spent counseling 4 min    Essential hypertension stable with current meds    Seizure disorder (CMS/[facility]). Stable seizure-free tolerating Keppra    Type 2 diabetes mellitus without complication, without long-term current use of insulin (CMS/[facility]) counseled about diet continue with exercise program recent A1c showed good control will continue to follow this

## 2018-08-15 ENCOUNTER — HOSPITAL ENCOUNTER (EMERGENCY)
Facility: HOSPITAL | Age: 58
Discharge: HOME | End: 2018-08-15
Attending: STUDENT IN AN ORGANIZED HEALTH CARE EDUCATION/TRAINING PROGRAM | Admitting: STUDENT IN AN ORGANIZED HEALTH CARE EDUCATION/TRAINING PROGRAM

## 2018-08-15 VITALS
SYSTOLIC BLOOD PRESSURE: 110 MMHG | BODY MASS INDEX: 22.81 KG/M2 | RESPIRATION RATE: 18 BRPM | HEART RATE: 80 BPM | OXYGEN SATURATION: 99 % | WEIGHT: 116.2 LBS | TEMPERATURE: 98.4 F | DIASTOLIC BLOOD PRESSURE: 65 MMHG | HEIGHT: 60 IN

## 2018-08-15 DIAGNOSIS — F11.10 HEROIN ABUSE (HCC): ICD-10-CM

## 2018-08-15 DIAGNOSIS — E87.6 HYPOKALEMIA: ICD-10-CM

## 2018-08-15 DIAGNOSIS — R56.9 SEIZURE (HCC): Primary | ICD-10-CM

## 2018-08-15 LAB
ALBUMIN SERPL-MCNC: 4.5 G/DL (ref 3.5–5)
ALBUMIN/GLOB SERPL: 1.3 G/DL (ref 1–2)
ALP SERPL-CCNC: 113 U/L (ref 38–126)
ALT SERPL W P-5'-P-CCNC: 13 U/L (ref 13–69)
AMPHET+METHAMPHET UR QL: NEGATIVE
AMPHETAMINES UR QL: NEGATIVE
ANION GAP SERPL CALCULATED.3IONS-SCNC: 16 MMOL/L (ref 10–20)
AST SERPL-CCNC: 18 U/L (ref 15–46)
BARBITURATES UR QL SCN: NEGATIVE
BASOPHILS # BLD AUTO: 0.08 10*3/MM3 (ref 0–0.2)
BASOPHILS NFR BLD AUTO: 0.5 % (ref 0–2.5)
BENZODIAZ UR QL SCN: NEGATIVE
BILIRUB SERPL-MCNC: 0.2 MG/DL (ref 0.2–1.3)
BILIRUB UR QL STRIP: NEGATIVE
BUN BLD-MCNC: 18 MG/DL (ref 7–20)
BUN/CREAT SERPL: 13.8 (ref 7.1–23.5)
BUPRENORPHINE SERPL-MCNC: NEGATIVE NG/ML
CALCIUM SPEC-SCNC: 9.5 MG/DL (ref 8.4–10.2)
CANNABINOIDS SERPL QL: NEGATIVE
CHLORIDE SERPL-SCNC: 101 MMOL/L (ref 98–107)
CLARITY UR: CLEAR
CO2 SERPL-SCNC: 19 MMOL/L (ref 26–30)
COCAINE UR QL: NEGATIVE
COLOR UR: YELLOW
CREAT BLD-MCNC: 1.3 MG/DL (ref 0.6–1.3)
DEPRECATED RDW RBC AUTO: 42.5 FL (ref 37–54)
EOSINOPHIL # BLD AUTO: 0.27 10*3/MM3 (ref 0–0.7)
EOSINOPHIL NFR BLD AUTO: 1.7 % (ref 0–7)
ERYTHROCYTE [DISTWIDTH] IN BLOOD BY AUTOMATED COUNT: 13 % (ref 11.5–14.5)
GFR SERPL CREATININE-BSD FRML MDRD: 43 ML/MIN/1.73
GLOBULIN UR ELPH-MCNC: 3.6 GM/DL
GLUCOSE BLD-MCNC: 103 MG/DL (ref 74–98)
GLUCOSE UR STRIP-MCNC: NEGATIVE MG/DL
HCT VFR BLD AUTO: 35.3 % (ref 37–47)
HGB BLD-MCNC: 11.8 G/DL (ref 12–16)
HGB UR QL STRIP.AUTO: NEGATIVE
HOLD SPECIMEN: NORMAL
IMM GRANULOCYTES # BLD: 0.05 10*3/MM3 (ref 0–0.06)
IMM GRANULOCYTES NFR BLD: 0.3 % (ref 0–0.6)
KETONES UR QL STRIP: NEGATIVE
LEUKOCYTE ESTERASE UR QL STRIP.AUTO: NEGATIVE
LYMPHOCYTES # BLD AUTO: 6.01 10*3/MM3 (ref 0.6–3.4)
LYMPHOCYTES NFR BLD AUTO: 38.4 % (ref 10–50)
MCH RBC QN AUTO: 29.5 PG (ref 27–31)
MCHC RBC AUTO-ENTMCNC: 33.4 G/DL (ref 30–37)
MCV RBC AUTO: 88.3 FL (ref 81–99)
METHADONE UR QL SCN: NEGATIVE
MONOCYTES # BLD AUTO: 0.76 10*3/MM3 (ref 0–0.9)
MONOCYTES NFR BLD AUTO: 4.9 % (ref 0–12)
NEUTROPHILS # BLD AUTO: 8.5 10*3/MM3 (ref 2–6.9)
NEUTROPHILS NFR BLD AUTO: 54.2 % (ref 37–80)
NITRITE UR QL STRIP: NEGATIVE
NRBC BLD MANUAL-RTO: 0 /100 WBC (ref 0–0)
OPIATES UR QL: POSITIVE
OXYCODONE UR QL SCN: NEGATIVE
PCP UR QL SCN: NEGATIVE
PH UR STRIP.AUTO: 6.5 [PH] (ref 5–8)
PLATELET # BLD AUTO: 335 10*3/MM3 (ref 130–400)
PMV BLD AUTO: 9 FL (ref 6–12)
POTASSIUM BLD-SCNC: 3 MMOL/L (ref 3.5–5.1)
PROPOXYPH UR QL: NEGATIVE
PROT SERPL-MCNC: 8.1 G/DL (ref 6.3–8.2)
PROT UR QL STRIP: NEGATIVE
RBC # BLD AUTO: 4 10*6/MM3 (ref 4.2–5.4)
SODIUM BLD-SCNC: 133 MMOL/L (ref 137–145)
SP GR UR STRIP: 1.01 (ref 1–1.03)
TRICYCLICS UR QL SCN: NEGATIVE
UROBILINOGEN UR QL STRIP: NORMAL
WBC NRBC COR # BLD: 15.67 10*3/MM3 (ref 4.8–10.8)
WHOLE BLOOD HOLD SPECIMEN: NORMAL
WHOLE BLOOD HOLD SPECIMEN: NORMAL

## 2018-08-15 PROCEDURE — 85025 COMPLETE CBC W/AUTO DIFF WBC: CPT | Performed by: STUDENT IN AN ORGANIZED HEALTH CARE EDUCATION/TRAINING PROGRAM

## 2018-08-15 PROCEDURE — 81003 URINALYSIS AUTO W/O SCOPE: CPT | Performed by: STUDENT IN AN ORGANIZED HEALTH CARE EDUCATION/TRAINING PROGRAM

## 2018-08-15 PROCEDURE — 25010000002 LEVETIRACETAM IN NACL 0.82% 500 MG/100ML SOLUTION: Performed by: STUDENT IN AN ORGANIZED HEALTH CARE EDUCATION/TRAINING PROGRAM

## 2018-08-15 PROCEDURE — 96361 HYDRATE IV INFUSION ADD-ON: CPT

## 2018-08-15 PROCEDURE — 99284 EMERGENCY DEPT VISIT MOD MDM: CPT

## 2018-08-15 PROCEDURE — 80306 DRUG TEST PRSMV INSTRMNT: CPT | Performed by: STUDENT IN AN ORGANIZED HEALTH CARE EDUCATION/TRAINING PROGRAM

## 2018-08-15 PROCEDURE — 80053 COMPREHEN METABOLIC PANEL: CPT | Performed by: STUDENT IN AN ORGANIZED HEALTH CARE EDUCATION/TRAINING PROGRAM

## 2018-08-15 PROCEDURE — 96365 THER/PROPH/DIAG IV INF INIT: CPT

## 2018-08-15 PROCEDURE — P9612 CATHETERIZE FOR URINE SPEC: HCPCS

## 2018-08-15 PROCEDURE — 93005 ELECTROCARDIOGRAM TRACING: CPT | Performed by: STUDENT IN AN ORGANIZED HEALTH CARE EDUCATION/TRAINING PROGRAM

## 2018-08-15 PROCEDURE — 25010000002 LORAZEPAM PER 2 MG

## 2018-08-15 RX ORDER — POTASSIUM CHLORIDE 750 MG/1
40 CAPSULE, EXTENDED RELEASE ORAL ONCE
Status: COMPLETED | OUTPATIENT
Start: 2018-08-15 | End: 2018-08-15

## 2018-08-15 RX ORDER — LORAZEPAM 2 MG/ML
INJECTION INTRAMUSCULAR
Status: COMPLETED
Start: 2018-08-15 | End: 2018-08-15

## 2018-08-15 RX ORDER — LEVETIRACETAM 5 MG/ML
500 INJECTION INTRAVASCULAR EVERY 12 HOURS SCHEDULED
Status: DISCONTINUED | OUTPATIENT
Start: 2018-08-15 | End: 2018-08-16 | Stop reason: HOSPADM

## 2018-08-15 RX ORDER — SODIUM CHLORIDE 0.9 % (FLUSH) 0.9 %
10 SYRINGE (ML) INJECTION AS NEEDED
Status: DISCONTINUED | OUTPATIENT
Start: 2018-08-15 | End: 2018-08-16 | Stop reason: HOSPADM

## 2018-08-15 RX ADMIN — LORAZEPAM 2 MG: 2 INJECTION INTRAMUSCULAR; INTRAVENOUS at 20:59

## 2018-08-15 RX ADMIN — POTASSIUM CHLORIDE 40 MEQ: 750 CAPSULE, EXTENDED RELEASE ORAL at 23:23

## 2018-08-15 RX ADMIN — LEVETIRACETAM 500 MG: 5 INJECTION INTRAVENOUS at 21:38

## 2018-08-15 RX ADMIN — SODIUM CHLORIDE 1000 ML: 9 INJECTION, SOLUTION INTRAVENOUS at 21:22

## 2018-08-16 NOTE — ED PROVIDER NOTES
"Subjective   Patient is a 57-year-old female with a history of heroin abuse as well as epilepsy who presents after having 3 break through seizures today.  Patient's mother reports that her  called her to come get her after she had 3 seizures.  Seizure was described as full body shaking with urinary incontinence.  Patient states she is concerned this is due to withdrawal.  Reports uses heroin daily and her last use was this morning.  States she did not have a full dose of heroin so she also snorted half of a Percocet tablet.  Patient reports she has a headache as well as a bulging disks in her back and would like something for pain while here.            Review of Systems   All other systems reviewed and are negative.      Past Medical History:   Diagnosis Date   • Anxiety    • Arthritis    • C. difficile diarrhea 2018   • Cancer (CMS/HCC)     skin   • Cataracts, bilateral    • Chest pain    • COPD (chronic obstructive pulmonary disease) (CMS/HCC) 3/23/2017   • Depression 3/23/2017   • Diabetes mellitus (CMS/HCC)    • Emphysema lung (CMS/HCC)    • Epilepsy (CMS/HCC)     LAST SEIZURE 2017   • Headache    • High cholesterol    • History of brain shunt    • Hypertension    • Liver disease    • Low back pain    • Lumbosacral disc disease    • BAEZ (nonalcoholic steatohepatitis)    • Neurological disorder    • Osteoporosis    • Pneumonia    • Seizure disorder (CMS/HCC) 3/23/2017   • Wears glasses        Allergies   Allergen Reactions   • Keflex [Cephalexin] Hives   • Sulfa Antibiotics Rash       Past Surgical History:   Procedure Laterality Date   • APPENDECTOMY     • BRAIN SURGERY     • BREAST BIOPSY Right    •  SECTION  ,   • CHOLECYSTECTOMY     • COLON SURGERY      2\" REMOVED   • COLONOSCOPY     • CYST REMOVAL      brain, R front lobe - [facility]   • DENTAL PROCEDURE     • ENDOSCOPY     • EXCISION MASS TRUNK Left 2017    Procedure: EXCISION LEFT BUTTOCKS MASS;  Surgeon: Jennifer DANIEL" MD Mateo;  Location: [facility] OR;  Service:    • HYSTERECTOMY     • RECONSTRUCTION URETHROPLASTY     • TUBAL ABDOMINAL LIGATION         Family History   Problem Relation Age of Onset   • Osteoarthritis Other    • Osteoporosis Other    • Heart disease Other    • Asthma Other    • COPD Other    • Ulcers Other    • Diabetes Other    • Cancer Other    • Hyperlipidemia Other    • Liver disease Other    • No Known Problems Mother    • No Known Problems Father    • No Known Problems Sister    • No Known Problems Brother        Social History     Social History   • Marital status:      Social History Main Topics   • Smoking status: Current Every Day Smoker     Packs/day: 1.00     Years: 45.00     Types: Cigarettes   • Smokeless tobacco: Never Used   • Alcohol use No   • Drug use: Yes      Comment: herion   • Sexual activity: Defer     Other Topics Concern   • Not on file           Objective   Physical Exam   Nursing note and vitals reviewed.    GEN: No acute distress  Head: Normocephalic, atraumatic  Eyes: Pupils equal round reactive to light  ENT: Posterior pharynx normal in appearance, oral mucosa is moist, edentulous  Chest: Nontender to palpation  Cardiovascular: Regular rate  Lungs: Clear to auscultation bilaterally  Abdomen: Soft, nontender, nondistended, no peritoneal signs  Extremities: No edema, normal appearance  Neuro: GCS 15, alert and oriented ×3, cranial nerves II through XII grossly intact, strength 5 out of 5 bilaterally in upper and lower extremities, no dysmetria, no aphasia or dysarthria, sensation grossly intact in all 4 extremities to light touch  Psych: Mood and affect are appropriate    Procedures           ED Course  ED Course as of Aug 15 2350   Wed Aug 15, 2018   2153 EKG shows normal sinus rhythm.  Rate 83.  No significant ST segments.  Intervals are normal.  Normal EKG.  Interpreted by me.  [DT]      ED Course User Index  [DT] Trent Carrasquillo MD                  [facility]  Number of Diagnoses or  Management Options  Heroin abuse:   Hypokalemia:   Seizure (CMS/HCC):   Diagnosis management comments: I believe the patient's having breakthrough seizures due to medication noncompliance likely secondary to being high on heroin.  Patient insisted that she was withdrawing while here in no she was not tachycardic, nauseous, vomiting, sweating or having any other symptoms to suggest this.  She did as well insisted that we treat her with narcotic pain medications for at least 3 different complaints while here.  I did call her around from per mother and explained that we are not giving her narcotic medications for anything.  Patient did have one breakthrough seizure while here.  She was treated with IV Ativan.  Her laboratories do support her having seizures today.  She does have a metabolic acidosis.  As well as a seizure she had here her oxygen saturations did drop into the high 80s.  Patient was loaded with Keppra.      Greater than 30 minutes prescription of care time was spent by the attending physician excluding separately billable procedures       Amount and/or Complexity of Data Reviewed  Clinical lab tests: reviewed  Decide to obtain previous medical records or to obtain history from someone other than the patient: yes  Obtain history from someone other than the patient: yes  Review and summarize past medical records: yes          Final diagnoses:   Seizure (CMS/HCC)   Hypokalemia   Heroin abuse            Trent Carrasquillo MD  08/15/18 2074

## 2018-08-16 NOTE — ED NOTES
Patient alert and oriented at this time while attempting to start IV and asking for food stating she is hungry. Informed patient she cannot have anything to eat or drink at this time.     Claudia Cortez, JOSEP  08/15/18 3141

## 2022-12-29 NOTE — PROCEDURES
Procedure performed:    Arterial line placement.    Indication hypotension with inability to obtain consistent blood pressures but    Procedure access was obtained to the left common femoral artery.  A arterial line was inserted over a wire in the Seldinger technique.  The arterial line was sutured to the groin.    Conclusions:  Successful placement of an arterial line in the left common femoral artery by the bedside  
Speaking Coherently

## (undated) DEVICE — NDL HYPO ECLPS SFTY 25G 1 1/2IN

## (undated) DEVICE — MARKR SKIN W/RULR

## (undated) DEVICE — PAD GRND REM POLYHESIVE A/ DISP

## (undated) DEVICE — SUT VIC 3/0 SH 27IN J416H

## (undated) DEVICE — SYR LUERLOK 10CC

## (undated) DEVICE — SHEET,DRAPE,70X100,STERILE: Brand: MEDLINE

## (undated) DEVICE — SURGICAL SET UP PACK: Brand: MEDLINE INDUSTRIES, INC.

## (undated) DEVICE — DRSNG WND BORDR/ADHS NONADHR/GZ LF 4X4IN STRL

## (undated) DEVICE — GOWN,SIRUS,NON REINFRCD,LARGE,SET IN SL: Brand: MEDLINE

## (undated) DEVICE — PACK,SET UP,NO DRAPES: Brand: MEDLINE

## (undated) DEVICE — 1000 S-DRAPE TOWEL DRAPE 10/BX: Brand: STERI-DRAPE™

## (undated) DEVICE — GOWN,PREVENTION PLUS,XLARGE,STERILE: Brand: MEDLINE

## (undated) DEVICE — TOWEL,OR,DSP,ST,BLUE,STD,4/PK,20PK/CS: Brand: MEDLINE